# Patient Record
Sex: MALE | Race: BLACK OR AFRICAN AMERICAN | NOT HISPANIC OR LATINO | Employment: OTHER | URBAN - METROPOLITAN AREA
[De-identification: names, ages, dates, MRNs, and addresses within clinical notes are randomized per-mention and may not be internally consistent; named-entity substitution may affect disease eponyms.]

---

## 2023-03-14 DIAGNOSIS — K59.00 CONSTIPATION, UNSPECIFIED CONSTIPATION TYPE: ICD-10-CM

## 2023-03-14 DIAGNOSIS — K21.9 GASTROESOPHAGEAL REFLUX DISEASE WITHOUT ESOPHAGITIS: Primary | ICD-10-CM

## 2023-03-14 RX ORDER — POLYETHYLENE GLYCOL 3350 17 G/17G
17 POWDER, FOR SOLUTION ORAL ONCE
Qty: 17 G | Refills: 0 | Status: SHIPPED | OUTPATIENT
Start: 2023-03-14 | End: 2023-03-14

## 2023-03-14 RX ORDER — POLYETHYLENE GLYCOL 3350 17 G/17G
17 POWDER, FOR SOLUTION ORAL ONCE
COMMUNITY
End: 2023-03-14 | Stop reason: SDUPTHER

## 2023-03-14 RX ORDER — SIMETHICONE 125 MG
125 CAPSULE ORAL ONCE
Qty: 1 CAPSULE | Refills: 0 | Status: SHIPPED | OUTPATIENT
Start: 2023-03-14 | End: 2023-03-14

## 2023-03-14 RX ORDER — SIMETHICONE 125 MG
125 CAPSULE ORAL ONCE
COMMUNITY
End: 2023-03-14 | Stop reason: SDUPTHER

## 2023-03-27 ENCOUNTER — PATIENT OUTREACH (OUTPATIENT)
Dept: PRIMARY CARE | Facility: CLINIC | Age: 43
End: 2023-03-27
Payer: MEDICAID

## 2023-03-27 RX ORDER — POLYETHYLENE GLYCOL 3350 17 G/17G
17 POWDER, FOR SOLUTION ORAL DAILY
COMMUNITY
Start: 2017-05-11 | End: 2023-04-10 | Stop reason: SDUPTHER

## 2023-03-27 RX ORDER — DOCUSATE SODIUM 50 MG/5ML
10 LIQUID ORAL DAILY
COMMUNITY
End: 2023-05-10 | Stop reason: ALTCHOICE

## 2023-03-27 RX ORDER — NAPROXEN SODIUM 220 MG/1
81 TABLET, FILM COATED ORAL DAILY
COMMUNITY
Start: 2023-03-02 | End: 2023-06-21 | Stop reason: SDUPTHER

## 2023-03-27 RX ORDER — PHENYTOIN 125 MG/5ML
4 SUSPENSION ORAL 3 TIMES DAILY
COMMUNITY
Start: 2021-10-19 | End: 2023-09-15 | Stop reason: DRUGHIGH

## 2023-03-27 RX ORDER — HYDROCORTISONE 25 MG/G
OINTMENT TOPICAL 2 TIMES DAILY PRN
COMMUNITY
Start: 2016-01-13 | End: 2024-04-17 | Stop reason: SDUPTHER

## 2023-03-27 RX ORDER — ARTIFICIAL TEARS 1; 2; 3 MG/ML; MG/ML; MG/ML
1-2 SOLUTION/ DROPS OPHTHALMIC 2 TIMES DAILY
COMMUNITY

## 2023-03-27 RX ORDER — MEROPENEM 1 G/1
1 INJECTION, POWDER, FOR SOLUTION INTRAVENOUS EVERY 8 HOURS
Qty: 15 G | Refills: 0 | COMMUNITY
Start: 2023-03-23 | End: 2023-03-28

## 2023-03-27 RX ORDER — BACLOFEN 10 MG/1
1 TABLET ORAL NIGHTLY
COMMUNITY
Start: 2017-02-17 | End: 2024-04-08 | Stop reason: ALTCHOICE

## 2023-03-27 RX ORDER — ALPRAZOLAM 0.5 MG/1
0.5 TABLET ORAL AS NEEDED
COMMUNITY

## 2023-03-27 RX ORDER — SELENIUM SULFIDE 2.5 MG/100ML
1 LOTION TOPICAL
COMMUNITY

## 2023-03-27 RX ORDER — VANCOMYCIN HYDROCHLORIDE 1.25 G/25ML
1.25 INJECTION, POWDER, LYOPHILIZED, FOR SOLUTION INTRAVENOUS EVERY 8 HOURS
COMMUNITY
Start: 2023-03-23 | End: 2023-03-28

## 2023-03-27 RX ORDER — DEXTROMETHORPHAN/PSEUDOEPHED 2.5-7.5/.8
2 DROPS ORAL
COMMUNITY

## 2023-03-27 RX ORDER — CARBAMAZEPINE 200 MG/10ML
300 SUSPENSION ORAL 2 TIMES DAILY
COMMUNITY
Start: 2020-12-07 | End: 2024-02-02 | Stop reason: SDUPTHER

## 2023-03-27 RX ORDER — LEVETIRACETAM 100 MG/ML
20 SOLUTION ORAL 2 TIMES DAILY
COMMUNITY
Start: 2020-12-07 | End: 2024-01-30 | Stop reason: SDUPTHER

## 2023-03-27 RX ORDER — CARBAMAZEPINE 200 MG/10ML
150 SUSPENSION ORAL DAILY
COMMUNITY
End: 2024-02-02 | Stop reason: SDUPTHER

## 2023-03-27 RX ORDER — ACETAMINOPHEN 160 MG/5ML
20 LIQUID ORAL EVERY 6 HOURS
COMMUNITY

## 2023-03-27 RX ORDER — BACLOFEN 20 MG/1
20 TABLET ORAL 3 TIMES DAILY
COMMUNITY
End: 2024-01-16

## 2023-03-27 RX ORDER — DIAZEPAM 10 MG/2ML
5 INJECTION INTRAMUSCULAR AS NEEDED
COMMUNITY
Start: 2018-02-27 | End: 2024-02-02 | Stop reason: WASHOUT

## 2023-03-27 NOTE — PROGRESS NOTES
Discharge Facility: [facility]  Discharge Diagnosis: Pneumonia, Multiple sclerosis, seizure   Admission Date: 3/10/2023  Discharge Date: 3/24/2023    PCP appt:: Caregiver to schedule. Transportation must be coordinated.     Hospital Course:   Israel Edgar is a 42 year-old -American male with a history of end-stage SPMS (nonverbal, spastic quadriplegia, chronic little, PEG) c/b intractable bihemispheric epilepsy who is transferred from [facility] ED to [facility] for breakthrough vs provoked seizure. Patient also found COVID-positive s/p Remdesivir x5d (3/10-3/14), Dexamethasone x10d (3/10-3/19). Patient transferred to [facility] on 3/9 for TAMMIE monitoring and cvEEG.  Since transfer to [facility], EEG negative for seizures. Suspect breakthrough seizure i/s/o COVID infection. Patient developed new O2 requirement c/f PNA. Sputum Cx grew Pseudomonas and MRSA. ID consulted, started on Vanc/Jazzy. CTPE and DVT US negative. From epilepsy stand point, patient likely had breakthrough seizure i/s/o COVID infection but has been seizure-free since 3/10. Patient was transferred to the medicine team due to pulmonary findings.     Engagement  Call Start Time: 1230 (3/27/2023 12:41 PM)    Medications  Medications reviewed with patient/caregiver?: Yes (Patient's mother, primary caregiver, verbalized understanding of medication regime.) (3/27/2023 12:41 PM)  Is the patient having any side effects they believe may be caused by any medication additions or changes?: No (3/27/2023 12:41 PM)  Does the patient have all medications ordered at discharge?: No (3/27/2023 12:41 PM)  What is keeping the patient from filling the prescriptions?: Lost script/didn't receive (3/27/2023 12:41 PM)  Prescription Comments: Patient did not receive enough heparin flushes for administration of IV antibiotics. Only has one flush left. Patient's mother called [facility] and someone said they would work on it. (3/27/2023 12:41 PM)  Is the patient taking all medications as  directed (includes completed medication regime)?: Yes (3/27/2023 12:41 PM)  Care Management Interventions: Nurse notified pharmacy for assistance (3/27/2023 12:41 PM)  Medication Comments: Called [facility] pharmacy. Heparin flushes should be delivered by 3 pm today. (3/27/2023 12:41 PM)    Appointments  Does the patient have a primary care provider?: Yes (3/27/2023 12:41 PM)  Has the patient kept scheduled appointments due by today?: Not applicable (3/27/2023 12:41 PM)  Care Management Interventions: Advised patient to keep appointment (3/27/2023 12:41 PM)    Self Management  What is the home health agency?: [facility] (3/27/2023 12:41 PM)  Has home health visited the patient within 72 hours of discharge?: Yes (3/27/2023 12:41 PM)  What Durable Medical Equipment (DME) was ordered?: N/A- no needs at this time. (3/27/2023 12:41 PM)    Patient Teaching  Does the patient have access to their discharge instructions?: Yes (Mother, primary caregiver, verbalized understanding of instructions.) (3/27/2023 12:41 PM)  What is the patient's perception of their health status since discharge?: Improving (3/27/2023 12:41 PM)  Is the patient/caregiver able to teach back the hierarchy of who to call/visit for symptoms/problems? PCP, Specialist, Home Health nurse, Urgent Care, ED, 911: Yes (3/27/2023 12:41 PM)

## 2023-03-28 LAB
ALANINE AMINOTRANSFERASE (SGPT) (U/L) IN SER/PLAS: 18 U/L (ref 10–52)
ALBUMIN (G/DL) IN SER/PLAS: 3.3 G/DL (ref 3.4–5)
ALKALINE PHOSPHATASE (U/L) IN SER/PLAS: 71 U/L (ref 33–120)
ANION GAP IN SER/PLAS: 13 MMOL/L (ref 10–20)
ASPARTATE AMINOTRANSFERASE (SGOT) (U/L) IN SER/PLAS: 24 U/L (ref 9–39)
BILIRUBIN TOTAL (MG/DL) IN SER/PLAS: 0.4 MG/DL (ref 0–1.2)
CALCIUM (MG/DL) IN SER/PLAS: 7.9 MG/DL (ref 8.6–10.3)
CARBON DIOXIDE, TOTAL (MMOL/L) IN SER/PLAS: 24 MMOL/L (ref 21–32)
CHLORIDE (MMOL/L) IN SER/PLAS: 102 MMOL/L (ref 98–107)
CREATININE (MG/DL) IN SER/PLAS: 0.39 MG/DL (ref 0.5–1.3)
ERYTHROCYTE DISTRIBUTION WIDTH (RATIO) BY AUTOMATED COUNT: 12.6 % (ref 11.5–14.5)
ERYTHROCYTE MEAN CORPUSCULAR HEMOGLOBIN CONCENTRATION (G/DL) BY AUTOMATED: 32.4 G/DL (ref 32–36)
ERYTHROCYTE MEAN CORPUSCULAR VOLUME (FL) BY AUTOMATED COUNT: 97 FL (ref 80–100)
ERYTHROCYTES (10*6/UL) IN BLOOD BY AUTOMATED COUNT: 4.25 X10E12/L (ref 4.5–5.9)
GFR MALE: >90 ML/MIN/1.73M2
GLUCOSE (MG/DL) IN SER/PLAS: 69 MG/DL (ref 74–99)
HEMATOCRIT (%) IN BLOOD BY AUTOMATED COUNT: 41.3 % (ref 41–52)
HEMOGLOBIN (G/DL) IN BLOOD: 13.4 G/DL (ref 13.5–17.5)
LEUKOCYTES (10*3/UL) IN BLOOD BY AUTOMATED COUNT: 3.2 X10E9/L (ref 4.4–11.3)
PLATELETS (10*3/UL) IN BLOOD AUTOMATED COUNT: 226 X10E9/L (ref 150–450)
POTASSIUM (MMOL/L) IN SER/PLAS: 5 MMOL/L (ref 3.5–5.3)
PROTEIN TOTAL: 6.4 G/DL (ref 6.4–8.2)
SODIUM (MMOL/L) IN SER/PLAS: 134 MMOL/L (ref 136–145)
UREA NITROGEN (MG/DL) IN SER/PLAS: 8 MG/DL (ref 6–23)

## 2023-03-29 DIAGNOSIS — D72.819 LEUKOPENIA, UNSPECIFIED TYPE: Primary | ICD-10-CM

## 2023-03-30 NOTE — PROGRESS NOTES
Israel Edgar   1980   [id number]   [phone]   Suellen Talbert MD    Provider reach out to me for ACO assistance to place referral for  Home Care because pt is home bond and need lab draw. Thank you.

## 2023-04-13 ENCOUNTER — PATIENT OUTREACH (OUTPATIENT)
Dept: PRIMARY CARE | Facility: CLINIC | Age: 43
End: 2023-04-13
Payer: MEDICAID

## 2023-04-13 PROBLEM — G40.909 EPILEPSY (MULTI): Status: ACTIVE | Noted: 2023-04-13

## 2023-04-13 NOTE — PROGRESS NOTES
Call made to follow up after hospitalization for pneumonia, multiple sclerosis and seizure.       At time of outreach call the patient's mother, primary caregiver, feels as if the patient's condition has improved. His IV antibiotics are complete and [facility] has discharged. Last Friday, she took the patient to see the dentist to get fitted for a mouth guard. Due to his spasms, he was biting his lip and causing it to bleed. The patient has one open area on his buttocks that is being dressed. The caregiver states she is trying her best to keep the patient turned and off of the wound. The patient's peg site irritation has also improved. Though there was a scant amount of blood on the patient's dressing today.     The patient's mother was unable to schedule a hospital follow up. She is requesting assistance getting levetiracetam 100 mg/ml refilled. She states [place]. Message sent in [employer] to Deb Mora CNP, neurology, requesting a refill. Received a response from Deb stating the Rx was called in on 4/11/2023. Caregiver updated.    Offered CCM services to caregiver. Caregiver agreeable. Will continue to follow until a Care Manager is assigned to the case.

## 2023-05-03 ENCOUNTER — PATIENT OUTREACH (OUTPATIENT)
Dept: PRIMARY CARE | Facility: CLINIC | Age: 43
End: 2023-05-03
Payer: MEDICAID

## 2023-05-04 NOTE — PROGRESS NOTES
Call regarding patient's ER visits on 4/28/2023 and 5/1/2023. Spoke with mother, Vee, patient's primary caregiver. Vee states she initially took the patient to ER for concerns of a possible UTI. The patient has been sleeping more and she noted blood in his urine. The patient was negative for a UTI and released to home. Vee then received notification from the hospital the patient was Covid positive. Still concerned the patient was not acting like himself she returned to the ER with the patient. She was informed the patient was more than likely still testing positive from his Covid infection in March. She was also told the fatigue may be related to this as well. The patient released to home.     At this time, the patient's condition is about the same. He continues to sleep most of the time. No further blood noted in his urine. The wound on his sacral area has healed. The caregiver declines a PCP FU at this time. Advised to call with questions or concerns.

## 2023-05-10 ENCOUNTER — TELEMEDICINE (OUTPATIENT)
Dept: PRIMARY CARE | Facility: CLINIC | Age: 43
End: 2023-05-10
Payer: MEDICAID

## 2023-05-10 DIAGNOSIS — K59.00 CONSTIPATION, UNSPECIFIED CONSTIPATION TYPE: ICD-10-CM

## 2023-05-10 DIAGNOSIS — G40.919 INTRACTABLE EPILEPSY WITHOUT STATUS EPILEPTICUS, UNSPECIFIED EPILEPSY TYPE (MULTI): Primary | ICD-10-CM

## 2023-05-10 PROCEDURE — 99442 PR PHYS/QHP TELEPHONE EVALUATION 11-20 MIN: CPT | Performed by: STUDENT IN AN ORGANIZED HEALTH CARE EDUCATION/TRAINING PROGRAM

## 2023-05-10 RX ORDER — DOCUSATE SODIUM 50 MG/5ML
100 LIQUID ORAL DAILY
Qty: 100 ML | Refills: 5 | Status: SHIPPED | OUTPATIENT
Start: 2023-05-10 | End: 2023-06-21 | Stop reason: SDUPTHER

## 2023-05-10 RX ORDER — POLYETHYLENE GLYCOL 3350
17 POWDER (GRAM) MISCELLANEOUS DAILY
Qty: 500 G | Refills: 5 | Status: SHIPPED | OUTPATIENT
Start: 2023-05-10 | End: 2023-06-09

## 2023-05-10 NOTE — PROGRESS NOTES
This is a telephonic conversation between me and Jesuss mom Benji has.  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  Subjective   Patient ID: Israel Edgar is a 42 y.o. male who presents for follow up  Virtual or Telephone Consent    A telephone visit (audio only) between the patient (at the originating site) and the provider (at the distant site) was utilized to provide this telehealth service.   Verbal consent was requested and obtained from Mrs. Edgar ( Israel's mom)on this date, 05/10/23 for a telehealth visit.   HPI  Mom reports patient is off Vanco and meropenem.  Has been doing okay.  But does endorse disease constipation and requests refill of docusate and polyethylene glycol.  She also reports he has been having more frequent seizures and is under neurologist monitoring.  Requests refill for Versed nasal spray until she sees a neurologist or gets a hold of them.  Review of Systems   Constitutional:  Negative for activity change and fever.   HENT:  Negative for congestion.    Respiratory:  Negative for cough, shortness of breath and wheezing.    Cardiovascular:  Negative for chest pain and leg swelling.   Gastrointestinal:  Negative for abdominal pain, constipation, nausea and vomiting.   Endocrine: Negative for cold intolerance.   Genitourinary:  Negative for dysuria, hematuria and urgency.   Neurological:  Negative for dizziness, speech difficulty, weakness and numbness.   Psychiatric/Behavioral:  Negative for self-injury and suicidal ideas.          Physical Exam    Assessment/Plan          Problem List Items Addressed This Visit          Nervous    Epilepsy (CMS/HCC) - Primary    Relevant Medications    nasal spray midazolam (Versed) 5 mg/spray (0.1 mL) spray,non-aerosol     Other Visit Diagnoses       Constipation, unspecified constipation type        Relevant Medications    polyethylene glycol 3350,bulk, powder    docusate sodium (Colace) 50 mg/5 mL oral liquid

## 2023-05-24 ASSESSMENT — ENCOUNTER SYMPTOMS
CONSTIPATION: 0
ACTIVITY CHANGE: 0
VOMITING: 0
DIZZINESS: 0
SPEECH DIFFICULTY: 0
WEAKNESS: 0
COUGH: 0
FEVER: 0
DYSURIA: 0
SHORTNESS OF BREATH: 0
NUMBNESS: 0
ABDOMINAL PAIN: 0
WHEEZING: 0
NAUSEA: 0
HEMATURIA: 0

## 2023-05-31 ENCOUNTER — TELEPHONE (OUTPATIENT)
Dept: PRIMARY CARE | Facility: CLINIC | Age: 43
End: 2023-05-31
Payer: MEDICAID

## 2023-06-02 ENCOUNTER — PATIENT OUTREACH (OUTPATIENT)
Dept: PRIMARY CARE | Facility: CLINIC | Age: 43
End: 2023-06-02
Payer: MEDICAID

## 2023-06-02 NOTE — PROGRESS NOTES
Call placed regarding two month post discharge follow-up. Per patient's mother, the patient's condition is at baseline at this time. The patient has an appt for a Botox injection on 6/8/2023 to hopefully relieve some of the stiffness and spacticity in his left arm. The patient also has a podiatry appt on 6/15/2023 for an ingrown toenail. A new wheelchair is in process of being ordered to allow the patient to have time out of bed. Caregiver has TCM contact info for assistance as needed.

## 2023-06-09 ENCOUNTER — PATIENT OUTREACH (OUTPATIENT)
Dept: PRIMARY CARE | Facility: CLINIC | Age: 43
End: 2023-06-09
Payer: MEDICAID

## 2023-06-09 NOTE — PROGRESS NOTES
Received phone call from Vee requesting a home care referral for Israel. He needs PT/OT evaluations to assist in obtaining his custom wheelchair. Offered freedom of choice for home care. Vee stated [facility] as preferred agency. Request sent to Dr. Ken for home care referral.

## 2023-06-13 DIAGNOSIS — G40.909 NONINTRACTABLE EPILEPSY WITHOUT STATUS EPILEPTICUS, UNSPECIFIED EPILEPSY TYPE (MULTI): Primary | ICD-10-CM

## 2023-06-13 DIAGNOSIS — G93.1 ANOXIC BRAIN DAMAGE (MULTI): ICD-10-CM

## 2023-06-14 ENCOUNTER — TELEPHONE (OUTPATIENT)
Dept: PRIMARY CARE | Facility: CLINIC | Age: 43
End: 2023-06-14
Payer: MEDICAID

## 2023-06-18 DIAGNOSIS — G40.909 NONINTRACTABLE EPILEPSY WITHOUT STATUS EPILEPTICUS, UNSPECIFIED EPILEPSY TYPE (MULTI): Primary | ICD-10-CM

## 2023-06-21 ENCOUNTER — TELEMEDICINE (OUTPATIENT)
Dept: PRIMARY CARE | Facility: CLINIC | Age: 43
End: 2023-06-21
Payer: MEDICAID

## 2023-06-21 DIAGNOSIS — K59.00 CONSTIPATION, UNSPECIFIED CONSTIPATION TYPE: ICD-10-CM

## 2023-06-21 DIAGNOSIS — G40.901 NONINTRACTABLE EPILEPSY WITH STATUS EPILEPTICUS, UNSPECIFIED EPILEPSY TYPE (MULTI): Primary | ICD-10-CM

## 2023-06-21 DIAGNOSIS — G93.1 ANOXIC ENCEPHALOPATHY (MULTI): ICD-10-CM

## 2023-06-21 PROCEDURE — 99213 OFFICE O/P EST LOW 20 MIN: CPT | Performed by: STUDENT IN AN ORGANIZED HEALTH CARE EDUCATION/TRAINING PROGRAM

## 2023-06-21 RX ORDER — DOCUSATE SODIUM 50 MG/5ML
100 LIQUID ORAL DAILY
Qty: 100 ML | Refills: 11 | Status: SHIPPED | OUTPATIENT
Start: 2023-06-21 | End: 2023-07-25 | Stop reason: SDUPTHER

## 2023-06-21 RX ORDER — NAPROXEN SODIUM 220 MG/1
81 TABLET, FILM COATED ORAL DAILY
Qty: 30 TABLET | Refills: 11 | Status: SHIPPED | OUTPATIENT
Start: 2023-06-21 | End: 2023-08-24 | Stop reason: SDUPTHER

## 2023-06-21 NOTE — PROGRESS NOTES
Virtual or Telephone Consent    An interactive audio and video telecommunication system which permits real time communications between the patient (at the originating site) and provider (at the distant site) was utilized to provide this telehealth service.   Verbal consent was requested and obtained for Israel  from mother  on this date, 06/26/23, for a telehealth visit.       Subjective   Patient ID: Israel Edgar is a 42 y.o. male who presents for No chief complaint on file..  HPI  Israel is 42 years old with s/p anoxic encephalopathy, nonverbal, mostly alert but sometimes drowsy, is being seen via virtual visit along with his mother.  Benji is doing mostly okay, seeing his epilepsy specialist.  Mother reports he is doing fairly well, at baseline, requests refill of several medications along with medical supplies.  Also reports he has been seeing his neurologist who is providing Botox injections.  Past Medical History:   Diagnosis Date    Abnormal findings on diagnostic imaging of other specified body structures 10/11/2017    Nonspecific abnormal findings on radiological and examination of intrathoracic organs    Acute embolism and thrombosis of deep veins of unspecified upper extremity (CMS/HCC)     Acute deep vein thrombosis of arm    Acute upper respiratory infection, unspecified 12/05/2014    Acute upper respiratory infection    Impacted cerumen, bilateral 09/01/2017    Impacted cerumen of both ears    Nausea with vomiting, unspecified 09/09/2016    Nausea and vomiting in adult    Other conditions influencing health status     Acute Hypoxic Encephalopathy    Other conditions influencing health status     Schizophrenia    Other conditions influencing health status     Lumbar Puncture Traumatic Tap    Other muscle spasm     Muscle spasm    Paraplegia, unspecified (CMS/HCC)     Paraparesis of both lower limbs    Personal history of other diseases of urinary system 03/21/2017    History of bladder stone    Personal  history of other specified conditions 01/13/2017    History of urinary incontinence    Personal history of other specified conditions     History of headache    Personal history of urinary (tract) infections 01/31/2017    History of urinary tract infection    Personal history of urinary calculi 09/08/2015    History of renal calculi    Personal history of urinary calculi 03/07/2016    History of renal calculi    Unspecified visual loss     Vision problems      Past Surgical History:   Procedure Laterality Date    ILEOSTOMY  08/05/2015    Ileostomy    OTHER SURGICAL HISTORY  08/05/2015    Tracheostomy    OTHER SURGICAL HISTORY  09/16/2016    Feeding Tube      No family history on file.   Allergies   Allergen Reactions    Adhesive Tape-Silicones Other and Unknown     Adhesive Bandages MISC    Per NH record    Albuterol Unknown    Budesonide Unknown     pulmicort nebulizer    Famotidine Other    Lacosamide Unknown     mother states lethargy, will not allow this medication    Latex Other    Lorazepam Unknown     Ativan TABS    Other Other and Unknown    Pantoprazole Unknown     diarrhea, hypokalemia, hypomagnesium    Piperacillin-Tazobactam Unknown     erythema multiforme    Ranitidine Unknown    Sulfamethoxazole-Trimethoprim Unknown    Vancomycin Other     Per NH list    Ipratropium Bromide Rash    Nitrofurantoin Rash     facial rash and reddness              Review of Systems   Unable to perform ROS: Patient nonverbal   Patient's mother reports he has been breathing okay, no changes in his baseline mental status, no fever, no change in breathing pattern, having regular bowel movements, has a change Boss bag.  No signs of inflammation reported at PEG site.  No skin breaks or sacral ulcers reported      Physical Exam  Constitutional:       Comments: Physical examination virtual visit is limited.   HENT:      Head: Normocephalic.      Nose: Nose normal.   Pulmonary:      Effort: Pulmonary effort is normal.    Neurological:      Mental Status: He is alert.         Assessment/Plan     Problem List Items Addressed This Visit       Epilepsy (CMS/HCC) - Primary    Relevant Medications    aspirin 81 mg chewable tablet    Other Relevant Orders    Referral to Home Care     Other Visit Diagnoses       Anoxic encephalopathy (CMS/[facility])        Relevant Medications    aspirin 81 mg chewable tablet    Other Relevant Orders    Referral to Home Care    Constipation, unspecified constipation type        Relevant Medications    docusate sodium (Colace) 50 mg/5 mL oral liquid        Given the anoxic encephalopathy, history of epilepsy, homebound status, I believe Israel will benefit from home health care, PT/OT.  Patient to continue taking his medications.  To continue follow-up with neurology.

## 2023-06-26 ENCOUNTER — PATIENT OUTREACH (OUTPATIENT)
Dept: PRIMARY CARE | Facility: CLINIC | Age: 43
End: 2023-06-26
Payer: MEDICAID

## 2023-06-26 NOTE — PROGRESS NOTES
Call placed regarding for 90 day post discharge wrap up. At the time of call, the pt's mother and primary caregiver states she is taking the pt to the ER to be evaluated for a UTI. She said the patient is having watery diarrhea and suspects something is going on. Caregiver also upset because C referral was declined. Called [facility] and more details for referral needed. Sent a message to PCP and clinical pool requesting assistance. Will keep case open until issue resolved.

## 2023-06-27 ENCOUNTER — PATIENT OUTREACH (OUTPATIENT)
Dept: PRIMARY CARE | Facility: CLINIC | Age: 43
End: 2023-06-27
Payer: MEDICAID

## 2023-06-27 NOTE — PROGRESS NOTES
Israel Edgar   1980   [id number]   [phone]   Suellen Talbert MD    I called pt and left voice message in regards to  Homecare and have they notified pt in regards to assistance. I will follow up with  Homecare to check the status and follow up with pt with an update. Thank you.

## 2023-06-29 PROBLEM — Z93.1 GASTROSTOMY STATUS (MULTI): Status: ACTIVE | Noted: 2022-08-19

## 2023-06-29 PROBLEM — N31.9 NEUROGENIC BLADDER: Status: ACTIVE | Noted: 2023-06-29

## 2023-06-29 PROBLEM — G82.50 QUADRIPLEGIA, UNSPECIFIED (MULTI): Status: ACTIVE | Noted: 2022-08-19

## 2023-06-29 PROBLEM — L21.9 SEBORRHEIC DERMATITIS: Status: ACTIVE | Noted: 2023-06-29

## 2023-06-29 PROBLEM — G35 MULTIPLE SCLEROSIS (MULTI): Status: ACTIVE | Noted: 2022-08-19

## 2023-06-29 PROBLEM — R13.10 DYSPHAGIA: Status: ACTIVE | Noted: 2022-08-19

## 2023-06-29 PROBLEM — Z97.8 CHRONIC INDWELLING FOLEY CATHETER: Status: ACTIVE | Noted: 2023-06-29

## 2023-07-19 ENCOUNTER — PATIENT OUTREACH (OUTPATIENT)
Dept: PRIMARY CARE | Facility: CLINIC | Age: 43
End: 2023-07-19
Payer: MEDICAID

## 2023-07-19 DIAGNOSIS — K59.81 OGILVIE SYNDROME: ICD-10-CM

## 2023-07-19 RX ORDER — CHOLECALCIFEROL (VITAMIN D3) 10(400)/ML
10 DROPS ORAL DAILY
COMMUNITY
Start: 2023-03-07 | End: 2024-03-28 | Stop reason: SDUPTHER

## 2023-07-19 RX ORDER — POLYETHYLENE GLYCOL 3350 17 G/17G
17 POWDER, FOR SOLUTION ORAL
COMMUNITY
Start: 2017-05-11 | End: 2024-04-07 | Stop reason: ALTCHOICE

## 2023-07-19 RX ORDER — BACITRACIN ZINC AND POLYMYXIN B SULFATE 500; 10000 [USP'U]/G; [USP'U]/G
OINTMENT OPHTHALMIC
COMMUNITY
Start: 2020-08-27 | End: 2024-02-16 | Stop reason: ENTERED-IN-ERROR

## 2023-07-19 RX ORDER — ASPIRIN 81 MG
TABLET, DELAYED RELEASE (ENTERIC COATED) ORAL
COMMUNITY
Start: 2016-08-23 | End: 2024-02-16 | Stop reason: WASHOUT

## 2023-07-19 NOTE — PROGRESS NOTES
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 Brenna Scott  [facility] Encounter and Summary: Linked   See discharge assessment below for further details    Medications  Medications reviewed with patient/caregiver?: Yes (Reviewed with mother. No med changes made.) (7/19/2023  2:14 PM)  Is the patient having any side effects they believe may be caused by any medication additions or changes?: No (7/19/2023  2:14 PM)  Does the patient have all medications ordered at discharge?: Yes (7/19/2023  2:14 PM)  Care Management Interventions: No intervention needed (7/19/2023  2:14 PM)  Is the patient taking all medications as directed (includes completed medication regime)?: Yes (7/19/2023  2:14 PM)    Appointments  Does the patient have a primary care provider?: Yes (7/19/2023  2:14 PM)  Care Management Interventions: -- (Message sent to office to schedule a virtual hospital follow up.) (7/19/2023  2:14 PM)  Has the patient kept scheduled appointments due by today?: Not applicable (7/19/2023  2:14 PM)    Self Management  What is the home health agency?: [facility] (7/19/2023  2:14 PM)  Has home health visited the patient within 72 hours of discharge?: Yes (7/19/2023  2:14 PM)  What Durable Medical Equipment (DME) was ordered?: N/A (7/19/2023  2:14 PM)    Patient Teaching  Does the patient have access to their discharge instructions?: Yes (7/19/2023  2:14 PM)  What is the patient's perception of their health status since discharge?: Returned to baseline/stable (Per mother, pt is doing okay at this time. Peg tube is functioning well. Contiunues to monitor sacral area for breakdown. [facility] therapy has resumed.) (7/19/2023  2:14 PM)    Wrap Up  Wrap Up Additional  Comments: Presented to the ED complaining of constipation abdominal pain distention with nausea and vomiting. Found to have a   partial/high-grade obstruction at the proximal rectum was seen by surgery. Serial KUB showed improvement final diagnosis was a Gerardo obstruction. Had an existing feeding tube which needed to be replaced this was done on 6/17/2023. (7/19/2023  2:14 PM)

## 2023-07-20 DIAGNOSIS — K59.00 CONSTIPATION, UNSPECIFIED CONSTIPATION TYPE: Primary | ICD-10-CM

## 2023-07-25 ENCOUNTER — TELEMEDICINE (OUTPATIENT)
Dept: PRIMARY CARE | Facility: CLINIC | Age: 43
End: 2023-07-25
Payer: MEDICAID

## 2023-07-25 DIAGNOSIS — K59.00 CONSTIPATION, UNSPECIFIED CONSTIPATION TYPE: ICD-10-CM

## 2023-07-25 PROCEDURE — 99213 OFFICE O/P EST LOW 20 MIN: CPT | Performed by: STUDENT IN AN ORGANIZED HEALTH CARE EDUCATION/TRAINING PROGRAM

## 2023-07-25 RX ORDER — DOCUSATE SODIUM 50 MG/5ML
100 LIQUID ORAL DAILY
Qty: 100 ML | Refills: 11 | Status: SHIPPED | OUTPATIENT
Start: 2023-07-25 | End: 2023-08-02 | Stop reason: SDUPTHER

## 2023-07-25 NOTE — PROGRESS NOTES
Virtual or Telephone Consent    An interactive audio and video telecommunication system which permits real time communications between the patient (at the originating site) and provider (at the distant site) was utilized to provide this telehealth service.   Verbal consent was requested and obtained from Mrs Edgar(per mom ) on this date, 7/25/2023for a telehealth visit.    Virtual or Telephone Consent    An interactive audio and video telecommunication system which permits real time communications between the patient (at the originating site) and provider (at the distant site) was utilized to provide this telehealth service.   Verbal consent was requested and obtained for Israel  from mother  on this date, 06/26/23, for a telehealth visit.       Subjective   Patient ID: Israel Edgar is a 42 y.o. male who presents for No chief complaint on file..  HPI  Israel is 42 years old with s/p anoxic encephalopathy, nonverbal, mostly alert but sometimes drowsy, is being seen via virtual visit along with his mother.  Benji is doing mostly okay, seeing his epilepsy specialist.  Mother reports he is doing fairly well, at baseline, requests refill of several medications along with medical supplies.  Also reports he has been seeing his neurologist who is providing Botox injections.  Past Medical History:   Diagnosis Date    Abnormal findings on diagnostic imaging of other specified body structures 10/11/2017    Nonspecific abnormal findings on radiological and examination of intrathoracic organs    Acute embolism and thrombosis of deep veins of unspecified upper extremity (CMS/HCC)     Acute deep vein thrombosis of arm    Acute upper respiratory infection, unspecified 12/05/2014    Acute upper respiratory infection    Impacted cerumen, bilateral 09/01/2017    Impacted cerumen of both ears    Nausea with vomiting, unspecified 09/09/2016    Nausea and vomiting in adult    Other conditions influencing health status     Acute Hypoxic  Encephalopathy    Other conditions influencing health status     Schizophrenia    Other conditions influencing health status     Lumbar Puncture Traumatic Tap    Other muscle spasm     Muscle spasm    Paraplegia, unspecified (CMS/HCC)     Paraparesis of both lower limbs    Personal history of other diseases of urinary system 03/21/2017    History of bladder stone    Personal history of other specified conditions 01/13/2017    History of urinary incontinence    Personal history of other specified conditions     History of headache    Personal history of urinary (tract) infections 01/31/2017    History of urinary tract infection    Personal history of urinary calculi 09/08/2015    History of renal calculi    Personal history of urinary calculi 03/07/2016    History of renal calculi    Unspecified visual loss     Vision problems      Past Surgical History:   Procedure Laterality Date    ILEOSTOMY  08/05/2015    Ileostomy    OTHER SURGICAL HISTORY  08/05/2015    Tracheostomy    OTHER SURGICAL HISTORY  09/16/2016    Feeding Tube          Allergies   Allergen Reactions    Adhesive Tape-Silicones Other and Unknown     Adhesive Bandages MISC    Per NH record    Albuterol Unknown    Budesonide Unknown     pulmicort nebulizer    Famotidine Other    Lacosamide Unknown     mother states lethargy, will not allow this medication    Latex Other    Lorazepam Unknown     Ativan TABS    Other Other and Unknown    Pantoprazole Unknown     diarrhea, hypokalemia, hypomagnesium    Piperacillin-Tazobactam Unknown     erythema multiforme    Ranitidine Unknown    Sulfamethoxazole-Trimethoprim Unknown    Suppository Adult [Glycerin (Adult)] Other     Mom doesnot want; says previous GI said no suppository     Vancomycin Other     Per NH list    Ipratropium Bromide Rash    Nitrofurantoin Rash     facial rash and reddness         Review of Systems   Unable to perform ROS: Patient nonverbal   Patient's mother reports he has been breathing okay,  no changes in his baseline mental status, no fever, no change in breathing pattern, having regular bowel movements, has a change Boss bag.  No signs of inflammation reported at PEG site.  No skin breaks or sacral ulcers reported      Physical Exam  Constitutional:       Comments: Physical examination virtual visit is limited.   HENT:      Head: Normocephalic.      Nose: Nose normal.   Pulmonary:      Effort: Pulmonary effort is normal.   Neurological:      Mental Status: He is alert.       Problem List Items Addressed This Visit    None  Visit Diagnoses       Constipation, unspecified constipation type

## 2023-08-02 ENCOUNTER — PATIENT OUTREACH (OUTPATIENT)
Dept: PRIMARY CARE | Facility: CLINIC | Age: 43
End: 2023-08-02
Payer: MEDICAID

## 2023-08-02 ENCOUNTER — TELEPHONE (OUTPATIENT)
Dept: PRIMARY CARE | Facility: CLINIC | Age: 43
End: 2023-08-02
Payer: MEDICAID

## 2023-08-02 DIAGNOSIS — K59.00 CONSTIPATION, UNSPECIFIED CONSTIPATION TYPE: ICD-10-CM

## 2023-08-02 RX ORDER — DOCUSATE SODIUM 50 MG/5ML
100 LIQUID ORAL 3 TIMES DAILY
Qty: 473 ML | Refills: 11 | Status: SHIPPED | OUTPATIENT
Start: 2023-08-02 | End: 2023-10-31

## 2023-08-02 NOTE — PROGRESS NOTES
Unable to reach patient's mother, primary caregiver, for call back after patient's follow up appointment with Dr. Ken. [facility] with call back number for patient to call if needed.    13:09 Received call back from pt's mother. Pt's bowels are still not moving well and she has been giving the pt enemas as needed. Additionally, there was a problem with pt's colace Rx and the correct volume of medicine was not received. Verified in pt's chart colace ordered in correct dosage and amount on 7/25/2023. Caregiver to check with [facility]'s pharmacy for prescription. Caregiver continues to work with neurology to get the pt a wheelchair and is very frustrated with the lack of progress being made. Offered to make a request to Dr. Ken for assistance, but caregiver declined.

## 2023-08-16 LAB
ANION GAP IN SER/PLAS: 14 MMOL/L (ref 10–20)
CALCIUM (MG/DL) IN SER/PLAS: 9.1 MG/DL (ref 8.6–10.6)
CARBAMAZEPINE (UG/ML) IN SER/PLAS: 6.5 UG/ML (ref 4–12)
CARBON DIOXIDE, TOTAL (MMOL/L) IN SER/PLAS: 28 MMOL/L (ref 21–32)
CHLORIDE (MMOL/L) IN SER/PLAS: 99 MMOL/L (ref 98–107)
CREATININE (MG/DL) IN SER/PLAS: 0.42 MG/DL (ref 0.5–1.3)
GFR MALE: >90 ML/MIN/1.73M2
GLUCOSE (MG/DL) IN SER/PLAS: 104 MG/DL (ref 74–99)
PHENYTOIN (UG/ML) IN SER/PLAS: 18.5 UG/ML (ref 10–20)
POTASSIUM (MMOL/L) IN SER/PLAS: 4 MMOL/L (ref 3.5–5.3)
SODIUM (MMOL/L) IN SER/PLAS: 137 MMOL/L (ref 136–145)
UREA NITROGEN (MG/DL) IN SER/PLAS: 9 MG/DL (ref 6–23)

## 2023-08-17 LAB — KEPPRA: 44 UG/ML (ref 10–40)

## 2023-08-23 ENCOUNTER — PATIENT OUTREACH (OUTPATIENT)
Dept: PRIMARY CARE | Facility: CLINIC | Age: 43
End: 2023-08-23
Payer: MEDICAID

## 2023-08-23 RX ORDER — LACTULOSE 10 G/15ML
10 SOLUTION ORAL; RECTAL 2 TIMES DAILY
COMMUNITY
Start: 2023-08-22 | End: 2024-02-02
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
## 2023-08-24 ENCOUNTER — TELEPHONE (OUTPATIENT)
Dept: PRIMARY CARE | Facility: CLINIC | Age: 43
End: 2023-08-24
Payer: MEDICAID

## 2023-08-24 DIAGNOSIS — G93.1 ANOXIC ENCEPHALOPATHY (MULTI): ICD-10-CM

## 2023-08-24 DIAGNOSIS — G40.901 NONINTRACTABLE EPILEPSY WITH STATUS EPILEPTICUS, UNSPECIFIED EPILEPSY TYPE (MULTI): ICD-10-CM

## 2023-08-25 RX ORDER — NAPROXEN SODIUM 220 MG/1
81 TABLET, FILM COATED ORAL DAILY
Qty: 30 TABLET | Refills: 11 | Status: SHIPPED | OUTPATIENT
Start: 2023-08-25 | End: 2024-08-19

## 2023-08-29 ENCOUNTER — DOCUMENTATION (OUTPATIENT)
Dept: PRIMARY CARE | Facility: CLINIC | Age: 43
End: 2023-08-29
Payer: MEDICAID

## 2023-08-29 DIAGNOSIS — G40.909 NONINTRACTABLE EPILEPSY WITHOUT STATUS EPILEPTICUS, UNSPECIFIED EPILEPSY TYPE (MULTI): ICD-10-CM

## 2023-08-29 DIAGNOSIS — G35 MULTIPLE SCLEROSIS (MULTI): Primary | ICD-10-CM

## 2023-08-29 DIAGNOSIS — G93.1 ANOXIC ENCEPHALOPATHY (MULTI): ICD-10-CM

## 2023-08-29 DIAGNOSIS — Z97.8 CHRONIC INDWELLING FOLEY CATHETER: ICD-10-CM
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
## 2023-09-12 ENCOUNTER — PATIENT OUTREACH (OUTPATIENT)
Dept: PRIMARY CARE | Facility: CLINIC | Age: 43
End: 2023-09-12
Payer: MEDICAID

## 2023-09-12 DIAGNOSIS — G40.919 BREAKTHROUGH SEIZURE (MULTI): ICD-10-CM

## 2023-09-12 DIAGNOSIS — R31.9 URINARY TRACT INFECTION WITH HEMATURIA, SITE UNSPECIFIED: ICD-10-CM

## 2023-09-12 DIAGNOSIS — N39.0 URINARY TRACT INFECTION WITH HEMATURIA, SITE UNSPECIFIED: ICD-10-CM

## 2023-09-12 RX ORDER — ADHESIVE BANDAGE
10 BANDAGE TOPICAL 2 TIMES DAILY
COMMUNITY
End: 2024-02-02

## 2023-09-12 RX ORDER — SENNOSIDES 8.8 MG/5ML
5 LIQUID ORAL 2 TIMES DAILY
COMMUNITY
End: 2024-04-07 | Stop reason: ALTCHOICE

## 2023-09-12 RX ORDER — CEFDINIR 300 MG/1
300 CAPSULE ORAL EVERY 12 HOURS
COMMUNITY
Start: 2023-09-08 | End: 2023-09-17

## 2023-09-12 NOTE — PROGRESS NOTES
Discharge Facility: [facility]  Discharge Diagnosis: Breakthrough seizure, UTI, Chronic little  Admission Date: 9/6/2023  Discharge Date: 9/8/2023    PCP Appointment Date: Needs scheduled  Specialist Appointment Date: 9/14/2023 11:30 Dr. Feliberto Mendosa, Neurology  Hospital Encounter and Summary: Linked   See discharge assessment below for further details    Engagement  Call Start Time: 1532 (9/12/2023  3:34 PM)    Medications  Medications reviewed with patient/caregiver?: Yes (9/12/2023  3:34 PM)  Is the patient having any side effects they believe may be caused by any medication additions or changes?: No (9/12/2023  3:34 PM)  Does the patient have all medications ordered at discharge?: Yes (9/12/2023  3:34 PM)  Is the patient taking all medications as directed (includes completed medication regime)?: Yes (9/12/2023  3:34 PM)    Appointments  Does the patient have a primary care provider?: Yes (9/12/2023  3:34 PM)  Care Management Interventions: -- (Message sent to office to schedule a virtual visit.) (9/12/2023  3:34 PM)  Has the patient kept scheduled appointments due by today?: Not applicable (9/12/2023  3:34 PM)    Self Management  What is the home health agency?: [facility] (9/12/2023  3:34 PM)  Has home health visited the patient within 72 hours of discharge?: Call prior to 72 hours (9/12/2023  3:34 PM)  What Durable Medical Equipment (DME) was ordered?: [employer] (9/12/2023  3:34 PM)    Patient Teaching  Does the patient have access to their discharge instructions?: Yes (9/12/2023  3:34 PM)  Care Management Interventions: Reviewed instructions with patient (9/12/2023  3:34 PM)  What is the patient's perception of their health status since discharge?: Returned to baseline/stable (9/12/2023  3:34 PM)  Is the patient/caregiver able to teach back the hierarchy of who to call/visit for symptoms/problems? PCP, Specialist, Home Health nurse, Urgent Care, ED, 911: Yes (9/12/2023  3:34 PM)    Wrap Up  Wrap Up Additional Comments:  Pt with h/o MS and a seizure disorder transported to ED after having a breakthrough seizure. Neurology consulted and hospital meds adjusted. Pt has a chronic little and also diagnosed with a UTI. ID consulted and IV meropenem given. Pt is total care and discharged to home with support of family and HHC. Rx for cedinir 300 mg every 12 hours given. (9/12/2023  3:34 PM)

## 2023-09-13 DIAGNOSIS — N39.0 URINARY TRACT INFECTION WITHOUT HEMATURIA, SITE UNSPECIFIED: Primary | ICD-10-CM

## 2023-09-15 ENCOUNTER — PATIENT OUTREACH (OUTPATIENT)
Dept: PRIMARY CARE | Facility: CLINIC | Age: 43
End: 2023-09-15
Payer: MEDICAID

## 2023-09-15 ENCOUNTER — DOCUMENTATION (OUTPATIENT)
Dept: PRIMARY CARE | Facility: CLINIC | Age: 43
End: 2023-09-15
Payer: MEDICAID

## 2023-09-15 DIAGNOSIS — G35 MULTIPLE SCLEROSIS (MULTI): ICD-10-CM

## 2023-09-15 DIAGNOSIS — G40.909 NONINTRACTABLE EPILEPSY WITHOUT STATUS EPILEPTICUS, UNSPECIFIED EPILEPSY TYPE (MULTI): Primary | ICD-10-CM

## 2023-09-15 DIAGNOSIS — G93.1 ANOXIC ENCEPHALOPATHY (MULTI): ICD-10-CM

## 2023-09-15 RX ORDER — PHENYTOIN 125 MG/5ML
SUSPENSION ORAL
Qty: 360 ML | Refills: 11 | Status: SHIPPED | OUTPATIENT
Start: 2023-09-15 | End: 2024-02-02 | Stop reason: SDUPTHER

## 2023-09-15 NOTE — PROGRESS NOTES
Spoke to Ms Edgar   She reports  patient is having breakthrough seizures  and reports that patient had similar reactions to keflex. She is able to give him Nazylam for seizure control and has Versed if needed.  We discussed on proceeding to the emergency room for further evaluation and management but Ms. Edgar would like to take him if he has a tonic-clonic seizure.  We discussed on stopping Omnicef [patient has had at least 10 days in total of antibiotics including hospitalization].  Agreeable to repeat urine culture/analysis.  Home health referral placed with occupational therapy per patient request.  Phenytoin medication sent per request.  Ms. Edgar promises to take Benji to emergency room or call 911 with persisting seizures.

## 2023-09-15 NOTE — PROGRESS NOTES
Received phone call from pt's mother Vee. Israel is continuing to have breakthrough seizures. Pt had 3 seizures yesterday and a seizure this morning. Vee is using nasal spray midazolam with relief. Advised mother to call the pt's neurologist, but she states they have not returned her previous calls. Vee called Dr. Ken and is waiting for a return call. Message sent to Dr. Ken updating on seizure this morning. Vee is concerned omnicef that pt is taking for his UTI is contributing to his breakthrough seizures. Notified physician of caregiver concerns.

## 2023-09-18 ENCOUNTER — LAB (OUTPATIENT)
Dept: LAB | Facility: LAB | Age: 43
End: 2023-09-18
Payer: MEDICAID

## 2023-09-18 DIAGNOSIS — N39.0 URINARY TRACT INFECTION WITHOUT HEMATURIA, SITE UNSPECIFIED: ICD-10-CM

## 2023-09-18 LAB
APPEARANCE, URINE: CLEAR
BILIRUBIN, URINE: NEGATIVE
BLOOD, URINE: NEGATIVE
COLOR, URINE: YELLOW
GLUCOSE, URINE: NEGATIVE MG/DL
KETONES, URINE: NEGATIVE MG/DL
LEUKOCYTE ESTERASE, URINE: ABNORMAL
MUCUS, URINE: NORMAL /LPF
NITRITE, URINE: NEGATIVE
PH, URINE: 7 (ref 5–8)
PROTEIN, URINE: NEGATIVE MG/DL
RBC, URINE: <1 /HPF (ref 0–5)
SPECIFIC GRAVITY, URINE: 1.01 (ref 1–1.03)
SQUAMOUS EPITHELIAL CELLS, URINE: <1 /HPF
UROBILINOGEN, URINE: <2 MG/DL (ref 0–1.9)
WBC, URINE: 4 /HPF (ref 0–5)

## 2023-09-18 PROCEDURE — 87086 URINE CULTURE/COLONY COUNT: CPT

## 2023-09-18 PROCEDURE — 81001 URINALYSIS AUTO W/SCOPE: CPT

## 2023-09-18 PROCEDURE — 87186 SC STD MICRODIL/AGAR DIL: CPT

## 2023-09-18 PROCEDURE — 87077 CULTURE AEROBIC IDENTIFY: CPT

## 2023-09-20 ENCOUNTER — DOCUMENTATION (OUTPATIENT)
Dept: PRIMARY CARE | Facility: CLINIC | Age: 43
End: 2023-09-20
Payer: MEDICAID

## 2023-09-20 ENCOUNTER — TELEPHONE (OUTPATIENT)
Dept: PRIMARY CARE | Facility: CLINIC | Age: 43
End: 2023-09-20
Payer: MEDICAID

## 2023-09-20 LAB — URINE CULTURE: ABNORMAL

## 2023-09-20 RX ORDER — CIPROFLOXACIN 500 MG/1
500 TABLET ORAL 2 TIMES DAILY
Qty: 10 TABLET | Refills: 0 | Status: SHIPPED | OUTPATIENT
Start: 2023-09-20 | End: 2023-09-25

## 2023-09-20 NOTE — PROGRESS NOTES
"Spoke to Ms. Edgar.  At 11:37 AM today . we spoke about the urine culture still showing up gram-negative bacilli-sensitivity r yet to be reported.  She reports Benji's Boss catheter is blocked and is leaking.  She changed it but it is still leaking.  Advised her to proceed to the emergency room immediately for further evaluation and management.  She does report-she would like to go Emergency room at the main [place].  But reports she has no choice when the EMS comes in.    I did speak to our patient coordinator, who in turn called Vee Edgar for concerns and advised again to take Benji to the emergency room and that we will arrange transportation/EMS to take him to the main [place].  However per patient coordinator, Ms. Edgar did not want to take him to the emergency room.    I spoke to Ms. Edgar again at 2:30 PM today, discussed culture sensitivity report.  I advised again to take him to the emergency room because his catheter is leaking.  She reports \"I know Benji better\" and that \"you do not know anything and sits on a hard chair day and night and you do not understand\".  she does not want him taken to the emergency room.    She is agreeable to give him ciprofloxacin for the UTI [per sensitivity report], we discussed benefits and risks.  I do understand the complicated history of Benji Edgar with multiple sclerosis, nonverbal state and multiple hospital admissions,etc. But given given the nature of today's conversation with Ms. Vee Edgar along with her reluctance to take him to the emergency room, I believe this will not set a good foundation for the forthcoming patient-doctor Relationship.  I did explain to Ms. Vee Edgar that I will be discharging Benji from my practice.  We will be filling his current medications for 30 days within which she will have to find another primary care physician.  She is agreeable to this.  "

## 2023-09-21 ENCOUNTER — PATIENT OUTREACH (OUTPATIENT)
Dept: CARE COORDINATION | Facility: CLINIC | Age: 43
End: 2023-09-21
Payer: MEDICAID

## 2023-09-21 ENCOUNTER — TELEPHONE (OUTPATIENT)
Dept: PRIMARY CARE | Facility: CLINIC | Age: 43
End: 2023-09-21
Payer: MEDICAID

## 2023-09-21 ENCOUNTER — DOCUMENTATION (OUTPATIENT)
Dept: PRIMARY CARE | Facility: CLINIC | Age: 43
End: 2023-09-21
Payer: MEDICAID

## 2023-09-21 SDOH — ECONOMIC STABILITY: GENERAL: WOULD YOU LIKE HELP WITH ANY OF THE FOLLOWING NEEDS?: I DONT NEED HELP WITH ANY OF THESE

## 2023-09-21 NOTE — PROGRESS NOTES
Unsuccessful outreach call to raisa's primary caregiver to offer care managemtns services.  No answer; message left requesting a return call.    Germaine LARSONN RN CCM

## 2023-09-21 NOTE — PROGRESS NOTES
RN CM spoke with patient's primary caregiver/mother Vee Cruz RN to assess resources.  CM provided information on  House calls program and the services they provide and Mrs. Cruz is ok with referral being sent. Per mom, patient is total care related to MS.  Mom administers feeding, catheter care, ADL's and IADL's.  CM provided verbal information on [place] Waiver services and will send out written information.  CM will follow up with primary caregiver on routine basis.        Germaine BLAKE RN [facility]  [phone]

## 2023-09-21 NOTE — PROGRESS NOTES
"Documenting this because my last note is not visible on epic.  My conversation with Ms. Vee Edgar yesterday in the morning was about patient's urinary tract infection.  Given that despite antibiotics, Benji's urine is still showing positive for infection with culture and urinalysis along with his mom reporting that his catheter was leaking.  She reports she did change his Boss catheter.  I advised Ms. Edgar to proceed to the emergency room and call 911 for Benji for further evaluation and management for UTI and Boss's.  She reported she did not want to take him to [facility] due to her previous experiences and would like to go to [facility].  Spoke to patient coordinator about this.  Per my conversation with patient coordinator our patient coordinator, Ms. Edgar did not want to take him to [facility] either but wanted antibiotics at home.  I called him later yesterday afternoon for the second time.  However during our conversation Ms. Edgar started yelling at me saying \"you sit on a hard chair day in and day out in your office and do not know what is happening here\".  \"I know Benji's body\".  Despite multiple attempts to calm her down and convince her to take her son to the emergency room, Ms. Edgar would not agree.  Finally, I had sent in an antibiotic keeping in view the risks of continuing care at home without antibiotics including but not limited to sepsis etc.  I did discuss the benefits and risks of the antibiotic with Ms. Edgar.  She agreed.  However, given today's conversation with Ms. Edgar and her yelling, I think going forward this would not set a good precedence to our doctor-patient relationship.  I did discuss with her that I will be discharging Benji of my practice and that for the next 30 days we will be filling all the medications Benji requires.  Within this time she should find another primary care physician.  She agrees to this.      "

## 2023-09-21 NOTE — TELEPHONE ENCOUNTER
Result Communication    Resulted Orders   Urinalysis with Reflex Microscopic   Result Value Ref Range    Color, Urine YELLOW STRAW,YELLOW    Appearance, Urine CLEAR CLEAR    Specific Gravity, Urine 1.012 1.005 - 1.035    pH, Urine 7.0 5.0 - 8.0    Protein, Urine NEGATIVE NEGATIVE mg/dL    Glucose, Urine NEGATIVE NEGATIVE mg/dL    Blood, Urine NEGATIVE NEGATIVE    Ketones, Urine NEGATIVE NEGATIVE mg/dL    Bilirubin, Urine NEGATIVE NEGATIVE    Urobilinogen, Urine <2.0 0.0 - 1.9 mg/dL    Nitrite, Urine NEGATIVE NEGATIVE    Leukocyte Esterase, Urine MODERATE(2+) (A) NEGATIVE   Urine Culture   Result Value Ref Range    Urine Culture Pseudomonas aeruginosa (A)       Comment:      >100,000 CFU/ML   Urinalysis Microscopic Only   Result Value Ref Range    WBC, Urine 4 0 - 5 /HPF    RBC, Urine <1 0 - 5 /HPF    Squamous Epithelial Cells, Urine <1 /HPF    Mucus, Urine 1+ /LPF       3:32 PM    Patient was sent yesterday patient's mom's request.  We discussed on presenting to the emergency room because she reports catheter is clogged.  We discussed about benefits risks of ciprofloxacin.  Verbalizes understanding and is okay to proceed.  Medication sent

## 2023-10-06 ENCOUNTER — PATIENT OUTREACH (OUTPATIENT)
Dept: PRIMARY CARE | Facility: CLINIC | Age: 43
End: 2023-10-06
Payer: MEDICAID

## 2023-10-06 NOTE — PROGRESS NOTES
Call placed regarding monthly post discharge follow up. Spoke with pt's mother Vee. Pt was referred to the  House Calls Program, but mother does not think this program would be beneficial at this time. She would prefer to stay with a primary care provider. Will explore options for pt.

## 2023-10-17 PROBLEM — L89.102: Status: ACTIVE | Noted: 2023-10-17

## 2023-10-20 ENCOUNTER — PATIENT OUTREACH (OUTPATIENT)
Dept: PRIMARY CARE | Facility: CLINIC | Age: 43
End: 2023-10-20
Payer: MEDICAID

## 2023-10-20 NOTE — PROGRESS NOTES
Call placed regarding monthly discharge follow up. Spoke with the patient's mother and primary care giver Vee. Confirmed from chart the patient's wound care supplies are in the process of being ordered.     The patient is continuing with with home care OT, but they will be wrapping up soon. Home care PT already discharged. Vee states the exercises were too much for Israel and his legs became still and it was difficult to dress him.     Vee is concerned the patient's bowels are not emptying completely, but she will follow up with GI for this. The patient has a colonoscopy scheduled for 12/1/2023.     Advised caregiver to call with questions or concerns as needed.

## 2023-10-27 ENCOUNTER — TELEPHONE (OUTPATIENT)
Dept: GASTROENTEROLOGY | Facility: HOSPITAL | Age: 43
End: 2023-10-27
Payer: MEDICAID

## 2023-10-27 DIAGNOSIS — K59.09 CHRONIC CONSTIPATION: Primary | ICD-10-CM

## 2023-10-27 RX ORDER — SYRING-NEEDL,DISP,INSUL,0.3 ML 29 G X1/2"
148 SYRINGE, EMPTY DISPOSABLE MISCELLANEOUS EVERY OTHER DAY
Qty: 2220 ML | Refills: 3 | Status: SHIPPED | OUTPATIENT
Start: 2023-10-27 | End: 2024-02-24

## 2023-10-30 DIAGNOSIS — K59.01 SLOW TRANSIT CONSTIPATION: Primary | ICD-10-CM

## 2023-10-30 RX ORDER — SORBITOL SOLUTION 70 %
30 SOLUTION, ORAL MISCELLANEOUS DAILY PRN
Qty: 473 ML | Refills: 3 | Status: SHIPPED | OUTPATIENT
Start: 2023-10-30 | End: 2024-01-01

## 2023-11-06 ENCOUNTER — TELEPHONE (OUTPATIENT)
Dept: GASTROENTEROLOGY | Facility: HOSPITAL | Age: 43
End: 2023-11-06
Payer: MEDICAID

## 2023-11-15 ENCOUNTER — PATIENT OUTREACH (OUTPATIENT)
Dept: PRIMARY CARE | Facility: CLINIC | Age: 43
End: 2023-11-15
Payer: MEDICAID

## 2023-11-16 ENCOUNTER — TELEPHONE (OUTPATIENT)
Dept: PRIMARY CARE | Facility: CLINIC | Age: 43
End: 2023-11-16
Payer: MEDICAID

## 2023-11-16 DIAGNOSIS — L89.102 PRESSURE INJURY OF BACK, STAGE 2 (MULTI): Primary | ICD-10-CM

## 2023-11-16 DIAGNOSIS — Z93.1 GASTROSTOMY STATUS (MULTI): ICD-10-CM

## 2023-11-16 DIAGNOSIS — G35 MULTIPLE SCLEROSIS (MULTI): ICD-10-CM

## 2023-11-17 NOTE — PROGRESS NOTES
Call placed regarding monthly post discharge follow up. Spoke with patient's mother/primary caregiver Vee. Per Vee, the patient is still struggling with constipation. She did give him magnesium citrate recently with relief. The patient has an upcoming follow up with GI and tentatively a colonoscopy scheduled. There are concerns about the patient's anti-seizure medication as it is supposed to be taken with food and the patient will be on clear liquids for his colonoscopy prep. Vee will work with neurology to resolve this. No questions or concerns for Dr. Ken at this time.

## 2023-11-28 ENCOUNTER — PATIENT OUTREACH (OUTPATIENT)
Dept: PRIMARY CARE | Facility: CLINIC | Age: 43
End: 2023-11-28
Payer: MEDICAID

## 2023-11-29 NOTE — PROGRESS NOTES
Received phone call from patient's mother, Vee, updating on patient status. Israel is still having issues with constipation and she is using one full bottle of mag citrate every other day with results. After consulting neurology, Israel's upcoming colonoscopy has been canceled due to the increased risk of seizures with holding his medications. Additionally, she was notified by [facility] the tube feeding formula Israel uses is being discontinued. After talking with the dietician, a new formula was chosen. Authorization for the new formula to be sent to Dr. Ken's office. Message sent to office notifying of change and letting them know to watch for authorization paperwork.

## 2023-11-30 ENCOUNTER — PATIENT OUTREACH (OUTPATIENT)
Dept: PRIMARY CARE | Facility: CLINIC | Age: 43
End: 2023-11-30
Payer: MEDICAID

## 2023-11-30 NOTE — PROGRESS NOTES
Outreach made to notify mother Dr. Ken's office sent the authorization for Israel's new tube feedings. [facility] states [facility] already made a delivery today. Further states, the new formula is very comparable to the old.

## 2023-12-01 ENCOUNTER — APPOINTMENT (OUTPATIENT)
Dept: GASTROENTEROLOGY | Facility: HOSPITAL | Age: 43
End: 2023-12-01
Payer: MEDICAID

## 2023-12-08 ENCOUNTER — PATIENT OUTREACH (OUTPATIENT)
Dept: PRIMARY CARE | Facility: CLINIC | Age: 43
End: 2023-12-08
Payer: MEDICAID

## 2023-12-08 NOTE — PROGRESS NOTES
Unable to reach patient/mother, Vee, for outreach call to wrap up TCM services. LVM with caregiver to return call for questions or concerns. The patient has met the 30 day and 90 day rehospitalization goals and is discharged from TCM services.

## 2023-12-14 ENCOUNTER — PROCEDURE VISIT (OUTPATIENT)
Dept: NEUROLOGY | Facility: CLINIC | Age: 43
End: 2023-12-14
Payer: MEDICAID

## 2023-12-14 DIAGNOSIS — G82.50 QUADRIPLEGIA, UNSPECIFIED (MULTI): ICD-10-CM

## 2023-12-14 DIAGNOSIS — G35 MULTIPLE SCLEROSIS (MULTI): Primary | ICD-10-CM

## 2023-12-14 NOTE — PROGRESS NOTES
Provider Impressions     Neuro - Immunology Assessment: This is a 43 year old AA with PMH of: end-stage MS (EDSS 9.5), secondary seizures/status, and anoxic brain injury in 2013 who presents: for spasticity management of the bilateral fingers and left arm.   The Neurological Exam showed: moderate spasticity of the bilateral fingers and left elbow with dystonic flexion. Otherwise patient is quadriplegic and non-verbal with mixed hypertonia/hypotonia and PEG tube.   MRI Showed: confluent periventricular, subcortical, and infratentorial white matter lesion with severe global brain atrophy on the study from 2021. MRI from 2012 shows large fluffy lesions with average brain volume. I also personally reviewed his spine MRI from 2012, which showed multiple short segment cord lesions.   CSF (Cerebrospinal Fluid): done at the time of diagnosis and was reportedly consistent with MS.   OCT/VEP: not done.   MS Mimics: had negative serum AQP4 antibody by PARVIZ in 2012.   The Overall Picture is Suggestive of: end stage SPMS (initial MRI has some MOGAD-like features but overall progression and disease severity more consistent with MS). No value in testing MOG at this point.   DMT (Disease Modifying Therapies): he has accumulated maximum neurological disability and the benefits of DMT will not outweigh the risks of infection in his case. I would not recommend DMT for him at this point. I would not recommend ITB either given high potential for infections and missed refills. We can give him a trial of botox to improve finger and elbow posture per mother's wishes (25 u right FDS, 25 U left FDS, and 50 u left biceps). will avoid MRI monitoring as well since immunomodulation is no longer indicated in case of ongoing disease activity.         Interval Assessment:   06/08/2023: first botox session. Inject LUE 25 FDS, 25 FDP, 50 biceps. will add RUE next time. tolerated well     09/14/2023: botox helped improve ROM especially around the  elbow. no side effects. I added the right arm and will plan a higher dose next time.         Plan: end stage MS was discussed with the patient (and family if present) in detail including pathogenesis, clinical picture, complications, course, prognosis, monitoring strategy, and management options. All questions answered.   Acute Relapse Management: not indicated.   DMT (Disease Modifying Therapies): not indicated.   Symptomatic Management: continue botox.   Vitamin D: continue 5000 units liquid vitamin D daily.   Smoking: quit.   PT/OT: continue.   Follow-Up: after three months for botox.      Patient Discussion/Summary  - You were injected with a total of 200 units of botox. You tolerated the procedure well. The effect of botox usually kicks in after 5 to 7 days and lasts for about 3 months. Please call office after 7 to 10 days to report how you are doing. Follow up in three months for repeat injections. next time we are planning to increase to 300 units.     - Please continue same dose of oral baclofen.      - Please continue physical therapy and daily stretching.      Thank you very much for coming to see me today.     Feliberto Mendosa MD, PhD   of Neurology  [facility] [employer]  Director of Neuroimmunology and staff neurologist at the [facility] Center  [facility]  09/14/2023   12:00PM        Attending Note                I saw and evaluated the patient. I personally obtained the key and critical portions of the history and physical exam or was physically present for key and critical portions performed by the trainee. I reviewed the trainee's documentation and discussed the patient with the trainee. I agree with the trainee's medical decision making, as documented on the trainee's note, with the exception/addition of the following:   Procedure Note Attestation   Procedure: botox.    Procedure performed by: me and the  fellow.    I supervised the entire procedure .   Comments/Additional Findings:   I edited the note and co-wrote the assessment and plan.     medical decision making was moderate complexity.     Feliberto Mendosa MD, PhD   of Neurology  [facility] [employer]  Director of Neuroimmunology and staff neurologist at the [facility] Disorder Center  [facility]  09/14/2023   8:09 PM  .      Orders  Multiple sclerosis    · Renew: Vitamin D3 10 MCG/ML Oral Liquid; Administer 10mL (4000 units) via G tube  once daily     Chief Complaint  MS.      History of Present Illness     Neuro - Immunology   Date of onset: 2007   Date of diagnosis: 12/2007   Last MRI brain: 2021   Last MRI cervical: 2017   Last MRI thoracic: 2017    SPMS.      Disease Course at Onset: RR.   Disease Course Now: SPMS.   Current DMT (Disease Modifying Therapies): none.   Previous DMT (Disease Modifying Therapies): rebif from 2008 to 2012 then stopped after having a spinal attack then he switched to avonex.   CSF (Cerebrospinal Fluid): done at the time of diagnosis and was reportedly consistent with MS.   JCV (Schuyler Cunningham Virus): unknown.   VZV (Varicella Zoster Virus): negative in 2012.   Hep Panel: unknown.   NMO (Neuromyelitis Optica): negative by serum PARVIZ in 2012.   MOG (Myelin Oligodendrocyte Glycoprotein): not done.   Narrative HPI:   This is a 43 yo male patient with hx of end-stage MS (EDSS 9.5), secondary seizures, and anoxic brain injury in 2013 who is here to establish care for MS and spasticity accompanied by his mother. He is a former patient of Dr. Min.      Interval hx:  botox helped improve ROM especially around the elbow. no side effects. I added the right arm and will plan a higher dose next time.   MS Symptom Review:   Weakness:   Sensory changes:   Gait Change:   Bladder/bowel dysfunction:   Spasticity:   Dystonia:   Cognitive changes:   No DMT.    Vitamin D: not taking.   Symptomatic: baclofen 30, 20, 20. allergic to tizanidine.      Review of Systems  All systems reviewed and are negative except as mentioned in the HPI.        Active Problems  Problems    · Atelectasis (518.0) (J98.11)   · Bilateral dry eyes (375.15) (H04.123)   · Chronic indwelling Boss catheter (V45.89) (Z97.8)   · Complex partial seizures evolving to generalized tonic-clonic seizures (345.40)  (G40.209)   · Constipation (564.00) (K59.00)   · Constipation, chronic (564.00) (K59.09)   · Discharge planning issues (V68.9) (Z02.9)   · Drooling (527.7) (K11.7)   · Dysphagia (787.20) (R13.10)   · Encephalopathy (348.30) (G93.40)   · Epilepsy (345.90) (G40.909)   · Fecal incontinence (787.60) (R15.9)   · Flatulence (787.3) (R14.3)   · Gastrostomy tube in place (V44.1) (Z93.1)   · GERD without esophagitis (530.81) (K21.9)   · Ileus (560.1) (K56.7)   · Immobility (799.89) (Z74.09)   · Incontinence of urine (788.30) (R32)   · Insomnia (780.52) (G47.00)   · Major depressive disorder (296.20) (F32.9)   · Multiple sclerosis (340) (G35)   · Muscle spasm (728.85) (M62.838)   · Muscle weakness (generalized) (728.87) (M62.81)   · Neurogenic bladder (596.54) (N31.9)   · Rash, skin (782.1) (R21)   · Seborrheic dermatitis (690.10) (L21.9)   · Seizure, convulsion (780.39) (R56.9)   · Seizures (780.39) (R56.9)   · 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)   · Spasticity (781.0) (R25.2)   · Ulcerative blepharitis left lower eyelid (373.01) (H01.015)   · Ulcerative blepharitis left upper eyelid (373.01) (H01.014)   · Ulcerative blepharitis of right lower eyelid (373.01) (H01.012)   · Ulcerative blepharitis of right upper eyelid (373.01) (H01.011)   · UTI (urinary tract infection) (599.0) (N39.0)     Past Medical History  Problems    · History of Acute deep vein thrombosis of arm (453.82) (I82.629)   · History of Acute Hypoxic Encephalopathy (348.1)   · History of Acute upper respiratory infection (465.9) (J06.9)    · Resolved Date: 24 Jan 2017   · History of bladder stone (V13.09) (Z87.448)   · Resolved Date: 03 Oct 2017   · History of headache (V13.89) (Z87.898)   · History of renal calculi (V13.01) (Z87.442)   · Resolved Date: 03 Oct 2017   · History of renal calculi (V13.01) (Z87.442)   · Resolved Date: 03 Oct 2017   · History of urinary incontinence (V13.09) (Z87.898)   · Resolved Date: 08 May 2018   · History of urinary tract infection (V13.02) (Z87.440)   · Resolved Date: 03 Oct 2017   · Added by Problem List Migration; 2013-7-20; Moved to Suppressed Nov 24 2013 4:45PM   · History of Impacted cerumen of both ears (380.4) (H61.23)   · Resolved Date: 08 May 2018   · History of Lumbar Puncture Traumatic Tap (349.0)   · History of Nausea and vomiting in adult (787.01) (R11.2)   · Resolved Date: 08 May 2018   · History of Nonspecific abnormal findings on radiological and examination of intrathoracic  organs (793.2) (R93.89)   · Resolved Date: 08 May 2018   · Added by Problem List Migration; 2013-7-20; Moved to Suppressed Nov 24 2013 4:45PM   · History of Paraparesis of both lower limbs (344.1) (G82.20)   · History of Schizophrenia (V11.0)   · History of Vision problems (V41.0) (H54.7)     Surgical History  Problems    · History of Feeding Tube   · History of Ileostomy   · History of Tracheostomy     Family History  Mother    · Family history of Breast Cancer (V16.3)  Father    · Family history of Diabetes Mellitus (V18.0)  Maternal Grandmother    · Family history of congestive heart failure (V17.49) (Z82.49)   · Family history of hypertension (V17.49) (Z82.49)     Social History  Problems    · Denied: History of Alcohol   · Does not use illicit drugs (V49.89) (Z78.9)   · Former smoker (V15.82) (Z87.891)   · No caffeine use     Allergies  Medication    · Adhesive Bandages MISC   Recorded By: Catalina Nesbitt; 5/6/2014 2:52:49 PM   · Ativan TABS   Adverse Reaction; Updated By: Deidre Taylor; 2/13/2019 4:50:56 PM   ·  Atrovent   Recorded By: Catalina Nesbitt; 5/6/2014 2:52:49 PM   · Bactrim TABS   Recorded By: Catalina Nesbitt; 5/6/2014 2:52:49 PM   · Pepcid   Recorded By: Chana Becker; 10/3/2017 8:55:09 AM   · Pulmicort Flexhaler INHA   Recorded By: Rubina Marlow; 8/5/2015 11:31:47 AM   · Zosyn   Recorded By: Chana Becker; 10/3/2017 8:54:29 AM  NonMedication    · Latex   Recorded By: Rubina Marlow; 8/5/2015 11:31:47 AM  Denied    · vancomycin   Updated By: Deidre Taylor; 2/13/2019 4:50:56 PM   · Vancomycin HCl CAPS   Updated By: Deidre Taylor; 2/13/2019 4:50:56 PM   · Zanaflex   Updated By: Rubina Marlow; 8/5/2015 11:31:47 AM  Unsure if true allergy. May be other factors involved.     Current Meds     Medication Name Instruction   Acetaminophen 160 MG/5ML Oral Solution SWALLOW 20 ML Every 6 hours   Alcohol Pads 70 % Pad clean skin before injection   ALPRAZolam 1 MG Oral Tablet One half to one tablet po three times daily   Aquaphor External Ointment     Artificial Tears 0.1-0.3 % Ophthalmic Solution APPLY DROP Every twelve hours   Aspirin 81 MG Oral Tablet Chewable PER PEG TUBE DAILY   Bacitracin-Polymyxin B 500-43666 UNIT/GM Ophthalmic Ointment APPLY A SMALL AMOUNT OF OINTMENT TWICE DAILY TO AFFECTED EYE(S).   Baclofen 10 MG Oral Tablet TAKE 3 TABLET Daily In The Morning in morning via peg tube   Baclofen 20 MG Oral Tablet TAKE 1 TABLET tid   Boost Plus Oral Liquid 2 cartons by peg every morning 2 in the afternoon and 1 at [facility]   carBAMazepine 100 MG/5ML Oral Suspension Take 15ML in the AM, 7.5ML in the afternoon and 15ML in the evening.   Debrox 6.5 % Otic Solution USE AS DIRECTED.   diazePAM 5 MG/ML Injection Solution 10ml multi-dose vial. Inject1-3ML(5-15MG) in the muscle as needed for seizures greated than 3 minutes or cluster of 2 to 3 seizures [id number]   Docusate Sodium 100 MG/10ML Oral Liquid TAKE 10 ML Daily   Fleet Enema ENEM     Gas-X 125 MG CAPS     Hydrocortisone 2.5 % External Ointment APPLY AS DIRECTED.  "As needed. Scalp and face   Lactulose 10 GM/15ML Oral Solution TAKE 15 ML Twice daily   levETIRAcetam 100 MG/ML Oral Solution TAKE 20 ML Twice daily   Milk of Magnesia Concentrate 2400 MG/10ML Oral Suspension TAKE 10 ML TWICE DAILY   N95 Face Mask use every week   Nayzilam 5 MG/0.1ML Nasal Solution Max 2 doses /single episode: One spray ( 5 mg) x 1 for one nostril for generelized tonic clonic seziure lasting for more than 5 minutes or prolonged seizure as discussed in clinic visit. If no resolution in seizure then repeat one spray of 5mg in other nostril after 5 min.      Canbe used 1 episode every 3 days upto 5 episodes per month. Please contact physician in case of question.   PEG 3350-KCl-Na Bicarb-NaCl 420 GM Oral Solution Reconstituted TAKE AS DIRECTED.   Phenytoin 125 MG/5ML Oral Suspension TAKE 4 ML 3 times daily   Selenium Sulfide 2.5 % External Lotion USE AS DIRECTED ON PACKAGE   Selenium Sulf-Pyrithione-Urea 2.25 % SHAM SHAMPOO HAIR/SCALP AS DIRECTED   Senna 8.8 MG/5ML Oral Liquid TAKE 5 ML TWICE DAILY   Silace LIQD TAKE 10 ML Daily Via PEG tube   Syringe 22G X 1-1/2\" 3 ML MISC To be used with diazepam 5mg/mL   traZODone HCl - 100 MG Oral Tablet TAKE 1 TABLET BY MOUTH EVERY NIGHT AT BEDTIME   Vitamin D3 10 MCG/ML Oral Liquid Administer 10mL (4000 units) via G tube once daily.   Zinc Oxide 20 % External Ointment to buttocks twice a day      Physical Exam  non-verbal, sleepy but able to open eyes. not tracking   Motor: notable spasticity mainly in the fingers bilaterally and the left elbow. Otherwise has mixed hypertonia, hypotonia, and contractures. 0/5 all limbs. none.   Deep Tendon Reflexes: left biceps 0 , right biceps 0, left triceps 0, right triceps 0, left brachioradialis 0, right brachioradialis 0, left patella 0, right patella 0, left ankle jerk 0, right ankle jerk 0   Gait: Gait was untestable. Secondary to the patient's weakness and imbalance, gait testing was not possible and was medically " contraindicated.      Procedure     Procedure: Botox injection.   Indication: muscle spasm.   Risk, benefits, alternatives, risk of worsening pain and risk of focal weakness were discussed with the parent. Verbal consent was obtained prior to the procedure. Alcohol was used to prep the area. Anesthesia: no anesthesia was needed.   Procedure Note:   The patient was placed in the upright position. 200 ml Botox-A was injected into the biceps 50 u, FDS 25 u, and FDP 25 u each. zero units discarded, bilaterally using a transcartilaginous technique using EMG guidance to identify the injection site. Lot number: [id number], [id number]. Expiration date: 2/2026, 10/2025.   Post-Procedure:   the patient tolerated the procedure well. Complications: None.   Follow-up in the office in 12 weeks for repeat injection(s).

## 2023-12-14 NOTE — PATIENT INSTRUCTIONS
- You were injected with a total of 300 units of botox. You tolerated the procedure well. The effect of botox usually kicks in after 5 to 7 days and lasts for about 3 months. Please call office after 7 to 10 days to report how you are doing. Follow up in three months for repeat injections. next time we are planning to increase to 300 units.     - Please continue same dose of oral baclofen.     - I ordered home OT, aid, and hospital bed for you     - Please continue daily stretching.      Thank you very much for coming to see me today.     Feliberto Mendosa MD, PhD   of Neurology  [facility] [employer]  Director of Neuroimmunology and staff neurologist at the [facility] Center  [facility]

## 2023-12-15 ENCOUNTER — HOME HEALTH ADMISSION (OUTPATIENT)
Dept: HOME HEALTH SERVICES | Facility: HOME HEALTH | Age: 43
End: 2023-12-15
Payer: MEDICAID

## 2023-12-15 ENCOUNTER — TELEPHONE (OUTPATIENT)
Dept: HOME HEALTH SERVICES | Facility: HOME HEALTH | Age: 43
End: 2023-12-15

## 2023-12-15 NOTE — TELEPHONE ENCOUNTER
Hello,     Thank you for the Home Health Referral     Unfortunately, [facility] does not provide non-skilled services. Due to lack of skilled needs, we have made the referral for Non-Admit. If the patient requires skilled services (SN, PT, ST), please resubmit the referral indicating the patient's needs. Other services (OT, HHA, MSW, RD) can be added if one of these qualifying services are ordered. Thank you

## 2023-12-18 ENCOUNTER — DOCUMENTATION (OUTPATIENT)
Dept: HOME HEALTH SERVICES | Facility: HOME HEALTH | Age: 43
End: 2023-12-18
Payer: MEDICAID

## 2023-12-18 ENCOUNTER — HOME HEALTH ADMISSION (OUTPATIENT)
Dept: HOME HEALTH SERVICES | Facility: HOME HEALTH | Age: 43
End: 2023-12-18
Payer: MEDICAID

## 2023-12-18 DIAGNOSIS — G35 MULTIPLE SCLEROSIS (MULTI): Primary | ICD-10-CM

## 2023-12-18 NOTE — HH CARE COORDINATION
Home Care received a Referral for Physical Therapy and Home Health Aide. We have processed the referral for a Start of Care for 24 -48 hours.     If you have any questions or concerns, please feel free to contact us at [phone]. Follow the prompts, enter your five digit zip code, and you will be directed to your care team on [facility].

## 2023-12-19 NOTE — TELEPHONE ENCOUNTER
Vee, mother called. Mom would like you to order Speech Therapy ( he is non-verbal) & OT for [place]'s home health care order. She stated that PT is not helpful as it causes more spasticity in his legs when they stretch/ROM his legs.  She declined nursing care, home health aide, & PT, as she states she takes care of all his needs. [facility] can't open a case for OT only it needs to be combined with PT, HHA, nursing, or SLP.

## 2023-12-22 PROBLEM — R15.9 FECAL INCONTINENCE: Status: ACTIVE | Noted: 2023-12-22

## 2023-12-27 NOTE — TELEPHONE ENCOUNTER
Ms Herve called today. She wanted to report that the Botox injections don't seem to be helping his hands. She stated there is no change. She would like to speak directly to you regarding ordering Speech Therapy and Occupational Therapy for home care.

## 2024-01-13 DIAGNOSIS — G35 MULTIPLE SCLEROSIS (MULTI): Primary | ICD-10-CM

## 2024-01-15 ENCOUNTER — TELEPHONE (OUTPATIENT)
Dept: NEUROLOGY | Facility: HOSPITAL | Age: 44
End: 2024-01-15
Payer: MEDICAID

## 2024-01-15 NOTE — TELEPHONE ENCOUNTER
Ms. Edgar called today. She would like to speak to you directly  about ordering SLP with OT for Home care for [place]. She stated she requested a script for another hospital bed at his last appointment. He currently has one issued by Medicaid but she wants a new improved bed. She wants it to go to [facility] but doesn't have a specific model or type.

## 2024-01-16 ENCOUNTER — APPOINTMENT (OUTPATIENT)
Dept: NEUROLOGY | Facility: CLINIC | Age: 44
End: 2024-01-16
Payer: MEDICAID

## 2024-01-16 RX ORDER — BACLOFEN 20 MG/1
20 TABLET ORAL 3 TIMES DAILY
Qty: 270 TABLET | Refills: 1 | Status: SHIPPED | OUTPATIENT
Start: 2024-01-16 | End: 2024-01-23 | Stop reason: SDUPTHER

## 2024-01-19 DIAGNOSIS — G35 MULTIPLE SCLEROSIS (MULTI): Primary | ICD-10-CM

## 2024-01-23 DIAGNOSIS — N39.0 URINARY TRACT INFECTION ASSOCIATED WITH INDWELLING URETHRAL CATHETER, SEQUELA: Primary | ICD-10-CM

## 2024-01-23 DIAGNOSIS — G35 MULTIPLE SCLEROSIS (MULTI): ICD-10-CM

## 2024-01-23 DIAGNOSIS — T83.511S URINARY TRACT INFECTION ASSOCIATED WITH INDWELLING URETHRAL CATHETER, SEQUELA: Primary | ICD-10-CM

## 2024-01-23 RX ORDER — BACLOFEN 20 MG/1
20 TABLET ORAL 3 TIMES DAILY
Qty: 270 TABLET | Refills: 1 | Status: SHIPPED | OUTPATIENT
Start: 2024-01-23 | End: 2024-04-12 | Stop reason: HOSPADM

## 2024-01-25 ENCOUNTER — LAB (OUTPATIENT)
Dept: LAB | Facility: LAB | Age: 44
End: 2024-01-25
Payer: MEDICAID

## 2024-01-25 DIAGNOSIS — N39.0 URINARY TRACT INFECTION ASSOCIATED WITH INDWELLING URETHRAL CATHETER, SEQUELA: ICD-10-CM

## 2024-01-25 DIAGNOSIS — T83.511S URINARY TRACT INFECTION ASSOCIATED WITH INDWELLING URETHRAL CATHETER, SEQUELA: ICD-10-CM

## 2024-01-25 LAB
AMORPH CRY #/AREA UR COMP ASSIST: ABNORMAL /HPF
APPEARANCE UR: ABNORMAL
BACTERIA #/AREA URNS AUTO: ABNORMAL /HPF
BILIRUB UR STRIP.AUTO-MCNC: NEGATIVE MG/DL
COLOR UR: ABNORMAL
GLUCOSE UR STRIP.AUTO-MCNC: NEGATIVE MG/DL
KETONES UR STRIP.AUTO-MCNC: NEGATIVE MG/DL
LEUKOCYTE ESTERASE UR QL STRIP.AUTO: ABNORMAL
MUCOUS THREADS #/AREA URNS AUTO: ABNORMAL /LPF
NITRITE UR QL STRIP.AUTO: NEGATIVE
PH UR STRIP.AUTO: 8 [PH]
PROT UR STRIP.AUTO-MCNC: ABNORMAL MG/DL
RBC # UR STRIP.AUTO: ABNORMAL /UL
RBC #/AREA URNS AUTO: >20 /HPF
SP GR UR STRIP.AUTO: 1.02
SQUAMOUS #/AREA URNS AUTO: ABNORMAL /HPF
UROBILINOGEN UR STRIP.AUTO-MCNC: <2 MG/DL
WBC #/AREA URNS AUTO: ABNORMAL /HPF
WBC CLUMPS #/AREA URNS AUTO: ABNORMAL /HPF

## 2024-01-25 PROCEDURE — 81001 URINALYSIS AUTO W/SCOPE: CPT

## 2024-01-25 PROCEDURE — 87086 URINE CULTURE/COLONY COUNT: CPT

## 2024-01-27 LAB — BACTERIA UR CULT: ABNORMAL

## 2024-01-30 ENCOUNTER — TELEMEDICINE (OUTPATIENT)
Dept: NEUROLOGY | Facility: CLINIC | Age: 44
End: 2024-01-30
Payer: MEDICAID

## 2024-01-30 ENCOUNTER — TELEPHONE (OUTPATIENT)
Dept: PRIMARY CARE | Facility: CLINIC | Age: 44
End: 2024-01-30
Payer: MEDICAID

## 2024-01-30 DIAGNOSIS — R31.9 URINARY TRACT INFECTION WITH HEMATURIA, SITE UNSPECIFIED: Primary | ICD-10-CM

## 2024-01-30 DIAGNOSIS — G40.909 NONINTRACTABLE EPILEPSY WITHOUT STATUS EPILEPTICUS, UNSPECIFIED EPILEPSY TYPE (MULTI): ICD-10-CM

## 2024-01-30 DIAGNOSIS — G40.919 REFRACTORY EPILEPSY (MULTI): Primary | ICD-10-CM

## 2024-01-30 DIAGNOSIS — N39.0 URINARY TRACT INFECTION WITH HEMATURIA, SITE UNSPECIFIED: Primary | ICD-10-CM

## 2024-01-30 DIAGNOSIS — G40.919 INTRACTABLE EPILEPSY WITHOUT STATUS EPILEPTICUS, UNSPECIFIED EPILEPSY TYPE (MULTI): ICD-10-CM

## 2024-01-30 PROCEDURE — 99214 OFFICE O/P EST MOD 30 MIN: CPT | Performed by: PSYCHIATRY & NEUROLOGY

## 2024-01-30 PROCEDURE — 99214 OFFICE O/P EST MOD 30 MIN: CPT | Mod: GT | Performed by: PSYCHIATRY & NEUROLOGY

## 2024-01-30 RX ORDER — CIPROFLOXACIN 500 MG/5ML
500 KIT ORAL 2 TIMES DAILY
Qty: 50 ML | Refills: 0 | Status: SHIPPED | OUTPATIENT
Start: 2024-01-30 | End: 2024-02-04

## 2024-01-30 RX ORDER — CIPROFLOXACIN 500 MG/1
500 TABLET ORAL 2 TIMES DAILY
Qty: 10 TABLET | Refills: 0 | Status: SHIPPED | OUTPATIENT
Start: 2024-01-30 | End: 2024-02-04

## 2024-01-30 NOTE — TELEPHONE ENCOUNTER
Result Communication    Resulted Orders   Urine Culture   Result Value Ref Range    Urine Culture (A)      Multiple organisms present, probable contamination. Repeat culture if clinically indicated.   Urinalysis with Reflex Microscopic   Result Value Ref Range    Color, Urine Vangie (N) Straw, Yellow    Appearance, Urine Turbid (N) Clear    Specific Gravity, Urine 1.018 1.005 - 1.035    pH, Urine 8.0 5.0, 5.5, 6.0, 6.5, 7.0, 7.5, 8.0    Protein, Urine 100 (2+) (N) NEGATIVE mg/dL    Glucose, Urine NEGATIVE NEGATIVE mg/dL    Blood, Urine SMALL (1+) (A) NEGATIVE    Ketones, Urine NEGATIVE NEGATIVE mg/dL    Bilirubin, Urine NEGATIVE NEGATIVE    Urobilinogen, Urine <2.0 <2.0 mg/dL    Nitrite, Urine NEGATIVE NEGATIVE    Leukocyte Esterase, Urine LARGE (3+) (A) NEGATIVE   Microscopic Only, Urine   Result Value Ref Range    WBC, Urine 21-50 (A) 1-5, NONE /HPF    WBC Clumps, Urine MODERATE Reference range not established. /HPF    RBC, Urine >20 (A) NONE, 1-2, 3-5 /HPF    Squamous Epithelial Cells, Urine 1-9 (SPARSE) Reference range not established. /HPF    Bacteria, Urine 1+ (A) NONE SEEN /HPF    Mucus, Urine 2+ Reference range not established. /LPF    Amorphous Crystals, Urine 2+ NONE, 1+, 2+ /HPF       4:55 PM      Cath changed on th day of collected urine sample  Adv mother  to let gi know ( has scheduled colonoscopy soon )  Uti -cath asso    Mom agreeable to ciprofloxacin   Discussed Aortitis / c diff/

## 2024-02-02 RX ORDER — PHENYTOIN 125 MG/5ML
SUSPENSION ORAL
Qty: 360 ML | Refills: 11 | Status: SHIPPED | OUTPATIENT
Start: 2024-02-02

## 2024-02-02 RX ORDER — CARBAMAZEPINE 200 MG/10ML
300 SUSPENSION ORAL 2 TIMES DAILY
Qty: 900 ML | Refills: 11 | Status: SHIPPED | OUTPATIENT
Start: 2024-02-02 | End: 2024-02-05 | Stop reason: SDUPTHER

## 2024-02-02 RX ORDER — LEVETIRACETAM 100 MG/ML
2000 SOLUTION ORAL 2 TIMES DAILY
Qty: 1200 ML | Refills: 11 | Status: SHIPPED | OUTPATIENT
Start: 2024-02-02

## 2024-02-02 RX ORDER — CARBAMAZEPINE 200 MG/10ML
150 SUSPENSION ORAL DAILY
Qty: 225 ML | Refills: 11 | Status: SHIPPED | OUTPATIENT
Start: 2024-02-02 | End: 2024-02-05 | Stop reason: ENTERED-IN-ERROR

## 2024-02-02 NOTE — PROGRESS NOTES
Interim history:  Telephone encounter with mom for follow up. Secondary progressive MS, intractable epilepsy, neurogenic bladder c/w recurrent infections and neurogenic bowel complicated with Ileus.   Midazolam Intranasal has been helping to stop seizures.   These continue to occur 1-2 every ~1  month.   On LEV 2gr bid, phenytoin 4ml tid and carbamazepine 100mg/10ML (15-7.5-15).   Ileus continues to be a significant problem. Getting scheduled colonoscopy this week, will require inpatient admission.       Plan:  Continue LEV, phenytoin carbamazepine.   Levels today   Follow up in 4-6 months.      Epilepsy Classification:  Epileptic Paroxysmal Episodes  Epileptogenic Zone: bihemispheric    Epileptic seizure semiology:   1. Type 1: Left versive seizureà left clonic   2. Type 2: right arm tonic à right arm clonic   Etiology: multiple sclerosis     Significant Comorbidities: multiple sclerosis  Onset date: 2013  Diagnosis date: 2013  Previous disease therapies: LEV, PHE, CBZ

## 2024-02-05 DIAGNOSIS — G40.919 REFRACTORY EPILEPSY (MULTI): ICD-10-CM

## 2024-02-05 RX ORDER — CARBAMAZEPINE 200 MG/10ML
SUSPENSION ORAL
Qty: 1125 ML | Refills: 11 | Status: SHIPPED | OUTPATIENT
Start: 2024-02-05 | End: 2024-03-19

## 2024-02-13 ENCOUNTER — TELEPHONE (OUTPATIENT)
Dept: PRIMARY CARE | Facility: CLINIC | Age: 44
End: 2024-02-13
Payer: MEDICAID

## 2024-02-13 ENCOUNTER — LAB (OUTPATIENT)
Dept: LAB | Facility: LAB | Age: 44
End: 2024-02-13
Payer: MEDICAID

## 2024-02-13 DIAGNOSIS — R31.9 URINARY TRACT INFECTION WITH HEMATURIA, SITE UNSPECIFIED: ICD-10-CM

## 2024-02-13 DIAGNOSIS — N39.0 URINARY TRACT INFECTION WITH HEMATURIA, SITE UNSPECIFIED: Primary | ICD-10-CM

## 2024-02-13 DIAGNOSIS — N39.0 URINARY TRACT INFECTION WITH HEMATURIA, SITE UNSPECIFIED: ICD-10-CM

## 2024-02-13 DIAGNOSIS — R31.9 URINARY TRACT INFECTION WITH HEMATURIA, SITE UNSPECIFIED: Primary | ICD-10-CM

## 2024-02-13 LAB
APPEARANCE UR: ABNORMAL
BILIRUB UR STRIP.AUTO-MCNC: NEGATIVE MG/DL
COLOR UR: YELLOW
GLUCOSE UR STRIP.AUTO-MCNC: NEGATIVE MG/DL
KETONES UR STRIP.AUTO-MCNC: ABNORMAL MG/DL
LEUKOCYTE ESTERASE UR QL STRIP.AUTO: ABNORMAL
MUCOUS THREADS #/AREA URNS AUTO: ABNORMAL /LPF
NITRITE UR QL STRIP.AUTO: NEGATIVE
PH UR STRIP.AUTO: 6 [PH]
PROT UR STRIP.AUTO-MCNC: NEGATIVE MG/DL
RBC # UR STRIP.AUTO: NEGATIVE /UL
RBC #/AREA URNS AUTO: ABNORMAL /HPF
SP GR UR STRIP.AUTO: 1.03
TRI-PHOS CRY #/AREA UR COMP ASSIST: ABNORMAL /HPF
UROBILINOGEN UR STRIP.AUTO-MCNC: <2 MG/DL
WBC #/AREA URNS AUTO: >50 /HPF

## 2024-02-13 PROCEDURE — 87086 URINE CULTURE/COLONY COUNT: CPT

## 2024-02-13 PROCEDURE — 81001 URINALYSIS AUTO W/SCOPE: CPT

## 2024-02-13 PROCEDURE — 87186 SC STD MICRODIL/AGAR DIL: CPT

## 2024-02-16 ENCOUNTER — APPOINTMENT (OUTPATIENT)
Dept: RADIOLOGY | Facility: HOSPITAL | Age: 44
End: 2024-02-16
Payer: MEDICAID

## 2024-02-16 ENCOUNTER — HOSPITAL ENCOUNTER (EMERGENCY)
Facility: HOSPITAL | Age: 44
Discharge: HOME | End: 2024-02-16
Attending: GENERAL PRACTICE
Payer: MEDICAID

## 2024-02-16 VITALS
TEMPERATURE: 97 F | RESPIRATION RATE: 20 BRPM | BODY MASS INDEX: 24.26 KG/M2 | HEIGHT: 71 IN | DIASTOLIC BLOOD PRESSURE: 95 MMHG | HEART RATE: 73 BPM | OXYGEN SATURATION: 99 % | WEIGHT: 173.28 LBS | SYSTOLIC BLOOD PRESSURE: 150 MMHG

## 2024-02-16 DIAGNOSIS — A49.9 UTI (URINARY TRACT INFECTION), BACTERIAL: Primary | ICD-10-CM

## 2024-02-16 DIAGNOSIS — N39.0 UTI (URINARY TRACT INFECTION), BACTERIAL: Primary | ICD-10-CM

## 2024-02-16 LAB
ALBUMIN SERPL BCP-MCNC: 4.3 G/DL (ref 3.4–5)
ALP SERPL-CCNC: 66 U/L (ref 33–120)
ALT SERPL W P-5'-P-CCNC: 22 U/L (ref 10–52)
ANION GAP SERPL CALC-SCNC: 12 MMOL/L (ref 10–20)
APPEARANCE UR: ABNORMAL
AST SERPL W P-5'-P-CCNC: 35 U/L (ref 9–39)
BACTERIA #/AREA URNS AUTO: ABNORMAL /HPF
BACTERIA UR CULT: ABNORMAL
BACTERIA UR CULT: ABNORMAL
BILIRUB SERPL-MCNC: 0.2 MG/DL (ref 0–1.2)
BILIRUB UR STRIP.AUTO-MCNC: NEGATIVE MG/DL
BUN SERPL-MCNC: 8 MG/DL (ref 6–23)
CALCIUM SERPL-MCNC: 9 MG/DL (ref 8.6–10.3)
CHLORIDE SERPL-SCNC: 97 MMOL/L (ref 98–107)
CO2 SERPL-SCNC: 27 MMOL/L (ref 21–32)
COLOR UR: YELLOW
CREAT SERPL-MCNC: 0.43 MG/DL (ref 0.5–1.3)
EGFRCR SERPLBLD CKD-EPI 2021: >90 ML/MIN/1.73M*2
ERYTHROCYTE [DISTWIDTH] IN BLOOD BY AUTOMATED COUNT: 12.1 % (ref 11.5–14.5)
FLUAV RNA RESP QL NAA+PROBE: NOT DETECTED
FLUBV RNA RESP QL NAA+PROBE: NOT DETECTED
GLUCOSE SERPL-MCNC: 154 MG/DL (ref 74–99)
GLUCOSE UR STRIP.AUTO-MCNC: NEGATIVE MG/DL
HCT VFR BLD AUTO: 49.9 % (ref 41–52)
HGB BLD-MCNC: 17.6 G/DL (ref 13.5–17.5)
KETONES UR STRIP.AUTO-MCNC: NEGATIVE MG/DL
LEUKOCYTE ESTERASE UR QL STRIP.AUTO: ABNORMAL
MCH RBC QN AUTO: 33.2 PG (ref 26–34)
MCHC RBC AUTO-ENTMCNC: 35.3 G/DL (ref 32–36)
MCV RBC AUTO: 94 FL (ref 80–100)
NITRITE UR QL STRIP.AUTO: NEGATIVE
NRBC BLD-RTO: 0 /100 WBCS (ref 0–0)
PH UR STRIP.AUTO: 7 [PH]
PLATELET # BLD AUTO: 221 X10*3/UL (ref 150–450)
POTASSIUM SERPL-SCNC: 4.1 MMOL/L (ref 3.5–5.3)
PROT SERPL-MCNC: 8.6 G/DL (ref 6.4–8.2)
PROT UR STRIP.AUTO-MCNC: NEGATIVE MG/DL
RBC # BLD AUTO: 5.3 X10*6/UL (ref 4.5–5.9)
RBC # UR STRIP.AUTO: ABNORMAL /UL
RBC #/AREA URNS AUTO: ABNORMAL /HPF
SARS-COV-2 RNA RESP QL NAA+PROBE: NOT DETECTED
SODIUM SERPL-SCNC: 132 MMOL/L (ref 136–145)
SP GR UR STRIP.AUTO: 1.01
UROBILINOGEN UR STRIP.AUTO-MCNC: <2 MG/DL
WBC # BLD AUTO: 6.1 X10*3/UL (ref 4.4–11.3)
WBC #/AREA URNS AUTO: >50 /HPF
WBC CLUMPS #/AREA URNS AUTO: ABNORMAL /HPF

## 2024-02-16 PROCEDURE — 74176 CT ABD & PELVIS W/O CONTRAST: CPT

## 2024-02-16 PROCEDURE — 36415 COLL VENOUS BLD VENIPUNCTURE: CPT | Performed by: GENERAL PRACTICE

## 2024-02-16 PROCEDURE — 74176 CT ABD & PELVIS W/O CONTRAST: CPT | Performed by: STUDENT IN AN ORGANIZED HEALTH CARE EDUCATION/TRAINING PROGRAM

## 2024-02-16 PROCEDURE — 81003 URINALYSIS AUTO W/O SCOPE: CPT | Performed by: GENERAL PRACTICE

## 2024-02-16 PROCEDURE — 51702 INSERT TEMP BLADDER CATH: CPT

## 2024-02-16 PROCEDURE — 2500000001 HC RX 250 WO HCPCS SELF ADMINISTERED DRUGS (ALT 637 FOR MEDICARE OP): Performed by: EMERGENCY MEDICINE

## 2024-02-16 PROCEDURE — 87086 URINE CULTURE/COLONY COUNT: CPT | Mod: AHULAB | Performed by: GENERAL PRACTICE

## 2024-02-16 PROCEDURE — 96365 THER/PROPH/DIAG IV INF INIT: CPT

## 2024-02-16 PROCEDURE — 80053 COMPREHEN METABOLIC PANEL: CPT | Performed by: GENERAL PRACTICE

## 2024-02-16 PROCEDURE — 85027 COMPLETE CBC AUTOMATED: CPT | Performed by: GENERAL PRACTICE

## 2024-02-16 PROCEDURE — 2500000004 HC RX 250 GENERAL PHARMACY W/ HCPCS (ALT 636 FOR OP/ED): Performed by: GENERAL PRACTICE

## 2024-02-16 PROCEDURE — 87636 SARSCOV2 & INF A&B AMP PRB: CPT | Performed by: GENERAL PRACTICE

## 2024-02-16 PROCEDURE — 99284 EMERGENCY DEPT VISIT MOD MDM: CPT | Mod: 25 | Performed by: GENERAL PRACTICE

## 2024-02-16 RX ORDER — LEVETIRACETAM 100 MG/ML
2000 SOLUTION ORAL ONCE
Status: COMPLETED | OUTPATIENT
Start: 2024-02-16 | End: 2024-02-16

## 2024-02-16 RX ORDER — DIAZEPAM ORAL SOLUTION (CONCENTRATE) 5 MG/ML
15 SOLUTION ORAL AS NEEDED
COMMUNITY
End: 2024-04-08 | Stop reason: ENTERED-IN-ERROR

## 2024-02-16 RX ORDER — ARTIFICIAL TEARS 1; 2; 3 MG/ML; MG/ML; MG/ML
1-2 SOLUTION/ DROPS OPHTHALMIC 2 TIMES DAILY
COMMUNITY
End: 2024-02-16 | Stop reason: ENTERED-IN-ERROR

## 2024-02-16 RX ORDER — CARBAMAZEPINE 100 MG/1
300 TABLET, CHEWABLE ORAL ONCE
Status: DISCONTINUED | OUTPATIENT
Start: 2024-02-16 | End: 2024-02-16

## 2024-02-16 RX ORDER — CIPROFLOXACIN 500 MG/1
500 TABLET ORAL 2 TIMES DAILY
Qty: 14 TABLET | Refills: 0 | Status: SHIPPED | OUTPATIENT
Start: 2024-02-16 | End: 2024-02-23

## 2024-02-16 RX ORDER — CIPROFLOXACIN 2 MG/ML
400 INJECTION, SOLUTION INTRAVENOUS ONCE
Status: COMPLETED | OUTPATIENT
Start: 2024-02-16 | End: 2024-02-16

## 2024-02-16 RX ORDER — BACLOFEN 10 MG/1
30 TABLET ORAL ONCE
Status: DISCONTINUED | OUTPATIENT
Start: 2024-02-16 | End: 2024-02-16

## 2024-02-16 RX ORDER — DOCUSATE SODIUM 50 MG/5ML
100 LIQUID ORAL DAILY
COMMUNITY

## 2024-02-16 RX ORDER — TRAZODONE HYDROCHLORIDE 100 MG/1
100 TABLET ORAL NIGHTLY PRN
COMMUNITY
End: 2024-04-07 | Stop reason: ALTCHOICE

## 2024-02-16 RX ORDER — ACETAMINOPHEN 160 MG/5ML
640 SUSPENSION ORAL ONCE
Status: COMPLETED | OUTPATIENT
Start: 2024-02-16 | End: 2024-02-16

## 2024-02-16 RX ORDER — CARBAMAZEPINE 200 MG/10ML
300 SUSPENSION ORAL
COMMUNITY
Start: 2020-12-07 | End: 2024-02-16 | Stop reason: ENTERED-IN-ERROR

## 2024-02-16 RX ORDER — PHENYTOIN 125 MG/5ML
100 SUSPENSION ORAL ONCE
Status: COMPLETED | OUTPATIENT
Start: 2024-02-16 | End: 2024-02-16

## 2024-02-16 RX ADMIN — PHENYTOIN 100 MG: 125 SUSPENSION ORAL at 12:35

## 2024-02-16 RX ADMIN — SODIUM CHLORIDE, POTASSIUM CHLORIDE, SODIUM LACTATE AND CALCIUM CHLORIDE 1000 ML: 600; 310; 30; 20 INJECTION, SOLUTION INTRAVENOUS at 04:55

## 2024-02-16 RX ADMIN — ACETAMINOPHEN 650 MG: 160 SUSPENSION ORAL at 12:33

## 2024-02-16 RX ADMIN — CIPROFLOXACIN 400 MG: 400 INJECTION, SOLUTION INTRAVENOUS at 04:57

## 2024-02-16 RX ADMIN — LEVETIRACETAM 2000 MG: 100 SOLUTION ORAL at 12:39

## 2024-02-16 NOTE — ED NOTES
Pt arrives with little catheter in place, was discontinues with intact balloon and was replaced as charted. Pt tolerated procedure well.      [place] RN  02/16/24 [id number]

## 2024-02-16 NOTE — PROGRESS NOTES
Pharmacy Medication History Review    Israel Edgar is a 43 y.o. male admitted for No Principal Problem: There is no principal problem currently on the Problem List. Please update the Problem List and refresh.. Pharmacy reviewed the patient's [id number] medications and allergies for accuracy.    The list below reflectives the updated PTA list. Please review each medication in order reconciliation for additional clarification and justification.  Prior to Admission Medications   Prescriptions Last Dose Informant     ALPRAZolam (Xanax) 0.5 mg tablet       Sig: Take 1 tablet (0.5 mg) by mouth if needed.   acetaminophen (Tylenol) 160 mg/5 mL (5 mL) solution       Sig: Take 20 mL by mouth 3 times a day. AT 9AM, 4PM AND 3AM   artificial tears, dextran-hypomel-glycerin, 0.1-0.3-0.2 % ophthalmic solution       Sig: Administer 1-2 drops into affected eye(s) twice a day. at 9 am and 3 pm while awake   aspirin 81 mg chewable tablet       Si tablet (81 mg) by g-tube route once daily. at 4 pm   baclofen (Lioresal) 10 mg tablet   Yes No   Sig: Take 1 tablet (10 mg) by mouth once daily at bedtime. Administer with tube feeds AT 10PM WITH BACLOFEN 20MG TABLET TO TOTAL 30MG   baclofen (Lioresal) 20 mg tablet       Sig: Take 1 tablet (20 mg) by mouth 3 times a day.   Patient taking differently: Take 0.5 tablets (10 mg) by mouth once daily at bedtime. Take with 20mg for a total of 30mg.   carBAMazepine 200 mg/10 mL suspension       Sig: Take 15ml in AM 7.5mL at noon and 15ml in PM   Patient taking differently: Take 15ml in AM 7.5mL at 4PM and 15ml in PM   cholecalciferol (Vitamin D-3) 10 mcg/mL (400 unit/mL) drops       Sig: 10 mL (4,000 Units) by g-tube route once daily.   diazePAM (Valium) 5 mg/mL solution       Sig: Take 3 mL (15 mg) by mouth if needed for seizures.   docusate sodium (Colace) 50 mg/5 mL oral liquid       Sig: Take 10 mL (100 mg) by mouth once daily.   hydrocortisone 2.5 % ointment       Sig: Apply  topically 2 times a day as needed. Apply to scalp and / or face area for seborrheic dermatitis (scalp flaking and reddended facial areas) 2 times a day, As Needed   levETIRAcetam 100 mg/mL solution       Si mL (2,000 mg) by g-tube route 2 times a day.   magnesium citrate solution       Sig: Take 148 mL by mouth every other day.   nasal spray midazolam (Versed) 5 mg/spray (0.1 mL) spray,non-aerosol       Sig: Use one spray in one nostril for convulsive seizure lasting longer than 3 minutes. May repeat a in the other nostril after 10 minutes if convulsive seizures still ongoing.   Patient taking differently: Use one spray in one nostril for convulsive seizure lasting longer than 5 minutes. May repeat a in the other nostril after 5 minutes if convulsive seizures still ongoing CALL 911   onabotulinumtoxinA (Botox) 200 unit injection       Sig: Inject 150 Units into the muscle. IN EACH ARM EVERY 3 MONTHS, GIVEN BY NEUROLOGIST   petrolatum,white ([facility], TOP)       Sig: Apply 1 Application topically if needed (TO BUTTOCKS TWICE DAILY AND AFTER EACH BOWEL MOVEMENT).   phenytoin (Dilantin-125) 125 mg/5 mL suspension       Sig: Take 4ml by PEG tube 3 times a day   polyethylene glycol (Glycolax, Miralax) 17 gram/dose powder       Si g. by PEG tube once a day. If no bowel movement in 3 days, give up to three doses a day for three days or until bowel movement. for three day than fleet Enema if no bm   selenium sulfide (Selsun) 2.5 % shampoo       Sig: Apply 1 Application topically 1 (one) time per week. Use every other day during a  seborrheic dermatitis outbreak.   senna 8.8 mg/5 mL syrup       Sig: Take 5 mL by mouth 2 times a day.   simethicone (Mylicon) 40 mg/0.6 mL drops       Si.6 mL (40 mg) by g-tube route if needed.   sodium phosphates 19-7 gram/197 mL enema       Sig: Insert 1 enema into the rectum if needed (constipation). 1 times after polyethylene glycol 3 times a day, As Needed  for severe constipation after giving miralax   traZODone (Desyrel) 100 mg tablet       Sig: Take 1 tablet (100 mg) by mouth as needed at bedtime for sleep.   white petrolatum-mineral oiL 94-3 % ophthalmic ointment       Sig: Apply 1 Application to both eyes once daily at bedtime.      Facility-Administered Medications: None      Allergies  Reviewed by [place] RN on 2/16/2024        Severity Reactions Comments    Adhesive Tape-silicones Not Specified Other, Unknown Adhesive Bandages MISC Per [facility] record    Albuterol Not Specified Unknown     Budesonide Not Specified Unknown pulmicort nebulizer    Famotidine Not Specified Other     Keflex [cephalexin] Not Specified Diarrhea     Lacosamide Not Specified Unknown mother states lethargy, will not allow this medication    Latex Not Specified Other     Lorazepam Not Specified Unknown Ativan TABS    Other Not Specified Other, Unknown     Pantoprazole Not Specified Unknown diarrhea, hypokalemia, hypomagnesium    Piperacillin-tazobactam Not Specified Unknown erythema multiforme    Ranitidine Not Specified Unknown     Sulfamethoxazole-trimethoprim Not Specified Unknown     Suppository Adult [glycerin (adult)] Not Specified Other Mom doesnot want; says previous GI said no suppository     Vancomycin Not Specified Other Per NH list    Ipratropium Bromide Low Rash     Nitrofurantoin Low Rash facial rash and reddness            Below are additional concerns with the patient's PTA list.  Medication list sent with patient.    Kelli Lovell CPhT

## 2024-02-16 NOTE — ED PROVIDER NOTES
HPI   Chief Complaint   Patient presents with    Urinary Symptom     Pt arrives via E[facility] from home with mother for urine leaking from his catheter. Mother reports that pt had a recent UTI.       HPI: 43-year-old male with a history of advanced multiple sclerosis and chronic indwelling Boss catheter presents with leakage around his Boss catheter.  His mother is very on top of his care and changes his Boss catheter every 2 weeks.  She states that several weeks ago he was prescribed 5 days of Cipro for urinary tract infection but this course was interrupted by a brief hospital stay.  She is concerned that he may be experiencing either bladder stones or ureteral stones that are interfering with the Boss catheter.  She states that he is at his neurological baseline      Limitations to history: Nonverbal status  Independent Historians: Patient's mother  External Records Reviewed: [facility], outpatient notes, inpatient notes  ------------------------------------------------------------------------------------------------------------------------------------------  ROS: a ten point review of systems was performed and was negative except as per HPI.  ------------------------------------------------------------------------------------------------------------------------------------------  PMH / PSH: as per HPI, otherwise reviewed in EMR  MEDS: as per HPI, otherwise reviewed in EMR  ALLERGIES: as per HPI, otherwise reviewed in EMR  SocH:  as per HPI, otherwise reviewed in EMR  FH:  as per HPI, otherwise reviewed in EMR  ------------------------------------------------------------------------------------------------------------------------------------------  Physical Exam:  VS: As documented in the triage note and EMR flowsheet from this visit was reviewed  General: Deconditioned appearing. No acute distress.   Eyes:  Extraocular movements grossly intact. No scleral icterus. No discharge  HEENT:  Normocephalic.  Atraumatic  Neck:  Moves neck freely. No gross masses  CV: Regular rhythm. No murmurs, rubs or gallops   Resp: Clear to auscultation bilaterally. No respiratory distress.    GI: Soft, no masses, nontender. No rebound tenderness or guarding.  PEG tube in place  : Boss catheter in place with mild leakage around the urethral meatus  MSK: Symmetric muscle bulk. No deformities. No lower extremity edema.    Skin: Warm, dry, intact.   Neuro: Advanced multiple sclerosis.  Patient is at his neurological baseline.  Does not move his extremities  Psych: Nonverbal at baseline  ------------------------------------------------------------------------------------------------------------------------------------------  Hospital Course / Medical Decision Making:  Independent Interpretations: CT abdomen / pelvis  EKG as interpreted by me: NA    MDM: This is a 43-year-old male with a history of advanced multiple sclerosis and chronic indwelling Boss catheter presenting with leakage around his Boss.  His mother is providing history.  He is afebrile and in no distress in the ED.  No major abnormalities on CBC or CMP.  No leukocytosis.  His Boss catheter was replaced by nursing staff without difficulty.  Urinalysis is positive for UTI.  He did recently complete a course of Cipro but his mother states that this course was interrupted and does not feel that he was treated adequately.  I conducted shared decision making and we will trial another course of Cipro.  He was given a dose of IV Cipro in the ED.  No ureteral stone noted on CT.  His mother was provided with a copy of his CT scan report.  She feels safe with him going home.  She will follow-up with his primary care physician as soon as possible and will return the patient to the ED for any concerning symptoms.    Discussion of Management with Other Providers:   I discussed the patient/results with: Emergency medicine team    Final diagnosis and disposition as below.    Results for orders placed  or performed during the hospital encounter of 02/16/24  -CBC:        Result                      Value             Ref Range           WBC                         6.1               4.4 - 11.3 x*       nRBC                        0.0               0.0 - 0.0 /1*       RBC                         5.30              4.50 - 5.90 *       Hemoglobin                  17.6 (H)          13.5 - 17.5 *       Hematocrit                  49.9              41.0 - 52.0 %       MCV                         94                80 - 100 fL         MCH                         33.2              26.0 - 34.0 *       MCHC                        35.3              32.0 - 36.0 *       RDW                         12.1              11.5 - 14.5 %       Platelets                   221               150 - 450 x1*  -Comprehensive metabolic panel:        Result                      Value             Ref Range           Glucose                     154 (H)           74 - 99 mg/dL       Sodium                      132 (L)           136 - 145 mm*       Potassium                   4.1               3.5 - 5.3 mm*       Chloride                    97 (L)            98 - 107 mmo*       Bicarbonate                 27                21 - 32 mmol*       Anion Gap                   12                10 - 20 mmol*       Urea Nitrogen               8                 6 - 23 mg/dL        Creatinine                  0.43 (L)          0.50 - 1.30 *       eGFR                        >90               >60 mL/min/1*       Calcium                     9.0               8.6 - 10.3 m*       Albumin                     4.3               3.4 - 5.0 g/*       Alkaline Phosphatase        66                33 - 120 U/L        Total Protein               8.6 (H)           6.4 - 8.2 g/*       AST                         35                9 - 39 U/L          Bilirubin, Total            0.2               0.0 - 1.2 mg*       ALT                         22                10 - 52 U/L     -Sars-CoV-2 and Influenza A/B PCR:        Result                      Value             Ref Range           Flu A Result                Not Detected      Not Detected        Flu B Result                Not Detected      Not Detected        Coronavirus 2019, PCR       Not Detected      Not Detected   -Urinalysis with Reflex Culture and Microscopic:        Result                      Value             Ref Range           Color, Urine                Yellow            Straw, Yellow       Appearance, Urine           Hazy (N)          Clear               Specific Gravity, Urine     1.006             1.005 - 1.035       pH, Urine                   7.0               5.0, 5.5, 6.*       Protein, Urine              NEGATIVE          NEGATIVE mg/*       Glucose, Urine              NEGATIVE          NEGATIVE mg/*       Blood, Urine                                  NEGATIVE        LARGE (3+) (A)       Ketones, Urine              NEGATIVE          NEGATIVE mg/*       Bilirubin, Urine            NEGATIVE          NEGATIVE            Urobilinogen, Urine         <2.0              <2.0 mg/dL          Nitrite, Urine              NEGATIVE          NEGATIVE            Leukocyte Esterase, Ur*                       NEGATIVE        LARGE (3+) (A)  -Microscopic Only, Urine:        Result                      Value             Ref Range           WBC, Urine                  >50 (A)           1-5, NONE /H*       WBC Clumps, Urine           FEW               Reference ra*       RBC, Urine                  11-20 (A)         NONE, 1-2, 3*       Bacteria, Urine             1+ (A)            NONE SEEN /H*  CT abdomen pelvis wo IV contrast   Final Result    1.  The Boss catheter is in place and the urinary bladder is    decompressed and contains gas. Suggestion of stranding around the    urinary bladder consistent with the provided history of urinary tract    infection. No evidence of hydronephrosis or nephrolithiasis.    2. There is chronic  dilatation of the colon suggestive of chronic    ileus. There are air fluid levels throughout the colon and rectum    without wall thickening. Clinical correlation for diarrhea/colitis is    recommended.    3. Unchanged bibasilar bandlike atelectasis. There is however mucous    plugging within the right lower lobe bronchi and clinical correlation    for aspiration is recommended.                Signed by: Rm Brewer 2/16/2024 4:22 AM    Dictation workstation:   [id number]                               [place] Coma Scale Score: 10                     Patient History   Past Medical History:   Diagnosis Date    Abnormal findings on diagnostic imaging of other specified body structures 10/11/2017    Nonspecific abnormal findings on radiological and examination of intrathoracic organs    Acute embolism and thrombosis of deep veins of unspecified upper extremity (CMS/HCC)     Acute deep vein thrombosis of arm    Acute upper respiratory infection, unspecified 12/05/2014    Acute upper respiratory infection    Impacted cerumen, bilateral 09/01/2017    Impacted cerumen of both ears    Nausea with vomiting, unspecified 09/09/2016    Nausea and vomiting in adult    Other conditions influencing health status     Acute Hypoxic Encephalopathy    Other conditions influencing health status     Schizophrenia    Other conditions influencing health status     Lumbar Puncture Traumatic Tap    Other muscle spasm     Muscle spasm    Paraplegia, unspecified (CMS/HCC)     Paraparesis of both lower limbs    Personal history of other diseases of urinary system 03/21/2017    History of bladder stone    Personal history of other specified conditions 01/13/2017    History of urinary incontinence    Personal history of other specified conditions     History of headache    Personal history of urinary (tract) infections 01/31/2017    History of urinary tract infection    Personal history of urinary calculi 09/08/2015    History of renal  calculi    Personal history of urinary calculi 03/07/2016    History of renal calculi    Unspecified visual loss     Vision problems     Past Surgical History:   Procedure Laterality Date    ILEOSTOMY  08/05/2015    Ileostomy    OTHER SURGICAL HISTORY  08/05/2015    Tracheostomy    OTHER SURGICAL HISTORY  09/16/2016    Feeding Tube     No family history on file.  Social History     Tobacco Use    Smoking status: Not on file    Smokeless tobacco: Not on file   Substance Use Topics    Alcohol use: Not on file    Drug use: Not on file       Physical Exam   ED Triage Vitals   Temperature Heart Rate Respirations BP   02/16/24 0151 02/16/24 0151 02/16/24 0151 02/16/24 0151   36.1 °C (97 °F) 96 16 (!) 126/92      Pulse Ox Temp Source Heart Rate Source Patient Position   02/16/24 0151 02/16/24 0151 02/16/24 0151 02/16/24 0637   (!) 92 % Axillary Monitor Lying      BP Location FiO2 (%)     02/16/24 0637 --     Left arm        Physical Exam    ED Course & MDM   Diagnoses as of 02/16/24 0720   UTI (urinary tract infection), bacterial       Medical Decision Making      Procedure  Procedures     Pk Bryan,   02/21/24 5050

## 2024-02-18 LAB — BACTERIA UR CULT: ABNORMAL

## 2024-02-19 ENCOUNTER — TELEPHONE (OUTPATIENT)
Dept: PHARMACY | Facility: HOSPITAL | Age: 44
End: 2024-02-19
Payer: MEDICAID

## 2024-02-19 DIAGNOSIS — N39.0 COMPLICATED UTI (URINARY TRACT INFECTION): Primary | ICD-10-CM

## 2024-02-19 RX ORDER — CEFUROXIME AXETIL 250 MG/1
250 TABLET ORAL 2 TIMES DAILY
Qty: 20 TABLET | Refills: 0 | Status: SHIPPED | OUTPATIENT
Start: 2024-02-19 | End: 2024-02-29

## 2024-02-19 NOTE — PROGRESS NOTES
EDPD Note: Bug-Drug Mismatch    Contacted Mr./Mrs./Ms. Israel Edgar regarding a positive urine culture/result that was taken during their recent emergency room visit. I completed education with patient. The patient is not being treated appropriately with Ciprofloxacin 500mg. Spoke with mother regarding results. Patient is allergic to Zosyn and Bactrim. Patient does have a intolerance (diarrhea) to Keflex. Mother states she is comfortable trying another cephalosporin because she refuses to return to ER.  Macrobid does not cover typically cover most Proteus species, so Cefuroxime was our last outpatient option.     Will also route result to patient PCP for awareness.    The following prescription was sent to the patient's preferred pharmacy. No further follow up needed from EDPD Team.   Drug: Cefuroxime 250mg   Si tab BID x10  Days Supply: 10    Susceptibility data from last 90 days.  Collected Specimen Info Organism Ampicillin Cefazolin Cefazolin (uncomplicated UTIs only) Ciprofloxacin Gentamicin Nitrofurantoin Oxacillin Piperacillin/Tazobactam Tetracycline Trimethoprim/Sulfamethoxazole Vancomycin   24 Urine from Clean Catch/Voided Proteus mirabilis S S S R S   S R S    24 Urine from Clean Catch/Voided Methicillin Resistant Staphylococcus aureus (MRSA)      S R   S S     Proteus mirabilis S S S R S   S R S        Irish Bautista, PharmD

## 2024-02-26 ENCOUNTER — TELEMEDICINE (OUTPATIENT)
Dept: PRIMARY CARE | Facility: CLINIC | Age: 44
End: 2024-02-26
Payer: MEDICAID

## 2024-02-26 DIAGNOSIS — N39.0 CHRONIC UTI: ICD-10-CM

## 2024-02-26 DIAGNOSIS — Z97.8 CHRONIC INDWELLING FOLEY CATHETER: Primary | ICD-10-CM

## 2024-02-26 DIAGNOSIS — G82.50 QUADRIPLEGIA, UNSPECIFIED (MULTI): ICD-10-CM

## 2024-02-26 DIAGNOSIS — N31.9 NEUROGENIC BLADDER: ICD-10-CM

## 2024-02-26 DIAGNOSIS — G35 MULTIPLE SCLEROSIS (MULTI): ICD-10-CM

## 2024-02-26 DIAGNOSIS — G40.909 NONINTRACTABLE EPILEPSY WITHOUT STATUS EPILEPTICUS, UNSPECIFIED EPILEPSY TYPE (MULTI): ICD-10-CM

## 2024-02-26 PROCEDURE — 3008F BODY MASS INDEX DOCD: CPT | Performed by: STUDENT IN AN ORGANIZED HEALTH CARE EDUCATION/TRAINING PROGRAM

## 2024-02-26 PROCEDURE — 99213 OFFICE O/P EST LOW 20 MIN: CPT | Performed by: STUDENT IN AN ORGANIZED HEALTH CARE EDUCATION/TRAINING PROGRAM

## 2024-02-26 RX ORDER — METHENAMINE HIPPURATE 1000 MG/1
1 TABLET ORAL 2 TIMES DAILY
Qty: 180 TABLET | Refills: 0 | Status: SHIPPED | OUTPATIENT
Start: 2024-02-26 | End: 2024-05-26

## 2024-02-26 ASSESSMENT — ENCOUNTER SYMPTOMS
SPEECH DIFFICULTY: 0
HEMATURIA: 0
ACTIVITY CHANGE: 0
NUMBNESS: 0
NAUSEA: 0
WHEEZING: 0
WEAKNESS: 0
SHORTNESS OF BREATH: 0
CONSTIPATION: 0
DIZZINESS: 0
FEVER: 0
VOMITING: 0
DYSURIA: 0
COUGH: 0
ABDOMINAL PAIN: 0

## 2024-02-26 NOTE — PROGRESS NOTES
Virtual or Telephone Consent    An interactive audio and video telecommunication system which permits real time communications between the patient (at the originating site) and provider (at the distant site) was utilized to provide this telehealth service.   Verbal consent was requested and obtained from Ms Edgar  on this date, 02/26/24 for a telehealth visit.   Subjective   Patient ID: Israel Edgar is a 43 y.o. male who presents for follow-up from emergency room.  HPI  Israel is 43 years old with history of multiple sclerosis, spastic tetraplegia, bedbound and nonverbal.  I am speaking to Ms. Edgar, Benji's mom today.  We discussed on Benji having number of UTIs this year, being treated with antibiotics..  Last urine culture showed Proteus, with resistance to ciprofloxacin tetracycline.  He is currently on cefuroxime to 50 mg twice daily.  He will be completing his antibiotic course on Thursday per mom  Reports Israel is doing better after the antibiotic course, is more alert and urine is clear.  Past Medical History:   Diagnosis Date    Abnormal findings on diagnostic imaging of other specified body structures 10/11/2017    Nonspecific abnormal findings on radiological and examination of intrathoracic organs    Acute embolism and thrombosis of deep veins of unspecified upper extremity (CMS/HCC)     Acute deep vein thrombosis of arm    Acute upper respiratory infection, unspecified 12/05/2014    Acute upper respiratory infection    Impacted cerumen, bilateral 09/01/2017    Impacted cerumen of both ears    Nausea with vomiting, unspecified 09/09/2016    Nausea and vomiting in adult    Other conditions influencing health status     Acute Hypoxic Encephalopathy    Other conditions influencing health status     Schizophrenia    Other conditions influencing health status     Lumbar Puncture Traumatic Tap    Other muscle spasm     Muscle spasm    Paraplegia, unspecified (CMS/HCC)     Paraparesis of both lower limbs     Personal history of other diseases of urinary system 03/21/2017    History of bladder stone    Personal history of other specified conditions 01/13/2017    History of urinary incontinence    Personal history of other specified conditions     History of headache    Personal history of urinary (tract) infections 01/31/2017    History of urinary tract infection    Personal history of urinary calculi 09/08/2015    History of renal calculi    Personal history of urinary calculi 03/07/2016    History of renal calculi    Unspecified visual loss     Vision problems      Past Surgical History:   Procedure Laterality Date    ILEOSTOMY  08/05/2015    Ileostomy    OTHER SURGICAL HISTORY  08/05/2015    Tracheostomy    OTHER SURGICAL HISTORY  09/16/2016    Feeding Tube      No family history on file.   Allergies   Allergen Reactions    Adhesive Tape-Silicones Other and Unknown     Adhesive Bandages MISC    Per NH record    Albuterol Unknown    Budesonide Unknown     pulmicort nebulizer    Famotidine Other    Keflex [Cephalexin] Diarrhea    Lacosamide Unknown     mother states lethargy, will not allow this medication    Latex Other    Lorazepam Unknown     Ativan TABS    Other Other and Unknown    Pantoprazole Unknown     diarrhea, hypokalemia, hypomagnesium    Piperacillin-Tazobactam Unknown     erythema multiforme    Ranitidine Unknown    Sulfamethoxazole-Trimethoprim Unknown    Suppository Adult [Glycerin (Adult)] Other     Mom doesnot want; says previous GI said no suppository     Vancomycin Other     Per NH list    Ipratropium Bromide Rash    Nitrofurantoin Rash     facial rash and reddness          Occupation:     Review of Systems   Constitutional:  Negative for activity change and fever.   HENT:  Negative for congestion.    Respiratory:  Negative for cough, shortness of breath and wheezing.    Cardiovascular:  Negative for chest pain and leg swelling.   Gastrointestinal:  Negative for abdominal pain, constipation, nausea  and vomiting.   Endocrine: Negative for cold intolerance.   Genitourinary:  Negative for dysuria, hematuria and urgency.   Neurological:  Negative for dizziness, speech difficulty, weakness and numbness.   Psychiatric/Behavioral:  Negative for self-injury and suicidal ideas.        Objective   There were no vitals taken for this visit.   Physical Exam  Constitutional:       General: He is not in acute distress.     Comments: Physical examination virtual visit is limited.   HENT:      Head: Normocephalic.      Nose: Nose normal.   Pulmonary:      Effort: Pulmonary effort is normal.       Mom reports no skin tears in the back.  Assessment/Plan   Diagnoses and all orders for this visit:         Problem List Items Addressed This Visit       Epilepsy (CMS/[facility])    Relevant Orders    Referral to Home Care    Chronic indwelling Boss catheter - Primary    Relevant Medications    methenamine hippurate (Hiprex) 1 gram tablet    Other Relevant Orders    Urinalysis with Reflex Microscopic    Urine Culture    Referral to Home Care    Multiple sclerosis (CMS/[facility])    Relevant Orders    Referral to Home Care    Neurogenic bladder    Relevant Orders    Referral to Home Care    Quadriplegia, unspecified (CMS/[facility])    Relevant Orders    Referral to Home Care     Other Visit Diagnoses       Chronic UTI        Relevant Medications    methenamine hippurate (Hiprex) 1 gram tablet    Other Relevant Orders    Urinalysis with Reflex Microscopic    Urine Culture    Referral to Home Care

## 2024-02-27 ENCOUNTER — DOCUMENTATION (OUTPATIENT)
Dept: HOME HEALTH SERVICES | Facility: HOME HEALTH | Age: 44
End: 2024-02-27
Payer: MEDICAID

## 2024-02-27 ENCOUNTER — HOME HEALTH ADMISSION (OUTPATIENT)
Dept: HOME HEALTH SERVICES | Facility: HOME HEALTH | Age: 44
End: 2024-02-27
Payer: MEDICAID

## 2024-02-27 NOTE — HH CARE COORDINATION
Home Care received a Referral for Nursing and Occupational Therapy. We have processed the referral for a Start of Care on 2/28/24.     If you have any questions or concerns, please feel free to contact us at [phone]. Follow the prompts, enter your five digit zip code, and you will be directed to your care team on [facility] 1.

## 2024-02-28 ENCOUNTER — HOME CARE VISIT (OUTPATIENT)
Dept: HOME HEALTH SERVICES | Facility: HOME HEALTH | Age: 44
End: 2024-02-28
Payer: MEDICAID

## 2024-02-28 VITALS
SYSTOLIC BLOOD PRESSURE: 108 MMHG | WEIGHT: 173 LBS | HEART RATE: 67 BPM | DIASTOLIC BLOOD PRESSURE: 80 MMHG | TEMPERATURE: 98.3 F | BODY MASS INDEX: 24.22 KG/M2 | HEIGHT: 71 IN

## 2024-02-28 PROCEDURE — G0299 HHS/HOSPICE OF RN EA 15 MIN: HCPCS

## 2024-02-28 PROCEDURE — 0023 HH SOC

## 2024-02-28 ASSESSMENT — PAIN SCALES - PAIN ASSESSMENT IN ADVANCED DEMENTIA (PAINAD)
CONSOLABILITY: 0
CONSOLABILITY: 0 - NO NEED TO CONSOLE.

## 2024-02-28 ASSESSMENT — ENCOUNTER SYMPTOMS
PERSON REPORTING PAIN: DIRECT OBSERVATION
DENIES PAIN: 1

## 2024-03-01 ASSESSMENT — ACTIVITIES OF DAILY LIVING (ADL)
ENTERING_EXITING_HOME: TOTAL DEPENDENCE
OASIS_M1830: 06

## 2024-03-01 ASSESSMENT — ENCOUNTER SYMPTOMS: LAST BOWEL MOVEMENT: 66898

## 2024-03-03 ENCOUNTER — HOME CARE VISIT (OUTPATIENT)
Dept: HOME HEALTH SERVICES | Facility: HOME HEALTH | Age: 44
End: 2024-03-03
Payer: MEDICAID

## 2024-03-04 ENCOUNTER — APPOINTMENT (OUTPATIENT)
Dept: HOME HEALTH SERVICES | Facility: HOME HEALTH | Age: 44
End: 2024-03-04
Payer: MEDICAID

## 2024-03-04 ENCOUNTER — LAB (OUTPATIENT)
Dept: LAB | Facility: LAB | Age: 44
End: 2024-03-04
Payer: MEDICAID

## 2024-03-04 ENCOUNTER — HOME CARE VISIT (OUTPATIENT)
Dept: HOME HEALTH SERVICES | Facility: HOME HEALTH | Age: 44
End: 2024-03-04
Payer: MEDICAID

## 2024-03-04 DIAGNOSIS — Z97.8 CHRONIC INDWELLING FOLEY CATHETER: ICD-10-CM

## 2024-03-04 DIAGNOSIS — N39.0 CHRONIC UTI: ICD-10-CM

## 2024-03-04 LAB
APPEARANCE UR: CLEAR
BILIRUB UR STRIP.AUTO-MCNC: NEGATIVE MG/DL
COLOR UR: YELLOW
GLUCOSE UR STRIP.AUTO-MCNC: NEGATIVE MG/DL
KETONES UR STRIP.AUTO-MCNC: ABNORMAL MG/DL
LEUKOCYTE ESTERASE UR QL STRIP.AUTO: ABNORMAL
MUCOUS THREADS #/AREA URNS AUTO: ABNORMAL /LPF
NITRITE UR QL STRIP.AUTO: NEGATIVE
PH UR STRIP.AUTO: 6 [PH]
PROT UR STRIP.AUTO-MCNC: NEGATIVE MG/DL
RBC # UR STRIP.AUTO: ABNORMAL /UL
RBC #/AREA URNS AUTO: ABNORMAL /HPF
SP GR UR STRIP.AUTO: 1.01
UROBILINOGEN UR STRIP.AUTO-MCNC: <2 MG/DL
WBC #/AREA URNS AUTO: ABNORMAL /HPF

## 2024-03-04 PROCEDURE — G0152 HHCP-SERV OF OT,EA 15 MIN: HCPCS

## 2024-03-04 PROCEDURE — 81001 URINALYSIS AUTO W/SCOPE: CPT

## 2024-03-04 ASSESSMENT — ENCOUNTER SYMPTOMS
DENIES PAIN: 1
PERSON REPORTING PAIN: PATIENT

## 2024-03-05 ASSESSMENT — ACTIVITIES OF DAILY LIVING (ADL)
WASHING_UPB_CURRENT_FUNCTION: DEPENDENT
WASHING_LB_CURRENT_FUNCTION: DEPENDENT

## 2024-03-06 ENCOUNTER — HOME CARE VISIT (OUTPATIENT)
Dept: HOME HEALTH SERVICES | Facility: HOME HEALTH | Age: 44
End: 2024-03-06
Payer: MEDICAID

## 2024-03-06 PROCEDURE — G0156 HHCP-SVS OF AIDE,EA 15 MIN: HCPCS

## 2024-03-06 ASSESSMENT — ACTIVITIES OF DAILY LIVING (ADL)
BATHING_REQUIRES_ASSISTANCE: 1
AMBULATION_REQUIRES_ASSISTANCE: 1
DRESSING_REQUIRES_ASSISTANCE: 1
EATING_REQUIRES_ASSISTANCE: 1
LAUNDRY_REQUIRES_ASSISTANCE: 1
SHOPPING_REQUIRES_ASSISTANCE: 1
TOILETING_REQUIRES_ASSISTANCE: 1
GROOMING_REQUIRES_ASSISTANCE: 1
PHYSICAL_TRANSFER_REQUIRES_ASSISTANCE: 1

## 2024-03-07 ENCOUNTER — HOME CARE VISIT (OUTPATIENT)
Dept: HOME HEALTH SERVICES | Facility: HOME HEALTH | Age: 44
End: 2024-03-07
Payer: MEDICAID

## 2024-03-07 PROCEDURE — G0152 HHCP-SERV OF OT,EA 15 MIN: HCPCS

## 2024-03-07 ASSESSMENT — ENCOUNTER SYMPTOMS: DENIES PAIN: 1

## 2024-03-11 ENCOUNTER — HOME CARE VISIT (OUTPATIENT)
Dept: HOME HEALTH SERVICES | Facility: HOME HEALTH | Age: 44
End: 2024-03-11
Payer: MEDICAID

## 2024-03-11 PROCEDURE — G0152 HHCP-SERV OF OT,EA 15 MIN: HCPCS

## 2024-03-11 PROCEDURE — G0156 HHCP-SVS OF AIDE,EA 15 MIN: HCPCS

## 2024-03-12 ASSESSMENT — ENCOUNTER SYMPTOMS
DENIES PAIN: 1
PERSON REPORTING PAIN: CAREGIVER

## 2024-03-14 ENCOUNTER — HOME CARE VISIT (OUTPATIENT)
Dept: HOME HEALTH SERVICES | Facility: HOME HEALTH | Age: 44
End: 2024-03-14
Payer: MEDICAID

## 2024-03-14 PROCEDURE — G0152 HHCP-SERV OF OT,EA 15 MIN: HCPCS

## 2024-03-16 ENCOUNTER — HOSPITAL ENCOUNTER (EMERGENCY)
Facility: HOSPITAL | Age: 44
Discharge: HOME | End: 2024-03-17
Attending: STUDENT IN AN ORGANIZED HEALTH CARE EDUCATION/TRAINING PROGRAM
Payer: MEDICAID

## 2024-03-16 DIAGNOSIS — T83.511A URINARY TRACT INFECTION ASSOCIATED WITH CATHETERIZATION OF URINARY TRACT, UNSPECIFIED INDWELLING URINARY CATHETER TYPE, INITIAL ENCOUNTER (CMS-HCC): Primary | ICD-10-CM

## 2024-03-16 DIAGNOSIS — N39.0 URINARY TRACT INFECTION ASSOCIATED WITH CATHETERIZATION OF URINARY TRACT, UNSPECIFIED INDWELLING URINARY CATHETER TYPE, INITIAL ENCOUNTER (CMS-HCC): Primary | ICD-10-CM

## 2024-03-16 LAB
APPEARANCE UR: ABNORMAL
BACTERIA #/AREA URNS AUTO: ABNORMAL /HPF
BILIRUB UR STRIP.AUTO-MCNC: NEGATIVE MG/DL
COLOR UR: YELLOW
GLUCOSE UR STRIP.AUTO-MCNC: NEGATIVE MG/DL
KETONES UR STRIP.AUTO-MCNC: NEGATIVE MG/DL
LEUKOCYTE ESTERASE UR QL STRIP.AUTO: ABNORMAL
MUCOUS THREADS #/AREA URNS AUTO: ABNORMAL /LPF
NITRITE UR QL STRIP.AUTO: NEGATIVE
PH UR STRIP.AUTO: 8 [PH]
PROT UR STRIP.AUTO-MCNC: NEGATIVE MG/DL
RBC # UR STRIP.AUTO: NEGATIVE /UL
RBC #/AREA URNS AUTO: ABNORMAL /HPF
SP GR UR STRIP.AUTO: 1.01
UROBILINOGEN UR STRIP.AUTO-MCNC: <2 MG/DL
WBC #/AREA URNS AUTO: ABNORMAL /HPF

## 2024-03-16 PROCEDURE — 81001 URINALYSIS AUTO W/SCOPE: CPT | Performed by: STUDENT IN AN ORGANIZED HEALTH CARE EDUCATION/TRAINING PROGRAM

## 2024-03-16 PROCEDURE — 99283 EMERGENCY DEPT VISIT LOW MDM: CPT

## 2024-03-16 RX ORDER — CEFAZOLIN SODIUM 1 G/50ML
1 SOLUTION INTRAVENOUS ONCE
Status: DISCONTINUED | OUTPATIENT
Start: 2024-03-16 | End: 2024-03-16

## 2024-03-16 RX ORDER — CEPHALEXIN 500 MG/1
500 CAPSULE ORAL ONCE
Status: DISCONTINUED | OUTPATIENT
Start: 2024-03-17 | End: 2024-03-17

## 2024-03-16 RX ORDER — CEPHALEXIN 500 MG/1
500 CAPSULE ORAL 4 TIMES DAILY
Qty: 40 CAPSULE | Refills: 0 | Status: SHIPPED | OUTPATIENT
Start: 2024-03-16 | End: 2024-03-17 | Stop reason: WASHOUT

## 2024-03-17 VITALS
BODY MASS INDEX: 24.07 KG/M2 | SYSTOLIC BLOOD PRESSURE: 136 MMHG | TEMPERATURE: 98 F | WEIGHT: 171.96 LBS | OXYGEN SATURATION: 96 % | HEART RATE: 64 BPM | RESPIRATION RATE: 18 BRPM | HEIGHT: 71 IN | DIASTOLIC BLOOD PRESSURE: 73 MMHG

## 2024-03-17 PROCEDURE — 2500000001 HC RX 250 WO HCPCS SELF ADMINISTERED DRUGS (ALT 637 FOR MEDICARE OP)

## 2024-03-17 RX ORDER — PHENYTOIN 125 MG/5ML
100 SUSPENSION ORAL ONCE
Status: DISCONTINUED | OUTPATIENT
Start: 2024-03-17 | End: 2024-03-17 | Stop reason: HOSPADM

## 2024-03-17 RX ORDER — LEVETIRACETAM 100 MG/ML
2000 SOLUTION ORAL ONCE
Status: DISCONTINUED | OUTPATIENT
Start: 2024-03-17 | End: 2024-03-17 | Stop reason: HOSPADM

## 2024-03-17 RX ORDER — CARBAMAZEPINE 200 MG/10ML
300 SUSPENSION ORAL ONCE
Status: DISCONTINUED | OUTPATIENT
Start: 2024-03-17 | End: 2024-03-17 | Stop reason: HOSPADM

## 2024-03-17 RX ORDER — DOXYCYCLINE 100 MG/1
100 TABLET ORAL 2 TIMES DAILY
Qty: 20 TABLET | Refills: 0 | Status: SHIPPED | OUTPATIENT
Start: 2024-03-17 | End: 2024-03-27

## 2024-03-17 RX ORDER — ACETAMINOPHEN 160 MG/5ML
325 SUSPENSION ORAL ONCE
Status: DISCONTINUED | OUTPATIENT
Start: 2024-03-17 | End: 2024-03-17 | Stop reason: HOSPADM

## 2024-03-17 RX ORDER — BACLOFEN 10 MG/1
10 TABLET ORAL ONCE
Status: DISCONTINUED | OUTPATIENT
Start: 2024-03-17 | End: 2024-03-17 | Stop reason: HOSPADM

## 2024-03-17 RX ORDER — DOXYCYCLINE HYCLATE 100 MG
100 TABLET ORAL ONCE
Status: COMPLETED | OUTPATIENT
Start: 2024-03-17 | End: 2024-03-17

## 2024-03-17 RX ADMIN — DOXYCYCLINE HYCLATE 100 MG: 100 TABLET, COATED ORAL at 01:05

## 2024-03-17 NOTE — DISCHARGE INSTRUCTIONS
You have been evaluated in the Emergency Department today for your urinary symptoms. Your evaluation, including urinalysis, suggests that your symptoms are due to a urinary tract infection. Please take your prescribed antibiotics for the full course of the medication as directed.    Please follow up with your primary care physician within two days.    Return to the Emergency Department if you experience fevers 100.4° or greater, worsening or uncontrolled pain, vomiting, flank pain, or for any other concerning symptoms.    Thank you for choosing us for your care.

## 2024-03-17 NOTE — ED NOTES
Mom at bedside; already aggressive; told mom that she cannot interfere with care and that if she continues being aggressive toward staff she will be escorted out     Mami Chew RN  03/16/24 2894

## 2024-03-17 NOTE — ED PROVIDER NOTES
CC: No chief complaint on file.     HPI: Israel Edgar is an 43 y.o. male with history including DVT, diabetes, MS, seizures, neurogenic bladder, quadriplegia, GERD, hx of MRSA bacteremia, HTN presenting to the emergency department for leakage around the little catheter. History was obtained from patient's mom and caregiver. Reports that there was leakage around his chronic little that occurred yesterday. Denies purulent drainage, fevers, chills. He was at his baseline neurologic status. She changed the little prior to presentation.      Triage note was reviewed and  agree with it  Limitations to History:  none  Additional History Obtained from: dc summary from 2/5/2024    PMHx/PSHx:  Per HPI.   - has a past medical history of Abnormal findings on diagnostic imaging of other specified body structures (10/11/2017), Acute embolism and thrombosis of deep veins of unspecified upper extremity (CMS/[facility]), Acute upper respiratory infection, unspecified (12/05/2014), Impacted cerumen, bilateral (09/01/2017), Nausea with vomiting, unspecified (09/09/2016), Other conditions influencing health status, Other conditions influencing health status, Other conditions influencing health status, Other muscle spasm, Paraplegia, unspecified (CMS/[facility]), Personal history of other diseases of urinary system (03/21/2017), Personal history of other specified conditions (01/13/2017), Personal history of other specified conditions, Personal history of urinary (tract) infections (01/31/2017), Personal history of urinary calculi (09/08/2015), Personal history of urinary calculi (03/07/2016), and Unspecified visual loss.  - has a past surgical history that includes Other surgical history (08/05/2015); Ileostomy (08/05/2015); and Other surgical history (09/16/2016).    Social History:  - Tobacco:  has no history on file for tobacco use.   - Alcohol:  has no history on file for alcohol use.   - Drugs:  has no history on file for drug use.      Medications: Reviewed in EMR.     Allergies:  Adhesive tape-silicones, Albuterol, Budesonide, Famotidine, Keflex [cephalexin], Lacosamide, Latex, Lorazepam, Other, Pantoprazole, Piperacillin-tazobactam, Ranitidine, Sulfamethoxazole-trimethoprim, Suppository adult [glycerin (adult)], Vancomycin, Ipratropium bromide, and Nitrofurantoin    ???????????????????????????????????????????????????????????????  Triage Vitals:  T    HR    BP    RR    O2        Physical Exam  Vitals and nursing note reviewed. Exam conducted with a chaperone present.   Constitutional:       General: He is not in acute distress.  HENT:      Head: Normocephalic and atraumatic.   Eyes:      Conjunctiva/sclera: Conjunctivae normal.   Cardiovascular:      Rate and Rhythm: Normal rate and regular rhythm.   Pulmonary:      Effort: Pulmonary effort is normal. No respiratory distress.      Breath sounds: Normal breath sounds.   Abdominal:      General: There is distension.      Palpations: Abdomen is soft.      Tenderness: There is no abdominal tenderness.   Genitourinary:     Comments: Boss in place. Good ouput. No hematuria or purulence noted in catheter  Musculoskeletal:         General: No deformity.      Cervical back: Normal range of motion.   Skin:     General: Skin is warm and dry.      Comments: G tube intact. No erythema surrounding   Neurological:      Mental Status: He is alert and oriented to person, place, and time.   Psychiatric:         Mood and Affect: Mood normal.         Behavior: Behavior normal.       ???????????????????????????????????????????????????????????????      ED Course/Medical Decision Making:    Diagnoses as of 03/17/24 0015   Urinary tract infection associated with catheterization of urinary tract, unspecified indwelling urinary catheter type, initial encounter (CMS/[facility])        Patient is a 43-year-old male presenting with concern for leakage around the Boss catheter.  Patient was afebrile and vitals were within  normal limits.  Lab work was considered however we did not feel it was indicated at this time given no external signs of infection.  UA was sent after changing the Boss.  UA was concerning for UTI.  Patient was given doxycycline for antibiotic treatment and after careful review of the urine cultures and patient's extensive allergy choices.  Considered ampicillin or Keflex however patient's caretaker was reporting that he would not tolerate either of those and that they induce seizures.  Patient was given an initial dose of doxycycline.  Patient will be discharged home with prescription for doxycycline.  Patient's home medications were ordered while awaiting transportation. Patient was discharged home in stable condition. Patient was advised to return if symptoms worsen or don’t improve. Patient was advised to follow up with their PCP as needed.       External records reviewed: recent inpatient, clinic, and prior ED notes  Diagnostic imaging independently reviewed/interpreted by me (as reflected in MDM) includes: ua  Social Determinants Affecting Care: Transportation difficulties  Discussion of management with other providers: ED attending,    Prescription Drug Consideration: doxycycline. Careful consideration of antibiotics based on prior urine culture and review of allergies  Escalation of Care: considered labs    Pt was seen and discussed with ED attending     Impression:   uti    Disposition: discharge        Procedures ? [facility] last updated 3/16/2024 10:29 PM        Dulce Alba MD  Resident  03/17/24 0133

## 2024-03-18 ENCOUNTER — HOME CARE VISIT (OUTPATIENT)
Dept: HOME HEALTH SERVICES | Facility: HOME HEALTH | Age: 44
End: 2024-03-18
Payer: MEDICAID

## 2024-03-18 PROCEDURE — G0152 HHCP-SERV OF OT,EA 15 MIN: HCPCS

## 2024-03-21 ENCOUNTER — HOME CARE VISIT (OUTPATIENT)
Dept: HOME HEALTH SERVICES | Facility: HOME HEALTH | Age: 44
End: 2024-03-21
Payer: MEDICAID

## 2024-03-21 PROCEDURE — G0152 HHCP-SERV OF OT,EA 15 MIN: HCPCS

## 2024-03-22 PROCEDURE — G0180 MD CERTIFICATION HHA PATIENT: HCPCS | Performed by: STUDENT IN AN ORGANIZED HEALTH CARE EDUCATION/TRAINING PROGRAM

## 2024-03-22 ASSESSMENT — ENCOUNTER SYMPTOMS
PERSON REPORTING PAIN: CAREGIVER
DENIES PAIN: 1

## 2024-03-22 ASSESSMENT — ACTIVITIES OF DAILY LIVING (ADL)
HOME_HEALTH_OASIS: 01
WASHING_UPB_CURRENT_FUNCTION: DEPENDENT
WASHING_LB_CURRENT_FUNCTION: DEPENDENT
OASIS_M1830: 06

## 2024-03-25 ENCOUNTER — LAB (OUTPATIENT)
Dept: LAB | Facility: LAB | Age: 44
End: 2024-03-25
Payer: MEDICAID

## 2024-03-25 DIAGNOSIS — D72.819 LEUKOPENIA, UNSPECIFIED TYPE: ICD-10-CM

## 2024-03-25 LAB
ALBUMIN SERPL BCP-MCNC: 4.2 G/DL (ref 3.4–5)
ALP SERPL-CCNC: 66 U/L (ref 33–120)
ALT SERPL W P-5'-P-CCNC: 25 U/L (ref 10–52)
ANION GAP SERPL CALC-SCNC: 14 MMOL/L (ref 10–20)
AST SERPL W P-5'-P-CCNC: 26 U/L (ref 9–39)
BASOPHILS # BLD AUTO: 0.05 X10*3/UL (ref 0–0.1)
BASOPHILS NFR BLD AUTO: 1.4 %
BILIRUB SERPL-MCNC: 0.4 MG/DL (ref 0–1.2)
BUN SERPL-MCNC: 8 MG/DL (ref 6–23)
CALCIUM SERPL-MCNC: 9.1 MG/DL (ref 8.6–10.3)
CHLORIDE SERPL-SCNC: 99 MMOL/L (ref 98–107)
CO2 SERPL-SCNC: 24 MMOL/L (ref 21–32)
CREAT SERPL-MCNC: 0.47 MG/DL (ref 0.5–1.3)
EGFRCR SERPLBLD CKD-EPI 2021: >90 ML/MIN/1.73M*2
EOSINOPHIL # BLD AUTO: 0.08 X10*3/UL (ref 0–0.7)
EOSINOPHIL NFR BLD AUTO: 2.2 %
ERYTHROCYTE [DISTWIDTH] IN BLOOD BY AUTOMATED COUNT: 11.9 % (ref 11.5–14.5)
GLUCOSE SERPL-MCNC: 86 MG/DL (ref 74–99)
HCT VFR BLD AUTO: 50.6 % (ref 41–52)
HGB BLD-MCNC: 16.4 G/DL (ref 13.5–17.5)
IMM GRANULOCYTES # BLD AUTO: 0.01 X10*3/UL (ref 0–0.7)
IMM GRANULOCYTES NFR BLD AUTO: 0.3 % (ref 0–0.9)
LYMPHOCYTES # BLD AUTO: 1.18 X10*3/UL (ref 1.2–4.8)
LYMPHOCYTES NFR BLD AUTO: 32.8 %
MCH RBC QN AUTO: 32 PG (ref 26–34)
MCHC RBC AUTO-ENTMCNC: 32.4 G/DL (ref 32–36)
MCV RBC AUTO: 99 FL (ref 80–100)
MONOCYTES # BLD AUTO: 0.64 X10*3/UL (ref 0.1–1)
MONOCYTES NFR BLD AUTO: 17.8 %
NEUTROPHILS # BLD AUTO: 1.64 X10*3/UL (ref 1.2–7.7)
NEUTROPHILS NFR BLD AUTO: 45.5 %
NRBC BLD-RTO: 0 /100 WBCS (ref 0–0)
PLATELET # BLD AUTO: 238 X10*3/UL (ref 150–450)
POTASSIUM SERPL-SCNC: 4.1 MMOL/L (ref 3.5–5.3)
PROT SERPL-MCNC: 7.8 G/DL (ref 6.4–8.2)
RBC # BLD AUTO: 5.12 X10*6/UL (ref 4.5–5.9)
SODIUM SERPL-SCNC: 133 MMOL/L (ref 136–145)
WBC # BLD AUTO: 3.6 X10*3/UL (ref 4.4–11.3)

## 2024-03-25 PROCEDURE — 36415 COLL VENOUS BLD VENIPUNCTURE: CPT

## 2024-03-25 PROCEDURE — 80053 COMPREHEN METABOLIC PANEL: CPT

## 2024-03-25 PROCEDURE — 85025 COMPLETE CBC W/AUTO DIFF WBC: CPT

## 2024-03-27 ENCOUNTER — TELEPHONE (OUTPATIENT)
Dept: NEUROLOGY | Facility: HOSPITAL | Age: 44
End: 2024-03-27
Payer: MEDICAID

## 2024-03-27 NOTE — TELEPHONE ENCOUNTER
Israel Edgar 's mother called she would like Israel 's Vitamin D refilled. Please send to [facility] on file.  I will also fax the to [facility] [phone] the hospital bed prescription at Ms. Edgar request.

## 2024-03-28 DIAGNOSIS — G35 MULTIPLE SCLEROSIS (MULTI): Primary | ICD-10-CM

## 2024-03-28 RX ORDER — CHOLECALCIFEROL (VITAMIN D3) 10(400)/ML
4000 DROPS ORAL DAILY
Qty: 300 ML | Refills: 3 | Status: SHIPPED | OUTPATIENT
Start: 2024-03-28

## 2024-03-29 ENCOUNTER — TELEPHONE (OUTPATIENT)
Dept: PRIMARY CARE | Facility: CLINIC | Age: 44
End: 2024-03-29
Payer: MEDICAID

## 2024-04-03 ENCOUNTER — HOSPITAL ENCOUNTER (EMERGENCY)
Facility: HOSPITAL | Age: 44
Discharge: HOME | End: 2024-04-04
Attending: EMERGENCY MEDICINE
Payer: MEDICAID

## 2024-04-03 ENCOUNTER — APPOINTMENT (OUTPATIENT)
Dept: RADIOLOGY | Facility: HOSPITAL | Age: 44
End: 2024-04-03
Payer: MEDICAID

## 2024-04-03 ENCOUNTER — TELEPHONE (OUTPATIENT)
Dept: PRIMARY CARE | Facility: CLINIC | Age: 44
End: 2024-04-03
Payer: MEDICAID

## 2024-04-03 DIAGNOSIS — K56.7 ILEUS (MULTI): ICD-10-CM

## 2024-04-03 DIAGNOSIS — R05.3 CHRONIC COUGH: ICD-10-CM

## 2024-04-03 DIAGNOSIS — T83.9XXD COMPLICATION OF FOLEY CATHETER, SUBSEQUENT ENCOUNTER: Primary | ICD-10-CM

## 2024-04-03 LAB
FLUAV RNA RESP QL NAA+PROBE: NOT DETECTED
FLUBV RNA RESP QL NAA+PROBE: NOT DETECTED
SARS-COV-2 RNA RESP QL NAA+PROBE: NOT DETECTED

## 2024-04-03 PROCEDURE — 87636 SARSCOV2 & INF A&B AMP PRB: CPT | Performed by: EMERGENCY MEDICINE

## 2024-04-03 PROCEDURE — 87186 SC STD MICRODIL/AGAR DIL: CPT | Mod: AHULAB | Performed by: EMERGENCY MEDICINE

## 2024-04-03 PROCEDURE — 71045 X-RAY EXAM CHEST 1 VIEW: CPT | Performed by: STUDENT IN AN ORGANIZED HEALTH CARE EDUCATION/TRAINING PROGRAM

## 2024-04-03 PROCEDURE — 74176 CT ABD & PELVIS W/O CONTRAST: CPT | Performed by: RADIOLOGY

## 2024-04-03 PROCEDURE — 99284 EMERGENCY DEPT VISIT MOD MDM: CPT | Mod: 25

## 2024-04-03 PROCEDURE — 51702 INSERT TEMP BLADDER CATH: CPT

## 2024-04-03 PROCEDURE — 74176 CT ABD & PELVIS W/O CONTRAST: CPT

## 2024-04-03 PROCEDURE — 71045 X-RAY EXAM CHEST 1 VIEW: CPT

## 2024-04-03 ASSESSMENT — PAIN SCALES - GENERAL: PAINLEVEL_OUTOF10: 0 - NO PAIN

## 2024-04-03 ASSESSMENT — COLUMBIA-SUICIDE SEVERITY RATING SCALE - C-SSRS
6. HAVE YOU EVER DONE ANYTHING, STARTED TO DO ANYTHING, OR PREPARED TO DO ANYTHING TO END YOUR LIFE?: NO
1. IN THE PAST MONTH, HAVE YOU WISHED YOU WERE DEAD OR WISHED YOU COULD GO TO SLEEP AND NOT WAKE UP?: NO
2. HAVE YOU ACTUALLY HAD ANY THOUGHTS OF KILLING YOURSELF?: NO

## 2024-04-03 ASSESSMENT — PAIN - FUNCTIONAL ASSESSMENT: PAIN_FUNCTIONAL_ASSESSMENT: 0-10

## 2024-04-04 VITALS
SYSTOLIC BLOOD PRESSURE: 104 MMHG | RESPIRATION RATE: 16 BRPM | DIASTOLIC BLOOD PRESSURE: 73 MMHG | BODY MASS INDEX: 24.22 KG/M2 | TEMPERATURE: 97.2 F | OXYGEN SATURATION: 95 % | HEART RATE: 75 BPM | HEIGHT: 71 IN | WEIGHT: 173 LBS

## 2024-04-04 NOTE — ED PROVIDER NOTES
HPI   No chief complaint on file.      HPI  Israel Edgar is an 43 y.o. male with history including DVT, diabetes, MS, seizures, neurogenic bladder, quadriplegia, GERD, hx of MRSA bacteremia, HTN presenting to the emergency department for leakage around the little catheter.     There has been no fever or vomiting today.  Seizures are under control.  I am concerned that the leaking could mean a urinary tract infection.  I did explain that the patient likely has a chronic infection                  No data recorded                     Patient History   Past Medical History:   Diagnosis Date    Abnormal findings on diagnostic imaging of other specified body structures 10/11/2017    Nonspecific abnormal findings on radiological and examination of intrathoracic organs    Acute embolism and thrombosis of deep veins of unspecified upper extremity (CMS/HCC)     Acute deep vein thrombosis of arm    Acute upper respiratory infection, unspecified 12/05/2014    Acute upper respiratory infection    Impacted cerumen, bilateral 09/01/2017    Impacted cerumen of both ears    Nausea with vomiting, unspecified 09/09/2016    Nausea and vomiting in adult    Other conditions influencing health status     Acute Hypoxic Encephalopathy    Other conditions influencing health status     Schizophrenia    Other conditions influencing health status     Lumbar Puncture Traumatic Tap    Other muscle spasm     Muscle spasm    Paraplegia, unspecified (CMS/HCC)     Paraparesis of both lower limbs    Personal history of other diseases of urinary system 03/21/2017    History of bladder stone    Personal history of other specified conditions 01/13/2017    History of urinary incontinence    Personal history of other specified conditions     History of headache    Personal history of urinary (tract) infections 01/31/2017    History of urinary tract infection    Personal history of urinary calculi 09/08/2015    History of renal calculi    Personal history  of urinary calculi 03/07/2016    History of renal calculi    Unspecified visual loss     Vision problems     Past Surgical History:   Procedure Laterality Date    ILEOSTOMY  08/05/2015    Ileostomy    OTHER SURGICAL HISTORY  08/05/2015    Tracheostomy    OTHER SURGICAL HISTORY  09/16/2016    Feeding Tube     No family history on file.  Social History     Tobacco Use    Smoking status: Not on file    Smokeless tobacco: Not on file   Substance Use Topics    Alcohol use: Not on file    Drug use: Not on file       Physical Exam   ED Triage Vitals [04/03/24 2101]   Temperature Heart Rate Respirations BP   36.2 °C (97.2 °F) 86 18 121/87      Pulse Ox Temp Source Heart Rate Source Patient Position   95 % Oral Monitor Lying      BP Location FiO2 (%)     Right arm 21 %       Physical Exam  Vitals and nursing note reviewed.   Constitutional:       General: He is not in acute distress.     Appearance: He is well-developed.   HENT:      Head: Normocephalic and atraumatic.   Eyes:      Comments: None conjugate gaze   Cardiovascular:      Rate and Rhythm: Normal rate and regular rhythm.      Heart sounds: No murmur heard.  Pulmonary:      Effort: Pulmonary effort is normal. No respiratory distress.      Breath sounds: Normal breath sounds.   Abdominal:      Palpations: Abdomen is soft.      Tenderness: There is no abdominal tenderness.      Comments: G tube site clean dry intact   Musculoskeletal:         General: No swelling.      Cervical back: Neck supple.   Skin:     General: Skin is warm and dry.      Capillary Refill: Capillary refill takes less than 2 seconds.      Comments: Thickened dry skin   Neurological:      Mental Status: He is alert.   Psychiatric:         Mood and Affect: Mood normal.         ED Course & MDM   Diagnoses as of 04/11/24 0720   Complication of Boss catheter, subsequent encounter   Chronic cough   Ileus (CMS/[facility])       Medical Decision Making  Will replace the patient's 16 [place] catheter with a  larger 18 [place] catheter.  Ultimately this patient will need to follow-up with urology.  Family requested x-ray and COVID swab for chronic a chronic cough.  Patient to follow-up as an outpatient.  Patient always has a very large gastric bubble/inflammation of his gut.  Given he is nonverbal I did obtain a CT scan today which shows ileus but no other new symptoms.  The patient's caregiver is extremely opposed to other medications refuses MiraLAX and does not want enema at this time.  Discharged home.    Procedure  Procedures     Rickey Scott MD  04/03/24 4308       Rickey Scott MD  04/11/24 [id number]

## 2024-04-06 LAB
BACTERIA UR CULT: ABNORMAL
BACTERIA UR CULT: ABNORMAL

## 2024-04-07 ENCOUNTER — APPOINTMENT (OUTPATIENT)
Dept: RADIOLOGY | Facility: HOSPITAL | Age: 44
End: 2024-04-07
Payer: MEDICAID

## 2024-04-07 ENCOUNTER — TELEPHONE (OUTPATIENT)
Dept: PHARMACY | Facility: HOSPITAL | Age: 44
End: 2024-04-07
Payer: MEDICAID

## 2024-04-07 ENCOUNTER — HOSPITAL ENCOUNTER (INPATIENT)
Facility: HOSPITAL | Age: 44
LOS: 5 days | Discharge: HOME | End: 2024-04-12
Attending: EMERGENCY MEDICINE | Admitting: NURSE PRACTITIONER
Payer: MEDICAID

## 2024-04-07 DIAGNOSIS — N39.0 URINARY TRACT INFECTION ASSOCIATED WITH INDWELLING URETHRAL CATHETER, INITIAL ENCOUNTER (CMS-HCC): Primary | ICD-10-CM

## 2024-04-07 DIAGNOSIS — T83.511A URINARY TRACT INFECTION ASSOCIATED WITH INDWELLING URETHRAL CATHETER, INITIAL ENCOUNTER (CMS-HCC): Primary | ICD-10-CM

## 2024-04-07 DIAGNOSIS — G40.909 NONINTRACTABLE EPILEPSY WITHOUT STATUS EPILEPTICUS, UNSPECIFIED EPILEPSY TYPE (MULTI): ICD-10-CM

## 2024-04-07 DIAGNOSIS — Z22.322 MRSA (METHICILLIN RESISTANT STAPH AUREUS) CULTURE POSITIVE: ICD-10-CM

## 2024-04-07 LAB
ALBUMIN SERPL BCP-MCNC: 3.7 G/DL (ref 3.4–5)
ALP SERPL-CCNC: 60 U/L (ref 33–120)
ALT SERPL W P-5'-P-CCNC: 17 U/L (ref 10–52)
ANION GAP BLDV CALCULATED.4IONS-SCNC: 9 MMOL/L (ref 10–25)
ANION GAP SERPL CALC-SCNC: 14 MMOL/L (ref 10–20)
APPEARANCE UR: ABNORMAL
APTT PPP: 21 SECONDS (ref 27–38)
AST SERPL W P-5'-P-CCNC: 19 U/L (ref 9–39)
BASE EXCESS BLDV CALC-SCNC: 3.8 MMOL/L (ref -2–3)
BASOPHILS # BLD AUTO: 0.04 X10*3/UL (ref 0–0.1)
BASOPHILS NFR BLD AUTO: 1.2 %
BILIRUB SERPL-MCNC: 0.3 MG/DL (ref 0–1.2)
BILIRUB UR STRIP.AUTO-MCNC: NEGATIVE MG/DL
BODY TEMPERATURE: 37 DEGREES CELSIUS
BUN SERPL-MCNC: 8 MG/DL (ref 6–23)
CA-I BLDV-SCNC: 1.16 MMOL/L (ref 1.1–1.33)
CALCIUM SERPL-MCNC: 9.1 MG/DL (ref 8.6–10.6)
CHLORIDE BLDV-SCNC: 99 MMOL/L (ref 98–107)
CHLORIDE SERPL-SCNC: 101 MMOL/L (ref 98–107)
CO2 SERPL-SCNC: 23 MMOL/L (ref 21–32)
COLOR UR: ABNORMAL
CREAT SERPL-MCNC: 0.39 MG/DL (ref 0.5–1.3)
CRP SERPL-MCNC: 2.81 MG/DL
EGFRCR SERPLBLD CKD-EPI 2021: >90 ML/MIN/1.73M*2
EOSINOPHIL # BLD AUTO: 0.09 X10*3/UL (ref 0–0.7)
EOSINOPHIL NFR BLD AUTO: 2.6 %
ERYTHROCYTE [DISTWIDTH] IN BLOOD BY AUTOMATED COUNT: 11.7 % (ref 11.5–14.5)
EST. AVERAGE GLUCOSE BLD GHB EST-MCNC: 108 MG/DL
FLUAV RNA RESP QL NAA+PROBE: NOT DETECTED
FLUBV RNA RESP QL NAA+PROBE: NOT DETECTED
GLUCOSE BLDV-MCNC: 119 MG/DL (ref 74–99)
GLUCOSE SERPL-MCNC: 110 MG/DL (ref 74–99)
GLUCOSE UR STRIP.AUTO-MCNC: NORMAL MG/DL
HBA1C MFR BLD: 5.4 %
HCO3 BLDV-SCNC: 29.7 MMOL/L (ref 22–26)
HCT VFR BLD AUTO: 43.1 % (ref 41–52)
HCT VFR BLD EST: 48 % (ref 41–52)
HGB BLD-MCNC: 15.8 G/DL (ref 13.5–17.5)
HGB BLDV-MCNC: 15.9 G/DL (ref 13.5–17.5)
IMM GRANULOCYTES # BLD AUTO: 0 X10*3/UL (ref 0–0.7)
IMM GRANULOCYTES NFR BLD AUTO: 0 % (ref 0–0.9)
INR PPP: 1.1 (ref 0.9–1.1)
KETONES UR STRIP.AUTO-MCNC: NEGATIVE MG/DL
LACTATE BLDV-SCNC: 1.3 MMOL/L (ref 0.4–2)
LEUKOCYTE ESTERASE UR QL STRIP.AUTO: ABNORMAL
LYMPHOCYTES # BLD AUTO: 0.83 X10*3/UL (ref 1.2–4.8)
LYMPHOCYTES NFR BLD AUTO: 23.9 %
MAGNESIUM SERPL-MCNC: 2.04 MG/DL (ref 1.6–2.4)
MCH RBC QN AUTO: 32.8 PG (ref 26–34)
MCHC RBC AUTO-ENTMCNC: 36.7 G/DL (ref 32–36)
MCV RBC AUTO: 89 FL (ref 80–100)
MONOCYTES # BLD AUTO: 0.6 X10*3/UL (ref 0.1–1)
MONOCYTES NFR BLD AUTO: 17.3 %
MUCOUS THREADS #/AREA URNS AUTO: ABNORMAL /LPF
NEUTROPHILS # BLD AUTO: 1.91 X10*3/UL (ref 1.2–7.7)
NEUTROPHILS NFR BLD AUTO: 55 %
NITRITE UR QL STRIP.AUTO: ABNORMAL
NRBC BLD-RTO: 0 /100 WBCS (ref 0–0)
OXYHGB MFR BLDV: 88.5 % (ref 45–75)
PCO2 BLDV: 48 MM HG (ref 41–51)
PH BLDV: 7.4 PH (ref 7.33–7.43)
PH UR STRIP.AUTO: 8 [PH]
PHOSPHATE SERPL-MCNC: 3.1 MG/DL (ref 2.5–4.9)
PLATELET # BLD AUTO: 167 X10*3/UL (ref 150–450)
PO2 BLDV: 62 MM HG (ref 35–45)
POTASSIUM BLDV-SCNC: 4.3 MMOL/L (ref 3.5–5.3)
POTASSIUM SERPL-SCNC: 4.2 MMOL/L (ref 3.5–5.3)
PROT SERPL-MCNC: 7.4 G/DL (ref 6.4–8.2)
PROT UR STRIP.AUTO-MCNC: ABNORMAL MG/DL
PROTHROMBIN TIME: 12.7 SECONDS (ref 9.8–12.8)
RBC # BLD AUTO: 4.82 X10*6/UL (ref 4.5–5.9)
RBC # UR STRIP.AUTO: ABNORMAL /UL
RBC #/AREA URNS AUTO: >20 /HPF
SAO2 % BLDV: 91 % (ref 45–75)
SARS-COV-2 RNA RESP QL NAA+PROBE: NOT DETECTED
SODIUM BLDV-SCNC: 133 MMOL/L (ref 136–145)
SODIUM SERPL-SCNC: 134 MMOL/L (ref 136–145)
SP GR UR STRIP.AUTO: 1.02
TRI-PHOS CRY #/AREA UR COMP ASSIST: ABNORMAL /HPF
UROBILINOGEN UR STRIP.AUTO-MCNC: NORMAL MG/DL
WBC # BLD AUTO: 3.5 X10*3/UL (ref 4.4–11.3)
WBC #/AREA URNS AUTO: ABNORMAL /HPF

## 2024-04-07 PROCEDURE — 87636 SARSCOV2 & INF A&B AMP PRB: CPT

## 2024-04-07 PROCEDURE — 2500000005 HC RX 250 GENERAL PHARMACY W/O HCPCS: Performed by: NURSE PRACTITIONER

## 2024-04-07 PROCEDURE — 84132 ASSAY OF SERUM POTASSIUM: CPT

## 2024-04-07 PROCEDURE — 99223 1ST HOSP IP/OBS HIGH 75: CPT | Performed by: NURSE PRACTITIONER

## 2024-04-07 PROCEDURE — 1210000001 HC SEMI-PRIVATE ROOM DAILY

## 2024-04-07 PROCEDURE — 2500000001 HC RX 250 WO HCPCS SELF ADMINISTERED DRUGS (ALT 637 FOR MEDICARE OP): Performed by: NURSE PRACTITIONER

## 2024-04-07 PROCEDURE — 83036 HEMOGLOBIN GLYCOSYLATED A1C: CPT | Performed by: NURSE PRACTITIONER

## 2024-04-07 PROCEDURE — 3E0G76Z INTRODUCTION OF NUTRITIONAL SUBSTANCE INTO UPPER GI, VIA NATURAL OR ARTIFICIAL OPENING: ICD-10-PCS | Performed by: NURSE PRACTITIONER

## 2024-04-07 PROCEDURE — 85730 THROMBOPLASTIN TIME PARTIAL: CPT

## 2024-04-07 PROCEDURE — 85025 COMPLETE CBC W/AUTO DIFF WBC: CPT

## 2024-04-07 PROCEDURE — 83735 ASSAY OF MAGNESIUM: CPT

## 2024-04-07 PROCEDURE — 99285 EMERGENCY DEPT VISIT HI MDM: CPT | Performed by: EMERGENCY MEDICINE

## 2024-04-07 PROCEDURE — 36415 COLL VENOUS BLD VENIPUNCTURE: CPT

## 2024-04-07 PROCEDURE — 81001 URINALYSIS AUTO W/SCOPE: CPT

## 2024-04-07 PROCEDURE — 87186 SC STD MICRODIL/AGAR DIL: CPT

## 2024-04-07 PROCEDURE — 2500000004 HC RX 250 GENERAL PHARMACY W/ HCPCS (ALT 636 FOR OP/ED): Mod: SE

## 2024-04-07 PROCEDURE — 2500000004 HC RX 250 GENERAL PHARMACY W/ HCPCS (ALT 636 FOR OP/ED): Performed by: NURSE PRACTITIONER

## 2024-04-07 PROCEDURE — 96374 THER/PROPH/DIAG INJ IV PUSH: CPT

## 2024-04-07 PROCEDURE — 71045 X-RAY EXAM CHEST 1 VIEW: CPT

## 2024-04-07 PROCEDURE — 96375 TX/PRO/DX INJ NEW DRUG ADDON: CPT

## 2024-04-07 PROCEDURE — 84100 ASSAY OF PHOSPHORUS: CPT

## 2024-04-07 PROCEDURE — 99285 EMERGENCY DEPT VISIT HI MDM: CPT

## 2024-04-07 PROCEDURE — 2500000004 HC RX 250 GENERAL PHARMACY W/ HCPCS (ALT 636 FOR OP/ED): Mod: SE | Performed by: PHARMACIST

## 2024-04-07 PROCEDURE — 74018 RADEX ABDOMEN 1 VIEW: CPT | Mod: FOREIGN READ | Performed by: RADIOLOGY

## 2024-04-07 PROCEDURE — 71045 X-RAY EXAM CHEST 1 VIEW: CPT | Mod: FOREIGN READ | Performed by: RADIOLOGY

## 2024-04-07 PROCEDURE — 86140 C-REACTIVE PROTEIN: CPT | Performed by: NURSE PRACTITIONER

## 2024-04-07 PROCEDURE — 74018 RADEX ABDOMEN 1 VIEW: CPT

## 2024-04-07 RX ORDER — CARBAMAZEPINE 200 MG/10ML
150 SUSPENSION ORAL DAILY
Status: DISCONTINUED | OUTPATIENT
Start: 2024-04-08 | End: 2024-04-12 | Stop reason: HOSPADM

## 2024-04-07 RX ORDER — POLYETHYLENE GLYCOL 3350 17 G/17G
17 POWDER, FOR SOLUTION ORAL 3 TIMES DAILY PRN
Status: DISCONTINUED | OUTPATIENT
Start: 2024-04-07 | End: 2024-04-07

## 2024-04-07 RX ORDER — PHENYTOIN 125 MG/5ML
360 SUSPENSION ORAL ONCE
Status: DISCONTINUED | OUTPATIENT
Start: 2024-04-07 | End: 2024-04-07

## 2024-04-07 RX ORDER — ENOXAPARIN SODIUM 100 MG/ML
40 INJECTION SUBCUTANEOUS EVERY 24 HOURS
Status: DISCONTINUED | OUTPATIENT
Start: 2024-04-07 | End: 2024-04-09

## 2024-04-07 RX ORDER — BACLOFEN 10 MG/1
30 TABLET ORAL DAILY
Status: DISCONTINUED | OUTPATIENT
Start: 2024-04-08 | End: 2024-04-12 | Stop reason: HOSPADM

## 2024-04-07 RX ORDER — ACETAMINOPHEN 160 MG/5ML
650 SOLUTION ORAL EVERY 6 HOURS
Status: DISCONTINUED | OUTPATIENT
Start: 2024-04-08 | End: 2024-04-12 | Stop reason: HOSPADM

## 2024-04-07 RX ORDER — DIAZEPAM 5 MG/ML
5 INJECTION, SOLUTION INTRAMUSCULAR; INTRAVENOUS DAILY PRN
Status: DISCONTINUED | OUTPATIENT
Start: 2024-04-07 | End: 2024-04-07

## 2024-04-07 RX ORDER — CARBAMAZEPINE 200 MG/10ML
300 SUSPENSION ORAL 2 TIMES DAILY
Status: DISCONTINUED | OUTPATIENT
Start: 2024-04-07 | End: 2024-04-12 | Stop reason: HOSPADM

## 2024-04-07 RX ORDER — DEXTROMETHORPHAN/PSEUDOEPHED 2.5-7.5/.8
40 DROPS ORAL AS NEEDED
Status: DISCONTINUED | OUTPATIENT
Start: 2024-04-07 | End: 2024-04-12 | Stop reason: HOSPADM

## 2024-04-07 RX ORDER — PHENYTOIN 125 MG/5ML
100 SUSPENSION ORAL 3 TIMES DAILY
Status: DISCONTINUED | OUTPATIENT
Start: 2024-04-07 | End: 2024-04-12 | Stop reason: HOSPADM

## 2024-04-07 RX ORDER — LEVETIRACETAM 100 MG/ML
2000 SOLUTION ORAL 2 TIMES DAILY
Status: DISCONTINUED | OUTPATIENT
Start: 2024-04-07 | End: 2024-04-12 | Stop reason: HOSPADM

## 2024-04-07 RX ORDER — DOCUSATE SODIUM 50 MG/5ML
100 LIQUID ORAL DAILY
Status: DISCONTINUED | OUTPATIENT
Start: 2024-04-08 | End: 2024-04-12 | Stop reason: HOSPADM

## 2024-04-07 RX ORDER — NAPROXEN SODIUM 220 MG/1
81 TABLET, FILM COATED ORAL DAILY
Status: DISCONTINUED | OUTPATIENT
Start: 2024-04-08 | End: 2024-04-12 | Stop reason: HOSPADM

## 2024-04-07 RX ORDER — VANCOMYCIN HYDROCHLORIDE 1 G/20ML
INJECTION, POWDER, LYOPHILIZED, FOR SOLUTION INTRAVENOUS DAILY PRN
Status: DISCONTINUED | OUTPATIENT
Start: 2024-04-07 | End: 2024-04-12 | Stop reason: HOSPADM

## 2024-04-07 RX ORDER — BACLOFEN 10 MG/1
20 TABLET ORAL 2 TIMES DAILY
Status: DISCONTINUED | OUTPATIENT
Start: 2024-04-07 | End: 2024-04-12 | Stop reason: HOSPADM

## 2024-04-07 RX ORDER — ACETAMINOPHEN 500 MG
5 TABLET ORAL NIGHTLY PRN
Status: DISCONTINUED | OUTPATIENT
Start: 2024-04-07 | End: 2024-04-07

## 2024-04-07 RX ORDER — ACETAMINOPHEN 160 MG/5ML
650 SOLUTION ORAL EVERY 6 HOURS PRN
Status: DISCONTINUED | OUTPATIENT
Start: 2024-04-07 | End: 2024-04-07

## 2024-04-07 RX ORDER — BACLOFEN 10 MG/1
30 TABLET ORAL NIGHTLY
Status: DISCONTINUED | OUTPATIENT
Start: 2024-04-07 | End: 2024-04-07

## 2024-04-07 RX ORDER — DIAZEPAM 5 MG/ML
5 INJECTION, SOLUTION INTRAMUSCULAR; INTRAVENOUS EVERY 5 MIN PRN
Status: DISCONTINUED | OUTPATIENT
Start: 2024-04-07 | End: 2024-04-12 | Stop reason: HOSPADM

## 2024-04-07 RX ORDER — PETROLATUM 420 MG/G
OINTMENT TOPICAL AS NEEDED
Status: DISCONTINUED | OUTPATIENT
Start: 2024-04-07 | End: 2024-04-12 | Stop reason: HOSPADM

## 2024-04-07 RX ADMIN — ENOXAPARIN SODIUM 40 MG: 100 INJECTION SUBCUTANEOUS at 23:55

## 2024-04-07 RX ADMIN — Medication 1250 MG: at 19:09

## 2024-04-07 RX ADMIN — ACETAMINOPHEN 650 MG: 650 SOLUTION ORAL at 21:34

## 2024-04-07 RX ADMIN — MEROPENEM 1 G: 1 INJECTION, POWDER, FOR SOLUTION INTRAVENOUS at 18:43

## 2024-04-07 RX ADMIN — PHENYTOIN 100 MG: 125 SUSPENSION ORAL at 21:35

## 2024-04-07 RX ADMIN — Medication 1 APPLICATION: at 21:35

## 2024-04-07 ASSESSMENT — COLUMBIA-SUICIDE SEVERITY RATING SCALE - C-SSRS
1. IN THE PAST MONTH, HAVE YOU WISHED YOU WERE DEAD OR WISHED YOU COULD GO TO SLEEP AND NOT WAKE UP?: NO
6. HAVE YOU EVER DONE ANYTHING, STARTED TO DO ANYTHING, OR PREPARED TO DO ANYTHING TO END YOUR LIFE?: NO
2. HAVE YOU ACTUALLY HAD ANY THOUGHTS OF KILLING YOURSELF?: NO

## 2024-04-07 NOTE — ED TRIAGE NOTES
"Pt presents to the ED via EMS for possible infection. Per EMS, pt being treated for staph resistant MRSA as well as a 2nd infection unknown to EMS. Pt nonverbal at baseline. Pt has hx of DM, DVT, HTN, seizures, and quadriplegia. Per EMS, pt \"not receiving proper care\" per mom.   "

## 2024-04-07 NOTE — PROGRESS NOTES
EDPD Note: Lab/Chart Reviewed    Reviewed Mr./Mrs./Ms. Israel Edgar 's chart regarding a positive urine culture/result that was taken during their recent emergency room visit. Patient's mother Vee returned back our voicemail that was left for the first outreach attempt.     Patient's urine culture was positive as listed below. Patient is allergic to Bactrim and has seizures with cephalexin per mother Vee and is unable to take either of those oral therapies. Patient's mother believes patient needs further treatment to treat his infection and she was informed that at this time her only option for treatment is to return back to the emergency department so patient can receive IV therapy to adequately treat both MRSA and Proteus mirabilus. She agreed and felt that he needs a course of IV antibiotics.     Susceptibility data from last 90 days.  Collected Specimen Info Organism Ampicillin Cefazolin Cefazolin (uncomplicated UTIs only) Ciprofloxacin Gentamicin Nitrofurantoin Oxacillin Piperacillin/Tazobactam Tetracycline Trimethoprim/Sulfamethoxazole Vancomycin   04/03/24 Urine from Indwelling (Boss) Catheter Methicillin Resistant Staphylococcus aureus (MRSA)      S R   S S     Proteus mirabilis S S S R S R  S R S    02/16/24 Urine from Clean Catch/Voided Proteus mirabilis S S S R S   S R S    02/13/24 Urine from Clean Catch/Voided Methicillin Resistant Staphylococcus aureus (MRSA)      S R   S S     Proteus mirabilis S S S R S   S R S      No further follow up needed from EDPD Team.     Raf Jimenez, PharmD

## 2024-04-07 NOTE — ED PROVIDER NOTES
HPI:      Limitations to History: Nonverbal status  Additional History Obtained from: Mother, EMS  External records reviewed: Prior EMR note    Chief Complaint   Patient presents with    Infection          Israel Edgar is a 43 y.o. male with past medical history of quadriplegia, nonverbal status, and MRDO urinary tract infections complicated by multiple antibiotic adverse reactions/allergies presenting to the ED with positive urine culture from [facility].  Patient was seen there few days ago, and was sent home pending urine culture.  At that time he did not have a fever, and remained without a fever at home.  However when mother was called and notified of his positive urine cultures today, she requested that he be brought to [facility] instead of [facility] ED.  On arrival, patient is afebrile, overall well-appearing.  Most recent indwelling Boss catheter changed on 4/3/2024 prior to urine cultures.    Past Medical History:   Diagnosis Date    Abnormal findings on diagnostic imaging of other specified body structures 10/11/2017    Nonspecific abnormal findings on radiological and examination of intrathoracic organs    Acute embolism and thrombosis of deep veins of unspecified upper extremity (CMS/HCC)     Acute deep vein thrombosis of arm    Acute upper respiratory infection, unspecified 12/05/2014    Acute upper respiratory infection    Impacted cerumen, bilateral 09/01/2017    Impacted cerumen of both ears    Nausea with vomiting, unspecified 09/09/2016    Nausea and vomiting in adult    Other conditions influencing health status     Acute Hypoxic Encephalopathy    Other conditions influencing health status     Schizophrenia    Other conditions influencing health status     Lumbar Puncture Traumatic Tap    Other muscle spasm     Muscle spasm    Paraplegia, unspecified (CMS/HCC)     Paraparesis of both lower limbs    Personal history of other diseases of urinary system 03/21/2017    History of  bladder stone    Personal history of other specified conditions 01/13/2017    History of urinary incontinence    Personal history of other specified conditions     History of headache    Personal history of urinary (tract) infections 01/31/2017    History of urinary tract infection    Personal history of urinary calculi 09/08/2015    History of renal calculi    Personal history of urinary calculi 03/07/2016    History of renal calculi    Unspecified visual loss     Vision problems     Past Surgical History:   Procedure Laterality Date    ILEOSTOMY  08/05/2015    Ileostomy    OTHER SURGICAL HISTORY  08/05/2015    Tracheostomy    OTHER SURGICAL HISTORY  09/16/2016    Feeding Tube        Allergies   Allergen Reactions    Adhesive Tape-Silicones Other and Unknown     Adhesive Bandages MISC    Per [facility] record    Albuterol Unknown    Budesonide Unknown     pulmicort nebulizer    Famotidine Other    Keflex [Cephalexin] Diarrhea    Lacosamide Unknown     mother states lethargy, will not allow this medication    Latex Other    Lorazepam Unknown     Ativan TABS    Other Other and Unknown    Pantoprazole Unknown     diarrhea, hypokalemia, hypomagnesium    Piperacillin-Tazobactam Unknown     erythema multiforme    Ranitidine Unknown    Sulfamethoxazole-Trimethoprim Unknown    Suppository Adult [Glycerin (Adult)] Other     Mom doesnot want; says previous GI said no suppository     Vancomycin Other     Administer slowly but able to tolerate     Ipratropium Bromide Rash    Nitrofurantoin Rash     facial rash and reddness       --------------------------------------------------------------------------------------------------------------------------------------    VS: As documented in the triage note and EMR flowsheet from this visit were reviewed.  Temp 36.6 °C (97.9 °F) HR 85 /87 RR 18 Sat 97 % on      Physical Exam:  GEN: Afebrile, no acute distress, appears comfortable. Conversational and appropriate.    HEENT:  Normocephalic, atraumatic. Conjunctiva pink with no redness or exudates. Hearing grossly intact. Moist mucous membranes.  CARDIO: Normal rate and regular rhythm. Normal S1, S2  without murmurs, rubs, or gallops.   PULM: Clear to auscultation bilaterally. No rales, rhonchi, or wheezes. No accessory muscle use or stridor.   GI: Soft, non-tender, distended abdomen, improved from baseline per mom. No rebound tenderness or guarding.   : Boss cath present, with cloudy yellow urine, no obvious hematuria.  SKIN: Warm and dry, no rashes, lesions, petechiae, or purpura.  MSK: No obvious deformities.  2+ pulses in the bilateral upper and lower extremities.  No peripheral edema, contusions, or wounds.    NEURO: A&Ox3, No focal findings identified. No confusion or gross mental status changes.  PSYCH: Appropriate mood and behavior, converses and responds appropriately during exam.   ---------------------------------------------------------------------------------------------------------------------------------------  Given in the ED:   Medications   vancomycin (Vancocin) pharmacy to dose - pharmacy monitoring (has no administration in time range)   vancomycin (Vancocin) in dextrose 5% 250 mL IV 1,250 mg (1,250 mg intravenous Given 4/7/24 1909)   meropenem (Merrem) 1 g in sodium chloride 0.9% 100 mL IV (0 g intravenous Stopped 4/7/24 1913)        Work up: All labs and imaging were independently reviewed by me.    Labs Reviewed   CBC WITH AUTO DIFFERENTIAL - Abnormal       Result Value    WBC 3.5 (*)     nRBC 0.0      RBC 4.82      Hemoglobin 15.8      Hematocrit 43.1      MCV 89      MCH 32.8      MCHC 36.7 (*)     RDW 11.7      Platelets 167      Neutrophils % 55.0      Immature Granulocytes %, Automated 0.0      Lymphocytes % 23.9      Monocytes % 17.3      Eosinophils % 2.6      Basophils % 1.2      Neutrophils Absolute 1.91      Immature Granulocytes Absolute, Automated 0.00      Lymphocytes Absolute 0.83 (*)     Monocytes  Absolute 0.60      Eosinophils Absolute 0.09      Basophils Absolute 0.04     COMPREHENSIVE METABOLIC PANEL - Abnormal    Glucose 110 (*)     Sodium 134 (*)     Potassium 4.2      Chloride 101      Bicarbonate 23      Anion Gap 14      Urea Nitrogen 8      Creatinine 0.39 (*)     eGFR >90      Calcium 9.1      Albumin 3.7      Alkaline Phosphatase 60      Total Protein 7.4      AST 19      Bilirubin, Total 0.3      ALT 17     COAGULATION SCREEN - Abnormal    Protime 12.7      INR 1.1      aPTT 21 (*)     Narrative:     The APTT is no longer used for monitoring Unfractionated Heparin Therapy. For monitoring Heparin Therapy, use the Heparin Assay.   BLOOD GAS VENOUS FULL PANEL UNSOLICITED - Abnormal    POCT pH, Venous 7.40      POCT pCO2, Venous 48      POCT pO2, Venous 62 (*)     POCT SO2, Venous 91 (*)     POCT Oxy Hemoglobin, Venous 88.5 (*)     POCT Hematocrit Calculated, Venous 48.0      POCT Sodium, Venous 133 (*)     POCT Potassium, Venous 4.3      POCT Chloride, Venous 99      POCT Ionized Calicum, Venous 1.16      POCT Glucose, Venous 119 (*)     POCT Lactate, Venous 1.3      POCT Base Excess, Venous 3.8 (*)     POCT HCO3 Calculated, Venous 29.7 (*)     POCT Hemoglobin, Venous 15.9      POCT Anion Gap, Venous 9.0 (*)     Patient Temperature 37.0     MAGNESIUM - Normal    Magnesium 2.04     PHOSPHORUS - Normal    Phosphorus 3.1     INFLUENZA A AND B PCR - Normal    Flu A Result Not Detected      Flu B Result Not Detected      Narrative:     This assay is an in vitro diagnostic multiplex nucleic acid amplification test for the detection and discrimination of Influenza A & B from nasopharyngeal specimens, and has been validated for use at [facility]. Negative results do not preclude Influenza A/B infections, and should not be used as the sole basis for diagnosis, treatment, or other management decisions. If Influenza A/B and RSV PCR results are negative, testing for Parainfluenza virus,  Adenovirus and Metapneumovirus is routinely performed for [facility] pediatric oncology and intensive care inpatients, and is available on other patients by placing an add-on request.   SARS-COV-2 PCR - Normal    Coronavirus 2019, PCR Not Detected      Narrative:     This assay has received FDA Emergency Use Authorization (EUA) and is only authorized for the duration of time that circumstances exist to justify the authorization of the emergency use of in vitro diagnostic tests for the detection of SARS-CoV-2 virus and/or diagnosis of COVID-19 infection under section 564(b)(1) of the Act, 21 U.S.C. 360bbb-3(b)(1). This assay is an in vitro diagnostic nucleic acid amplification test for the qualitative detection of SARS-CoV-2 from nasopharyngeal specimens and has been validated for use at [facility]. Negative results do not preclude COVID-19 infections and should not be used as the sole basis for diagnosis, treatment, or other management decisions.     LACTATE   URINALYSIS WITH REFLEX CULTURE AND MICROSCOPIC    Narrative:     The following orders were created for panel order Urinalysis with Reflex Culture and Microscopic.  Procedure                               Abnormality         Status                     ---------                               -----------         ------                     Urinalysis with Reflex C...[[id number]]                                                 Extra Urine Gray Tube[[id number]]                                                         Please view results for these tests on the individual orders.   URINALYSIS WITH REFLEX CULTURE AND MICROSCOPIC   EXTRA URINE GRAY TUBE   HEMOGLOBIN A1C   LACTATE   C-REACTIVE PROTEIN   CBC WITH AUTO DIFFERENTIAL   RENAL FUNCTION PANEL   MAGNESIUM       XR abdomen 1 view   Final Result   Gas-filled distended large and small bowel compatible with ileus.   Signed by Julio Curran MD      XR chest 1 view   Final Result   No acute process.    Gas-filled distended loops of large and small bowel compatible with   ileus.   Signed by Julio Curran MD            MDM:  Briefly, this is a 43 y.o. male who was seen here for positive urine cultures for MRSA and Proteus, complicated by bacterial resistance and patient allergies.  Patient arrived afebrile, not tachycardic, overall nonseptic appearing.  Was started on vancomycin and meropenem, in addition to basic blood work, and repeat urinalysis.  Abdominal x-ray was ordered with concern for obstruction, may perform CT abdomen/pelvis if abnormal x-ray.  Chest x-ray was also ordered to rule out further infection.  Discussed with medicine team, and was accepted for further management.  Remained hemodynamically stable in the ED.    Diagnoses as of 04/07/24 1914   Urinary tract infection associated with indwelling urethral catheter, initial encounter (CMS/[facility])   MRSA (methicillin resistant staph aureus) culture positive     Social Determinants of Health: Currently lives at facility, however mother is very involved in care.    Plan and Disposition: Admission to medicine for IV antibiotics.        Oneal Stafford DO  Emergency Medicine, PGY-2    Patient was seen and evaluated by the attending physician. The attending ED physician agrees with the plan. Patient and/or patient´s representative was counseled regarding labs, imaging, likely diagnosis, and plan. All questions were answered.  Disclaimer: This note was dictated by speech recognition.  Attempt at proofreading was made to minimize errors.  Errors in transcription may be present.  Please call if questions.     Oneal Stafford DO  Resident  04/07/24 1945

## 2024-04-07 NOTE — CONSULTS
"Vancomycin Dosing by Pharmacy- Initial    Israel Edgar is a 43 y.o. year old male who Pharmacy has been consulted for vancomycin dosing for urinary tract infection, positive MRSA urine culture (catheter associated). Based on the patient's indication and renal status this patient is being dosed based on a goal AUC of 400-600.     Renal function can be described as Normal Renal Function    Renal function is at patient's baseline      Visit Vitals  /87   Pulse 85   Temp 36.6 °C (97.9 °F) (Oral)   Resp 18        Lab Results   Component Value Date    CREATININE 0.47 (L) 03/25/2024    CREATININE 0.43 (L) 02/16/2024    CREATININE 0.41 (L) 09/08/2023    CREATININE 0.47 (L) 09/07/2023    CREATININE 0.34 (L) 09/06/2023    CREATININE 0.42 (L) 08/16/2023        Patient weight is No results found for: \"PTWEIGHT\"    Lab Results   Component Value Date    VANCORANDOM 15.9 03/22/2023    VANCORANDOM 22.3 03/22/2023    VANCORANDOM CANCELED 03/22/2023        No intake/output data recorded.    Lab Results   Component Value Date    PATIENTTEMP 37.0 04/07/2024    PATIENTTEMP 37.0 09/06/2023    PATIENTTEMP 37.0 03/15/2023    PATIENTTEMP 37.0 03/09/2023          Assessment/Plan    Patient will not be given a loading dose.  Will initiate vancomycin maintenance,  1250 mg every 12 hours. Patient has a vancomycin allergy listed with details as \"On NH (Nursing Home) List\". Patient received vancomycin in March 2023 without incident, but regardless I will decrease the infusion rate to 2 hours in order to mitigate any infusion related reaction.    This dosing regimen is predicted by [employer] to result in the following pharmacokinetic parameters:    Loading dose: N/A  Regimen: 1250 mg IV every 12 hours.  Start time: 17:56 on 04/07/2024  Exposure target: AUC24 (range)400-600 mg/L.hr   AUC24,ss: 460 mg/L.hr  Probability of AUC24 > 400: 65 %  Ctrough,ss: 13 mg/L  Probability of Ctrough,ss > 20: 20 %  Probability of nephrotoxicity (Lodise ELEAZAR " 2009): 8 %      Follow-up level will be ordered on 4/8 at AM labs unless clinically indicated sooner.  Will continue to monitor renal function daily while on vancomycin and order serum creatinine at least every 48 hours if not already ordered.  Follow for continued vancomycin needs, clinical response, and signs/symptoms of toxicity.     Thank you for allowing me to participate in the care of this patient.     Cheyenne Romano, PharmD

## 2024-04-07 NOTE — H&P
History Of Present Illness  Israel Edgar is a 43 y.o. male with a PMH of DVT, diabetes, multiple sclerosis, seizure disorder, neurogenic bladder with chronic little cath, paraplegia, neurogenic bowel c/b chronic ileus (2011), chronic constipation, GERD, hx of MRSA bacteremia, recurrent UTIs, quadriplegia, Gerardo's syndrome, dysphagia and HTN. Pt presented to ED from home with his mother this afternoon after she was notified that urine cultures obtained on 4/3/24 were positive for P.mirabilis and MRSA. Pt originally presented to [facility] on 4/3 due to concern for UTI. Pt's mom states that his urine had a foul fishy ammonia type smell and urine was leaking around his little cath. His little was changed to a larger size during this visit to 18 Fr little cath instead of 16 Fr. Urine sample was sent when little was changed in the ED. Antibiotics were not prescribed during this visit due to concern for chronic colonization. Pt is nonverbal at baseline. Labs obtained on admit notable for leukopenia, elevated CRP, and mild hyponatremia. KUB showed gas-filled distended large and small bowel compatible with ileus. Upon chart review it appears that pt has had ileus since 2011. Abdomen is distended on exam but soft. No visible signs of discomfort on palpation. Pt did not appear to be experiencing any distress when seen by writer. IV antibiotics based on sensitivities started while in the ED. Pt has multiple allergies to antibiotics upon chart review. Pt is NPO and receives bolus TF (Peptamen 1.5) TID at home. Pt's mom (Vee) is his POA and caregiver. Last hospitalization was at [facility] 2/1-2/5 for colonoscopy pt unable to have procedure done outpatient because his Tegretrol and Baclofen must be taken with food and their was increased risk for aspiration. Pt admitted to medicine service for further treatment and management of catheter associated UTI.      Past Medical History  Past Medical History:   Diagnosis Date     Abnormal findings on diagnostic imaging of other specified body structures 10/11/2017    Nonspecific abnormal findings on radiological and examination of intrathoracic organs    Acute embolism and thrombosis of deep veins of unspecified upper extremity (CMS/HCC)     Acute deep vein thrombosis of arm    Acute upper respiratory infection, unspecified 12/05/2014    Acute upper respiratory infection    Impacted cerumen, bilateral 09/01/2017    Impacted cerumen of both ears    Nausea with vomiting, unspecified 09/09/2016    Nausea and vomiting in adult    Other conditions influencing health status     Acute Hypoxic Encephalopathy    Other conditions influencing health status     Schizophrenia    Other conditions influencing health status     Lumbar Puncture Traumatic Tap    Other muscle spasm     Muscle spasm    Paraplegia, unspecified (CMS/HCC)     Paraparesis of both lower limbs    Personal history of other diseases of urinary system 03/21/2017    History of bladder stone    Personal history of other specified conditions 01/13/2017    History of urinary incontinence    Personal history of other specified conditions     History of headache    Personal history of urinary (tract) infections 01/31/2017    History of urinary tract infection    Personal history of urinary calculi 09/08/2015    History of renal calculi    Personal history of urinary calculi 03/07/2016    History of renal calculi    Unspecified visual loss     Vision problems       Surgical History  Past Surgical History:   Procedure Laterality Date    ILEOSTOMY  08/05/2015    Ileostomy    OTHER SURGICAL HISTORY  08/05/2015    Tracheostomy    OTHER SURGICAL HISTORY  09/16/2016    Feeding Tube        Social History  He has no history on file for tobacco use, alcohol use, and drug use.    Family History  No family history on file.     Allergies  Keflex [cephalexin], Levaquin [levofloxacin], Lorazepam, Piperacillin-tazobactam, Adhesive tape-silicones, Albuterol,  "Lacosamide, Latex, Pantoprazole, Sulfa (sulfonamide antibiotics), Sulfamethoxazole-trimethoprim, Suppository adult [glycerin (adult)], Budesonide, Famotidine, Ipratropium bromide, Nitrofurantoin, and Ranitidine    Review of Systems   Reason unable to perform ROS: pt nonverbal.        Physical Exam  Vitals reviewed.   Constitutional:       General: He is not in acute distress.     Appearance: He is not ill-appearing.   HENT:      Head: Normocephalic and atraumatic.      Nose: Nose normal.      Mouth/Throat:      Mouth: Mucous membranes are dry.   Eyes:      Conjunctiva/sclera: Conjunctivae normal.   Cardiovascular:      Rate and Rhythm: Normal rate.      Pulses: Normal pulses.      Heart sounds: Normal heart sounds.   Pulmonary:      Effort: Pulmonary effort is normal.      Breath sounds: Normal breath sounds.   Abdominal:      General: There is distension.      Palpations: Abdomen is soft.   Skin:     Comments: Healing stage pressure ulcer on sacrum, midline/LUQ PEG tube    Neurological:      Mental Status: He is alert.      Comments: GAYE; pt nonverbal          Last Recorded Vitals  Blood pressure 131/81, pulse 75, temperature 36.6 °C (97.9 °F), temperature source Oral, resp. rate 14, height 1.803 m (5' 11\"), weight 77.1 kg (170 lb), SpO2 97 %.    Relevant Results  XR chest 1 view    Result Date: 4/7/2024  STUDY: Chest Radiograph;  04/07/2024 at 5:50 PM INDICATION: Rule out pneumonia. COMPARISON: XR chest 04/03/24, 09/06/23, 07/15/23. ACCESSION NUMBER(S): [id number] ORDERING CLINICIAN: NOEMI BRAUN TECHNIQUE:  Frontal chest was obtained at 1750 hours. FINDINGS: CARDIOMEDIASTINAL SILHOUETTE: Cardiomediastinal silhouette is normal in size and configuration.  LUNGS: Lungs are clear.  ABDOMEN: No remarkable upper abdominal findings.  BONES: No acute osseous changes.    No acute process. Gas-filled distended loops of large and small bowel compatible with ileus. Signed by Julio Curran MD    XR abdomen 1 " view    Result Date: 4/7/2024  STUDY: Abdomen Radiographs;  04/07/2024 at 5:50 PM INDICATION: Abdominal distention. COMPARISON: XR chest imaging of same date, 04/07/24. XR KUB 07/13/23, 07/12/23. ACCESSION NUMBER(S): [id number] ORDERING CLINICIAN: TECHNIQUE:  One view(s) of the abdomen (two images). FINDINGS:  There are gas-filled distended large and small bowel loops compatible with ileus. There are no convincing calculi or abnormal calcifications.  No focal osseous abnormalities.      Gas-filled distended large and small bowel compatible with ileus. Signed by Julio Curran MD      Results for orders placed or performed during the hospital encounter of 04/07/24 (from the past 24 hour(s))   Blood Gas Venous Full Panel Unsolicited   Result Value Ref Range    POCT pH, Venous 7.40 7.33 - 7.43 pH    POCT pCO2, Venous 48 41 - 51 mm Hg    POCT pO2, Venous 62 (H) 35 - 45 mm Hg    POCT SO2, Venous 91 (H) 45 - 75 %    POCT Oxy Hemoglobin, Venous 88.5 (H) 45.0 - 75.0 %    POCT Hematocrit Calculated, Venous 48.0 41.0 - 52.0 %    POCT Sodium, Venous 133 (L) 136 - 145 mmol/L    POCT Potassium, Venous 4.3 3.5 - 5.3 mmol/L    POCT Chloride, Venous 99 98 - 107 mmol/L    POCT Ionized Calicum, Venous 1.16 1.10 - 1.33 mmol/L    POCT Glucose, Venous 119 (H) 74 - 99 mg/dL    POCT Lactate, Venous 1.3 0.4 - 2.0 mmol/L    POCT Base Excess, Venous 3.8 (H) -2.0 - 3.0 mmol/L    POCT HCO3 Calculated, Venous 29.7 (H) 22.0 - 26.0 mmol/L    POCT Hemoglobin, Venous 15.9 13.5 - 17.5 g/dL    POCT Anion Gap, Venous 9.0 (L) 10.0 - 25.0 mmol/L    Patient Temperature 37.0 degrees Celsius   CBC and Auto Differential   Result Value Ref Range    WBC 3.5 (L) 4.4 - 11.3 x10*3/uL    nRBC 0.0 0.0 - 0.0 /100 WBCs    RBC 4.82 4.50 - 5.90 x10*6/uL    Hemoglobin 15.8 13.5 - 17.5 g/dL    Hematocrit 43.1 41.0 - 52.0 %    MCV 89 80 - 100 fL    MCH 32.8 26.0 - 34.0 pg    MCHC 36.7 (H) 32.0 - 36.0 g/dL    RDW 11.7 11.5 - 14.5 %    Platelets 167 150 - 450 x10*3/uL     Neutrophils % 55.0 40.0 - 80.0 %    Immature Granulocytes %, Automated 0.0 0.0 - 0.9 %    Lymphocytes % 23.9 13.0 - 44.0 %    Monocytes % 17.3 2.0 - 10.0 %    Eosinophils % 2.6 0.0 - 6.0 %    Basophils % 1.2 0.0 - 2.0 %    Neutrophils Absolute 1.91 1.20 - 7.70 x10*3/uL    Immature Granulocytes Absolute, Automated 0.00 0.00 - 0.70 x10*3/uL    Lymphocytes Absolute 0.83 (L) 1.20 - 4.80 x10*3/uL    Monocytes Absolute 0.60 0.10 - 1.00 x10*3/uL    Eosinophils Absolute 0.09 0.00 - 0.70 x10*3/uL    Basophils Absolute 0.04 0.00 - 0.10 x10*3/uL   Comprehensive Metabolic Panel   Result Value Ref Range    Glucose 110 (H) 74 - 99 mg/dL    Sodium 134 (L) 136 - 145 mmol/L    Potassium 4.2 3.5 - 5.3 mmol/L    Chloride 101 98 - 107 mmol/L    Bicarbonate 23 21 - 32 mmol/L    Anion Gap 14 10 - 20 mmol/L    Urea Nitrogen 8 6 - 23 mg/dL    Creatinine 0.39 (L) 0.50 - 1.30 mg/dL    eGFR >90 >60 mL/min/1.73m*2    Calcium 9.1 8.6 - 10.6 mg/dL    Albumin 3.7 3.4 - 5.0 g/dL    Alkaline Phosphatase 60 33 - 120 U/L    Total Protein 7.4 6.4 - 8.2 g/dL    AST 19 9 - 39 U/L    Bilirubin, Total 0.3 0.0 - 1.2 mg/dL    ALT 17 10 - 52 U/L   Magnesium   Result Value Ref Range    Magnesium 2.04 1.60 - 2.40 mg/dL   Phosphorus   Result Value Ref Range    Phosphorus 3.1 2.5 - 4.9 mg/dL   Influenza A, and B PCR   Result Value Ref Range    Flu A Result Not Detected Not Detected    Flu B Result Not Detected Not Detected   Sars-CoV-2 PCR   Result Value Ref Range    Coronavirus 2019, PCR Not Detected Not Detected   Coagulation Screen   Result Value Ref Range    Protime 12.7 9.8 - 12.8 seconds    INR 1.1 0.9 - 1.1    aPTT 21 (L) 27 - 38 seconds   Hemoglobin A1c   Result Value Ref Range    Hemoglobin A1C 5.4 see below %    Estimated Average Glucose 108 Not Established mg/dL   C-reactive protein   Result Value Ref Range    C-Reactive Protein 2.81 (H) <1.00 mg/dL   Urinalysis with Reflex Culture and Microscopic   Result Value Ref Range    Color, Urine Light-Yellow  Light-Yellow, Yellow, Dark-Yellow    Appearance, Urine Turbid (N) Clear    Specific Gravity, Urine 1.016 1.005 - 1.035    pH, Urine 8.0 5.0, 5.5, 6.0, 6.5, 7.0, 7.5, 8.0    Protein, Urine 20 (TRACE) NEGATIVE, 10 (TRACE), 20 (TRACE) mg/dL    Glucose, Urine Normal Normal mg/dL    Blood, Urine 0.2 (2+) (A) NEGATIVE    Ketones, Urine NEGATIVE NEGATIVE mg/dL    Bilirubin, Urine NEGATIVE NEGATIVE    Urobilinogen, Urine Normal Normal mg/dL    Nitrite, Urine 1+ (A) NEGATIVE    Leukocyte Esterase, Urine 250 Mckay/µL (A) NEGATIVE   Microscopic Only, Urine   Result Value Ref Range    WBC, Urine 11-20 (A) 1-5, NONE /HPF    RBC, Urine >20 (A) NONE, 1-2, 3-5 /HPF    Mucus, Urine FEW Reference range not established. /LPF    Triple Phosphate Crystals, Urine 1+ NONE, 1+ /HPF      Susceptibility data from last 90 days.  Collected Specimen Info Organism Ampicillin Cefazolin Cefazolin (uncomplicated UTIs only) Ciprofloxacin Gentamicin Nitrofurantoin Oxacillin Piperacillin/Tazobactam Tetracycline Trimethoprim/Sulfamethoxazole Vancomycin   04/03/24 Urine from Indwelling (Boss) Catheter Methicillin Resistant Staphylococcus aureus (MRSA)      S R   S S     Proteus mirabilis S S S R S R  S R S    02/16/24 Urine from Clean Catch/Voided Proteus mirabilis S S S R S   S R S    02/13/24 Urine from Clean Catch/Voided Methicillin Resistant Staphylococcus aureus (MRSA)      S R   S S     Proteus mirabilis S S S R S   S R S       ED Medication Administration from 04/07/2024 1640 to 04/08/2024 0011         Date/Time Order Dose Route Action Action by     04/07/2024 1843 EDT meropenem (Merrem) 1 g in sodium chloride 0.9% 100 mL IV 1 g intravenous New Bag YAHIR Patricio     04/07/2024 1855 EDT phenytoin (Dilantin) suspension 360 mg -- oral Canceled Entry YAHIR Patricio     04/07/2024 1909 EDT vancomycin (Vancocin) in dextrose 5% 250 mL IV 1,250 mg 1,250 mg intravenous Given YAHIR Patricio     04/07/2024 1913 EDT meropenem (Merrem) 1 g in sodium chloride 0.9% 100 mL IV 0 g  intravenous Stopped YAHIR Patricio     04/07/2024 2134 EDT acetaminophen (Tylenol) oral liquid 650 mg 650 mg g-tube Given Patricio S     04/07/2024 2135 EDT phenytoin (Dilantin) suspension 100 mg 100 mg g-tube Given Patricio S     04/07/2024 2135 EDT white petrolatum-mineral oiL (Tears Naturale PM) ophthalmic ointment 1 Application 1 Application Both Eyes Given YAHIR Patricio     04/07/2024 2355 EDT enoxaparin (Lovenox) syringe 40 mg 40 mg subcutaneous Given Patricio S           Lab Results   Component Value Date    HGBA1C 5.4 04/07/2024      Scheduled medications  acetaminophen, 650 mg, g-tube, q6h  aspirin, 81 mg, g-tube, Daily  baclofen, 20 mg, g-tube, BID  baclofen, 30 mg, oral, Daily  carBAMazepine, 150 mg, g-tube, Daily  carBAMazepine, 300 mg, g-tube, BID  docusate sodium, 100 mg, g-tube, Daily  enoxaparin, 40 mg, subcutaneous, q24h  levETIRAcetam, 2,000 mg, g-tube, BID  lubricating eye drops, 2 drop, Both Eyes, BID  [START ON 4/9/2024] magnesium citrate, 148 mL, g-tube, Every other day  meropenem, 1 g, intravenous, q8h  phenytoin, 100 mg, g-tube, TID  vancomycin, 1,250 mg, intravenous, q12h  white petrolatum-mineral oiL, 1 Application, Both Eyes, Nightly      Continuous medications     PRN medications  PRN medications: diazePAM, simethicone, vancomycin, white petrolatum    Mr. Edgar is a 43 yr old male with a PMH of DVT, diabetes, multiple sclerosis, seizure disorder, neurogenic bladder with chronic little cath, paraplegia, neurogenic bowel c/b ileus (2011), chronic constipation, GERD, hx of MRSA bacteremia, recurrent UTIs, quadriplegia, Eolia's syndrome, dysphagia and HTN. Pt presented to ED from home with his mother this afternoon after she was notified that urine cultures obtained on 4/3/24 were positive for P.mirabilis and MRSA. Pt originally presented to [facility] on 4/3 due to concern for UTI. Labs obtained on admit notable for leukopenia, elevated CRP, and mild hyponatremia. KUB showed gas-filled distended large and  small bowel compatible with ileus. Upon chart review it appears that pt has had ileus since 2011. Abdomen is distended on exam but soft. Pt admitted to medicine service for further treatment and management of catheter associated UTI.     Assessment/Plan   Principal Problem:    Urinary tract infection associated with indwelling urethral catheter, initial encounter (CMS/[facility])    #catheter associated UTI  #hx of recurrent UTIs  - Urine culture: Pending results with sensitivities   - Prior Urine culture result (4/3/24):20,000-80,000 CFU/mL P.mirabilis sensitive to Ampicillin, Ancef, Gentamicin, Zosyn, and Bactrim and >100,000 MRSA sensitive to Macrobid, Vancomycin, and Bactrim  - Antibiotic Regimen: Vancomycin RPH to dose (4/7-) and Merrem 1g q8h (4/7-). Transition to PO antibiotic at discharge   - Monitor kidney function; Provide maintenance fluids if needed; Encourage oral hydration   - consult ID in AM for antimicrobial recs (pt has allergies to Macrobid, Bactrim, Keflex)  - pt takes Methenamine Hippurate 1g BID via PEG tube outpatient (med held on admit)    #hyponatremia (mild)  - Na 134 on admit  - continue to monitor RFP ordered for AM    #neurogenic bowel c/b chronic ileus (2011)  #chronic constipation  - per chart review ileus has been present since 2011  - pt was last seen by GI outpatient on 2/23/24  - abdomen soft on exam with no visible signs of discomfort; per mom abdominal distension is how it has always looked in the past  - continue home bowel regimen: Colace 10 ml daily via PEG tube and Magnesium citrate 148 ml every other day via PEG tube    #seizure disorder  - pt follows Dr. Brenna Ash- [facility], last visit via telemedicine 1/30/24  - seizure and aspiration precautions  - continue home dose of Keppra 2000 mg (20 mL) BID at 0900/2100, Dilantin suspension 100 mg TID at 0900/1500/2100, and Tegretol 300 mg at 10a/10p and 150 mg at 1600 via PEG tube  - pt takes Nayzilam 5 mg nasal spray PRN  seizures (last filled via [facility] 2/5/24); med is non-formulary, mom states that pt normally has PRN Valium ordered for seizures when hospitalized; pt has allergy to Ativan    #dysphagia  - home regimen: Peptamen 1.5 1 carton TID (10a/4p/10p) with 150 ml water flushes before and after boluses  - Vital 1.5 (therapeutic interchange) 1 carton (237 ml) TID with 150 ml water flushes before and after boluses    #multiple sclerosis  - continue home dose of Baclofen 30 mg via PEG tube at 10a and 20 mg via PEG at 1600 and 2200    Dispo: admit to RNF. Plan per above.    I spent 75 minutes in the professional and overall care of this patient.    Emergency contact: Vee Edgar (mom/POA): [phone] (please call her cell if you round on pt and she is not present in room. She has appt with pt's wheelchair company tomorrow 4/8 at 12:30 that can't be missed. Pt got new wheelchair that needs adjustments.      Huong Reid, APRN-CNP

## 2024-04-08 ENCOUNTER — APPOINTMENT (OUTPATIENT)
Dept: RADIOLOGY | Facility: HOSPITAL | Age: 44
End: 2024-04-08
Payer: MEDICAID

## 2024-04-08 LAB
ALBUMIN SERPL BCP-MCNC: 3.8 G/DL (ref 3.4–5)
ANION GAP SERPL CALC-SCNC: 13 MMOL/L (ref 10–20)
BASOPHILS # BLD AUTO: 0.05 X10*3/UL (ref 0–0.1)
BASOPHILS NFR BLD AUTO: 1.5 %
BUN SERPL-MCNC: 7 MG/DL (ref 6–23)
CALCIUM SERPL-MCNC: 9 MG/DL (ref 8.6–10.6)
CHLORIDE SERPL-SCNC: 101 MMOL/L (ref 98–107)
CO2 SERPL-SCNC: 27 MMOL/L (ref 21–32)
CREAT SERPL-MCNC: 0.51 MG/DL (ref 0.5–1.3)
EGFRCR SERPLBLD CKD-EPI 2021: >90 ML/MIN/1.73M*2
EOSINOPHIL # BLD AUTO: 0.09 X10*3/UL (ref 0–0.7)
EOSINOPHIL NFR BLD AUTO: 2.8 %
ERYTHROCYTE [DISTWIDTH] IN BLOOD BY AUTOMATED COUNT: 11.9 % (ref 11.5–14.5)
GLUCOSE SERPL-MCNC: 175 MG/DL (ref 74–99)
HCT VFR BLD AUTO: 45.1 % (ref 41–52)
HGB BLD-MCNC: 15.6 G/DL (ref 13.5–17.5)
HOLD SPECIMEN: NORMAL
IMM GRANULOCYTES # BLD AUTO: 0.01 X10*3/UL (ref 0–0.7)
IMM GRANULOCYTES NFR BLD AUTO: 0.3 % (ref 0–0.9)
LACTATE SERPL-SCNC: 1.5 MMOL/L (ref 0.4–2)
LYMPHOCYTES # BLD AUTO: 0.93 X10*3/UL (ref 1.2–4.8)
LYMPHOCYTES NFR BLD AUTO: 28.5 %
MAGNESIUM SERPL-MCNC: 1.79 MG/DL (ref 1.6–2.4)
MCH RBC QN AUTO: 31.7 PG (ref 26–34)
MCHC RBC AUTO-ENTMCNC: 34.6 G/DL (ref 32–36)
MCV RBC AUTO: 92 FL (ref 80–100)
MONOCYTES # BLD AUTO: 0.48 X10*3/UL (ref 0.1–1)
MONOCYTES NFR BLD AUTO: 14.7 %
NEUTROPHILS # BLD AUTO: 1.7 X10*3/UL (ref 1.2–7.7)
NEUTROPHILS NFR BLD AUTO: 52.2 %
NRBC BLD-RTO: 0 /100 WBCS (ref 0–0)
PHOSPHATE SERPL-MCNC: 3.8 MG/DL (ref 2.5–4.9)
PLATELET # BLD AUTO: 248 X10*3/UL (ref 150–450)
POTASSIUM SERPL-SCNC: 3.8 MMOL/L (ref 3.5–5.3)
RBC # BLD AUTO: 4.92 X10*6/UL (ref 4.5–5.9)
SODIUM SERPL-SCNC: 137 MMOL/L (ref 136–145)
WBC # BLD AUTO: 3.3 X10*3/UL (ref 4.4–11.3)

## 2024-04-08 PROCEDURE — 99232 SBSQ HOSP IP/OBS MODERATE 35: CPT | Performed by: INTERNAL MEDICINE

## 2024-04-08 PROCEDURE — 1210000001 HC SEMI-PRIVATE ROOM DAILY

## 2024-04-08 PROCEDURE — 85025 COMPLETE CBC W/AUTO DIFF WBC: CPT | Performed by: NURSE PRACTITIONER

## 2024-04-08 PROCEDURE — 36410 VNPNXR 3YR/> PHY/QHP DX/THER: CPT

## 2024-04-08 PROCEDURE — 83605 ASSAY OF LACTIC ACID: CPT | Performed by: NURSE PRACTITIONER

## 2024-04-08 PROCEDURE — 2500000005 HC RX 250 GENERAL PHARMACY W/O HCPCS: Performed by: NURSE PRACTITIONER

## 2024-04-08 PROCEDURE — 80069 RENAL FUNCTION PANEL: CPT | Performed by: NURSE PRACTITIONER

## 2024-04-08 PROCEDURE — 2500000001 HC RX 250 WO HCPCS SELF ADMINISTERED DRUGS (ALT 637 FOR MEDICARE OP): Performed by: NURSE PRACTITIONER

## 2024-04-08 PROCEDURE — 83735 ASSAY OF MAGNESIUM: CPT | Performed by: NURSE PRACTITIONER

## 2024-04-08 PROCEDURE — C1751 CATH, INF, PER/CENT/MIDLINE: HCPCS

## 2024-04-08 PROCEDURE — A4217 STERILE WATER/SALINE, 500 ML: HCPCS | Performed by: NURSE PRACTITIONER

## 2024-04-08 PROCEDURE — 36415 COLL VENOUS BLD VENIPUNCTURE: CPT | Performed by: NURSE PRACTITIONER

## 2024-04-08 PROCEDURE — 2500000004 HC RX 250 GENERAL PHARMACY W/ HCPCS (ALT 636 FOR OP/ED): Performed by: NURSE PRACTITIONER

## 2024-04-08 PROCEDURE — 99222 1ST HOSP IP/OBS MODERATE 55: CPT | Performed by: INTERNAL MEDICINE

## 2024-04-08 PROCEDURE — 2780000003 HC OR 278 NO HCPCS

## 2024-04-08 RX ORDER — VANCOMYCIN HYDROCHLORIDE 500 MG/100ML
INJECTION, SOLUTION INTRAVENOUS
Status: DISCONTINUED
Start: 2024-04-08 | End: 2024-04-08 | Stop reason: WASHOUT

## 2024-04-08 RX ORDER — SYRING-NEEDL,DISP,INSUL,0.3 ML 29 G X1/2"
148 SYRINGE, EMPTY DISPOSABLE MISCELLANEOUS EVERY OTHER DAY
Status: DISCONTINUED | OUTPATIENT
Start: 2024-04-08 | End: 2024-04-12 | Stop reason: HOSPADM

## 2024-04-08 RX ORDER — LIDOCAINE HYDROCHLORIDE 10 MG/ML
5 INJECTION INFILTRATION; PERINEURAL ONCE
Status: DISCONTINUED | OUTPATIENT
Start: 2024-04-08 | End: 2024-04-12 | Stop reason: HOSPADM

## 2024-04-08 RX ORDER — DIAZEPAM 5 MG/ML
15 INJECTION, SOLUTION INTRAMUSCULAR; INTRAVENOUS
COMMUNITY

## 2024-04-08 RX ORDER — TRAZODONE HYDROCHLORIDE 100 MG/1
100 TABLET ORAL NIGHTLY PRN
COMMUNITY
End: 2024-04-08 | Stop reason: ENTERED-IN-ERROR

## 2024-04-08 RX ORDER — SYRING-NEEDL,DISP,INSUL,0.3 ML 29 G X1/2"
148 SYRINGE, EMPTY DISPOSABLE MISCELLANEOUS EVERY OTHER DAY
Status: DISCONTINUED | OUTPATIENT
Start: 2024-04-08 | End: 2024-04-08

## 2024-04-08 RX ORDER — SYRING-NEEDL,DISP,INSUL,0.3 ML 29 G X1/2"
SYRINGE, EMPTY DISPOSABLE MISCELLANEOUS
COMMUNITY

## 2024-04-08 RX ADMIN — CARBOXYMETHYLCELLULOSE SODIUM 2 DROP: 5 SOLUTION/ DROPS OPHTHALMIC at 08:50

## 2024-04-08 RX ADMIN — CARBOXYMETHYLCELLULOSE SODIUM 2 DROP: 5 SOLUTION/ DROPS OPHTHALMIC at 15:43

## 2024-04-08 RX ADMIN — LEVETIRACETAM 2000 MG: 500 SOLUTION ORAL at 20:59

## 2024-04-08 RX ADMIN — BACLOFEN 30 MG: 10 TABLET ORAL at 11:24

## 2024-04-08 RX ADMIN — ACETAMINOPHEN 650 MG: 650 SOLUTION ORAL at 09:11

## 2024-04-08 RX ADMIN — Medication 1250 MG: at 18:14

## 2024-04-08 RX ADMIN — LEVETIRACETAM 2000 MG: 500 SOLUTION ORAL at 01:18

## 2024-04-08 RX ADMIN — PHENYTOIN 100 MG: 125 SUSPENSION ORAL at 15:38

## 2024-04-08 RX ADMIN — PHENYTOIN 100 MG: 125 SUSPENSION ORAL at 08:20

## 2024-04-08 RX ADMIN — DOCUSATE SODIUM 100 MG: 50 LIQUID ORAL at 10:16

## 2024-04-08 RX ADMIN — Medication 1 APPLICATION: at 21:00

## 2024-04-08 RX ADMIN — SIMETHICONE 40 MG: 20 SUSPENSION/ DROPS ORAL at 21:14

## 2024-04-08 RX ADMIN — CARBAMAZEPINE 300 MG: 100 SUSPENSION ORAL at 02:35

## 2024-04-08 RX ADMIN — ENOXAPARIN SODIUM 40 MG: 100 INJECTION SUBCUTANEOUS at 20:58

## 2024-04-08 RX ADMIN — ACETAMINOPHEN 650 MG: 650 SOLUTION ORAL at 20:59

## 2024-04-08 RX ADMIN — BACLOFEN 20 MG: 10 TABLET ORAL at 21:15

## 2024-04-08 RX ADMIN — MEROPENEM 1 G: 1 INJECTION, POWDER, FOR SOLUTION INTRAVENOUS at 20:58

## 2024-04-08 RX ADMIN — CARBAMAZEPINE 150 MG: 100 SUSPENSION ORAL at 16:44

## 2024-04-08 RX ADMIN — CARBAMAZEPINE 300 MG: 100 SUSPENSION ORAL at 11:23

## 2024-04-08 RX ADMIN — ACETAMINOPHEN 650 MG: 650 SOLUTION ORAL at 15:43

## 2024-04-08 RX ADMIN — MEROPENEM 1 G: 1 INJECTION, POWDER, FOR SOLUTION INTRAVENOUS at 11:10

## 2024-04-08 RX ADMIN — BACLOFEN 20 MG: 10 TABLET ORAL at 02:37

## 2024-04-08 RX ADMIN — PHENYTOIN 100 MG: 125 SUSPENSION ORAL at 21:14

## 2024-04-08 RX ADMIN — BACLOFEN 20 MG: 10 TABLET ORAL at 16:44

## 2024-04-08 RX ADMIN — CARBAMAZEPINE 300 MG: 100 SUSPENSION ORAL at 21:15

## 2024-04-08 RX ADMIN — Medication 1250 MG: at 08:20

## 2024-04-08 RX ADMIN — LEVETIRACETAM 2000 MG: 500 SOLUTION ORAL at 10:18

## 2024-04-08 RX ADMIN — MEROPENEM 1 G: 1 INJECTION, POWDER, FOR SOLUTION INTRAVENOUS at 02:38

## 2024-04-08 ASSESSMENT — PAIN SCALES - GENERAL: PAINLEVEL_OUTOF10: 0 - NO PAIN

## 2024-04-08 ASSESSMENT — PAIN - FUNCTIONAL ASSESSMENT: PAIN_FUNCTIONAL_ASSESSMENT: CPOT (CRITICAL CARE PAIN OBSERVATION TOOL)

## 2024-04-08 NOTE — PROGRESS NOTES
"Israel Edgar is a 43 y.o. male on day 1 of admission presenting with Urinary tract infection associated with indwelling urethral catheter, initial encounter (CMS/[facility]).    Subjective   No events over night,     Patient is non verbal at base line Patients mother at bed side,   H/o Leak around the little catheter,        Objective     Physical Exam  Constitutional:       Comments: Looks comfortable at rest  Nonverbal due to underlying MS,    HENT:      Head: Normocephalic.      Nose: Nose normal.   Eyes:      Extraocular Movements: Extraocular movements intact.      Pupils: Pupils are equal, round, and reactive to light.   Cardiovascular:      Rate and Rhythm: Normal rate and regular rhythm.      Heart sounds: Normal heart sounds. No murmur heard.  Pulmonary:      Effort: No respiratory distress.      Breath sounds: Normal breath sounds. No wheezing.   Abdominal:      General: Bowel sounds are normal.      Palpations: Abdomen is soft.      Comments: PEG tube is in place,    Musculoskeletal:         General: Normal range of motion.      Cervical back: Normal range of motion.   Skin:     General: Skin is warm.   Neurological:      Mental Status: Mental status is at baseline.      Comments: Non verbal, at base line          Last Recorded Vitals  Blood pressure 119/87, pulse 79, temperature 36.6 °C (97.9 °F), temperature source Oral, resp. rate 13, height 1.803 m (5' 11\"), weight 77.1 kg (170 lb), SpO2 94 %.  Intake/Output last 3 Shifts:  No intake/output data recorded.    Relevant Results  Scheduled medications  acetaminophen, 650 mg, g-tube, q6h  aspirin, 81 mg, g-tube, Daily  baclofen, 20 mg, g-tube, BID  baclofen, 30 mg, oral, Daily  carBAMazepine, 150 mg, g-tube, Daily  carBAMazepine, 300 mg, g-tube, BID  docusate sodium, 100 mg, g-tube, Daily  enoxaparin, 40 mg, subcutaneous, q24h  levETIRAcetam, 2,000 mg, g-tube, BID  lubricating eye drops, 2 drop, Both Eyes, BID  magnesium citrate, 148 mL, oral, Every other " day  meropenem, 1 g, intravenous, q8h  phenytoin, 100 mg, g-tube, TID  vancomycin, 1,250 mg, intravenous, q12h  white petrolatum-mineral oiL, 1 Application, Both Eyes, Nightly      Continuous medications     PRN medications  PRN medications: diazePAM, simethicone, vancomycin, white petrolatum    Results for orders placed or performed during the hospital encounter of 04/07/24 (from the past 24 hour(s))   Blood Gas Venous Full Panel Unsolicited   Result Value Ref Range    POCT pH, Venous 7.40 7.33 - 7.43 pH    POCT pCO2, Venous 48 41 - 51 mm Hg    POCT pO2, Venous 62 (H) 35 - 45 mm Hg    POCT SO2, Venous 91 (H) 45 - 75 %    POCT Oxy Hemoglobin, Venous 88.5 (H) 45.0 - 75.0 %    POCT Hematocrit Calculated, Venous 48.0 41.0 - 52.0 %    POCT Sodium, Venous 133 (L) 136 - 145 mmol/L    POCT Potassium, Venous 4.3 3.5 - 5.3 mmol/L    POCT Chloride, Venous 99 98 - 107 mmol/L    POCT Ionized Calicum, Venous 1.16 1.10 - 1.33 mmol/L    POCT Glucose, Venous 119 (H) 74 - 99 mg/dL    POCT Lactate, Venous 1.3 0.4 - 2.0 mmol/L    POCT Base Excess, Venous 3.8 (H) -2.0 - 3.0 mmol/L    POCT HCO3 Calculated, Venous 29.7 (H) 22.0 - 26.0 mmol/L    POCT Hemoglobin, Venous 15.9 13.5 - 17.5 g/dL    POCT Anion Gap, Venous 9.0 (L) 10.0 - 25.0 mmol/L    Patient Temperature 37.0 degrees Celsius   CBC and Auto Differential   Result Value Ref Range    WBC 3.5 (L) 4.4 - 11.3 x10*3/uL    nRBC 0.0 0.0 - 0.0 /100 WBCs    RBC 4.82 4.50 - 5.90 x10*6/uL    Hemoglobin 15.8 13.5 - 17.5 g/dL    Hematocrit 43.1 41.0 - 52.0 %    MCV 89 80 - 100 fL    MCH 32.8 26.0 - 34.0 pg    MCHC 36.7 (H) 32.0 - 36.0 g/dL    RDW 11.7 11.5 - 14.5 %    Platelets 167 150 - 450 x10*3/uL    Neutrophils % 55.0 40.0 - 80.0 %    Immature Granulocytes %, Automated 0.0 0.0 - 0.9 %    Lymphocytes % 23.9 13.0 - 44.0 %    Monocytes % 17.3 2.0 - 10.0 %    Eosinophils % 2.6 0.0 - 6.0 %    Basophils % 1.2 0.0 - 2.0 %    Neutrophils Absolute 1.91 1.20 - 7.70 x10*3/uL    Immature Granulocytes  Absolute, Automated 0.00 0.00 - 0.70 x10*3/uL    Lymphocytes Absolute 0.83 (L) 1.20 - 4.80 x10*3/uL    Monocytes Absolute 0.60 0.10 - 1.00 x10*3/uL    Eosinophils Absolute 0.09 0.00 - 0.70 x10*3/uL    Basophils Absolute 0.04 0.00 - 0.10 x10*3/uL   Comprehensive Metabolic Panel   Result Value Ref Range    Glucose 110 (H) 74 - 99 mg/dL    Sodium 134 (L) 136 - 145 mmol/L    Potassium 4.2 3.5 - 5.3 mmol/L    Chloride 101 98 - 107 mmol/L    Bicarbonate 23 21 - 32 mmol/L    Anion Gap 14 10 - 20 mmol/L    Urea Nitrogen 8 6 - 23 mg/dL    Creatinine 0.39 (L) 0.50 - 1.30 mg/dL    eGFR >90 >60 mL/min/1.73m*2    Calcium 9.1 8.6 - 10.6 mg/dL    Albumin 3.7 3.4 - 5.0 g/dL    Alkaline Phosphatase 60 33 - 120 U/L    Total Protein 7.4 6.4 - 8.2 g/dL    AST 19 9 - 39 U/L    Bilirubin, Total 0.3 0.0 - 1.2 mg/dL    ALT 17 10 - 52 U/L   Magnesium   Result Value Ref Range    Magnesium 2.04 1.60 - 2.40 mg/dL   Phosphorus   Result Value Ref Range    Phosphorus 3.1 2.5 - 4.9 mg/dL   Influenza A, and B PCR   Result Value Ref Range    Flu A Result Not Detected Not Detected    Flu B Result Not Detected Not Detected   Sars-CoV-2 PCR   Result Value Ref Range    Coronavirus 2019, PCR Not Detected Not Detected   Coagulation Screen   Result Value Ref Range    Protime 12.7 9.8 - 12.8 seconds    INR 1.1 0.9 - 1.1    aPTT 21 (L) 27 - 38 seconds   Hemoglobin A1c   Result Value Ref Range    Hemoglobin A1C 5.4 see below %    Estimated Average Glucose 108 Not Established mg/dL   C-reactive protein   Result Value Ref Range    C-Reactive Protein 2.81 (H) <1.00 mg/dL   Urinalysis with Reflex Culture and Microscopic   Result Value Ref Range    Color, Urine Light-Yellow Light-Yellow, Yellow, Dark-Yellow    Appearance, Urine Turbid (N) Clear    Specific Gravity, Urine 1.016 1.005 - 1.035    pH, Urine 8.0 5.0, 5.5, 6.0, 6.5, 7.0, 7.5, 8.0    Protein, Urine 20 (TRACE) NEGATIVE, 10 (TRACE), 20 (TRACE) mg/dL    Glucose, Urine Normal Normal mg/dL    Blood, Urine 0.2  (2+) (A) NEGATIVE    Ketones, Urine NEGATIVE NEGATIVE mg/dL    Bilirubin, Urine NEGATIVE NEGATIVE    Urobilinogen, Urine Normal Normal mg/dL    Nitrite, Urine 1+ (A) NEGATIVE    Leukocyte Esterase, Urine 250 Mckay/µL (A) NEGATIVE   Extra Urine Gray Tube   Result Value Ref Range    Extra Tube Hold for add-ons.    Microscopic Only, Urine   Result Value Ref Range    WBC, Urine 11-20 (A) 1-5, NONE /HPF    RBC, Urine >20 (A) NONE, 1-2, 3-5 /HPF    Mucus, Urine FEW Reference range not established. /LPF    Triple Phosphate Crystals, Urine 1+ NONE, 1+ /HPF       This patient has a urinary catheter   Reason for the urinary catheter remaining today? neurogenic bladder               Assessment/Plan   Principal Problem:    Urinary tract infection associated with indwelling urethral catheter, initial encounter (CMS/[facility])    Mr. Edgar is a 43 year old  old male with a PMH of DVT, DM, multiple sclerosis, seizure disorder, neurogenic bladder with chronic little catheter, MS, neurogenic bowel c/b ileus (2011), chronic constipation, GERD, hx of MRSA bacteremia, recurrent UTIs,  Gerardo's syndrome, dysphagia and HTN who is presented to ED from home with his mother  after she was notified that urine cultures obtained on 4/3/24 were positive for P.mirabilis and MRSA. Pt originally presented to [facility] on 4/3 due to concern for UTI. Labs obtained on admit notable for leukopenia, elevated CRP, and mild hyponatremia. KUB showed gas-filled distended large and small bowel compatible with ileus. Upon chart review it appears that pt has had ileus since 2011. Abdomen is distended on exam , however is  soft. Pt is admitted to medicine service for further treatment and management of catheter associated UTI.      Assessment/Plan   Principal Problem:    Urinary tract infection associated with indwelling urethral catheter, initial encounter (CMS/[facility])     #Catheter associated UTI  #hx of recurrent UTIs  - Urine culture: Pending results with  sensitivities   - Prior Urine culture result (4/3/24):20,000-80,000 CFU/mL P.mirabilis sensitive to Ampicillin, Ancef, Gentamicin, Zosyn, and Bactrim and >100,000 MRSA sensitive to Macrobid, Vancomycin, and Bactrim  - Antibiotic Regimen: Vancomycin RPH to dose (4/7-) and Merrem 1g q8h (4/7-). Transition to PO antibiotic at discharge   - pt takes Methenamine Hippurate 1g BID via PEG tube outpatient (med held on admit)  ID consulted, follow up recs,      #hyponatremia (mild)  - Na 134 on admit  - continue to monitor RFP,      #neurogenic bowel c/b chronic ileus (2011)  #chronic constipation  - per chart review ileus has been present since 2011  - pt was last seen by GI outpatient on 2/23/24  - abdomen soft on exam with no visible signs of discomfort; per mom abdominal distension is how it has always looked in the past  - continue home bowel regimen: Colace 10 ml daily via PEG tube and Magnesium citrate 148 ml every other day via PEG tube     #H/o seizure disorder  - pt follows Dr. Brenna Scott, last visit via telemedicine 1/30/24  - seizure and aspiration precautions  - continue home dose of Keppra 2000 mg (20 mL) BID at 0900/2100, Dilantin suspension 100 mg TID at 0900/1500/2100, and Tegretol 300 mg at 10a/10p and 150 mg at 1600 via PEG tube  - pt takes Nayzilam 5 mg nasal spray PRN seizures (last filled via [facility] 2/5/24); med is non-formulary, mom states that pt normally has PRN Valium ordered for seizures when hospitalized; pt has allergy to Ativan     #dysphagia s/p PEG:  - home regimen: Peptamen 1.5 1 carton TID (10a/4p/10p) with 150 ml water flushes before and after boluses  - Vital 1.5 (therapeutic interchange) 1 carton (237 ml) TID with 150 ml water flushes before and after boluses     #H/o multiple sclerosis  - continue home dose of Baclofen 30 mg via PEG tube at 10a and 20 mg via PEG at 1600 and 2200     Dispo: Pending cultures from 4/7,       Emergency contact: Vee Edgar  (mom/POA): [phone] (please call her cell if you round on pt and she is not present in room. She has appt with pt's wheelchair company tomorrow 4/8 at 12:30 that can't be missed. Pt got new wheelchair that needs adjustments.       Julien Thomas MD

## 2024-04-08 NOTE — PROGRESS NOTES
Pharmacy Medication History Review    Israel Edgar is a 43 y.o. male admitted for Urinary tract infection associated with indwelling urethral catheter, initial encounter (CMS/[facility]). Pharmacy reviewed the patient's [id number] medications and allergies for accuracy.    The list below reflects the updated PTA list. Comments regarding how patient may be taking medications differently can be found in the Admit Orders Activity  Prior to Admission Medications   Prescriptions Informant Patient Mother Reported? Taking?   ALPRAZolam (Xanax) 0.5 mg tablet Mother Yes PRN   Si tablet (0.5 mg) by g-tube route if needed.   TABLET SHOULD BE CRUSHED AND GIVEN VIA G-TUBE   TegretoL 100 mg/5 mL suspension Mother Yes Yes   Sig: Take 15ml in AM 7.5mL at 4PM and 15ml in PM   acetaminophen (Tylenol) 160 mg/5 mL (5 mL) solution Mother Yes Yes   Sig: Take 20 mL by mouth every 6 hours. Pt takes med at   9a/4p/10p/3a   artificial tears, dextran-hypomel-glycerin, 0.1-0.3-0.2 % ophthalmic solution Mother Yes Yes   Sig: Administer 1-2 drops into affected eye(s) twice a day.   at 9 am and 3 pm while awake   aspirin 81 mg chewable tablet Mother Yes Yes   Si tablet (81 mg) by g-tube route once daily. at 4 pm   baclofen (Lioresal) 20 mg tablet Mother Yes Yes   Sig: Take 0.5 tablet (10 mg) by mouth 3 times a day.   Pt takes 30 mg of Baclofen at 10am and 20 mg of Baclofen   at 1600 and 2200. Must be crushed and given with tube feed.   cholecalciferol (Vitamin D-3) 10 mcg/mL (400 unit/mL) drops Mother Yes Yes   Sig: 10 mL (4,000 Units) by g-tube route once daily.   diazePAM (Valium) 5 mg/mL Injection Mother Yes PRN   Sig: Take 3 mL (15 mg) into the muscle if needed for seizures.   DO NOT MIX WITH NAYZILAM  **Patient Mom States Rarely Used**   docusate sodium (Colace) 50 mg/5 mL oral liquid Mother Yes Yes   Sig: 10 mL (100 mg) by g-tube route once daily.   hydrocortisone 2.5 % ointment Mother Yes PRN   Sig: Apply topically 2 times a day  as needed. Apply to scalp   and / or face area for seborrheic dermatitis (scalp flaking and   reddened facial areas) 2 times a day, As Needed   levETIRAcetam 100 mg/mL solution Mother Yes Yes   Si mL (2,000 mg) by g-tube route 2 times a day.   Pt takes this medication at 0900 and 2100.   magnesium citrate solution Mother Yes Yes   Sig: Give 148 ml(s) by G-TUBE every other day   methenamine hippurate (Hiprex) 1 gram tablet Mother Yes Yes   Sig: Take 1 tablet (1 g) by mouth 2 times a day.   TABLET SHOULD BE CRUSHED AND GIVEN VIA G-TUBE   nasal spray midazolam (Versed) 5 mg/spray (0.1 mL) spray,non-aerosol Mother Yes PRN   Sig: Use one spray in one nostril for convulsive seizure   lasting longer than 5 minutes. May repeat again in the other nostril   after 5 minutes if convulsive seizures still ongoing. CALL 911   **Last Filled 24 for a 30 Day Supply**   onabotulinumtoxinA (Botox) 200 unit injection Mother Yes Yes   Sig: Inject 150 Units into the muscle.   IN EACH ARM EVERY 3 MONTHS, GIVEN BY NEUROLOGIST   petrolatum,white (ADVANCED HEALING, PETROLATUM, TOP) Mother Yes PRN   Sig: Apply 1 Application topically if needed   (TO BUTTOCKS TWICE DAILY AND AFTER EACH BOWEL MOVEMENT).   phenytoin (Dilantin-125) 125 mg/5 mL suspension Mother Yes Yes   Sig: Take 4ml by PEG tube 3 times a day   selenium sulfide (Selsun) 2.5 % shampoo Mother Yes PRN   Sig: Apply 1 Application topically 1 (one) time per week.   Use every other day during a seborrheic dermatitis outbreak   or as needed   simethicone (Mylicon) 40 mg/0.6 mL drops Mother Yes PRN   Si mL (133.3333 mg) by g-tube route every other day   if needed for flatulence.   white petrolatum-mineral oiL 94-3 % ophthalmic ointment Mother Yes Yes   Sig: Apply 1 Application to both eyes once daily at bedtime.  Apply small amount (1/4 inch) to inside of lower eyelid at bedtime daily      Facility-Administered Medications: None        The list below reflects the updated  allergy list. Please review each documented allergy for additional clarification and justification.  Allergies  Reviewed by Aidan Contreras on 4/8/2024        Severity Reactions Comments    Keflex [cephalexin] High Seizure Diarrhea; seizure activity, rash, and redness    Levaquin [levofloxacin] High Seizure     Lorazepam High Seizure Increased agitation and complex seizures (tonic clonic)    Piperacillin-tazobactam High Other erythema multiforme    Adhesive Tape-silicones Not Specified Other, Unknown Adhesive Bandages MISC Per NH record    Albuterol Not Specified Unknown     Lacosamide Not Specified Other Mother states lethargy, and non responsiveness to stimuli; will not allow this medication    Latex Not Specified Other Increased urinary infections    Pantoprazole Not Specified Diarrhea Diarrhea, hypokalemia, hypomagnesium    Suppository Adult [glycerin (adult)] Not Specified Other Mom doesnot want; says previous GI said no suppository     Budesonide Low Rash pulmicort nebulizer; Mother states facial rash and tachycardia    Famotidine Low Diarrhea Diarrhea    Ipratropium Bromide Low Rash Mother states facial rash and tachycardia    Nitrofurantoin Low Rash Facial rash and reddness    Ranitidine Low Rash Facial rash and redness    Sulfa (sulfonamide Antibiotics) Low Hives, Rash     Sulfamethoxazole-trimethoprim Low Hives, Rash             Patient declines M2B at discharge. Pharmacy has been updated to [facility] [phone].    Sources used to complete the med history include:  Patient is non-verbal and mother/caregiver is at bedside and was interviewed about medications given to patient. Patient mom was pleasant, very conversive and knowledgeable about patient medication regimen with home typed medication list with her and she administers the medications as well via peg tube  [facility] fill history reviewed  Care Everywhere, [facility] Clinical Summary  reviewed  [facility] reviewed    Below are additional concerns with the patient's PTA list.  ALL REQUEST PER MOM:  **DO NOT ADD OR CHANGE PATIENT MEDICATIONS WITHOUT TALKING TO MOM EARL VEENA [phone]**  **PATIENT IS NPO SINCE 4/29/2018**  **DO NOT GIVE POLYETHYLENE GLYCOL OR SENNA**  **NO RECTAL SUPPOSITORIES ARE TO BE GIVEN TO PATIENT**      ---------------------------------  Aidan Contreras CPhT  Transitions of Care Technician  Medication reconciliation complete  Please reach out via [employer] Secure Chat for questions,   or if no response call [employer] or [email or web address].  [facility]

## 2024-04-08 NOTE — CONSULTS
Inpatient consult to Infectious Diseases  Consult performed by: Gurinder Gandhi MD  Consult ordered by: Julien Thomas MD        Primary MD: Suellen Talbert MD  Reason For Consult Co-management for chronic little catheter UTI    History Of Present Illness  Israel Edgar is a 43 y.o. male with PMH of diabetes, multiple sclerosis, seizure disorder, neurogenic bladder with chronic little cath since 2013, paraplegia, neurogenic bowel c/b chronic ileus (2011), Gerardo's syndrome chronic constipation, hx of MRSA bacteremia and recurrent UTIs who presented to ED from home with his mother this afternoon after she was notified that urine cultures obtained on 4/3/24 were positive for P.mirabilis and MRSA. ID was consulted on 4/8.     Patient is nonverbal at baseline. Patient originally presented to [facility] on 4/3 with his mother report that his urine had a foul fishy ammonia type smell with leakage around his little cath. Antibiotics were not prescribed during this visit due to concern for chronic colonization. Pt has multiple allergies to antibiotics upon chart review. Pt admitted to medicine service for further treatment and management of catheter associated UTI.    Patient presented to the ED with leakage around his Little catheter with his mother. Afebrile without leukocytosis, and finally was treated for CAUTI with Cefuroxime 250mg BID for 10 days (2/19-2/28). Patient was doing better, which he was more alert and his urine was clear per PCP note on 2/26.     His mother denied he had fever but still having acetaminophen. Recent pH of urine was 8.0, with baseline above 6.0.     Past Medical History  He has a past medical history of Abnormal findings on diagnostic imaging of other specified body structures (10/11/2017), Acute embolism and thrombosis of deep veins of unspecified upper extremity (CMS/HCC), Acute upper respiratory infection, unspecified (12/05/2014), Impacted cerumen, bilateral (09/01/2017), Nausea with  "vomiting, unspecified (09/09/2016), Other conditions influencing health status, Other conditions influencing health status, Other conditions influencing health status, Other muscle spasm, Paraplegia, unspecified (CMS/HCC), Personal history of other diseases of urinary system (03/21/2017), Personal history of other specified conditions (01/13/2017), Personal history of other specified conditions, Personal history of urinary (tract) infections (01/31/2017), Personal history of urinary calculi (09/08/2015), Personal history of urinary calculi (03/07/2016), and Unspecified visual loss.    Surgical History  He has a past surgical history that includes Other surgical history (08/05/2015); Ileostomy (08/05/2015); and Other surgical history (09/16/2016).  Family History  No family history on file.  Allergies  Keflex [cephalexin], Levaquin [levofloxacin], Lorazepam, Piperacillin-tazobactam, Adhesive tape-silicones, Albuterol, Lacosamide, Latex, Pantoprazole, Suppository adult [glycerin (adult)], Budesonide, Famotidine, Ipratropium bromide, Nitrofurantoin, Ranitidine, Sulfa (sulfonamide antibiotics), and Sulfamethoxazole-trimethoprim      Objective  Range of Vitals (last 24 hours)  Heart Rate:  [66-85]   Temperature:  [36.6 °C (97.9 °F)]   Respirations:  [10-18]   BP: (105-131)/(80-94)   Height:  [180.3 cm (5' 11\")]   Weight:  [77.1 kg (170 lb)]   Pulse Ox:  [93 %-98 %]   Daily Weight  04/07/24 : 77.1 kg (170 lb)    Body mass index is 23.71 kg/m².     Physical Exam  General: NAD  CVS: RRR. Normal S1 and S2. No m/r/g.   RESP: ctab no w/r/r, no increased wob  Abd: Soft and distended. No sign suggestive of tenderness to palpation   Ext: No swelling of the LE b/l.   Boss catheter placed   Relevant Results    Labs  Results from last 72 hours   Lab Units 04/07/24  1708   WBC AUTO x10*3/uL 3.5*   HEMOGLOBIN g/dL 15.8   HEMATOCRIT % 43.1   PLATELETS AUTO x10*3/uL 167   NEUTROS PCT AUTO % 55.0   LYMPHS PCT AUTO % 23.9   MONOS PCT " AUTO % 17.3   EOS PCT AUTO % 2.6   Microbiology  Susceptibility data from last 90 days.  Collected Specimen Info Organism Ampicillin Cefazolin Cefazolin (uncomplicated UTIs only) Ciprofloxacin Gentamicin Nitrofurantoin Oxacillin Piperacillin/Tazobactam Tetracycline Trimethoprim/Sulfamethoxazole Vancomycin   04/03/24 Urine from Indwelling (Little) Catheter Methicillin Resistant Staphylococcus aureus (MRSA)      S R   S S     Proteus mirabilis S S S R S R  S R S    02/16/24 Urine from Clean Catch/Voided Proteus mirabilis S S S R S   S R S    02/13/24 Urine from Clean Catch/Voided Methicillin Resistant Staphylococcus aureus (MRSA)      S R   S S     Proteus mirabilis S S S R S   S R S      Microbiology  4/7 Urine     Antimicrobial agents  4/6- Meropenem  4/6- Vancomycin    Assessment/Plan  # Recent urine culture growing with Proteus mirabilis and MRSA with urine leakage and foul smell in setting of chronic setting  # Severe allergy of levofloxacin, zosyn, keflex, and bactrim. Cefuroxime tolerable.     A 42 yo male with advanced multiple sclerosis, seizure disorder, neurogenic bladder with chronic little cath since 2013 and hx of MRSA bacteremia and recurrent UTIs was found to have foul smell and urine leakage. No fever with oral acetaminophen. UA showed pyuria and recent urine culture growing with Proteus mirabilis and MRSA. Has been on methenamine. Would be considered for suprapubic catheter to reduce burden of his UTI.     There is likely a colonization given his current clinical condition but it would be an option to treat possible CAUTI through discussion with his mother.     Allergy hxs of antibiotics, cefuroxime should be tolerable from recent treatment.  Cefazolin or Ceftriaxone would be tolerable to use given both having side chains that are different from keflex, which would likely have not cross reaction. Also, it would less likely to have allergy for all cephalosporins. However, there would be reasonable to  chose different type of antibiotics to treat for possible infection.      Would be agreeable to continue IV meropenem and vancomycin for a while. Will follow urine culture.     Recommendations  -Please continue IV meropenem 1g  q8h and IV vancomycin per pharmacy protocol   -Will follow UA and culture 4/7    Discussed with Dr. Andrade. Please text me via Epic chat if you have any questions or concerns regarding this patient. ID will continue to follow up this patient.    Gurinder Gandhi MD  ID fellow [id number]  Team B Pager [phone]    We spent some time talking to his mother, who clearly does a fantastic job in managing him, about the challenge of a chronic Boss catheter recurrent bacterial colonization and recurrent UTIs.  We emphasized the need to only treat symptomatic UTIs but this is challenging in this case.  We did mention that a suprapubic catheter may have less of a risk than a Boss catheter and that she should follow-up with urology.  We will continue to follow-up with next couple days

## 2024-04-08 NOTE — PROGRESS NOTES
Pharmacy Admission Order Reconciliation Review    Israel Edgar is a 43 y.o. male admitted for Urinary tract infection associated with indwelling urethral catheter, initial encounter (CMS/[facility]). Pharmacy reviewed the patient's unreconciled admission medications.    Prior to admission medications that were reviewed and acted on by the pharmacist include:  Diazepam    These medications have been reconciled.     Any other unreconcilied medications have been addressed and will be ordered or held by the patient's medical team. Medications addressed by the pharmacist may be added or changed by the patient's medical team at any time.    Gabriela Herman, PharmD  Transitions of Care Pharmacist  [facility]  Please reach out via Secure Chat for questions, or if no response call [phone]

## 2024-04-09 LAB
ALBUMIN SERPL BCP-MCNC: 3.7 G/DL (ref 3.4–5)
ANION GAP SERPL CALC-SCNC: 12 MMOL/L (ref 10–20)
BASOPHILS # BLD AUTO: 0.04 X10*3/UL (ref 0–0.1)
BASOPHILS NFR BLD AUTO: 1.4 %
BUN SERPL-MCNC: 7 MG/DL (ref 6–23)
CALCIUM SERPL-MCNC: 9.1 MG/DL (ref 8.6–10.6)
CHLORIDE SERPL-SCNC: 101 MMOL/L (ref 98–107)
CO2 SERPL-SCNC: 29 MMOL/L (ref 21–32)
CREAT SERPL-MCNC: 0.45 MG/DL (ref 0.5–1.3)
EGFRCR SERPLBLD CKD-EPI 2021: >90 ML/MIN/1.73M*2
EOSINOPHIL # BLD AUTO: 0.08 X10*3/UL (ref 0–0.7)
EOSINOPHIL NFR BLD AUTO: 2.8 %
ERYTHROCYTE [DISTWIDTH] IN BLOOD BY AUTOMATED COUNT: 11.7 % (ref 11.5–14.5)
GLUCOSE SERPL-MCNC: 110 MG/DL (ref 74–99)
HCT VFR BLD AUTO: 43.1 % (ref 41–52)
HGB BLD-MCNC: 15.5 G/DL (ref 13.5–17.5)
IMM GRANULOCYTES # BLD AUTO: 0 X10*3/UL (ref 0–0.7)
IMM GRANULOCYTES NFR BLD AUTO: 0 % (ref 0–0.9)
LYMPHOCYTES # BLD AUTO: 0.92 X10*3/UL (ref 1.2–4.8)
LYMPHOCYTES NFR BLD AUTO: 32.1 %
MAGNESIUM SERPL-MCNC: 1.94 MG/DL (ref 1.6–2.4)
MCH RBC QN AUTO: 32.8 PG (ref 26–34)
MCHC RBC AUTO-ENTMCNC: 36 G/DL (ref 32–36)
MCV RBC AUTO: 91 FL (ref 80–100)
MONOCYTES # BLD AUTO: 0.49 X10*3/UL (ref 0.1–1)
MONOCYTES NFR BLD AUTO: 17.1 %
NEUTROPHILS # BLD AUTO: 1.34 X10*3/UL (ref 1.2–7.7)
NEUTROPHILS NFR BLD AUTO: 46.6 %
NRBC BLD-RTO: 0 /100 WBCS (ref 0–0)
PHOSPHATE SERPL-MCNC: 3.5 MG/DL (ref 2.5–4.9)
PLATELET # BLD AUTO: 252 X10*3/UL (ref 150–450)
POTASSIUM SERPL-SCNC: 4 MMOL/L (ref 3.5–5.3)
RBC # BLD AUTO: 4.73 X10*6/UL (ref 4.5–5.9)
SODIUM SERPL-SCNC: 138 MMOL/L (ref 136–145)
VANCOMYCIN SERPL-MCNC: 10.1 UG/ML (ref 5–20)
WBC # BLD AUTO: 2.9 X10*3/UL (ref 4.4–11.3)

## 2024-04-09 PROCEDURE — A4217 STERILE WATER/SALINE, 500 ML: HCPCS | Performed by: NURSE PRACTITIONER

## 2024-04-09 PROCEDURE — 83735 ASSAY OF MAGNESIUM: CPT | Performed by: INTERNAL MEDICINE

## 2024-04-09 PROCEDURE — 2500000004 HC RX 250 GENERAL PHARMACY W/ HCPCS (ALT 636 FOR OP/ED): Performed by: NURSE PRACTITIONER

## 2024-04-09 PROCEDURE — 2500000004 HC RX 250 GENERAL PHARMACY W/ HCPCS (ALT 636 FOR OP/ED)

## 2024-04-09 PROCEDURE — 2500000005 HC RX 250 GENERAL PHARMACY W/O HCPCS: Performed by: NURSE PRACTITIONER

## 2024-04-09 PROCEDURE — 80069 RENAL FUNCTION PANEL: CPT | Performed by: INTERNAL MEDICINE

## 2024-04-09 PROCEDURE — 99232 SBSQ HOSP IP/OBS MODERATE 35: CPT | Performed by: INTERNAL MEDICINE

## 2024-04-09 PROCEDURE — A4217 STERILE WATER/SALINE, 500 ML: HCPCS

## 2024-04-09 PROCEDURE — 85025 COMPLETE CBC W/AUTO DIFF WBC: CPT | Performed by: INTERNAL MEDICINE

## 2024-04-09 PROCEDURE — 99233 SBSQ HOSP IP/OBS HIGH 50: CPT | Performed by: INTERNAL MEDICINE

## 2024-04-09 PROCEDURE — 1100000001 HC PRIVATE ROOM DAILY

## 2024-04-09 PROCEDURE — 80202 ASSAY OF VANCOMYCIN: CPT | Performed by: STUDENT IN AN ORGANIZED HEALTH CARE EDUCATION/TRAINING PROGRAM

## 2024-04-09 PROCEDURE — 2500000001 HC RX 250 WO HCPCS SELF ADMINISTERED DRUGS (ALT 637 FOR MEDICARE OP): Performed by: NURSE PRACTITIONER

## 2024-04-09 RX ADMIN — ACETAMINOPHEN 650 MG: 650 SOLUTION ORAL at 09:07

## 2024-04-09 RX ADMIN — Medication 1250 MG: at 06:10

## 2024-04-09 RX ADMIN — ASPIRIN 81 MG 81 MG: 81 TABLET ORAL at 22:10

## 2024-04-09 RX ADMIN — MEROPENEM 1 G: 1 INJECTION, POWDER, FOR SOLUTION INTRAVENOUS at 22:11

## 2024-04-09 RX ADMIN — LEVETIRACETAM 2000 MG: 500 SOLUTION ORAL at 09:04

## 2024-04-09 RX ADMIN — MEROPENEM 1 G: 1 INJECTION, POWDER, FOR SOLUTION INTRAVENOUS at 05:24

## 2024-04-09 RX ADMIN — ACETAMINOPHEN 650 MG: 650 SOLUTION ORAL at 22:21

## 2024-04-09 RX ADMIN — VANCOMYCIN HYDROCHLORIDE 1500 MG: 5 INJECTION, POWDER, LYOPHILIZED, FOR SOLUTION INTRAVENOUS at 22:11

## 2024-04-09 RX ADMIN — CARBAMAZEPINE 150 MG: 100 SUSPENSION ORAL at 18:13

## 2024-04-09 RX ADMIN — BACLOFEN 30 MG: 10 TABLET ORAL at 09:04

## 2024-04-09 RX ADMIN — ACETAMINOPHEN 650 MG: 650 SOLUTION ORAL at 02:41

## 2024-04-09 RX ADMIN — DOCUSATE SODIUM 100 MG: 50 LIQUID ORAL at 09:04

## 2024-04-09 RX ADMIN — CARBAMAZEPINE 300 MG: 100 SUSPENSION ORAL at 22:10

## 2024-04-09 RX ADMIN — PHENYTOIN 100 MG: 125 SUSPENSION ORAL at 14:11

## 2024-04-09 RX ADMIN — BACLOFEN 20 MG: 10 TABLET ORAL at 22:10

## 2024-04-09 RX ADMIN — ACETAMINOPHEN 650 MG: 650 SOLUTION ORAL at 14:11

## 2024-04-09 RX ADMIN — PHENYTOIN 100 MG: 125 SUSPENSION ORAL at 22:18

## 2024-04-09 RX ADMIN — MEROPENEM 1 G: 1 INJECTION, POWDER, FOR SOLUTION INTRAVENOUS at 13:53

## 2024-04-09 RX ADMIN — Medication 1 APPLICATION: at 22:10

## 2024-04-09 RX ADMIN — BACLOFEN 20 MG: 10 TABLET ORAL at 18:10

## 2024-04-09 ASSESSMENT — COGNITIVE AND FUNCTIONAL STATUS - GENERAL
TURNING FROM BACK TO SIDE WHILE IN FLAT BAD: TOTAL
TOILETING: TOTAL
PERSONAL GROOMING: TOTAL
WALKING IN HOSPITAL ROOM: TOTAL
DRESSING REGULAR LOWER BODY CLOTHING: TOTAL
MOBILITY SCORE: 6
EATING MEALS: TOTAL
DAILY ACTIVITIY SCORE: 6
DRESSING REGULAR UPPER BODY CLOTHING: TOTAL
HELP NEEDED FOR BATHING: TOTAL
STANDING UP FROM CHAIR USING ARMS: TOTAL
CLIMB 3 TO 5 STEPS WITH RAILING: TOTAL
MOVING TO AND FROM BED TO CHAIR: TOTAL
MOVING FROM LYING ON BACK TO SITTING ON SIDE OF FLAT BED WITH BEDRAILS: TOTAL

## 2024-04-09 ASSESSMENT — ACTIVITIES OF DAILY LIVING (ADL)
HEARING - RIGHT EAR: UNABLE TO ASSESS
FEEDING YOURSELF: DEPENDENT
BATHING: DEPENDENT
PATIENT'S MEMORY ADEQUATE TO SAFELY COMPLETE DAILY ACTIVITIES?: UNABLE TO ASSESS
TOILETING: DEPENDENT
ADEQUATE_TO_COMPLETE_ADL: UNABLE TO ASSESS
LACK_OF_TRANSPORTATION: PATIENT UNABLE TO ANSWER
GROOMING: DEPENDENT
HEARING - LEFT EAR: UNABLE TO ASSESS
DRESSING YOURSELF: DEPENDENT
WALKS IN HOME: UNABLE TO ASSESS
JUDGMENT_ADEQUATE_SAFELY_COMPLETE_DAILY_ACTIVITIES: NO

## 2024-04-09 NOTE — PROGRESS NOTES
"Vancomycin Dosing by Pharmacy- FOLLOW UP    Israel Edgar is a 43 y.o. year old male who Pharmacy has been consulted for vancomycin dosing for other UTI . Based on the patient's indication and renal status this patient is being dosed based on a goal AUC of 400-600.     Renal function is currently stable.    Current vancomycin dose: 1250 mg given every 12 hours    Estimated vancomycin AUC on current dose: <400 mg/L.hr     Visit Vitals  BP (!) 118/93   Pulse 70   Temp 36.6 °C (97.9 °F) (Oral)   Resp 16        Lab Results   Component Value Date    CREATININE 0.45 (L) 04/09/2024    CREATININE 0.51 04/08/2024    CREATININE 0.39 (L) 04/07/2024    CREATININE 0.47 (L) 03/25/2024        Patient weight is No results found for: \"PTWEIGHT\"    No results found for: \"CULTURE\"     I/O last 3 completed shifts:  In: 910 (11.8 mL/kg) [P.O.:670; I.V.:240 (3.1 mL/kg)]  Out: 2100 (27.2 mL/kg) [Urine:2100 (0.8 mL/kg/hr)]  Weight: 77.1 kg   [unfilled]    Lab Results   Component Value Date    PATIENTTEMP 37.0 04/07/2024    PATIENTTEMP 37.0 09/06/2023    PATIENTTEMP 37.0 03/15/2023    PATIENTTEMP 37.0 03/09/2023        Assessment/Plan    Below goal AUC. Orders placed for new vancomcyin regimen of 1500 every 12 hours to begin at 2100.   Longer infusion of doses of 120 minutes     This dosing regimen is predicted by [employer] to result in the following pharmacokinetic parameters:    AUC24,ss: 475 mg/L.hr  Probability of AUC24 > 400: 86 %  Ctrough,ss: 12.1 mg/L  Probability of Ctrough,ss > 20: 1 %  Probability of nephrotoxicity (Lodise ELEAZAR 2009): 7 %    The next level will be obtained on 4/11 at AM labs. May be obtained sooner if clinically indicated.   Will continue to monitor renal function daily while on vancomycin and order serum creatinine at least every 48 hours if not already ordered.  Follow for continued vancomycin needs, clinical response, and signs/symptoms of toxicity.       Jay Jay Redding, PharmD           "

## 2024-04-09 NOTE — PROGRESS NOTES
"Israel Edgar is a 43 y.o. male on day 2 of admission presenting with Urinary tract infection associated with indwelling urethral catheter, initial encounter (CMS/[facility]).    Subjective   No events over night,     Patient is non verbal at base line ( Patients mother at bed side),   H/o Leak around the little catheter,   No c/o fever or chills as per his mother       Objective     Physical Exam  Constitutional:       Comments: Looks comfortable at rest  Nonverbal due to underlying MS,    HENT:      Head: Normocephalic.      Nose: Nose normal.   Eyes:      Extraocular Movements: Extraocular movements intact.      Pupils: Pupils are equal, round, and reactive to light.   Cardiovascular:      Rate and Rhythm: Normal rate and regular rhythm.      Heart sounds: Normal heart sounds. No murmur heard.  Pulmonary:      Effort: No respiratory distress.      Breath sounds: Normal breath sounds. No wheezing.   Abdominal:      General: Bowel sounds are normal.      Palpations: Abdomen is soft.      Comments: PEG tube is in place,    Musculoskeletal:         General: Normal range of motion.      Cervical back: Normal range of motion.   Skin:     General: Skin is warm.   Neurological:      Mental Status: Mental status is at baseline.      Comments: Non verbal, at base line          Last Recorded Vitals  Blood pressure 123/78, pulse 82, temperature 36.9 °C (98.4 °F), resp. rate 16, height 1.803 m (5' 11\"), weight 77.1 kg (170 lb), SpO2 94 %.  Intake/Output last 3 Shifts:  I/O last 3 completed shifts:  In: 910 (11.8 mL/kg) [P.O.:670; I.V.:240 (3.1 mL/kg)]  Out: 2100 (27.2 mL/kg) [Urine:2100 (0.8 mL/kg/hr)]  Weight: 77.1 kg     Relevant Results  Scheduled medications  acetaminophen, 650 mg, g-tube, q6h  aspirin, 81 mg, g-tube, Daily  baclofen, 20 mg, g-tube, BID  baclofen, 30 mg, oral, Daily  carBAMazepine, 150 mg, g-tube, Daily  carBAMazepine, 300 mg, g-tube, BID  docusate sodium, 100 mg, g-tube, Daily  levETIRAcetam, 2,000 mg, g-tube, " BID  lidocaine, 5 mL, infiltration, Once  lubricating eye drops, 2 drop, Both Eyes, BID  magnesium citrate, 148 mL, oral, Every other day  meropenem, 1 g, intravenous, q8h  phenytoin, 100 mg, g-tube, TID  vancomycin, 1,500 mg, intravenous, q12h  white petrolatum-mineral oiL, 1 Application, Both Eyes, Nightly      Continuous medications     PRN medications  PRN medications: diazePAM, simethicone, vancomycin, white petrolatum    Results for orders placed or performed during the hospital encounter of 04/07/24 (from the past 24 hour(s))   Lactate   Result Value Ref Range    Lactate 1.5 0.4 - 2.0 mmol/L   CBC and Auto Differential   Result Value Ref Range    WBC 3.3 (L) 4.4 - 11.3 x10*3/uL    nRBC 0.0 0.0 - 0.0 /100 WBCs    RBC 4.92 4.50 - 5.90 x10*6/uL    Hemoglobin 15.6 13.5 - 17.5 g/dL    Hematocrit 45.1 41.0 - 52.0 %    MCV 92 80 - 100 fL    MCH 31.7 26.0 - 34.0 pg    MCHC 34.6 32.0 - 36.0 g/dL    RDW 11.9 11.5 - 14.5 %    Platelets 248 150 - 450 x10*3/uL    Neutrophils % 52.2 40.0 - 80.0 %    Immature Granulocytes %, Automated 0.3 0.0 - 0.9 %    Lymphocytes % 28.5 13.0 - 44.0 %    Monocytes % 14.7 2.0 - 10.0 %    Eosinophils % 2.8 0.0 - 6.0 %    Basophils % 1.5 0.0 - 2.0 %    Neutrophils Absolute 1.70 1.20 - 7.70 x10*3/uL    Immature Granulocytes Absolute, Automated 0.01 0.00 - 0.70 x10*3/uL    Lymphocytes Absolute 0.93 (L) 1.20 - 4.80 x10*3/uL    Monocytes Absolute 0.48 0.10 - 1.00 x10*3/uL    Eosinophils Absolute 0.09 0.00 - 0.70 x10*3/uL    Basophils Absolute 0.05 0.00 - 0.10 x10*3/uL   Renal Function Panel   Result Value Ref Range    Glucose 175 (H) 74 - 99 mg/dL    Sodium 137 136 - 145 mmol/L    Potassium 3.8 3.5 - 5.3 mmol/L    Chloride 101 98 - 107 mmol/L    Bicarbonate 27 21 - 32 mmol/L    Anion Gap 13 10 - 20 mmol/L    Urea Nitrogen 7 6 - 23 mg/dL    Creatinine 0.51 0.50 - 1.30 mg/dL    eGFR >90 >60 mL/min/1.73m*2    Calcium 9.0 8.6 - 10.6 mg/dL    Phosphorus 3.8 2.5 - 4.9 mg/dL    Albumin 3.8 3.4 - 5.0  g/dL   Magnesium   Result Value Ref Range    Magnesium 1.79 1.60 - 2.40 mg/dL   Light Blue Top   Result Value Ref Range    Extra Tube Hold for add-ons.    Red Top   Result Value Ref Range    Extra Tube Hold for add-ons.    PST Top   Result Value Ref Range    Extra Tube Hold for add-ons.    Vancomycin   Result Value Ref Range    Vancomycin 10.1 5.0 - 20.0 ug/mL   CBC and Auto Differential   Result Value Ref Range    WBC 2.9 (L) 4.4 - 11.3 x10*3/uL    nRBC 0.0 0.0 - 0.0 /100 WBCs    RBC 4.73 4.50 - 5.90 x10*6/uL    Hemoglobin 15.5 13.5 - 17.5 g/dL    Hematocrit 43.1 41.0 - 52.0 %    MCV 91 80 - 100 fL    MCH 32.8 26.0 - 34.0 pg    MCHC 36.0 32.0 - 36.0 g/dL    RDW 11.7 11.5 - 14.5 %    Platelets 252 150 - 450 x10*3/uL    Neutrophils % 46.6 40.0 - 80.0 %    Immature Granulocytes %, Automated 0.0 0.0 - 0.9 %    Lymphocytes % 32.1 13.0 - 44.0 %    Monocytes % 17.1 2.0 - 10.0 %    Eosinophils % 2.8 0.0 - 6.0 %    Basophils % 1.4 0.0 - 2.0 %    Neutrophils Absolute 1.34 1.20 - 7.70 x10*3/uL    Immature Granulocytes Absolute, Automated 0.00 0.00 - 0.70 x10*3/uL    Lymphocytes Absolute 0.92 (L) 1.20 - 4.80 x10*3/uL    Monocytes Absolute 0.49 0.10 - 1.00 x10*3/uL    Eosinophils Absolute 0.08 0.00 - 0.70 x10*3/uL    Basophils Absolute 0.04 0.00 - 0.10 x10*3/uL   Magnesium   Result Value Ref Range    Magnesium 1.94 1.60 - 2.40 mg/dL   Renal Function Panel   Result Value Ref Range    Glucose 110 (H) 74 - 99 mg/dL    Sodium 138 136 - 145 mmol/L    Potassium 4.0 3.5 - 5.3 mmol/L    Chloride 101 98 - 107 mmol/L    Bicarbonate 29 21 - 32 mmol/L    Anion Gap 12 10 - 20 mmol/L    Urea Nitrogen 7 6 - 23 mg/dL    Creatinine 0.45 (L) 0.50 - 1.30 mg/dL    eGFR >90 >60 mL/min/1.73m*2    Calcium 9.1 8.6 - 10.6 mg/dL    Phosphorus 3.5 2.5 - 4.9 mg/dL    Albumin 3.7 3.4 - 5.0 g/dL       This patient has a urinary catheter   Reason for the urinary catheter remaining today? neurogenic bladder               Assessment/Plan   Principal Problem:     Urinary tract infection associated with indwelling urethral catheter, initial encounter (CMS/[facility])    Mr. Edgar is a 43 year old  old male with a PMH of DVT, DM, multiple sclerosis, seizure disorder, neurogenic bladder with chronic little catheter, MS, neurogenic bowel c/b ileus (2011), chronic constipation, GERD, hx of MRSA bacteremia, recurrent UTIs,  Hoolehua's syndrome, dysphagia and HTN who is presented to ED from home with his mother  after she was notified that urine cultures obtained on 4/3/24 were positive for P.mirabilis and MRSA. Pt [facility] due to concern for UTI. Labs obtained on admit notable for leukopenia, elevated CRP, and mild hyponatremia. KUB showed gas-filled distended large and small bowel compatible with ileus. Upon chart review it appears that pt has had ileus since 2011. Abdomen is distended on exam , however is  soft. Pt is admitted to medicine service for further treatment and management of catheter associated UTI.      Assessment/Plan   Principal Problem:    Urinary tract infection associated with indwelling urethral catheter, initial encounter (CMS/[facility])     #Catheter associated UTI  #hx of recurrent UTIs  - Urine culture: Pending results with sensitivities   - Prior Urine culture result (4/3/24):20,000-80,000 CFU/mL P.mirabilis sensitive to Ampicillin, Ancef, Gentamicin, Zosyn, and Bactrim and >100,000 MRSA sensitive to Macrobid, Vancomycin, and Bactrim  - Antibiotic Regimen: Vancomycin RPH to dose (4/7-) and Merrem 1g q8h (4/7-). Transition to PO antibiotic at discharge   - pt takes Methenamine Hippurate 1g BID via PEG tube outpatient (med held on admit)  ID consulted, recs appreciated,   Continue vanc, meropenum,      #hyponatremia (mild)  - Na 134 on admit  - continue to monitor RFP,      #neurogenic bowel c/b chronic ileus (2011)  #chronic constipation  - per chart review ileus has been present since 2011  - pt was last seen by GI outpatient on 2/23/24  -  abdomen soft on exam with no visible signs of discomfort; per mom abdominal distension is how it has always looked in the past  - continue home bowel regimen: Colace 10 ml daily via PEG tube and Magnesium citrate 148 ml every other day via PEG tube     #H/o seizure disorder  - pt follows Dr. Brenna Scott, last visit via telemedicine 1/30/24  - seizure and aspiration precautions  - continue home dose of Keppra 2000 mg (20 mL) BID at 0900/2100, Dilantin suspension 100 mg TID at 0900/1500/2100, and Tegretol 300 mg at 10a/10p and 150 mg at 1600 via PEG tube  - pt takes Nayzilam 5 mg nasal spray PRN seizures (last filled via [facility] 2/5/24); med is non-formulary, mom states that pt normally has PRN Valium ordered for seizures when hospitalized; pt has allergy to Ativan     #dysphagia s/p PEG:  - home regimen: Peptamen 1.5 1 carton TID (10a/4p/10p) with 150 ml water flushes before and after boluses  - Vital 1.5 (therapeutic interchange) 1 carton (237 ml) TID with 150 ml water flushes before and after boluses     #H/o multiple sclerosis  - continue home dose of Baclofen 30 mg via PEG tube at 10a and 20 mg via PEG at 1600 and 2200     Dispo: Pending cultures from 4/7,       Emergency contact: Vee Edgar (mom/POA): [phone] (please call her cell if you round on pt and she is not present in room. She has appt with pt's wheelchair nprogress tomorrow 4/8 at 12:30 that can't be missed. Pt got new wheelchair that needs adjustments.       Julien Thomas MD

## 2024-04-09 NOTE — PROGRESS NOTES
"Israel Edgar is a 43 y.o. male on day 2 of admission presenting with Urinary tract infection associated with indwelling urethral catheter, initial encounter (CMS/[facility]).    Subjective      Patient seen in his room when he had a house 3.  Discussed with his mother who thinks he is at his baseline    Objective     Physical Exam    Physical Exam  General: NAD  CVS: RRR. Normal S1 and S2. No m/r/g.   RESP: ctab no w/r/r, no increased wob  Abd: Soft and distended. No sign suggestive of tenderness to palpation   Ext: No swelling of the LE b/l.   Boss catheter in place    Last Recorded Vitals  Blood pressure 123/78, pulse 82, temperature 36.9 °C (98.4 °F), resp. rate 16, height 1.803 m (5' 11\"), weight 77.1 kg (170 lb), SpO2 94 %.  Intake/Output last 3 Shifts:  I/O last 3 completed shifts:  In: 910 (11.8 mL/kg) [P.O.:670; I.V.:240 (3.1 mL/kg)]  Out: 2100 (27.2 mL/kg) [Urine:2100 (0.8 mL/kg/hr)]  Weight: 77.1 kg     Relevant Results  Scheduled medications  acetaminophen, 650 mg, g-tube, q6h  aspirin, 81 mg, g-tube, Daily  baclofen, 20 mg, g-tube, BID  baclofen, 30 mg, oral, Daily  carBAMazepine, 150 mg, g-tube, Daily  carBAMazepine, 300 mg, g-tube, BID  docusate sodium, 100 mg, g-tube, Daily  enoxaparin, 40 mg, subcutaneous, q24h  levETIRAcetam, 2,000 mg, g-tube, BID  lidocaine, 5 mL, infiltration, Once  lubricating eye drops, 2 drop, Both Eyes, BID  magnesium citrate, 148 mL, oral, Every other day  meropenem, 1 g, intravenous, q8h  phenytoin, 100 mg, g-tube, TID  vancomycin, 1,500 mg, intravenous, q12h  white petrolatum-mineral oiL, 1 Application, Both Eyes, Nightly      Continuous medications          Results for orders placed or performed during the hospital encounter of 04/07/24 (from the past 24 hour(s))   Lactate   Result Value Ref Range    Lactate 1.5 0.4 - 2.0 mmol/L   CBC and Auto Differential   Result Value Ref Range    WBC 3.3 (L) 4.4 - 11.3 x10*3/uL    nRBC 0.0 0.0 - 0.0 /100 WBCs    RBC 4.92 4.50 - 5.90 " x10*6/uL    Hemoglobin 15.6 13.5 - 17.5 g/dL    Hematocrit 45.1 41.0 - 52.0 %    MCV 92 80 - 100 fL    MCH 31.7 26.0 - 34.0 pg    MCHC 34.6 32.0 - 36.0 g/dL    RDW 11.9 11.5 - 14.5 %    Platelets 248 150 - 450 x10*3/uL    Neutrophils % 52.2 40.0 - 80.0 %    Immature Granulocytes %, Automated 0.3 0.0 - 0.9 %    Lymphocytes % 28.5 13.0 - 44.0 %    Monocytes % 14.7 2.0 - 10.0 %    Eosinophils % 2.8 0.0 - 6.0 %    Basophils % 1.5 0.0 - 2.0 %    Neutrophils Absolute 1.70 1.20 - 7.70 x10*3/uL    Immature Granulocytes Absolute, Automated 0.01 0.00 - 0.70 x10*3/uL    Lymphocytes Absolute 0.93 (L) 1.20 - 4.80 x10*3/uL    Monocytes Absolute 0.48 0.10 - 1.00 x10*3/uL    Eosinophils Absolute 0.09 0.00 - 0.70 x10*3/uL    Basophils Absolute 0.05 0.00 - 0.10 x10*3/uL   Renal Function Panel   Result Value Ref Range    Glucose 175 (H) 74 - 99 mg/dL    Sodium 137 136 - 145 mmol/L    Potassium 3.8 3.5 - 5.3 mmol/L    Chloride 101 98 - 107 mmol/L    Bicarbonate 27 21 - 32 mmol/L    Anion Gap 13 10 - 20 mmol/L    Urea Nitrogen 7 6 - 23 mg/dL    Creatinine 0.51 0.50 - 1.30 mg/dL    eGFR >90 >60 mL/min/1.73m*2    Calcium 9.0 8.6 - 10.6 mg/dL    Phosphorus 3.8 2.5 - 4.9 mg/dL    Albumin 3.8 3.4 - 5.0 g/dL   Magnesium   Result Value Ref Range    Magnesium 1.79 1.60 - 2.40 mg/dL   Light Blue Top   Result Value Ref Range    Extra Tube Hold for add-ons.    Red Top   Result Value Ref Range    Extra Tube Hold for add-ons.    PST Top   Result Value Ref Range    Extra Tube Hold for add-ons.    Vancomycin   Result Value Ref Range    Vancomycin 10.1 5.0 - 20.0 ug/mL   CBC and Auto Differential   Result Value Ref Range    WBC 2.9 (L) 4.4 - 11.3 x10*3/uL    nRBC 0.0 0.0 - 0.0 /100 WBCs    RBC 4.73 4.50 - 5.90 x10*6/uL    Hemoglobin 15.5 13.5 - 17.5 g/dL    Hematocrit 43.1 41.0 - 52.0 %    MCV 91 80 - 100 fL    MCH 32.8 26.0 - 34.0 pg    MCHC 36.0 32.0 - 36.0 g/dL    RDW 11.7 11.5 - 14.5 %    Platelets 252 150 - 450 x10*3/uL    Neutrophils % 46.6 40.0  - 80.0 %    Immature Granulocytes %, Automated 0.0 0.0 - 0.9 %    Lymphocytes % 32.1 13.0 - 44.0 %    Monocytes % 17.1 2.0 - 10.0 %    Eosinophils % 2.8 0.0 - 6.0 %    Basophils % 1.4 0.0 - 2.0 %    Neutrophils Absolute 1.34 1.20 - 7.70 x10*3/uL    Immature Granulocytes Absolute, Automated 0.00 0.00 - 0.70 x10*3/uL    Lymphocytes Absolute 0.92 (L) 1.20 - 4.80 x10*3/uL    Monocytes Absolute 0.49 0.10 - 1.00 x10*3/uL    Eosinophils Absolute 0.08 0.00 - 0.70 x10*3/uL    Basophils Absolute 0.04 0.00 - 0.10 x10*3/uL   Magnesium   Result Value Ref Range    Magnesium 1.94 1.60 - 2.40 mg/dL   Renal Function Panel   Result Value Ref Range    Glucose 110 (H) 74 - 99 mg/dL    Sodium 138 136 - 145 mmol/L    Potassium 4.0 3.5 - 5.3 mmol/L    Chloride 101 98 - 107 mmol/L    Bicarbonate 29 21 - 32 mmol/L    Anion Gap 12 10 - 20 mmol/L    Urea Nitrogen 7 6 - 23 mg/dL    Creatinine 0.45 (L) 0.50 - 1.30 mg/dL    eGFR >90 >60 mL/min/1.73m*2    Calcium 9.1 8.6 - 10.6 mg/dL    Phosphorus 3.5 2.5 - 4.9 mg/dL    Albumin 3.7 3.4 - 5.0 g/dL                     Assessment/Plan   Principal Problem:    Urinary tract infection associated with indwelling urethral catheter, initial encounter (CMS/[facility])      Antimicrobial agents  4/6- Meropenem  4/6- Vancomycin     Assessment/Plan  # Recent urine culture growing with Proteus mirabilis and MRSA with urine leakage and foul smell in setting of chronic setting  # Severe allergy of levofloxacin, zosyn, keflex, and bactrim. Cefuroxime tolerable.      A 42 yo male with advanced multiple sclerosis, seizure disorder, neurogenic bladder with chronic little cath since 2013 and hx of MRSA bacteremia and recurrent UTIs was found to have foul smell and urine leakage. No fever with oral acetaminophen. UA showed pyuria and recent urine culture growing with Proteus mirabilis and MRSA. Has been on methenamine. Would be considered for suprapubic catheter to reduce burden of his UTI.      There is likely a colonization  given his current clinical condition but it would be an option to treat possible CAUTI through discussion with his mother.      Allergy hxs of antibiotics, cefuroxime should be tolerable from recent treatment.  Cefazolin or Ceftriaxone would be tolerable to use given both having side chains that are different from keflex, which would likely have not cross reaction. Also, it would less likely to have allergy for all cephalosporins. However, there would be reasonable to chose different type of antibiotics to treat for possible infection.       Would be agreeable to continue IV meropenem and vancomycin for a while. Will follow urine culture.      Recommendations  -Please continue IV meropenem 1g  q8h and IV vancomycin per pharmacy protocol   -Will follow UA and culture 4/7     Discussed with Dr. Andrade. Please text me via Epic chat if you have any questions or concerns regarding this patient. ID will continue to follow up this patient.     Gurinder Gandhi MD  ID fellow [id number]  Team B Pager [phone]     We spent some time talking to his mother, who clearly does a fantastic job in managing him, about the challenge of a chronic Boss catheter recurrent bacterial colonization and recurrent UTIs.  We emphasized the need to only treat symptomatic UTIs but this is challenging in this case.  We did mention that a suprapubic catheter may have less of a risk than a Boss catheter and that she should follow-up with urology.  We will continue to follow-up with next couple days    Update April 9  Per his mother he is at his baseline.  Urine culture from 4 7 growing gram-negative rods.  Urine culture from 4 3 with MRSA and Proteus.    Will continue vancomycin and meropenem for now.  He should not need a long course.  Really a few days to clear this out and hopefully put him into remission for a while in terms of UTIs.  I discussed with his mother the issue of getting a suprapubic catheter instead of a Boss to cut down the frequency of UTIs.   I told her methenamine really does not work without dwell time and does not work when there is continuous drainage.  There is really no good solution to preventing UTIs in the situation.  Suprapubic catheter may have less of a risk    Marcell Andrade MD

## 2024-04-09 NOTE — CONSULTS
"Nutrition Assessment      Reason for Assessment: Dietitian discretion (Patient is on home TF)    Israel Edgar is a 43 y.o. male on day 2 of admission presenting with Urinary tract infection associated with indwelling urethral catheter, initial encounter (CMS/[facility]).     Patient with dysphagia and PEG tube feedings at baseline.     ID following    PMH of DVT, DM, multiple sclerosis, seizure disorder, neurogenic bladder with chronic little catheter, MS, neurogenic bowel c/b ileus (2011), chronic constipation, GERD, hx of MRSA bacteremia, recurrent UTIs,  [place]'s syndrome, dysphagia and HTN     Nutrition History:  Food and Nutrient History: Spoke with patient's mom who was bathing patient. States patient receives 3 cartons of Peptamen 1.5 a day along with 150ml of water before and after each bolus. Reports that patient is followed by Dietitian working for an outside agency. \"He gets it by gravity.\" TF provides 49g protein and 1080 kcal a day. Patient's mother adamant that patient receive TF as ordered and is aware that Vital 1.5 is appropriate substitute for Peptamen 1.5.  \"He is a quadraplegic and I don't want him gaining weight because I have to lift him.\"   Patient has \"chronic constipation\" per patient's mom.  Vitamin/Herbal Supplement Use: Vitamin D3      Anthropometrics:  Height: 180.3 cm (5' 10.98\")   Weight: 77.1 kg (170 lb)   BMI (Calculated): 23.72  IBW/kg (Dietitian Calculated): 78.2 kg  Percent of IBW: 98.6 %  Adjusted Body Weight (kg): 68.5 kg (subtracted 10-15% from IBW for quadraplegia.)    Weight History:   Wt Readings from Last 15 Encounters:   04/09/24 77.1 kg (170 lb)   04/03/24 78.5 kg (173 lb)   03/16/24 78 kg (171 lb 15.3 oz)   02/28/24 78.5 kg (173 lb)   02/16/24 78.6 kg (173 lb 4.5 oz)   12/05/22 78.7 kg (173 lb 8 oz)   09/12/22 78.7 kg (173 lb 8 oz)   08/29/22 78.7 kg (173 lb 8 oz)   08/22/22 78.7 kg (173 lb 8 oz)       Weight Change %:  Significant Weight Loss:  (Admission weight is recorded " "as an 'estimated wt'. Therefore, unsure of wt change.)    Nutrition Focused Physical Exam Findings:  Deferred due to receiving bed bath.  Per visual exam, patient appears very well nourished.  Edema:  Edema: none  Physical Findings:  Skin: Negative    Nutrition Significant Labs:  CBC Trend:   Results from last 7 days   Lab Units 04/09/24 0435 04/08/24 1814 04/07/24  1708   WBC AUTO x10*3/uL 2.9* 3.3* 3.5*   RBC AUTO x10*6/uL 4.73 4.92 4.82   HEMOGLOBIN g/dL 15.5 15.6 15.8   HEMATOCRIT % 43.1 45.1 43.1   MCV fL 91 92 89   PLATELETS AUTO x10*3/uL 252 248 167    , BMP Trend:   Results from last 7 days   Lab Units 04/09/24 0435 04/08/24 1814 04/07/24  1708   GLUCOSE mg/dL 110* 175* 110*   CALCIUM mg/dL 9.1 9.0 9.1   SODIUM mmol/L 138 137 134*   POTASSIUM mmol/L 4.0 3.8 4.2   CO2 mmol/L 29 27 23   CHLORIDE mmol/L 101 101 101   BUN mg/dL 7 7 8   CREATININE mg/dL 0.45* 0.51 0.39*    , A1C:  Lab Results   Component Value Date    HGBA1C 5.4 04/07/2024   , BG POCT trend:    , Renal Lab Trend:   Results from last 7 days   Lab Units 04/09/24 0435 04/08/24 1814 04/07/24  1708   POTASSIUM mmol/L 4.0 3.8 4.2   PHOSPHORUS mg/dL 3.5 3.8 3.1   SODIUM mmol/L 138 137 134*   MAGNESIUM mg/dL 1.94 1.79 2.04   EGFR mL/min/1.73m*2 >90 >90 >90   BUN mg/dL 7 7 8   CREATININE mg/dL 0.45* 0.51 0.39*    , Vit D: No results found for: \"VITD25\"        Nutrition Specific Medications:  docusate sodium, 100 mg, g-tube, Daily  magnesium citrate, 148 mL, oral, Every other day  meropenem, 1 g, intravenous, q8h  phenytoin, 100 mg, g-tube, TID  vancomycin, 1,500 mg, intravenous, q12h    Continuous medications     PRN medications  PRN medications: diazePAM, simethicone, vancomycin, white petrolatum    I/O:    ; Stool Appearance: Loose (04/09/24 0524)    Dietary Orders (From admission, onward)       Start     Ordered    04/07/24 2154  Enteral feeding with NPO Vital 1.5; PEG (percutaneous endoscopic gastric); 237; three times a day; 150; Water; Tap " water; Other (specify); 150 cc water flush before and after tube feed bolus  Diet effective now        Question Answer Comment   Tube feeding formula: Vital 1.5    Feeding route: PEG (percutaneous endoscopic gastric)    Tube feeding bolus (mL): 237    Tube feeding bolus frequency: three times a day    Tube feeding flush (mL): 150    Flush type: Water    Water type: Tap water    Flush frequency: Other (specify)    Additional Details 150 cc water flush before and after tube feed bolus        04/07/24 1512                     Estimated Needs:   Total Energy Estimated Needs (kCal): 1370 kCal  Method for Estimating Needs: 68.5kg / 20 kcal  Total Protein Estimated Needs (g): 70 g  Method for Estimating Needs: 68.5 kg / 1.0              Nutrition Diagnosis   Malnutrition Diagnosis  Patient has Malnutrition Diagnosis: No    Nutrition Diagnosis  Patient has Nutrition Diagnosis: Yes  Nutrition Diagnosis 1: Swallowing difficulity  Related to (1): chronic illness  As Evidenced by (1): chronic Peg tube for nutrition support.       Nutrition Interventions/Recommendations         Nutrition Prescription:  Individualized Nutrition Prescription Provided for : Continue Vital 1.5 240ml bolus per day. Water flushes 150ml before and after each bolus.         Nutrition Education:          Nutrition Monitoring and Evaluation   Food/Nutrient Related History Monitoring  Enteral and Parenteral Nutrition Intake: Enteral nutrition intake  Criteria: TF well tolerated.    Body Composition/Growth/Weight History  Monitoring and Evaluation Plan: Weight  Criteria: Stable weight    Biochemical Data, Medical Tests and Procedures  Monitoring and Evaluation Plan: Electrolyte/renal panel, Glucose/endocrine profile  Criteria: Labs wnl          Time Spent/Follow-up Reminder:   Time Spent (min): 45 minutes  Last Date of Nutrition Visit: 04/09/24  Nutrition Follow-Up Needed?: Dietitian to reassess per policy

## 2024-04-09 NOTE — NURSING NOTE
"1600: This nurse attempted to go in patients room to administer medications and give TF. Mother became angry stating \"I need to wash my son and to come back in 1 hour. Nurse asked mother if she needed any help, mother stated \"no\".     1700: Dr. Moore called to the room due to patients mother yelling at staff regarding medications that werent given in the ER. Nurse assured pt's mother that he would receive his medications once allowed to do so by her. Mother then stated \"if she didn't clean him, no one would have\", this nurse reminded pt's mother that I attempted to help and that offer was declined. As I attempted to assess and put a mepalex on patient sacrum, mother became verbally aggressive and was asked to step aside so I could perform patient care, mother stated she would not leave her son, and I needed to leave and that she would put cameras up while she wasn't here as she \"doesn't trust us\". Nursing supervisor called to the room.     1800: This nurse went in again with a witness to give pt his medications and tube feed. Mother stated \"None of you know what your doing, you need to go back to school\". This nurse was simply attempting to check for bowel sounds. When asked if I could assess and give meds, mother became verbally aggressive and stated \"You must be an Reg\". Nurse asked her what she meant, Patients mother stated, \"It must be that time of the month.\" Patients mother was mocking witness RN in room. Nurse was able to give all medications and tube feed successfully as witnessed by other nurse.   "

## 2024-04-10 LAB — BACTERIA UR CULT: ABNORMAL

## 2024-04-10 PROCEDURE — 2500000001 HC RX 250 WO HCPCS SELF ADMINISTERED DRUGS (ALT 637 FOR MEDICARE OP): Performed by: NURSE PRACTITIONER

## 2024-04-10 PROCEDURE — 2500000004 HC RX 250 GENERAL PHARMACY W/ HCPCS (ALT 636 FOR OP/ED): Performed by: NURSE PRACTITIONER

## 2024-04-10 PROCEDURE — 2500000005 HC RX 250 GENERAL PHARMACY W/O HCPCS: Performed by: NURSE PRACTITIONER

## 2024-04-10 PROCEDURE — 1100000001 HC PRIVATE ROOM DAILY

## 2024-04-10 PROCEDURE — 99232 SBSQ HOSP IP/OBS MODERATE 35: CPT | Performed by: INTERNAL MEDICINE

## 2024-04-10 PROCEDURE — 94760 N-INVAS EAR/PLS OXIMETRY 1: CPT

## 2024-04-10 PROCEDURE — A4217 STERILE WATER/SALINE, 500 ML: HCPCS

## 2024-04-10 PROCEDURE — 2500000004 HC RX 250 GENERAL PHARMACY W/ HCPCS (ALT 636 FOR OP/ED)

## 2024-04-10 PROCEDURE — 99233 SBSQ HOSP IP/OBS HIGH 50: CPT | Performed by: INTERNAL MEDICINE

## 2024-04-10 RX ADMIN — ACETAMINOPHEN 650 MG: 650 SOLUTION ORAL at 04:32

## 2024-04-10 RX ADMIN — ACETAMINOPHEN 650 MG: 650 SOLUTION ORAL at 21:26

## 2024-04-10 RX ADMIN — ACETAMINOPHEN 650 MG: 650 SOLUTION ORAL at 09:00

## 2024-04-10 RX ADMIN — ASPIRIN 81 MG 81 MG: 81 TABLET ORAL at 15:23

## 2024-04-10 RX ADMIN — MEROPENEM 1 G: 1 INJECTION, POWDER, FOR SOLUTION INTRAVENOUS at 21:26

## 2024-04-10 RX ADMIN — Medication 1 APPLICATION: at 21:30

## 2024-04-10 RX ADMIN — LEVETIRACETAM 2000 MG: 500 SOLUTION ORAL at 21:26

## 2024-04-10 RX ADMIN — PHENYTOIN 100 MG: 125 SUSPENSION ORAL at 15:23

## 2024-04-10 RX ADMIN — MEROPENEM 1 G: 1 INJECTION, POWDER, FOR SOLUTION INTRAVENOUS at 04:32

## 2024-04-10 RX ADMIN — VANCOMYCIN HYDROCHLORIDE 1500 MG: 5 INJECTION, POWDER, LYOPHILIZED, FOR SOLUTION INTRAVENOUS at 22:37

## 2024-04-10 RX ADMIN — DIAZEPAM 5 MG: 10 INJECTION, SOLUTION INTRAMUSCULAR; INTRAVENOUS at 23:54

## 2024-04-10 RX ADMIN — CARBAMAZEPINE 300 MG: 100 SUSPENSION ORAL at 10:00

## 2024-04-10 RX ADMIN — CARBOXYMETHYLCELLULOSE SODIUM 2 DROP: 5 SOLUTION/ DROPS OPHTHALMIC at 08:31

## 2024-04-10 RX ADMIN — LEVETIRACETAM 2000 MG: 500 SOLUTION ORAL at 00:27

## 2024-04-10 RX ADMIN — PHENYTOIN 100 MG: 125 SUSPENSION ORAL at 22:20

## 2024-04-10 RX ADMIN — BACLOFEN 30 MG: 10 TABLET ORAL at 10:00

## 2024-04-10 RX ADMIN — LEVETIRACETAM 2000 MG: 500 SOLUTION ORAL at 08:31

## 2024-04-10 RX ADMIN — ACETAMINOPHEN 650 MG: 650 SOLUTION ORAL at 15:22

## 2024-04-10 RX ADMIN — VANCOMYCIN HYDROCHLORIDE 1500 MG: 5 INJECTION, POWDER, LYOPHILIZED, FOR SOLUTION INTRAVENOUS at 10:56

## 2024-04-10 RX ADMIN — PHENYTOIN 100 MG: 125 SUSPENSION ORAL at 09:00

## 2024-04-10 RX ADMIN — MEROPENEM 1 G: 1 INJECTION, POWDER, FOR SOLUTION INTRAVENOUS at 15:23

## 2024-04-10 RX ADMIN — BACLOFEN 20 MG: 10 TABLET ORAL at 16:12

## 2024-04-10 RX ADMIN — CARBAMAZEPINE 150 MG: 100 SUSPENSION ORAL at 16:00

## 2024-04-10 RX ADMIN — DOCUSATE SODIUM 100 MG: 50 LIQUID ORAL at 08:32

## 2024-04-10 ASSESSMENT — COGNITIVE AND FUNCTIONAL STATUS - GENERAL
MOVING FROM LYING ON BACK TO SITTING ON SIDE OF FLAT BED WITH BEDRAILS: TOTAL
STANDING UP FROM CHAIR USING ARMS: TOTAL
TURNING FROM BACK TO SIDE WHILE IN FLAT BAD: TOTAL
DRESSING REGULAR LOWER BODY CLOTHING: TOTAL
EATING MEALS: TOTAL
CLIMB 3 TO 5 STEPS WITH RAILING: TOTAL
MOVING TO AND FROM BED TO CHAIR: TOTAL
DAILY ACTIVITIY SCORE: 6
HELP NEEDED FOR BATHING: TOTAL
TOILETING: TOTAL
MOBILITY SCORE: 6
WALKING IN HOSPITAL ROOM: TOTAL
DRESSING REGULAR UPPER BODY CLOTHING: TOTAL
PERSONAL GROOMING: TOTAL

## 2024-04-10 ASSESSMENT — ACTIVITIES OF DAILY LIVING (ADL): LACK_OF_TRANSPORTATION: NO

## 2024-04-10 ASSESSMENT — PAIN - FUNCTIONAL ASSESSMENT: PAIN_FUNCTIONAL_ASSESSMENT: WONG-BAKER FACES

## 2024-04-10 NOTE — PROGRESS NOTES
"Israel Edgar is a 43 y.o. male on day 3 of admission presenting with Urinary tract infection associated with indwelling urethral catheter, initial encounter (CMS/[facility]).    Subjective       Discussed with his mother who thinks he is at his baseline    Objective     Physical Exam    Physical Exam  General: NAD  CVS: RRR. Normal S1 and S2. No m/r/g.   RESP: ctab no w/r/r, no increased wob  Abd: Soft and distended. No sign suggestive of tenderness to palpation   Ext: No swelling of the LE b/l.   Boss catheter in place    Last Recorded Vitals  Blood pressure (!) 122/96, pulse 86, temperature 36.5 °C (97.7 °F), resp. rate 15, height 1.803 m (5' 10.98\"), weight 77.1 kg (170 lb), SpO2 94%.  Intake/Output last 3 Shifts:  I/O last 3 completed shifts:  In: 910 (11.8 mL/kg) [P.O.:670; I.V.:240 (3.1 mL/kg)]  Out: 2100 (27.2 mL/kg) [Urine:2100 (0.8 mL/kg/hr)]  Weight: 77.1 kg     Relevant Results  Scheduled medications  acetaminophen, 650 mg, g-tube, q6h  aspirin, 81 mg, g-tube, Daily  baclofen, 20 mg, g-tube, BID  baclofen, 30 mg, oral, Daily  carBAMazepine, 150 mg, g-tube, Daily  carBAMazepine, 300 mg, g-tube, BID  docusate sodium, 100 mg, g-tube, Daily  levETIRAcetam, 2,000 mg, g-tube, BID  lidocaine, 5 mL, infiltration, Once  lubricating eye drops, 2 drop, Both Eyes, BID  magnesium citrate, 148 mL, oral, Every other day  meropenem, 1 g, intravenous, q8h  phenytoin, 100 mg, g-tube, TID  vancomycin, 1,500 mg, intravenous, q12h  white petrolatum-mineral oiL, 1 Application, Both Eyes, Nightly      Continuous medications          No results found for this or any previous visit (from the past 24 hour(s)).                    Assessment/Plan   Principal Problem:    Urinary tract infection associated with indwelling urethral catheter, initial encounter (CMS/[facility])      Antimicrobial agents  4/6- Meropenem  4/6- Vancomycin     Assessment/Plan  # Recent urine culture growing with Proteus mirabilis and MRSA with urine leakage and foul " smell in setting of chronic setting  # Severe allergy of levofloxacin, zosyn, keflex, and bactrim. Cefuroxime tolerable.      A 44 yo male with advanced multiple sclerosis, seizure disorder, neurogenic bladder with chronic little cath since 2013 and hx of MRSA bacteremia and recurrent UTIs was found to have foul smell and urine leakage. No fever with oral acetaminophen. UA showed pyuria and recent urine culture growing with Proteus mirabilis and MRSA. Has been on methenamine. Would be considered for suprapubic catheter to reduce burden of his UTI.      There is likely a colonization given his current clinical condition but it would be an option to treat possible CAUTI through discussion with his mother.      Allergy hxs of antibiotics, cefuroxime should be tolerable from recent treatment.  Cefazolin or Ceftriaxone would be tolerable to use given both having side chains that are different from keflex, which would likely have not cross reaction. Also, it would less likely to have allergy for all cephalosporins. However, there would be reasonable to chose different type of antibiotics to treat for possible infection.       Would be agreeable to continue IV meropenem and vancomycin for a while. Will follow urine culture.      Recommendations  -Please continue IV meropenem 1g  q8h and IV vancomycin per pharmacy protocol   -Will follow UA and culture 4/7     Discussed with Dr. Andrade. Please text me via Epic chat if you have any questions or concerns regarding this patient. ID will continue to follow up this patient.     Gurinder Gandhi MD  ID fellow PGY4  Team B Pager [phone]     We spent some time talking to his mother, who clearly does a fantastic job in managing him, about the challenge of a chronic Little catheter recurrent bacterial colonization and recurrent UTIs.  We emphasized the need to only treat symptomatic UTIs but this is challenging in this case.  We did mention that a suprapubic catheter may have less of a risk  than a Little catheter and that she should follow-up with urology.  We will continue to follow-up with next couple days    Update April 9  Per his mother he is at his baseline.  Urine culture from 4 7 growing gram-negative rods.  Urine culture from 4 3 with MRSA and Proteus.    Will continue vancomycin and meropenem for now.  He should not need a long course.  Really a few days to clear this out and hopefully put him into remission for a while in terms of UTIs.  I discussed with his mother the issue of getting a suprapubic catheter instead of a Little to cut down the frequency of UTIs.  I told her methenamine really does not work without dwell time and does not work when there is continuous drainage.  There is really no good solution to preventing UTIs in the situation.  Suprapubic catheter may have less of a risk    April 10  At baseline  Would give 5 total days of antibiotics for an uncomplicated cystitis-   Would change his little during this hospital stay- related to biofilm colonization with MRSA on current little  Outpatient  follow up for consideration of suprapubic  will discuss this with his mother    Marcell Andrade MD

## 2024-04-10 NOTE — PROGRESS NOTES
04/10/24 1425   Discharge Planning   Living Arrangements Parent   Support Systems Parent   Assistance Needed Total care for all adls   Type of Residence Private residence   Home or Post Acute Services None   Patient expects to be discharged to: Home with mother   Does the patient need discharge transport arranged? Yes   RoundTrip coordination needed? Yes   Has discharge transport been arranged? No   Financial Resource Strain   How hard is it for you to pay for the very basics like food, housing, medical care, and heating? Not hard   Housing Stability   In the last 12 months, was there a time when you were not able to pay the mortgage or rent on time? N   In the last 12 months, was there a time when you did not have a steady place to sleep or slept in a shelter (including now)? N   Transportation Needs   In the past 12 months, has lack of transportation kept you from medical appointments or from getting medications? no   In the past 12 months, has lack of transportation kept you from meetings, work, or from getting things needed for daily living? No     Transitional Care Coordination Progress Note:  Patient discussed during interdisciplinary rounds.   Team members present: MD, TCC  Plan per Medical/Surgical team: pending final ID recommendations  Payor: Medicaid  Discharge disposition: Home  Potential Barriers: none  ADOD: 1-2 days    Previous Home Care: NA   DME: Hospital bed, petr lift, air compressor, suction machine, wheelchair, tube feed pump/pole and supplies  Pharmacy: [facility]  Falls: Denies  PCP:  Dr Suellen Talbert; last seen 2-3 months ago  Spoke with mother Vee over the phone to complete admission assessment. Patient lives at home with mother who provides all care. Mother states patient was active with [facility] until March of this year. Patient uses [facility]ransit to get to appointments. Mother states no concerns obtaining/affording medications; states no social/financial concerns.  Mother states tube feed and supplies are supplied by [facility]. Mother asking if this TCC was aware of donations for [facility] beds. Patient has standard hospital bed at home, however she feels he would do better with a [facility] bed. CHW updated and will reach out to mother and check with [facility]. Patient will need transport home arranged at discharge.     Susan BLAKE, RN  Transitional Care Coordinator (TCC)  [phone]

## 2024-04-10 NOTE — PROGRESS NOTES
"Israel Edgar is a 43 y.o. male on day 3 of admission presenting with Urinary tract infection associated with indwelling urethral catheter, initial encounter (CMS/[facility]).    Subjective   No events over night,     Patient is non verbal at base line   H/o Leak around the little catheter,   No c/o fever or chills as per his mother    RN reported no new events,        Objective     Physical Exam  Constitutional:       Comments: Looks comfortable at rest  Nonverbal due to underlying MS,    HENT:      Head: Normocephalic.      Nose: Nose normal.   Eyes:      Extraocular Movements: Extraocular movements intact.      Pupils: Pupils are equal, round, and reactive to light.   Cardiovascular:      Rate and Rhythm: Normal rate and regular rhythm.      Heart sounds: Normal heart sounds. No murmur heard.  Pulmonary:      Effort: No respiratory distress.      Breath sounds: Normal breath sounds. No wheezing.   Abdominal:      General: Bowel sounds are normal.      Palpations: Abdomen is soft.      Comments: PEG tube is in place,    Musculoskeletal:         General: Normal range of motion.      Cervical back: Normal range of motion.   Skin:     General: Skin is warm.   Neurological:      Mental Status: Mental status is at baseline.      Comments: Non verbal, at base line          Last Recorded Vitals  Blood pressure (!) 133/91, pulse 79, temperature 36.6 °C (97.9 °F), resp. rate 16, height 1.803 m (5' 10.98\"), weight 77.1 kg (170 lb), SpO2 95%.  Intake/Output last 3 Shifts:  I/O last 3 completed shifts:  In: 1447 (18.8 mL/kg) [P.O.:670; I.V.:240 (3.1 mL/kg); NG/GT:537]  Out: 2100 (27.2 mL/kg) [Urine:2100 (0.8 mL/kg/hr)]  Weight: 77.1 kg     Relevant Results  Scheduled medications  acetaminophen, 650 mg, g-tube, q6h  aspirin, 81 mg, g-tube, Daily  baclofen, 20 mg, g-tube, BID  baclofen, 30 mg, oral, Daily  carBAMazepine, 150 mg, g-tube, Daily  carBAMazepine, 300 mg, g-tube, BID  docusate sodium, 100 mg, g-tube, Daily  levETIRAcetam, " 2,000 mg, g-tube, BID  lidocaine, 5 mL, infiltration, Once  lubricating eye drops, 2 drop, Both Eyes, BID  magnesium citrate, 148 mL, oral, Every other day  meropenem, 1 g, intravenous, q8h  phenytoin, 100 mg, g-tube, TID  vancomycin, 1,500 mg, intravenous, q12h  white petrolatum-mineral oiL, 1 Application, Both Eyes, Nightly      Continuous medications     PRN medications  PRN medications: diazePAM, simethicone, vancomycin, white petrolatum    No results found for this or any previous visit (from the past 24 hour(s)).      This patient has a urinary catheter   Reason for the urinary catheter remaining today? neurogenic bladder               Assessment/Plan   Principal Problem:    Urinary tract infection associated with indwelling urethral catheter, initial encounter (CMS/[facility])    Mr. Edgar is a 43 year old  old male with a PMH of DVT, DM, multiple sclerosis, seizure disorder, neurogenic bladder with chronic little catheter, MS, neurogenic bowel c/b ileus (2011), chronic constipation, GERD, hx of MRSA bacteremia, recurrent UTIs,  Gerardo's syndrome, dysphagia and HTN who is presented to ED from home with his mother  after she was notified that urine cultures obtained on 4/3/24 were positive for P.mirabilis and MRSA. Pt originally presented to [facility] on 4/3 due to concern for UTI. Labs obtained on admit notable for leukopenia, elevated CRP, and mild hyponatremia. KUB showed gas-filled distended large and small bowel compatible with ileus. Upon chart review it appears that pt has had ileus since 2011. Abdomen is distended on exam , however is  soft. Pt is admitted to medicine service for further treatment and management of catheter associated UTI.         #Catheter associated UTI  #hx of recurrent UTIs  - Urine culture: Pending results with sensitivities   - Prior Urine culture result (4/3/24):20,000-80,000 CFU/mL P.mirabilis sensitive to Ampicillin, Ancef, Gentamicin, Zosyn, and Bactrim and >100,000 MRSA sensitive to  Macrobid, Vancomycin, and Bactrim  - Antibiotic Regimen: Vancomycin RPH to dose (4/7-) and Merrem 1g q8h (4/7-). Transition to PO antibiotic at discharge   - pt takes Methenamine Hippurate 1g BID via PEG tube outpatient (med held on admit)  ID consulted, recs appreciated,   Continue vanc, meropenum x total 5 days,   Follow up with urology for considering Supra pubic catheter,      #hyponatremia (mild)  - Na 134 on admit  Improved to 138,       #neurogenic bowel c/b chronic ileus (2011)  #chronic constipation  - per chart review ileus has been present since 2011  - pt was last seen by GI outpatient on 2/23/24  - abdomen soft on exam with no visible signs of discomfort; per mom abdominal distension is how it has always looked in the past  - continue home bowel regimen: Colace 10 ml daily via PEG tube and Magnesium citrate 148 ml every other day via PEG tube     #H/o seizure disorder  - pt follows Dr. Brenna Scott, last visit via telemedicine 1/30/24  - seizure and aspiration precautions  - continue home dose of Keppra 2000 mg (20 mL) BID at 0900/2100, Dilantin suspension 100 mg TID at 0900/1500/2100, and Tegretol 300 mg at 10a/10p and 150 mg at 1600 via PEG tube  - pt takes Nayzilam 5 mg nasal spray PRN seizures (last filled via [facility] 2/5/24); med is non-formulary, mom states that pt normally has PRN Valium ordered for seizures when hospitalized; pt has allergy to Ativan     #Dysphagia s/p PEG:  - home regimen: Peptamen 1.5 1 carton TID (10a/4p/10p) with 150 ml water flushes before and after boluses  - Vital 1.5 (therapeutic interchange) 1 carton (237 ml) TID with 150 ml water flushes before and after boluses     #H/o multiple sclerosis  - continue home dose of Baclofen 30 mg via PEG tube at 10a and 20 mg via PEG at 1600 and 2200     Dispo: Complete total 5 days of antibiotics and aim for discharge on 4/12 evening,       Emergency contact: Vee Edgar (mom/POA): [phone] (please call her  cell if you round on pt and she is not present in room. She has appt with pt's wheelchair company tomorrow 4/8 at 12:30 that can't be missed. Pt got new wheelchair that needs adjustments.       Julien Thomas MD

## 2024-04-10 NOTE — CARE PLAN
The patient's goals for the shift include comfort    The clinical goals for the shift include  antibiotic therapy    Problem: Safety  Goal: Patient will be injury free during hospitalization  Outcome: Progressing     Problem: Safety  Goal: I will remain free of falls  Outcome: Progressing     Problem: Daily Care  Goal: Daily care needs are met  Outcome: Progressing

## 2024-04-11 PROBLEM — G40.919 REFRACTORY EPILEPSY (MULTI): Status: ACTIVE | Noted: 2024-04-11

## 2024-04-11 LAB
ALBUMIN SERPL BCP-MCNC: 3.6 G/DL (ref 3.4–5)
ALP SERPL-CCNC: 60 U/L (ref 33–120)
ALT SERPL W P-5'-P-CCNC: 29 U/L (ref 10–52)
ANION GAP SERPL CALC-SCNC: 13 MMOL/L (ref 10–20)
AST SERPL W P-5'-P-CCNC: 25 U/L (ref 9–39)
BILIRUB SERPL-MCNC: 0.4 MG/DL (ref 0–1.2)
BUN SERPL-MCNC: 8 MG/DL (ref 6–23)
CALCIUM SERPL-MCNC: 8.9 MG/DL (ref 8.6–10.6)
CARBAMAZEPINE SERPL-MCNC: 3.7 UG/ML (ref 4–12)
CHLORIDE SERPL-SCNC: 103 MMOL/L (ref 98–107)
CO2 SERPL-SCNC: 29 MMOL/L (ref 21–32)
CREAT SERPL-MCNC: 0.44 MG/DL (ref 0.5–1.3)
EGFRCR SERPLBLD CKD-EPI 2021: >90 ML/MIN/1.73M*2
ERYTHROCYTE [DISTWIDTH] IN BLOOD BY AUTOMATED COUNT: 11.9 % (ref 11.5–14.5)
GLUCOSE BLD MANUAL STRIP-MCNC: 171 MG/DL (ref 74–99)
GLUCOSE SERPL-MCNC: 77 MG/DL (ref 74–99)
HCT VFR BLD AUTO: 40.9 % (ref 41–52)
HGB BLD-MCNC: 14.3 G/DL (ref 13.5–17.5)
LEVETIRACETAM SERPL-MCNC: 26 UG/ML (ref 10–40)
MCH RBC QN AUTO: 32.6 PG (ref 26–34)
MCHC RBC AUTO-ENTMCNC: 35 G/DL (ref 32–36)
MCV RBC AUTO: 93 FL (ref 80–100)
NRBC BLD-RTO: 0 /100 WBCS (ref 0–0)
PHENOBARB SERPL-MCNC: <5 UG/ML (ref 10–40)
PHENYTOIN SERPL-MCNC: 29.9 UG/ML (ref 10–20)
PLATELET # BLD AUTO: 230 X10*3/UL (ref 150–450)
POTASSIUM SERPL-SCNC: 4.2 MMOL/L (ref 3.5–5.3)
PROT SERPL-MCNC: 7 G/DL (ref 6.4–8.2)
RBC # BLD AUTO: 4.38 X10*6/UL (ref 4.5–5.9)
SODIUM SERPL-SCNC: 141 MMOL/L (ref 136–145)
VANCOMYCIN SERPL-MCNC: 16.9 UG/ML (ref 5–20)
WBC # BLD AUTO: 3 X10*3/UL (ref 4.4–11.3)

## 2024-04-11 PROCEDURE — 2500000005 HC RX 250 GENERAL PHARMACY W/O HCPCS: Performed by: NURSE PRACTITIONER

## 2024-04-11 PROCEDURE — A4217 STERILE WATER/SALINE, 500 ML: HCPCS

## 2024-04-11 PROCEDURE — 80184 ASSAY OF PHENOBARBITAL: CPT | Performed by: STUDENT IN AN ORGANIZED HEALTH CARE EDUCATION/TRAINING PROGRAM

## 2024-04-11 PROCEDURE — 99232 SBSQ HOSP IP/OBS MODERATE 35: CPT | Performed by: INTERNAL MEDICINE

## 2024-04-11 PROCEDURE — 2500000004 HC RX 250 GENERAL PHARMACY W/ HCPCS (ALT 636 FOR OP/ED)

## 2024-04-11 PROCEDURE — 80185 ASSAY OF PHENYTOIN TOTAL: CPT | Performed by: INTERNAL MEDICINE

## 2024-04-11 PROCEDURE — 85027 COMPLETE CBC AUTOMATED: CPT | Performed by: INTERNAL MEDICINE

## 2024-04-11 PROCEDURE — 80202 ASSAY OF VANCOMYCIN: CPT

## 2024-04-11 PROCEDURE — 80156 ASSAY CARBAMAZEPINE TOTAL: CPT | Performed by: STUDENT IN AN ORGANIZED HEALTH CARE EDUCATION/TRAINING PROGRAM

## 2024-04-11 PROCEDURE — 80053 COMPREHEN METABOLIC PANEL: CPT | Performed by: INTERNAL MEDICINE

## 2024-04-11 PROCEDURE — 99233 SBSQ HOSP IP/OBS HIGH 50: CPT | Performed by: INTERNAL MEDICINE

## 2024-04-11 PROCEDURE — 2500000001 HC RX 250 WO HCPCS SELF ADMINISTERED DRUGS (ALT 637 FOR MEDICARE OP): Performed by: NURSE PRACTITIONER

## 2024-04-11 PROCEDURE — 80177 DRUG SCRN QUAN LEVETIRACETAM: CPT | Performed by: STUDENT IN AN ORGANIZED HEALTH CARE EDUCATION/TRAINING PROGRAM

## 2024-04-11 PROCEDURE — 99223 1ST HOSP IP/OBS HIGH 75: CPT | Performed by: PSYCHIATRY & NEUROLOGY

## 2024-04-11 PROCEDURE — 1100000001 HC PRIVATE ROOM DAILY

## 2024-04-11 PROCEDURE — 82947 ASSAY GLUCOSE BLOOD QUANT: CPT

## 2024-04-11 PROCEDURE — 2500000004 HC RX 250 GENERAL PHARMACY W/ HCPCS (ALT 636 FOR OP/ED): Performed by: NURSE PRACTITIONER

## 2024-04-11 RX ADMIN — CARBOXYMETHYLCELLULOSE SODIUM 2 DROP: 5 SOLUTION/ DROPS OPHTHALMIC at 09:02

## 2024-04-11 RX ADMIN — MEROPENEM 1 G: 1 INJECTION, POWDER, FOR SOLUTION INTRAVENOUS at 14:46

## 2024-04-11 RX ADMIN — Medication 1 APPLICATION: at 21:00

## 2024-04-11 RX ADMIN — MEROPENEM 1 G: 1 INJECTION, POWDER, FOR SOLUTION INTRAVENOUS at 21:05

## 2024-04-11 RX ADMIN — CARBAMAZEPINE 300 MG: 100 SUSPENSION ORAL at 22:00

## 2024-04-11 RX ADMIN — VANCOMYCIN HYDROCHLORIDE 1500 MG: 5 INJECTION, POWDER, LYOPHILIZED, FOR SOLUTION INTRAVENOUS at 09:28

## 2024-04-11 RX ADMIN — CARBOXYMETHYLCELLULOSE SODIUM 2 DROP: 5 SOLUTION/ DROPS OPHTHALMIC at 15:11

## 2024-04-11 RX ADMIN — LEVETIRACETAM 2000 MG: 500 SOLUTION ORAL at 21:04

## 2024-04-11 RX ADMIN — CARBAMAZEPINE 300 MG: 100 SUSPENSION ORAL at 10:49

## 2024-04-11 RX ADMIN — MEROPENEM 1 G: 1 INJECTION, POWDER, FOR SOLUTION INTRAVENOUS at 05:18

## 2024-04-11 RX ADMIN — BACLOFEN 30 MG: 10 TABLET ORAL at 10:48

## 2024-04-11 RX ADMIN — CARBAMAZEPINE 150 MG: 100 SUSPENSION ORAL at 16:42

## 2024-04-11 RX ADMIN — ACETAMINOPHEN 650 MG: 650 SOLUTION ORAL at 08:57

## 2024-04-11 RX ADMIN — SIMETHICONE 40 MG: 20 SUSPENSION/ DROPS ORAL at 09:03

## 2024-04-11 RX ADMIN — ACETAMINOPHEN 650 MG: 650 SOLUTION ORAL at 15:13

## 2024-04-11 RX ADMIN — ASPIRIN 81 MG 81 MG: 81 TABLET ORAL at 16:42

## 2024-04-11 RX ADMIN — PHENYTOIN 100 MG: 125 SUSPENSION ORAL at 15:00

## 2024-04-11 RX ADMIN — LEVETIRACETAM 2000 MG: 500 SOLUTION ORAL at 08:56

## 2024-04-11 RX ADMIN — ACETAMINOPHEN 650 MG: 650 SOLUTION ORAL at 21:04

## 2024-04-11 RX ADMIN — DOCUSATE SODIUM 100 MG: 50 LIQUID ORAL at 08:56

## 2024-04-11 RX ADMIN — PHENYTOIN 100 MG: 125 SUSPENSION ORAL at 21:00

## 2024-04-11 RX ADMIN — VANCOMYCIN HYDROCHLORIDE 1500 MG: 5 INJECTION, POWDER, LYOPHILIZED, FOR SOLUTION INTRAVENOUS at 22:11

## 2024-04-11 RX ADMIN — ACETAMINOPHEN 650 MG: 650 SOLUTION ORAL at 03:35

## 2024-04-11 RX ADMIN — CARBAMAZEPINE 300 MG: 100 SUSPENSION ORAL at 00:23

## 2024-04-11 RX ADMIN — BACLOFEN 20 MG: 10 TABLET ORAL at 22:11

## 2024-04-11 RX ADMIN — BACLOFEN 20 MG: 10 TABLET ORAL at 16:42

## 2024-04-11 RX ADMIN — BACLOFEN 20 MG: 10 TABLET ORAL at 00:23

## 2024-04-11 RX ADMIN — PHENYTOIN 100 MG: 125 SUSPENSION ORAL at 09:00

## 2024-04-11 ASSESSMENT — COGNITIVE AND FUNCTIONAL STATUS - GENERAL
TURNING FROM BACK TO SIDE WHILE IN FLAT BAD: TOTAL
DRESSING REGULAR LOWER BODY CLOTHING: TOTAL
MOVING FROM LYING ON BACK TO SITTING ON SIDE OF FLAT BED WITH BEDRAILS: TOTAL
EATING MEALS: TOTAL
CLIMB 3 TO 5 STEPS WITH RAILING: TOTAL
STANDING UP FROM CHAIR USING ARMS: TOTAL
DRESSING REGULAR LOWER BODY CLOTHING: TOTAL
PERSONAL GROOMING: TOTAL
DAILY ACTIVITIY SCORE: 6
TOILETING: TOTAL
DRESSING REGULAR UPPER BODY CLOTHING: TOTAL
MOVING FROM LYING ON BACK TO SITTING ON SIDE OF FLAT BED WITH BEDRAILS: TOTAL
PERSONAL GROOMING: TOTAL
MOVING TO AND FROM BED TO CHAIR: TOTAL
MOBILITY SCORE: 6
DRESSING REGULAR UPPER BODY CLOTHING: TOTAL
CLIMB 3 TO 5 STEPS WITH RAILING: TOTAL
EATING MEALS: TOTAL
DAILY ACTIVITIY SCORE: 6
TURNING FROM BACK TO SIDE WHILE IN FLAT BAD: TOTAL
MOVING TO AND FROM BED TO CHAIR: TOTAL
HELP NEEDED FOR BATHING: TOTAL
TOILETING: TOTAL
WALKING IN HOSPITAL ROOM: TOTAL
STANDING UP FROM CHAIR USING ARMS: TOTAL
WALKING IN HOSPITAL ROOM: TOTAL
HELP NEEDED FOR BATHING: TOTAL
MOBILITY SCORE: 6

## 2024-04-11 ASSESSMENT — PAIN - FUNCTIONAL ASSESSMENT
PAIN_FUNCTIONAL_ASSESSMENT: FLACC (FACE, LEGS, ACTIVITY, CRY, CONSOLABILITY)
PAIN_FUNCTIONAL_ASSESSMENT: 0-10

## 2024-04-11 ASSESSMENT — PAIN SCALES - GENERAL
PAINLEVEL_OUTOF10: 0 - NO PAIN
PAINLEVEL_OUTOF10: 0 - NO PAIN

## 2024-04-11 NOTE — PROGRESS NOTES
"Israel Edgar is a 43 y.o. male on day 4 of admission presenting with Urinary tract infection associated with indwelling urethral catheter, initial encounter (CMS/[facility]).    Subjective   Over night, he had break through seizure,     Patient is non verbal at base line ( mother is at bed side)  H/o Leak around the little catheter,   No c/o fever or chills as per his mother    RN reported no new events,        Objective     Physical Exam  Constitutional:       Comments: Looks comfortable at rest  Nonverbal due to underlying MS,    HENT:      Head: Normocephalic.      Nose: Nose normal.   Eyes:      Extraocular Movements: Extraocular movements intact.      Pupils: Pupils are equal, round, and reactive to light.   Cardiovascular:      Rate and Rhythm: Normal rate and regular rhythm.      Heart sounds: Normal heart sounds. No murmur heard.  Pulmonary:      Effort: No respiratory distress.      Breath sounds: Normal breath sounds. No wheezing.   Abdominal:      General: Bowel sounds are normal.      Palpations: Abdomen is soft.      Comments: PEG tube is in place,    Musculoskeletal:         General: Normal range of motion.      Cervical back: Normal range of motion.   Skin:     General: Skin is warm.   Neurological:      Mental Status: Mental status is at baseline.      Comments: Non verbal, at base line          Last Recorded Vitals  Blood pressure 113/78, pulse 84, temperature 36.8 °C (98.2 °F), resp. rate 16, height 1.803 m (5' 10.98\"), weight 77.1 kg (170 lb), SpO2 94%.  Intake/Output last 3 Shifts:  I/O last 3 completed shifts:  In: 537 (7 mL/kg) [NG/GT:537]  Out: 2100 (27.2 mL/kg) [Urine:2100 (0.8 mL/kg/hr)]  Weight: 77.1 kg     Relevant Results  Scheduled medications  acetaminophen, 650 mg, g-tube, q6h  aspirin, 81 mg, g-tube, Daily  baclofen, 20 mg, g-tube, BID  baclofen, 30 mg, oral, Daily  carBAMazepine, 150 mg, g-tube, Daily  carBAMazepine, 300 mg, g-tube, BID  docusate sodium, 100 mg, g-tube, " Daily  levETIRAcetam, 2,000 mg, g-tube, BID  lidocaine, 5 mL, infiltration, Once  lubricating eye drops, 2 drop, Both Eyes, BID  magnesium citrate, 148 mL, oral, Every other day  meropenem, 1 g, intravenous, q8h  phenytoin, 100 mg, g-tube, TID  vancomycin, 1,500 mg, intravenous, q12h  white petrolatum-mineral oiL, 1 Application, Both Eyes, Nightly      Continuous medications     PRN medications  PRN medications: diazePAM, simethicone, vancomycin, white petrolatum    Results for orders placed or performed during the hospital encounter of 04/07/24 (from the past 24 hour(s))   POCT GLUCOSE   Result Value Ref Range    POCT Glucose 171 (H) 74 - 99 mg/dL         This patient has a urinary catheter   Reason for the urinary catheter remaining today? neurogenic bladder               Assessment/Plan   Principal Problem:    Urinary tract infection associated with indwelling urethral catheter, initial encounter (CMS/[facility])    Mr. Edgar is a 43 year old  old male with a PMH of DVT, DM, multiple sclerosis, seizure disorder, neurogenic bladder with chronic little catheter, MS, neurogenic bowel c/b ileus (2011), chronic constipation, GERD, hx of MRSA bacteremia, recurrent UTIs,  Gerardo's syndrome, dysphagia and HTN who is presented to ED from home with his mother  after she was notified that urine cultures obtained on 4/3/24 were positive for P.mirabilis and MRSA. Pt originally presented [facility] on 4/3 due to concern for UTI. Labs obtained on admit notable for leukopenia, elevated CRP, and mild hyponatremia. KUB showed gas-filled distended large and small bowel compatible with ileus. Upon chart review it appears that pt has had ileus since 2011. Abdomen is distended on exam , however is  soft. Pt is admitted to medicine service for further treatment and management of catheter associated UTI.         #Catheter associated UTI  #hx of recurrent UTIs  - Urine culture: Pending results with sensitivities   - Prior Urine culture result  (4/3/24):20,000-80,000 CFU/mL P.mirabilis sensitive to Ampicillin, Ancef, Gentamicin, Zosyn, and Bactrim and >100,000 MRSA sensitive to Macrobid, Vancomycin, and Bactrim  - Antibiotic Regimen: Vancomycin RPH to dose (4/7-) and Merrem 1g q8h (4/7-). Transition to PO antibiotic at discharge   - pt takes Methenamine Hippurate 1g BID via PEG tube outpatient (med held on admit)  ID consulted, recs appreciated,   Continue vanc, meropenum x total 5 days,   Follow up with urology for considering Supra pubic catheter,      #hyponatremia (mild)  - Na 134 on admit  Improved to 138,       #neurogenic bowel c/b chronic ileus (2011)  #chronic constipation  - per chart review ileus has been present since 2011  - pt was last seen by GI outpatient on 2/23/24  - abdomen soft on exam with no visible signs of discomfort; per mom abdominal distension is how it has always looked in the past  - continue home bowel regimen: Colace 10 ml daily via PEG tube and Magnesium citrate 148 ml every other day via PEG tube     #H/o seizure disorder  - pt follows Dr. Brenna Scott, last visit via telemedicine 1/30/24  - seizure and aspiration precautions  - continue home dose of Keppra 2000 mg (20 mL) BID at 0900/2100, Dilantin suspension 100 mg TID at 0900/1500/2100, and Tegretol 300 mg at 10a/10p and 150 mg at 1600 via PEG tube  - pt takes Nayzilam 5 mg nasal spray PRN seizures ([facility]); med is non-formulary, mom states that pt normally has PRN Valium ordered for seizures when hospitalized; pt has allergy to Ativan  He had break through seizure on 4/10 night,   Neurology recs appreciated,  Monitor drug level,      #Dysphagia s/p PEG:  - home regimen: Peptamen 1.5 1 carton TID (10a/4p/10p) with 150 ml water flushes before and after boluses  - Vital 1.5 (therapeutic interchange) 1 carton (237 ml) TID with 150 ml water flushes before and after boluses     #H/o multiple sclerosis  - continue home dose of Baclofen  30 mg via PEG tube at 10a and 20 mg via PEG at 1600 and 2200     Dispo: Complete total 5 days of antibiotics and aim for discharge on 4/12 evening,       Emergency contact: Vee Edgar (mom/POA): [phone] (please call her cell if you round on pt and she is not present in room. She has appt with pt's wheelchair company tomorrow 4/8 at 12:30 that can't be missed. Pt got new wheelchair that needs adjustments.       Julien Thomas MD

## 2024-04-11 NOTE — PROGRESS NOTES
"Israel Edgar is a 43 y.o. male on day 3 of admission presenting with Urinary tract infection associated with indwelling urethral catheter, initial encounter (CMS/[facility]).    Subjective       Discussed with his mother who thinks he is at his baseline    Objective     Physical Exam    Physical Exam  General: NAD  CVS: RRR. Normal S1 and S2. No m/r/g.   RESP: ctab no w/r/r, no increased wob  Abd: Soft and distended. No sign suggestive of tenderness to palpation   Ext: No swelling of the LE b/l.   Boss catheter in place    Last Recorded Vitals  Blood pressure (!) 122/96, pulse 86, temperature 36.5 °C (97.7 °F), resp. rate 15, height 1.803 m (5' 10.98\"), weight 77.1 kg (170 lb), SpO2 94%.  Intake/Output last 3 Shifts:  I/O last 3 completed shifts:  In: 910 (11.8 mL/kg) [P.O.:670; I.V.:240 (3.1 mL/kg)]  Out: 2100 (27.2 mL/kg) [Urine:2100 (0.8 mL/kg/hr)]  Weight: 77.1 kg     Relevant Results  Scheduled medications  acetaminophen, 650 mg, g-tube, q6h  aspirin, 81 mg, g-tube, Daily  baclofen, 20 mg, g-tube, BID  baclofen, 30 mg, oral, Daily  carBAMazepine, 150 mg, g-tube, Daily  carBAMazepine, 300 mg, g-tube, BID  docusate sodium, 100 mg, g-tube, Daily  levETIRAcetam, 2,000 mg, g-tube, BID  lidocaine, 5 mL, infiltration, Once  lubricating eye drops, 2 drop, Both Eyes, BID  magnesium citrate, 148 mL, oral, Every other day  meropenem, 1 g, intravenous, q8h  phenytoin, 100 mg, g-tube, TID  vancomycin, 1,500 mg, intravenous, q12h  white petrolatum-mineral oiL, 1 Application, Both Eyes, Nightly      Continuous medications          No results found for this or any previous visit (from the past 24 hour(s)).                    Assessment/Plan   Principal Problem:    Urinary tract infection associated with indwelling urethral catheter, initial encounter (CMS/[facility])      Antimicrobial agents  4/6- Meropenem  4/6- Vancomycin     Assessment/Plan  # Recent urine culture growing with Proteus mirabilis and MRSA with urine leakage and foul " smell in setting of chronic setting  # Severe allergy of levofloxacin, zosyn, keflex, and bactrim. Cefuroxime tolerable.      A 42 yo male with advanced multiple sclerosis, seizure disorder, neurogenic bladder with chronic little cath since 2013 and hx of MRSA bacteremia and recurrent UTIs was found to have foul smell and urine leakage. No fever with oral acetaminophen. UA showed pyuria and recent urine culture growing with Proteus mirabilis and MRSA. Has been on methenamine. Would be considered for suprapubic catheter to reduce burden of his UTI.      There is likely a colonization given his current clinical condition but it would be an option to treat possible CAUTI through discussion with his mother.      Allergy hxs of antibiotics, cefuroxime should be tolerable from recent treatment.  Cefazolin or Ceftriaxone would be tolerable to use given both having side chains that are different from keflex, which would likely have not cross reaction. Also, it would less likely to have allergy for all cephalosporins. However, there would be reasonable to chose different type of antibiotics to treat for possible infection.       Would be agreeable to continue IV meropenem and vancomycin for a while. Will follow urine culture.      Recommendations  -Please continue IV meropenem 1g  q8h and IV vancomycin per pharmacy protocol   -Will follow UA and culture 4/7     Discussed with Dr. Andrade. Please text me via Epic chat if you have any questions or concerns regarding this patient. ID will continue to follow up this patient.     Gurinder Gandhi MD  ID fellow PGY4  Team B Pager [phone]     We spent some time talking to his mother, who clearly does a fantastic job in managing him, about the challenge of a chronic Little catheter recurrent bacterial colonization and recurrent UTIs.  We emphasized the need to only treat symptomatic UTIs but this is challenging in this case.  We did mention that a suprapubic catheter may have less of a risk  than a Little catheter and that she should follow-up with urology.  We will continue to follow-up with next couple days    Update April 9  Per his mother he is at his baseline.  Urine culture from 4 7 growing gram-negative rods.  Urine culture from 4 3 with MRSA and Proteus.    Will continue vancomycin and meropenem for now.  He should not need a long course.  Really a few days to clear this out and hopefully put him into remission for a while in terms of UTIs.  I discussed with his mother the issue of getting a suprapubic catheter instead of a Little to cut down the frequency of UTIs.  I told her methenamine really does not work without dwell time and does not work when there is continuous drainage.  There is really no good solution to preventing UTIs in the situation.  Suprapubic catheter may have less of a risk    April 10  At baseline  Would give 5 total days of antibiotics for an uncomplicated cystitis-   Would change his little during this hospital stay- related to biofilm colonization with MRSA on current little  Outpatient  follow up for consideration of suprapubic  will discuss this with his mother    Update 4/11/24  At his baseline  No new recommendations-  Complete a short course of antibiotics for UTI  Will sign off- but text me with questions        Marcell Andrade MD

## 2024-04-11 NOTE — NURSING NOTE
At approximately 2345 this nurse was in patient's room, patient began to have a what appears to be a seizure. This nurse administered dose of Diazepam, and rapid response was called. Seizure lasted about 2-3 minutes without any injury. MD was notified of event and new lab orders were placed. Patient now resting comfortably mother is at bedside will continue with plan of care.

## 2024-04-11 NOTE — SIGNIFICANT EVENT
Rapid response paged for witnessed seizure activity by caregiver/family member. Primary RN notified that seizure activity is in setting of delay in medication availability. When pt seizure activity did not break 5mg IV ativan administered. After which pt seizure activity stopped. On arrival to bedside pt responding at baseline per family member. NACR at bedside performing focused neuro exam. Unable to assess motor function based on contracted extremities and pt baseline mobility limitations. NACR reached out to primary team to update on pt condition and also reached out to neurology to assess pt for any need of medication adjustment or movement to EMU.     Vitals:    04/10/24 2359   BP: 113/78   Pulse: 84   Resp:    Temp: 36.8 °C (98.2 °F)   SpO2: 94%     Pt vitals stable post seizure activity. Plan conveyed to primary RN and family member updated on current plan of care. Primary RN encouraged to reach out for any recurrent seizure activity, change in pt condition or if any new concerns arise.

## 2024-04-11 NOTE — PROGRESS NOTES
"Vancomycin Dosing by Pharmacy- FOLLOW UP    Israel Edgar is a 43 y.o. year old male who Pharmacy has been consulted for vancomycin dosing for other uti . Based on the patient's indication and renal status this patient is being dosed based on a goal AUC of 400-600.     Renal function is currently stable.    Current vancomycin dose: 1500 mg given every 12 hours    Estimated vancomycin AUC on current dose: 513 mg/L.hr     Visit Vitals  BP 97/75   Pulse 87   Temp 37 °C (98.6 °F)   Resp 16        Lab Results   Component Value Date    CREATININE 0.44 (L) 04/11/2024    CREATININE 0.45 (L) 04/09/2024    CREATININE 0.51 04/08/2024    CREATININE 0.39 (L) 04/07/2024        Patient weight is No results found for: \"PTWEIGHT\"    No results found for: \"CULTURE\"     I/O last 3 completed shifts:  In: 537 (7 mL/kg) [NG/GT:537]  Out: 2100 (27.2 mL/kg) [Urine:2100 (0.8 mL/kg/hr)]  Weight: 77.1 kg   [unfilled]    Lab Results   Component Value Date    PATIENTTEMP 37.0 04/07/2024    PATIENTTEMP 37.0 09/06/2023    PATIENTTEMP 37.0 03/15/2023    PATIENTTEMP 37.0 03/09/2023        Assessment/Plan    Within goal AUC range. Continue current vancomycin regimen.    This dosing regimen is predicted by [employer] to result in the following pharmacokinetic parameters:  Loading dose: N/A  Regimen: 1500 mg IV every 12 hours.  Start time: 10:37 on 04/11/2024  Exposure target: AUC24 (range)400-600 mg/L.hr   AUC24,ss: 513 mg/L.hr  Probability of AUC24 > 400: 97 %  Ctrough,ss: 14.2 mg/L  Probability of Ctrough,ss > 20: 2 %  Probability of nephrotoxicity (Lodise ELEAZAR 2009): 9 %    The next level will be obtained no more levels planned, per ID note plan of 5d abx, today is day 5.May be obtained sooner if clinically indicated.   Will continue to monitor renal function daily while on vancomycin and order serum creatinine at least every 48 hours if not already ordered.  Follow for continued vancomycin needs, clinical response, and signs/symptoms of " toxicity.       Cheyenne Torre, PharmD

## 2024-04-11 NOTE — NURSING NOTE
0806 received report. Patient awake, alert x to self. Mother is with patient and takes care of him at home. Bed locked in low position. Call bell within reach. Safety maintained.    [id number] Dr. Thomas speaking with Mom in room.

## 2024-04-11 NOTE — CONSULTS
Consults  Consult Questions:  Breakthrough seizure    History Of Present Illness  Israel Edgar is a 43 y.o. male history of primary chronic progressive multiple sclerosis requiring total care at baseline s/p PEG, paraplegia, neurogenic bladder, seizure disorder, seborrheic dermatitis, chronic little, mrsa colonization admitted with a CAUTI. Neurology consulted for breakthrough seizure.   Per primary team, patient has been admitted to [facility] 4 days ago for treatment of a  CAUTI (MRSA and Proteus).  Today patient had bili and administration of Dilantin and carbamazepine for more than 2 hours had an episode of nystagmus, left gaze deviation and lipsmacking that lasted for less than 2 minutes.   Patient received 5 mg of Valium.   On arrival of neurology team, mother was at bedside and said patient has been back to normal and has had not had any repeat seizures.  Per mother last seizure was in February in the setting of another urine infection.   Last seen by neurology Dr. Ash in January -and at that time neurogenic bladder with recurrent infections and bowel ileus has been an ongoing concern leading to 1-2 seizures every 1 month.   Most updated regimen: LEV 2gr bid, phenytoin 4ml tid and carbamazepine 100mg/5ML (15-7.5-15)       Epilepsy Classification:  Epileptic Paroxysmal Episodes  Epileptogenic Zone: bihemispheric              Epileptic seizure semiology:   1. Type 1: Left versive seizure? left clonic   2. Type 2: right arm tonic ? right arm clonic                Etiology: multiple sclerosis                                           Significant Comorbidities: multiple sclerosis  Onset date: 2013  Diagnosis date: 2013  Previous disease therapies: LEV, LAC     History summary:   patient had his first seizure in 2013. He has presented multiple times in the ED with status epilepticus both: provoked by infection and unprovoked.  Mom described his seizures as eyes roled up which may evoled into right or left  head deviation and bilateral asymmetric tonic posturing with LOC. Patient also has multiple nonepileptic paroxysmal episodes with nystagmus, arms twitching and eye deviations.     Prior EEGs:  2016: VEEG abnormal III: right arm tonic, left temporal seizure.  2017: VEEG abnormal III: left versive seizure, right hemispheric seizure.     Patient was diagnosed with MS in 2007 while he was in [facility]. He presented initially with cerebellar symptoms followed by spinal cord symptoms. He was on Rebif and Avonex but not fully compliant. Currently with secondary progressive MS, wheelchair bound, totally dependent on his mother who cares for him at home (she is a nurse).      MRI of the spine C and T shows extensive patchy abnormal increased intramedullary signal noted within the spinal.     MRI brain: Extensive patchy and confluent nonspecific white matter changes are again noted within the cerebral hemispheres bilaterally, brain stem and cerebellar hemispheres.     Past Medical History  Past Medical History:   Diagnosis Date    Abnormal findings on diagnostic imaging of other specified body structures 10/11/2017    Nonspecific abnormal findings on radiological and examination of intrathoracic organs    Acute embolism and thrombosis of deep veins of unspecified upper extremity (CMS/HCC)     Acute deep vein thrombosis of arm    Acute upper respiratory infection, unspecified 12/05/2014    Acute upper respiratory infection    Impacted cerumen, bilateral 09/01/2017    Impacted cerumen of both ears    Nausea with vomiting, unspecified 09/09/2016    Nausea and vomiting in adult    Other conditions influencing health status     Acute Hypoxic Encephalopathy    Other conditions influencing health status     Schizophrenia    Other conditions influencing health status     Lumbar Puncture Traumatic Tap    Other muscle spasm     Muscle spasm    Paraplegia, unspecified (CMS/HCC)     Paraparesis of both lower limbs    Personal history of  other diseases of urinary system 03/21/2017    History of bladder stone    Personal history of other specified conditions 01/13/2017    History of urinary incontinence    Personal history of other specified conditions     History of headache    Personal history of urinary (tract) infections 01/31/2017    History of urinary tract infection    Personal history of urinary calculi 09/08/2015    History of renal calculi    Personal history of urinary calculi 03/07/2016    History of renal calculi    Unspecified visual loss     Vision problems     Surgical History  Past Surgical History:   Procedure Laterality Date    ILEOSTOMY  08/05/2015    Ileostomy    OTHER SURGICAL HISTORY  08/05/2015    Tracheostomy    OTHER SURGICAL HISTORY  09/16/2016    Feeding Tube     Social History     Allergies  Keflex [cephalexin], Levaquin [levofloxacin], Lorazepam, Piperacillin-tazobactam, Adhesive tape-silicones, Albuterol, Lacosamide, Latex, Pantoprazole, Suppository adult [glycerin (adult)], Budesonide, Famotidine, Ipratropium bromide, Nitrofurantoin, Ranitidine, Sulfa (sulfonamide antibiotics), and Sulfamethoxazole-trimethoprim  Medications Prior to Admission   Medication Sig Dispense Refill Last Dose    acetaminophen (Tylenol) 160 mg/5 mL (5 mL) solution Take 20 mL by mouth every 6 hours. Pt takes med at : 9a/4p/10p/3a       ALPRAZolam (Xanax) 0.5 mg tablet 1 tablet (0.5 mg) by g-tube route if needed. TABLET SHOULD BE CRUSHED AND GIVEN VIA G-TUBE       artificial tears, dextran-hypomel-glycerin, 0.1-0.3-0.2 % ophthalmic solution Administer 1-2 drops into affected eye(s) twice a day. at 9 am and 3 pm while awake       aspirin 81 mg chewable tablet 1 tablet (81 mg) by g-tube route once daily. at 4 pm 30 tablet 11     baclofen (Lioresal) 20 mg tablet Take 1 tablet (20 mg) by mouth 3 times a day. (Patient taking differently: Take 0.5 tablets (10 mg) by mouth 3 times a day. Pt takes 30 mg of Baclofen at 10am and 20 mg of Baclofen at 1600  and 2200. Must be crushed and given with tube feed.) 270 tablet 1     cholecalciferol (Vitamin D-3) 10 mcg/mL (400 unit/mL) drops 10 mL (4,000 Units) by g-tube route once daily. 300 mL 3     diazePAM (Valium) 5 mg/mL injection Inject 3 mL (15 mg) into the muscle. As needed for seizure activity lasting for more than 3 minutes, and if seizures persist may repeat 3 times, then call 911. Do not Mix with Nayzilam.       docusate sodium (Colace) 50 mg/5 mL oral liquid 10 mL (100 mg) by g-tube route once daily.       hydrocortisone 2.5 % ointment Apply topically 2 times a day as needed. Apply to scalp and / or face area for seborrheic dermatitis (scalp flaking and reddended facial areas) 2 times a day, As Needed       levETIRAcetam 100 mg/mL solution 20 mL (2,000 mg) by g-tube route 2 times a day. (Patient taking differently: 20 mL (2,000 mg) by g-tube route 2 times a day. Pt takes this medication at 0900 and 2100.) 1200 mL 11     magnesium citrate solution Give 148 ml(s) by G-TUBE every other day       methenamine hippurate (Hiprex) 1 gram tablet Take 1 tablet (1 g) by mouth 2 times a day. (Patient taking differently: 1 tablet (1 g) by g-tube route 2 times a day. TABLET SHOULD BE CRUSHED AND GIVEN VIA G-TUBE) 180 tablet 0     nasal spray midazolam (Versed) 5 mg/spray (0.1 mL) spray,non-aerosol Use one spray in one nostril for convulsive seizure lasting longer than 3 minutes. May repeat a in the other nostril after 10 minutes if convulsive seizures still ongoing. (Patient taking differently: Use one spray in one nostril for convulsive seizure lasting longer than 5 minutes. May repeat a in the other nostril after 5 minutes if convulsive seizures still ongoing CALL 911) 3 each 2     onabotulinumtoxinA (Botox) 200 unit injection Inject 150 Units into the muscle. IN EACH ARM EVERY 3 MONTHS, GIVEN BY NEUROLOGIST       petrolatum,white (ADVANCED HEALING, PETROLATUM, TOP) Apply 1 Application topically if needed (TO BUTTOCKS TWICE  "DAILY AND AFTER EACH BOWEL MOVEMENT).       phenytoin (Dilantin-125) 125 mg/5 mL suspension Take 4ml by PEG tube 3 times a day 360 mL 11     selenium sulfide (Selsun) 2.5 % shampoo Apply 1 Application topically 1 (one) time per week. Use every other day during a seborrheic dermatitis outbreak or as needed       simethicone (Mylicon) 40 mg/0.6 mL drops 2 mL (133.3333 mg) by g-tube route every other day if needed for flatulence.       TegretoL 100 mg/5 mL suspension Take 15ml in AM 7.5mL at 4PM and 15ml in PM 3400 mL 2     white petrolatum-mineral oiL 94-3 % ophthalmic ointment Apply 1 Application to both eyes once daily at bedtime. Apply small amount (1/4 inch) to inside of lower eyelid at bedtime daily          Review of Systems  Neurological Exam  Physical Exam  Last Recorded Vitals  Blood pressure 113/78, pulse 84, temperature 36.8 °C (98.2 °F), resp. rate 16, height 1.803 m (5' 10.98\"), weight 77.1 kg (170 lb), SpO2 94%.    Relevant Results   Physical Exam:    General: young man, no acute distress, moves head from side-to-side     Neuro:   eyes open spontaneously   Pupils nonreactive bilaterally  No nystagmus noted  nonverbal at baseline     spastic quadriparesis  RUE contracted at elbows  LUE contracted, extended  BLE minimal movements to nox stim  Unable to elicit reflexes due to contractures    She can  Pelican Coma Scale  Best Eye Response: To verbal stimuli  Best Verbal Response: None  Best Motor Response: Withdraws to pain  Carly Coma Scale Score: 9                 I have personally reviewed the following imaging results [facility]    Result Date: 4/8/2024  These images are not reportable by radiology and will not be interpreted by  Radiologists.    XR chest 1 view    Result Date: 4/7/2024  STUDY: Chest Radiograph;  04/07/2024 at 5:50 PM INDICATION: Rule out pneumonia. COMPARISON: XR chest 04/03/24, 09/06/23, 07/15/23. ACCESSION NUMBER(S): [id number] ORDERING CLINICIAN: NOEMI BRAUN" TECHNIQUE:  Frontal chest was obtained at 1750 hours. FINDINGS: CARDIOMEDIASTINAL SILHOUETTE: Cardiomediastinal silhouette is normal in size and configuration.  LUNGS: Lungs are clear.  ABDOMEN: No remarkable upper abdominal findings.  BONES: No acute osseous changes.    No acute process. Gas-filled distended loops of large and small bowel compatible with ileus. Signed by Julio Curran MD    XR abdomen 1 view    Result Date: 4/7/2024  STUDY: Abdomen Radiographs;  04/07/2024 at 5:50 PM INDICATION: Abdominal distention. COMPARISON: XR chest imaging of same date, 04/07/24. XR KUB 07/13/23, 07/12/23. ACCESSION NUMBER(S): [id number] ORDERING CLINICIAN: TECHNIQUE:  One view(s) of the abdomen (two images). FINDINGS:  There are gas-filled distended large and small bowel loops compatible with ileus. There are no convincing calculi or abnormal calcifications.  No focal osseous abnormalities.      Gas-filled distended large and small bowel compatible with ileus. Signed by Julio Curran MD    CT abdomen pelvis wo IV contrast    Result Date: 4/4/2024  Interpreted By:  Radha Han, STUDY: CT abdomen pelvis without   INDICATION: Signs/Symptoms:large bowel distension on cxr.   COMPARISON: 02/16/2024   ACCESSION NUMBER(S): [id number]   ORDERING CLINICIAN: ASHWIN AMARO   TECHNIQUE: Axial noncontrast CT images of the abdomen and pelvis with coronal and sagittal reconstructed images.   FINDINGS: LOWER CHEST: Lower lobe interstitial opacities similar to prior imaging likely atelectasis and scarring. BONES: No acute osseous abnormality. Mild chronic compression deformity of the L3 vertebral body. ABDOMINAL WALL: Coarse calcifications with soft tissue thickening along the posterior right gluteal muscles.   ABDOMEN: Lack of intravenous contrast limits evaluation of vessels and solid organs. LIVER: 22.2 cm, enlarged. BILE DUCTS: Normal caliber. GALLBLADDER: No calcified gallstones. No wall thickening. PANCREAS: No peripancreatic  inflammatory stranding or duct dilatation. SPLEEN: Within normal limits. ADRENALS: Within normal limits.   KIDNEYS, URETERS, URINARY BLADDER: No hydronephrosis or urinary tract calculus.  The urinary bladder is decompressed with Boss catheter.   VESSELS: Atherosclerotic calcifications without aneurysmal dilatation seen. RETROPERITONEUM: No pathologically enlarged lymph nodes.   PELVIS:   REPRODUCTIVE ORGANS: No abnormality, given limitations of the noncontrast CT.  No significant free pelvic fluid.   BOWEL: Percutaneous gastrostomy tube appears in expected position with decompressed stomach similar to prior imaging. There is marked air in fluid filled distension of the colon, most prominent within the sigmoid colon which appears distended up to 10.8 cm that significant wall thickening. No swirl sign or evidence of obstruction identified. No small bowel distention. Normal appendix. PERITONEUM: No ascites or free air, no fluid collection.       Marked colonic distention with air and fluid measuring up to 10 cm, previously 7 cm however no evidence of wall thickening or pneumatosis. Colonic distension is present on multiple prior exams and may be related to acute on chronic colitis. Acute on chronic ileus may be also considered. No definite evidence of obstruction identified within constraints of marked distension with mass effect.   Medical devices as detailed, hepatomegaly and additional findings.   MACRO: None   Signed by: Radha Han 4/4/2024 12:39 AM Dictation workstation:   [id number]    XR chest 1 view    Result Date: 4/3/2024  Interpreted By:  Denver Shay, STUDY: XR CHEST 1 VIEW;  4/3/2024 10:46 pm   INDICATION: Signs/Symptoms:cough.   COMPARISON: 09/06/2023   ACCESSION NUMBER(S): [id number]   ORDERING CLINICIAN: ASHWIN AMARO   FINDINGS:     Low lung volumes due to cast dilated bowel in the upper abdomen. The heart is flattened due to the aforementioned. The lungs are clear. No pneumothorax.        Similar gaseous dilatation of the colon with upward elevation of the diaphragm. No cardiopulmonary abnormality.     MACRO: None.   Signed by: Denver Shay 4/3/2024 10:57 PM Dictation workstation:   [id number]  .      Assessment/Plan   Principal Problem:    Urinary tract infection associated with indwelling urethral catheter, initial encounter (CMS/[facility])  emma Edgar is a 43 y.o. male history of primary chronic progressive multiple sclerosis requiring total care at baseline s/p PEG, paraplegia, neurogenic bladder, seizure disorder, seborrheic dermatitis, chronic little, mrsa colonization admitted with a CAUTI. Neurology consulted for breakthrough seizure isomedication delay and urinary infection.    Epilepsy Classification:  Epileptic Paroxysmal Episodes  Epileptogenic Zone: bihemispheric       Epileptic seizure semiology:   1. Type 1: Left versive seizureà left clonic         2. Type 2: right arm tonic à right arm clonic     Etiology: multiple sclerosis                                Significant Comorbidities: multiple sclerosis  Onset date: 2013  Diagnosis date: 2013  Previous disease therapies: LEV, PHE, CBZ     Recommendations:  -Please continue the following regimen  -Phenytoin 100 mg 3 times daily  -Carbamazepine -300 mg-150 mg-300 mg  -Keppra 2 g twice daily  -Baclofen 30mg at 10am, 20mg at 1600 and 2200  -Please draw levels for all 3 before next a.m. dose (for true troughs level should be drawn within 30 minutes of next dose)  - please reserve 2 mg Ativan for generalized tonic-clonic seizures lasting for more than 5 minutes or for 2 or more seizures within a 15-minute. (Can give up to 2 doses)  -Please ensure seizure precautions  -Please be has any questions or concerns or if patient has another breakthrough seizure    I spent 60 minutes in the professional and overall care of this patient.      Lissett Reid MD  Neurology PGY 2  Neuro epilepsy p [phone]    Post-rounding Updates:  Patient seen on  rounds. Clinically stable from neurology perspective. Carbamazepine and Keppra levels resulted and acceptable. Phenobarbital level seems to have been collected instead of phenytoin level.    Recommendations:  - Continue medications:   - Phenytoin 100mg TID   - Carbamazepine [id number]   - Keppra 2000mg BID   - Baclofen 30mg at 10am, 20mg at 1600 and 2200  - Please obtain trough phenytoin level from lab 30 minutes prior to his phenytoin dose  - please reserve 2 mg Ativan for generalized tonic-clonic seizures lasting for more than 5 minutes or for 2 or more seizures within a 15-minute. (Can give up to 2 doses)  -Please ensure seizure precautions  -Please be has any questions or concerns or if patient has another breakthrough seizure  -Please page [phone] for any questions or concerns or if patient has another breakthrough seizure    Ang Parkinson MD  Neurology, PGY-2    Patient seen and discussed with attending physician Dr. Lulu Scott.

## 2024-04-11 NOTE — SIGNIFICANT EVENT
Epilepsy Post-rounding Updates:  Patient seen on rounds. Clinically stable from neurology perspective. Carbamazepine and Keppra levels resulted and acceptable. Phenobarbital level seems to have been collected instead of phenytoin level.    Impression: Episode of seizure-like activity consisting of nystagmus, left gaze, and lip smacking most likely breakthrough seizure in setting of infection and medication delay.    Epilepsy Classification:  Epileptic Paroxysmal Episodes  Epileptogenic Zone: bihemispheric       Epileptic seizure semiology:   1. Type 1: Left versive seizureà left clonic         2. Type 2: right arm tonic à right arm clonic     Etiology: multiple sclerosis                                Significant Comorbidities: multiple sclerosis  Onset date: 2013  Diagnosis date: 2013  Previous disease therapies: LEV, PHE, CBZ     Recommendations:  - Continue medications:              - Phenytoin 100mg TID              - Carbamazepine [id number]              - Keppra 2000mg BID              - Baclofen 30mg at 10am, 20mg at 1600 and 2200  - Please obtain trough phenytoin level from lab 30 minutes prior to his phenytoin dose  - please reserve 2 mg Ativan for generalized tonic-clonic seizures lasting for more than 5 minutes or for 2 or more seizures within a 15-minute. (Can give up to 2 doses)  -Please ensure seizure precautions  -Please be has any questions or concerns or if patient has another breakthrough seizure  -Please page [phone] for any questions or concerns or if patient has another breakthrough seizure     Ang Parkinson MD  Neurology, PGY-2

## 2024-04-11 NOTE — CARE PLAN
The patient's goals for the shift include Patient will be free of seizure activity.    The clinical goals for the shift include Patient will remain safe and free from injury.      Problem: Pain  Goal: My pain/discomfort is manageable  Outcome: Progressing     Problem: Safety  Goal: Patient will be injury free during hospitalization  Outcome: Progressing  Goal: I will remain free of falls  Outcome: Progressing     Problem: Daily Care  Goal: Daily care needs are met  Outcome: Progressing     Problem: Psychosocial Needs  Goal: Demonstrates ability to cope with hospitalization/illness  Outcome: Progressing  Goal: Collaborate with me, my family, and caregiver to identify my specific goals  Outcome: Progressing     Problem: Discharge Barriers  Goal: My discharge needs are met  Outcome: Progressing     Problem: Skin  Goal: Decreased wound size/increased tissue granulation at next dressing change  Outcome: Progressing  Goal: Participates in plan/prevention/treatment measures  Outcome: Progressing  Goal: Prevent/manage excess moisture  Outcome: Progressing

## 2024-04-12 VITALS
HEIGHT: 71 IN | TEMPERATURE: 97.9 F | SYSTOLIC BLOOD PRESSURE: 104 MMHG | HEART RATE: 84 BPM | DIASTOLIC BLOOD PRESSURE: 69 MMHG | WEIGHT: 170 LBS | BODY MASS INDEX: 23.8 KG/M2 | RESPIRATION RATE: 16 BRPM | OXYGEN SATURATION: 94 %

## 2024-04-12 PROCEDURE — A4217 STERILE WATER/SALINE, 500 ML: HCPCS

## 2024-04-12 PROCEDURE — 2500000005 HC RX 250 GENERAL PHARMACY W/O HCPCS: Performed by: NURSE PRACTITIONER

## 2024-04-12 PROCEDURE — 2500000001 HC RX 250 WO HCPCS SELF ADMINISTERED DRUGS (ALT 637 FOR MEDICARE OP): Performed by: NURSE PRACTITIONER

## 2024-04-12 PROCEDURE — 51702 INSERT TEMP BLADDER CATH: CPT

## 2024-04-12 PROCEDURE — 99239 HOSP IP/OBS DSCHRG MGMT >30: CPT | Performed by: INTERNAL MEDICINE

## 2024-04-12 PROCEDURE — 2500000004 HC RX 250 GENERAL PHARMACY W/ HCPCS (ALT 636 FOR OP/ED)

## 2024-04-12 PROCEDURE — 2500000004 HC RX 250 GENERAL PHARMACY W/ HCPCS (ALT 636 FOR OP/ED): Performed by: NURSE PRACTITIONER

## 2024-04-12 RX ORDER — MEROPENEM 1 G/1
1 INJECTION, POWDER, FOR SOLUTION INTRAVENOUS ONCE
Status: CANCELLED | OUTPATIENT
Start: 2024-04-12 | End: 2024-04-12

## 2024-04-12 RX ORDER — BACLOFEN 10 MG/1
30 TABLET ORAL DAILY
Start: 2024-04-13 | End: 2024-04-23

## 2024-04-12 RX ORDER — BACLOFEN 20 MG/1
20 TABLET ORAL 2 TIMES DAILY
Start: 2024-04-12 | End: 2024-04-23 | Stop reason: SDUPTHER

## 2024-04-12 RX ADMIN — ACETAMINOPHEN 650 MG: 650 SOLUTION ORAL at 15:02

## 2024-04-12 RX ADMIN — PHENYTOIN 100 MG: 125 SUSPENSION ORAL at 09:47

## 2024-04-12 RX ADMIN — BACLOFEN 20 MG: 10 TABLET ORAL at 16:25

## 2024-04-12 RX ADMIN — VANCOMYCIN HYDROCHLORIDE 1500 MG: 5 INJECTION, POWDER, LYOPHILIZED, FOR SOLUTION INTRAVENOUS at 09:35

## 2024-04-12 RX ADMIN — LEVETIRACETAM 2000 MG: 500 SOLUTION ORAL at 09:46

## 2024-04-12 RX ADMIN — CARBAMAZEPINE 150 MG: 100 SUSPENSION ORAL at 16:25

## 2024-04-12 RX ADMIN — ACETAMINOPHEN 650 MG: 650 SOLUTION ORAL at 09:45

## 2024-04-12 RX ADMIN — BACLOFEN 30 MG: 10 TABLET ORAL at 10:45

## 2024-04-12 RX ADMIN — CARBOXYMETHYLCELLULOSE SODIUM 2 DROP: 5 SOLUTION/ DROPS OPHTHALMIC at 13:25

## 2024-04-12 RX ADMIN — DOCUSATE SODIUM 100 MG: 50 LIQUID ORAL at 09:45

## 2024-04-12 RX ADMIN — MEROPENEM 1 G: 1 INJECTION, POWDER, FOR SOLUTION INTRAVENOUS at 13:24

## 2024-04-12 RX ADMIN — CARBAMAZEPINE 300 MG: 100 SUSPENSION ORAL at 10:45

## 2024-04-12 RX ADMIN — ACETAMINOPHEN 650 MG: 650 SOLUTION ORAL at 03:16

## 2024-04-12 RX ADMIN — PHENYTOIN 100 MG: 125 SUSPENSION ORAL at 15:02

## 2024-04-12 RX ADMIN — CARBOXYMETHYLCELLULOSE SODIUM 2 DROP: 5 SOLUTION/ DROPS OPHTHALMIC at 10:11

## 2024-04-12 RX ADMIN — ASPIRIN 81 MG 81 MG: 81 TABLET ORAL at 16:25

## 2024-04-12 RX ADMIN — MAGNESIUM CITRATE 148 ML: 1.75 LIQUID ORAL at 07:04

## 2024-04-12 RX ADMIN — MEROPENEM 1 G: 1 INJECTION, POWDER, FOR SOLUTION INTRAVENOUS at 05:27

## 2024-04-12 ASSESSMENT — COGNITIVE AND FUNCTIONAL STATUS - GENERAL
CLIMB 3 TO 5 STEPS WITH RAILING: TOTAL
DRESSING REGULAR UPPER BODY CLOTHING: TOTAL
DRESSING REGULAR LOWER BODY CLOTHING: TOTAL
TURNING FROM BACK TO SIDE WHILE IN FLAT BAD: TOTAL
STANDING UP FROM CHAIR USING ARMS: A LOT
TOILETING: TOTAL
MOVING FROM LYING ON BACK TO SITTING ON SIDE OF FLAT BED WITH BEDRAILS: TOTAL
MOBILITY SCORE: 6
DRESSING REGULAR LOWER BODY CLOTHING: TOTAL
CLIMB 3 TO 5 STEPS WITH RAILING: TOTAL
DRESSING REGULAR UPPER BODY CLOTHING: TOTAL
TURNING FROM BACK TO SIDE WHILE IN FLAT BAD: TOTAL
HELP NEEDED FOR BATHING: TOTAL
MOVING FROM LYING ON BACK TO SITTING ON SIDE OF FLAT BED WITH BEDRAILS: TOTAL
WALKING IN HOSPITAL ROOM: TOTAL
TOILETING: TOTAL
MOVING TO AND FROM BED TO CHAIR: TOTAL
DAILY ACTIVITIY SCORE: 6
EATING MEALS: TOTAL
PERSONAL GROOMING: TOTAL
STANDING UP FROM CHAIR USING ARMS: TOTAL
MOBILITY SCORE: 7
EATING MEALS: TOTAL
WALKING IN HOSPITAL ROOM: TOTAL
DAILY ACTIVITIY SCORE: 6
HELP NEEDED FOR BATHING: TOTAL
MOVING TO AND FROM BED TO CHAIR: TOTAL
PERSONAL GROOMING: TOTAL

## 2024-04-12 ASSESSMENT — PAIN SCALES - GENERAL: PAINLEVEL_OUTOF10: 3

## 2024-04-12 NOTE — PROGRESS NOTES
04/12/24 1200   Discharge Planning   Patient expects to be discharged to: Home with mother   Does the patient need discharge transport arranged? Yes   RoundTrip coordination needed? Yes   Has discharge transport been arranged? Yes   What day is the transport expected? 04/12/24   What time is the transport expected? 1800     Patient to discharge home today. Transport arranged via [facility] stretcher for 6pm. Patient mother MD Vee, Naye SANTIAGO aware. Blue slip delivered to unit secretary. Mother asked that this TCC reach out to [employer] regarding mattress concerns. Spoke with rep from [employer] and they will look into warranty on mattress and reach out to mother. Mother updated on conversation.   Susan BALKE, RN  Transitional Care Coordinator (TCC)  [phone]

## 2024-04-12 NOTE — CARE PLAN
The patient's goals for the shift include pt. will be free from seizure during hospitalzation.    The clinical goals for the shift include pt. will have little catheter changed  prior to discharge    Over the shift, the patient did not make progress toward the following goals. Barriers to progression include pt. Was able to be seizure free without 1 small episode during change of little catheter .Recommendations to address these barriers include Pt. Little catheter changed, Meds given as ordered.

## 2024-04-12 NOTE — SIGNIFICANT EVENT
Epilepsy Team     Herve Arreola is a 43 y.o. male history of primary chronic progressive multiple sclerosis requiring total care at baseline s/p PEG, paraplegia, neurogenic bladder, seizure disorder, seborrheic dermatitis, chronic little, MRSA colonization admitted with a CAUTI. Epilepsy consulted for breakthrough seizure in the setting of medication delay and urinary infection.     He is currently on antibiotic course for UIT.    Levels:   Phenytoin 29.9   Carbamazepine 3.7   Levetiracetam 26     Impression: Episode of seizure-like activity consisting of nystagmus, left gaze, and lip smacking most likely breakthrough seizure in setting of infection and medication delay.     Epilepsy Classification:  Epileptic Paroxysmal Episodes  Epileptogenic Zone: bihemispheric       Epileptic seizure semiology:   1. Type 1: Left versive seizureà left clonic         2. Type 2: right arm tonic à right arm clonic     Etiology: multiple sclerosis                                Significant Comorbidities: multiple sclerosis  Onset date: 2013  Diagnosis date: 2013  Previous disease therapies: LEV, PHE, CBZ     Recommendations:  - Continue medications:              - Phenytoin 100mg TID              - Carbamazepine [id number]              - Keppra 2000mg BID              - Baclofen 30mg at 10am, 20mg at 1600 and 2200  - Please reserve 2 mg Ativan for generalized tonic-clonic seizures lasting for more than 5 minutes or for 2 or more seizures within a 15-minute. (Can give up to 2 doses)  - Please ensure seizure precautions  - We will schedule follow up with Epilepsy     Epilepsy team singing off.     Please page with any further questions or concerns.   Epilepsy team pager: [phone]     Edgard Simmons MD  Neurology Resident PGY3

## 2024-04-12 NOTE — NURSING NOTE
Pt. Discharge to home with transport. Midline and heplock removed, tip intact. Pt discharge to home. Mother Vee called @ [phone] with update of last medication and discharge form in pt. Belonging bag. Pt. Left floor accompanied with pt. Transport. Naye Jackson RN

## 2024-04-12 NOTE — DISCHARGE SUMMARY
Discharge Diagnosis  Urinary tract infection associated with indwelling urethral catheter, initial encounter (CMS-HCC)    Issues Requiring Follow-Up  Urology follow up for consideration for supra pubic catheter  Neurology follow up for Seizures.     Test Results Pending At Discharge  Pending Labs       No current pending labs.            Hospital Course   Mr. Edgar is a 43 year old  old male with a PMH of DVT, DM, multiple sclerosis, seizure disorder, neurogenic bladder with chronic little catheter, MS, neurogenic bowel c/b ileus (2011), chronic constipation, GERD, hx of MRSA bacteremia, recurrent UTIs,  Sherrill's syndrome, dysphagia and HTN who is presented to ED from home with his mother  after she was notified that urine cultures obtained on 4/3/24 were positive for P.mirabilis and MRSA. Pt originally presented to [facility] on 4/3 due to concern for UTI. Labs obtained on admit notable for leukopenia, elevated CRP, and mild hyponatremia. KUB showed gas-filled distended large and small bowel compatible with ileus. Upon chart review it appears that pt has had ileus since 2011. Abdomen is distended on exam , however is  soft. Pt is admitted to medicine service for further treatment and management of catheter associated UTI.         #Catheter associated UTI  #hx of recurrent UTIs  - Urine culture: proteus and MRSA.   - Prior Urine culture result (4/3/24):20,000-80,000 CFU/mL P.mirabilis sensitive to Ampicillin, Ancef, Gentamicin, Zosyn, and Bactrim and >100,000 MRSA sensitive to Macrobid, Vancomycin, and Bactrim  - Antibiotic Regimen: Vancomycin RPH to dose (4/7-) and Merrem 1g q8h (4/7-). Transition to PO antibiotic at discharge   - pt takes Methenamine Hippurate 1g BID via PEG tube outpatient (med held on admit)  ID consulted, recs appreciated,   Completed vanc, meropenum x total 5 days,   Follow up with urology for considering Supra pubic catheter,      #hyponatremia (mild)  - Na 134 on admit  Improved to 138,        #neurogenic bowel c/b chronic ileus (2011)  #chronic constipation  - per chart review ileus has been present since 2011  - pt was last seen by GI outpatient on 2/23/24  - abdomen soft on exam with no visible signs of discomfort; per mom abdominal distension is how it has always looked in the past  - continue home bowel regimen: Colace 10 ml daily via PEG tube and Magnesium citrate 148 ml every other day via PEG tube     #H/o seizure disorder  - pt follows Dr. Brenna Scott, last visit via telemedicine 1/30/24  - seizure and aspiration precautions  - continue home dose of Keppra 2000 mg (20 mL) BID at 0900/2100, Dilantin suspension 100 mg TID at 0900/1500/2100, and Tegretol 300 mg at 10a/10p and 150 mg at 1600 via PEG tube  - pt takes Nayzilam 5 mg nasal spray PRN seizures (last filled via [facility] 2/5/24); med is non-formulary, mom states that pt normally has PRN Valium ordered for seizures when hospitalized; pt has allergy to Ativan  He had break through seizure on 4/10 night,   Neurology recs appreciated,  Continue home meds, Phenytoin, Keppra and Tegretol home dose.      #Dysphagia s/p PEG:  - home regimen: Peptamen 1.5 1 carton TID (10a/4p/10p) with 150 ml water flushes before and after boluses  - Vital 1.5 (therapeutic interchange) 1 carton (237 ml) TID with 150 ml water flushes before and after boluses     #H/o multiple sclerosis  - continue home dose of Baclofen 30 mg via PEG tube at 10a and 20 mg via PEG at 1600 and 2200    Discharged home in a stable condition. ( Recently completed [facility])  Discharge day management time > 30 minutes.        Pertinent Physical Exam At Time of Discharge  Vitals:    04/12/24 1534   BP: 104/69   Pulse: 84   Resp: 16   Temp: 36.6 °C (97.9 °F)   SpO2: 94%       Physical Exam  Constitutional:       Comments: Non verbal at base line  Doesn't seem to be in any distress    HENT:      Head: Normocephalic.      Nose: Nose normal.   Eyes:      Pupils: Pupils are equal,  round, and reactive to light.   Neck:      Comments: Movements restricted due to immobility at base line   Cardiovascular:      Pulses: Normal pulses.      Heart sounds: Normal heart sounds. No murmur heard.  Pulmonary:      Effort: Pulmonary effort is normal. No respiratory distress.      Breath sounds: Normal breath sounds. No wheezing.   Abdominal:      Palpations: Abdomen is soft.      Comments: Midly distended, no rigidity    Musculoskeletal:         General: Normal range of motion.   Skin:     General: Skin is warm.   Neurological:      Comments: Patient has advanced MS  Bed bound and non verbal,   Cannot follow instructions,          Home Medications     Medication List      CHANGE how you take these medications     * baclofen 20 mg tablet; Commonly known as: Lioresal; 1 tablet (20 mg)   by g-tube route 2 times a day.; What changed: medication strength, how   much to take, how to take this, when to take this, additional instructions   * baclofen 10 mg tablet; Commonly known as: Lioresal; Take 3 tablets (30   mg) by mouth once daily.; Start taking on: April 13, 2024; What changed:   medication strength, how much to take, when to take this   nasal spray midazolam 5 mg/spray (0.1 mL) spray,non-aerosol; Commonly   known as: Versed; Use one spray in one nostril for convulsive seizure   lasting longer than 3 minutes. May repeat a in the other nostril after 10   minutes if convulsive seizures still ongoing.; What changed: additional   instructions  * This list has 2 medication(s) that are the same as other medications   prescribed for you. Read the directions carefully, and ask your doctor or   other care provider to review them with you.     CONTINUE taking these medications     acetaminophen 160 mg/5 mL (5 mL) suspension; Commonly known as: Tylenol   ADVANCED HEALING (PETROLATUM) TOP   ALPRAZolam 0.5 mg tablet; Commonly known as: Xanax   artificial tears (dextran-hypomel-glycerin) 0.1-0.3-0.2 % ophthalmic    solution   aspirin 81 mg chewable tablet; 1 tablet (81 mg) by g-tube route once   daily. at 4 pm   Botox 200 unit injection; Generic drug: onabotulinumtoxinA   cholecalciferol 10 mcg/mL (400 unit/mL) drops; Commonly known as:   Vitamin D-3; 10 mL (4,000 Units) by g-tube route once daily.   diazePAM 5 mg/mL injection; Commonly known as: Valium   Dilantin-125 125 mg/5 mL suspension; Generic drug: phenytoin; Take 4ml   by PEG tube 3 times a day   docusate sodium 50 mg/5 mL oral liquid; Commonly known as: Colace   hydrocortisone 2.5 % ointment   levETIRAcetam 100 mg/mL solution; Commonly known as: Keppra; 20 mL   (2,000 mg) by g-tube route 2 times a day.   magnesium citrate solution   methenamine hippurate 1 gram tablet; Commonly known as: Hiprex; Take 1   tablet (1 g) by mouth 2 times a day.   selenium sulfide 2.5 % shampoo; Commonly known as: Selsun   simethicone 40 mg/0.6 mL drops; Commonly known as: Mylicon   TegretoL 100 mg/5 mL suspension; Generic drug: carBAMazepine; Take 15ml   in AM 7.5mL at 4PM and 15ml in PM   white petrolatum-mineral oiL 94-3 % ophthalmic ointment; Commonly known   as: Tears Naturale PM       Outpatient Follow-Up  Future Appointments   Date Time Provider Department Center   4/17/2024  1:00 PM Suellen Talbert MD [id number] [facility]   4/30/2024  2:10 PM Rosalinda Renee MD [id number] [facility]   7/11/2024  9:30 AM Feliberto Mendosa MD [id number] [facility]       Julien Thomas MD

## 2024-04-12 NOTE — CARE PLAN
CHW spoke to Patient Mother and both [facility] to assist with Hillroom bed for patient. [facility] do not provide this style of bed to patient due to weight/size of the unit. The bed doesn't breakdown for delivery and will not fit inside a standard home doorway. CHW provided feed back to both family and .      Community Resource Name:   Phone Number:   Staff Member:      Discussed the following topics on behalf of the patient:  []  Behavioral Health Assistance     []  Case Management  []   Assistance  []  Digital Equity Assistance  []  Dental Health Assistance  []  Education Assistance  []  Employment Assistance  []  Financial Strain Relief Assistance  []  Food Insecurity Assistance  []  Healthcare Coverage Assistance  []  Housing Stability Assistance  []  IP Violence Relief Assistance  []  Legal Assistance  [x]  Physical Activity Assistance  []  Social Connection Assistance  []  Stress Relief Assistance   []  Substance Abuse Assistance  []  Transportation Assistance  []  Utility Assistance  []  Other: [insert comment here]    Next Steps:         Reva Cohen LCSW

## 2024-04-16 ENCOUNTER — PATIENT OUTREACH (OUTPATIENT)
Dept: PRIMARY CARE | Facility: CLINIC | Age: 44
End: 2024-04-16
Payer: MEDICAID

## 2024-04-16 DIAGNOSIS — T83.511A URINARY TRACT INFECTION ASSOCIATED WITH INDWELLING URETHRAL CATHETER, INITIAL ENCOUNTER (CMS-HCC): ICD-10-CM

## 2024-04-16 DIAGNOSIS — N39.0 URINARY TRACT INFECTION ASSOCIATED WITH INDWELLING URETHRAL CATHETER, INITIAL ENCOUNTER (CMS-HCC): ICD-10-CM

## 2024-04-16 NOTE — PROGRESS NOTES
Discharge Facility: [facility]  Discharge Diagnosis: Urinary tract infection associated with indwelling urethral catheter, initial encounter   Admission Date: 4/9/2024  Discharge Date: 4/12/2024    PCP Appointment Date: 4/17/2024 13:00  Specialist Appointment Date: 4/23/2024 11:30 Dr. Marcel Clark, GI  4/30/2024 14:10 Dr. Rosalinda Renee, Urology   Hospital Encounter and Summary: Linked   See discharge assessment below for further details    Medications  Medications reviewed with patient/caregiver?: No (No new medications ordered. Baclofen adjusted.) (4/16/2024 10:41 AM)  Is the patient having any side effects they believe may be caused by any medication additions or changes?: No (4/16/2024 10:41 AM)  Does the patient have all medications ordered at discharge?: Yes (4/16/2024 10:41 AM)  Care Management Interventions: No intervention needed (4/16/2024 10:41 AM)    Appointments  Does the patient have a primary care provider?: Yes (4/16/2024 10:41 AM)  Care Management Interventions: Verified appointment date/time/provider (Caregiver concerned about weather. May need to cancel if raining hard. Patient is wheelchair bound and family takes a bus to appts.) (4/16/2024 10:41 AM)  Has the patient kept scheduled appointments due by today?: Not applicable (4/16/2024 10:41 AM)    Patient Teaching  Does the patient have access to their discharge instructions?: Yes (Caregiver has access.) (4/16/2024 10:41 AM)  What is the patient's perception of their health status since discharge?: New symptoms unrelated to diagnosis (Caregiver states UTI seems to have cleared. Urine is clear and without strong odor. Very concerned the patient's bowels have not moved since Sunday. Patient given mag citrate X2. Last dose at 0400 today.) (4/16/2024 10:41 AM)  Is the patient/caregiver able to teach back the hierarchy of who to call/visit for symptoms/problems? PCP, Specialist, Home Health nurse, Urgent Care, ED, 911: Yes (4/16/2024 10:41 AM)    Wrap  Up  Wrap Up Additional Comments: 43yoM with PHMx of  primary chronic progressive multiple sclerosis requiring total care at baseline, s/p PEG, paraplegia, neurogenic bladder, seizure disorder, seborrheic dermatitis, chronic little, mrsa colonization admitted with a CAUTI. ID consulted for antibiotic recommendations. Patient had a breakthrough seizure while hospitalized and neuro consulted. Patient discharged to home with family support. Baclofen dosage adjusted. Urology follow up recommended for consideration for supra pubic catheter. Neurology follow up recommended for seizures. (4/16/2024 10:41 AM)

## 2024-04-17 ENCOUNTER — OFFICE VISIT (OUTPATIENT)
Dept: PRIMARY CARE | Facility: CLINIC | Age: 44
End: 2024-04-17
Payer: MEDICAID

## 2024-04-17 VITALS — SYSTOLIC BLOOD PRESSURE: 100 MMHG | DIASTOLIC BLOOD PRESSURE: 68 MMHG | HEART RATE: 94 BPM | OXYGEN SATURATION: 99 %

## 2024-04-17 DIAGNOSIS — L21.9 CHRONIC SEBORRHEIC DERMATITIS: Primary | ICD-10-CM

## 2024-04-17 DIAGNOSIS — Z00.00 HEALTH CARE MAINTENANCE: ICD-10-CM

## 2024-04-17 PROCEDURE — 3008F BODY MASS INDEX DOCD: CPT | Performed by: STUDENT IN AN ORGANIZED HEALTH CARE EDUCATION/TRAINING PROGRAM

## 2024-04-17 PROCEDURE — 99396 PREV VISIT EST AGE 40-64: CPT | Performed by: STUDENT IN AN ORGANIZED HEALTH CARE EDUCATION/TRAINING PROGRAM

## 2024-04-17 RX ORDER — HYDROCORTISONE 25 MG/G
OINTMENT TOPICAL 2 TIMES DAILY PRN
Qty: 28.35 G | Refills: 11 | Status: SHIPPED | OUTPATIENT
Start: 2024-04-17 | End: 2024-04-23 | Stop reason: SDUPTHER

## 2024-04-17 NOTE — PROGRESS NOTES
Subjective   Patient ID: Israel Edgar is a 43 y.o. male who presents for Annual Exam.  MILLIE Leblanc is here for a hospital follow-up and physical exam.      **PLEASE NOT THE UPDATED MEDICATION LIST PER MOM HAS BEEN PRINTED OUT SEPARATELY. PLEASE VERIFY WITH MOTHER THE FINAL MEDICATION LIST AS THIS  CAN BE SUBJECTED TO CHANGES BASED ON PATIENT'S CONDITION AND VISITs WITH VARIOUS SPECIALIST**  Past Medical History:   Diagnosis Date    Abnormal findings on diagnostic imaging of other specified body structures 10/11/2017    Nonspecific abnormal findings on radiological and examination of intrathoracic organs    Acute embolism and thrombosis of deep veins of unspecified upper extremity (Multi)     Acute deep vein thrombosis of arm    Acute upper respiratory infection, unspecified 12/05/2014    Acute upper respiratory infection    Impacted cerumen, bilateral 09/01/2017    Impacted cerumen of both ears    Nausea with vomiting, unspecified 09/09/2016    Nausea and vomiting in adult    Other conditions influencing health status     Acute Hypoxic Encephalopathy    Other conditions influencing health status     Schizophrenia    Other conditions influencing health status     Lumbar Puncture Traumatic Tap    Other muscle spasm     Muscle spasm    Paraplegia, unspecified (Multi)     Paraparesis of both lower limbs    Personal history of other diseases of urinary system 03/21/2017    History of bladder stone    Personal history of other specified conditions 01/13/2017    History of urinary incontinence    Personal history of other specified conditions     History of headache    Personal history of urinary (tract) infections 01/31/2017    History of urinary tract infection    Personal history of urinary calculi 09/08/2015    History of renal calculi    Personal history of urinary calculi 03/07/2016    History of renal calculi    Unspecified visual loss     Vision problems      Past Surgical History:   Procedure Laterality Date     ILEOSTOMY  08/05/2015    Ileostomy    OTHER SURGICAL HISTORY  08/05/2015    Tracheostomy    OTHER SURGICAL HISTORY  09/16/2016    Feeding Tube      No family history on file.   Allergies   Allergen Reactions    Keflex [Cephalexin] Seizure     Diarrhea; seizure activity, rash, and redness    Levaquin [Levofloxacin] Seizure    Lorazepam Seizure     Increased agitation and complex seizures (tonic clonic)    Piperacillin-Tazobactam Other     erythema multiforme    Adhesive Tape-Silicones Other and Unknown     Adhesive Bandages MISC    Per NH record    Albuterol Unknown    Lacosamide Other     Mother states lethargy, and non responsiveness to stimuli; will not allow this medication    Latex Other     Increased urinary infections    Pantoprazole Diarrhea     Diarrhea, hypokalemia, hypomagnesium    Suppository Adult [Glycerin (Adult)] Other     Mom doesnot want; says previous GI said no suppository     Budesonide Rash     pulmicort nebulizer; Mother states facial rash and tachycardia    Famotidine Diarrhea     Diarrhea    Ipratropium Bromide Rash     Mother states facial rash and tachycardia    Nitrofurantoin Rash     Facial rash and reddness    Ranitidine Rash     Facial rash and redness    Sulfa (Sulfonamide Antibiotics) Hives and Rash    Sulfamethoxazole-Trimethoprim Hives and Rash          Occupation:     Review of Systems   Unable to perform ROS: Patient nonverbal       Objective   Visit Vitals  /68   Pulse 94   SpO2 99%      Physical Exam  HENT:      Head: Normocephalic.      Nose: Nose normal.      Mouth/Throat:      Mouth: Mucous membranes are moist.   Eyes:      Conjunctiva/sclera: Conjunctivae normal.   Cardiovascular:      Rate and Rhythm: Normal rate and regular rhythm.      Heart sounds: Normal heart sounds. No murmur heard.  Pulmonary:      Effort: Pulmonary effort is normal. No respiratory distress.      Breath sounds: Normal breath sounds.   Skin:     General: Skin is warm.   Neurological:      Mental  Status: He is alert.     PEG tube +,  Boss catheter +  Ms. Edgar did bring me photographs of Benji's back, foot.  That shows scars from previous ulcers but showed no definite skin break or signs of inflammation.  Assessment/Plan   Diagnoses and all orders for this visit:  Chronic seborrheic dermatitis  -     hydrocortisone 2.5 % ointment; Apply topically 2 times a day as needed for rash. Apply to scalp and / or face area for seborrheic dermatitis (scalp flaking and reddended facial areas) 2 times a day, As Needed  Health care maintenance  Today's vitals   100/68-blood pressure  92-heart rate  99-saturation on room air      173lbs  last weight per mom      Benji's mother reports that patient is going to go to a nursing home because his mother has to undergo knee surgery and possibly will have to go to rehab after that.    The following is Benji's medication regimen as reported by mom       9AM    10ml of colace  20ml levatiracetam   10 ml vit D   4ml of dilantin   20ml  tylenol( sugar free; aspirin free)  Preservative free eye drops    10AM  30mg baclofen  15ml of tegretol   1 carton of peptomin 1.5    3PM  4ml of dilantin   20 tylenol   Refresh omega- 3 2 drops each eye      4PM  20mg  baclofen  Aspirin 81 chewable  1 carton of peptomen 1.5 by gravity   7.5ml of tegretol      9PM  20ml  keppra  4ml dilantin   20ml tylenol     10PM  20mg  baclofen   15ml tegretol  1 carton of peptomin   Sustane eye ointment       Hydrocortisone 2.5% seborrhic dermatitis; as needed twice daily   Nasolam PRN  5mg/0.1 1 spray per nostril for gtcs lasting for more than 5min and  5mg more minn to get to next nostril         Aquaphor on his buttocks after each bowel movemen and twice daily   Mag citrate -lemon ( not cherry) 296ml/every other day      PRN:  Diazepam 5mg/ml inj  2 ml Simethicone 40mg / 6ml--- 2ml  Trazodone  Alprazolam 0.5mg  for agitaiton  Aquaphor daily       Interdry cloth for the hands( to wrap around the  towel)    Patient's mother did advised to avoid the following  NO rectal suppositires   Dont mix diazepam with nasolam   NO ativan   No latex cather ( only silicone catheters(  TEGRETOL NOT BE MIXED       She did report the following intolerances/allergies    Ativan - intolearnce - exacerbates seizures  Keglex- intolerance - redness of skin , seizures  Latex   Pepcid- intolerance- inc diarrjhea  Vimpat - intelea- inc lethargy; non repsomsive to tactile or verbal stimuli   VANCOMYCIN- INTEOLRANCE( CAN GIVE IF GIVEN VERY SLOWLY)    SULFA- ALLERGY- RASH AND HIVES  BACTRIM- -Allergy   ATROVENT- INC hr AND FACIAL HIVES  PULMOCORT-  INC hr AND FACIAL RASH       ADHESIVES/ BANDAID/ STB LOCKS; EKG LEADS- NOT FOR LONG PERIOD OF TIME   ZOSYN - ERYTHEMA MULTIFORME    niTROFURANTOIN- ALLERGY FACIAL RASH    RANITIDINE- RASH AND REDNESS  PROTONIX - DIARRHOEA, HYPOKALEMIA, HYPOMAG            KENNY CATH ACTHERT 16f  10CC; CHANGES EVERY 15 DAYS   PEG TUBE- care     COLOPLAST FOR BACKSORE  LEAVE AND LIFE lALSTER   HEEL CARE.  FEET CARE           Kenny  Peg tube   ORAL CARE         She reports patient does have physicians and nurse practitioners working at NH to take care of his emergency issues and medication refills.  He will be seeing neurology, gastroenterology, urology [for discussion on suprapubic catheterization], infectious diseases [for his recurrent UTIs]

## 2024-04-22 DIAGNOSIS — G40.909 NONINTRACTABLE EPILEPSY WITHOUT STATUS EPILEPTICUS, UNSPECIFIED EPILEPSY TYPE (MULTI): ICD-10-CM

## 2024-04-22 DIAGNOSIS — L21.9 CHRONIC SEBORRHEIC DERMATITIS: ICD-10-CM

## 2024-04-23 RX ORDER — HYDROCORTISONE 25 MG/G
OINTMENT TOPICAL 2 TIMES DAILY PRN
Qty: 28.35 G | Refills: 11 | Status: SHIPPED | OUTPATIENT
Start: 2024-04-23 | End: 2024-04-24 | Stop reason: SDUPTHER

## 2024-04-23 RX ORDER — BACLOFEN 20 MG/1
20 TABLET ORAL 2 TIMES DAILY
Qty: 60 TABLET | Refills: 0 | Status: SHIPPED | OUTPATIENT
Start: 2024-04-23 | End: 2024-04-26

## 2024-04-23 RX ORDER — BACLOFEN 10 MG/1
TABLET ORAL
Qty: 90 TABLET | Refills: 2 | Status: SHIPPED | OUTPATIENT
Start: 2024-04-23

## 2024-04-24 DIAGNOSIS — L21.9 CHRONIC SEBORRHEIC DERMATITIS: ICD-10-CM

## 2024-04-24 RX ORDER — HYDROCORTISONE 25 MG/G
OINTMENT TOPICAL 2 TIMES DAILY PRN
Qty: 453.6 G | Refills: 11 | Status: SHIPPED | OUTPATIENT
Start: 2024-04-24

## 2024-04-25 ENCOUNTER — APPOINTMENT (OUTPATIENT)
Dept: NEUROLOGY | Facility: CLINIC | Age: 44
End: 2024-04-25
Payer: MEDICAID

## 2024-04-26 DIAGNOSIS — G40.909 NONINTRACTABLE EPILEPSY WITHOUT STATUS EPILEPTICUS, UNSPECIFIED EPILEPSY TYPE (MULTI): ICD-10-CM

## 2024-04-26 RX ORDER — BACLOFEN 20 MG/1
TABLET ORAL
Qty: 60 TABLET | Refills: 0 | Status: SHIPPED | OUTPATIENT
Start: 2024-04-26 | End: 2024-05-29 | Stop reason: SDUPTHER

## 2024-04-30 ENCOUNTER — APPOINTMENT (OUTPATIENT)
Dept: UROLOGY | Facility: CLINIC | Age: 44
End: 2024-04-30
Payer: MEDICAID

## 2024-05-03 ENCOUNTER — PATIENT OUTREACH (OUTPATIENT)
Dept: PRIMARY CARE | Facility: CLINIC | Age: 44
End: 2024-05-03
Payer: MEDICAID

## 2024-05-03 ENCOUNTER — DOCUMENTATION (OUTPATIENT)
Dept: PRIMARY CARE | Facility: CLINIC | Age: 44
End: 2024-05-03
Payer: MEDICAID

## 2024-05-17 ENCOUNTER — DOCUMENTATION (OUTPATIENT)
Dept: PRIMARY CARE | Facility: CLINIC | Age: 44
End: 2024-05-17
Payer: MEDICAID

## 2024-05-17 NOTE — PROGRESS NOTES
Request for paperwork from NH     Did try to give them a call to verify meds and send in the correct regimen as discussed with Ms Vee Edgar. Left a  for call back

## 2024-05-29 ENCOUNTER — DOCUMENTATION (OUTPATIENT)
Dept: PRIMARY CARE | Facility: CLINIC | Age: 44
End: 2024-05-29
Payer: MEDICAID

## 2024-05-29 DIAGNOSIS — L21.9 CHRONIC SEBORRHEIC DERMATITIS: Primary | ICD-10-CM

## 2024-05-29 DIAGNOSIS — G40.909 NONINTRACTABLE EPILEPSY WITHOUT STATUS EPILEPTICUS, UNSPECIFIED EPILEPSY TYPE (MULTI): ICD-10-CM

## 2024-05-29 RX ORDER — BACITRACIN ZINC AND POLYMYXIN B SULFATE 500; 10000 [USP'U]/G; [USP'U]/G
OINTMENT OPHTHALMIC 3 TIMES DAILY
Qty: 3.5 G | Refills: 11 | Status: SHIPPED | OUTPATIENT
Start: 2024-05-29 | End: 2024-06-28

## 2024-05-29 RX ORDER — BACLOFEN 20 MG/1
20 TABLET ORAL 3 TIMES DAILY
Qty: 270 TABLET | Refills: 1 | Status: SHIPPED | OUTPATIENT
Start: 2024-05-29 | End: 2024-11-25

## 2024-05-29 NOTE — PROGRESS NOTES
Spoke to Ms Connie Denton at [facility]. Was advised to reach out to Mr. Sudeep Benitez [phone]; [phone]. I left a  message for call back.     I called back Ms Edgar to let her know about this .SHE reports she will get a new assessment done and send us a message when we are expecting updated paperwork

## 2024-06-03 NOTE — PROGRESS NOTES
Received a call from mauricio valdez about paperwork   Called back to verify the medication list  Says she is busy talking to someone else and wants me to call back again     Called her again to verify meds list ;  Some changes made. I did discusss that it can printed and sent back , but she reports she does not want to hold up the paperwork any longer and wants us to fax the paperwork. She said she will call the NH again to verify the medication list and that its OK to send the medication list

## 2024-06-12 DIAGNOSIS — G40.919 REFRACTORY EPILEPSY (MULTI): ICD-10-CM

## 2024-06-12 DIAGNOSIS — G40.909 NONINTRACTABLE EPILEPSY WITHOUT STATUS EPILEPTICUS, UNSPECIFIED EPILEPSY TYPE (MULTI): ICD-10-CM

## 2024-06-12 DIAGNOSIS — G40.919 INTRACTABLE EPILEPSY WITHOUT STATUS EPILEPTICUS, UNSPECIFIED EPILEPSY TYPE (MULTI): ICD-10-CM

## 2024-06-12 RX ORDER — BACLOFEN 10 MG/1
TABLET ORAL
Qty: 90 TABLET | Refills: 11 | Status: SHIPPED | OUTPATIENT
Start: 2024-06-12

## 2024-06-12 RX ORDER — DOCUSATE SODIUM 50 MG/5ML
100 LIQUID ORAL 2 TIMES DAILY
Qty: 100 ML | Refills: 0 | Status: SHIPPED | OUTPATIENT
Start: 2024-06-12

## 2024-06-12 RX ORDER — BACLOFEN 20 MG/1
20 TABLET ORAL 3 TIMES DAILY
Qty: 270 TABLET | Refills: 3 | Status: SHIPPED | OUTPATIENT
Start: 2024-06-12 | End: 2025-06-07

## 2024-06-12 RX ORDER — SENNOSIDES 8.8 MG/5ML
LIQUID ORAL
COMMUNITY

## 2024-06-12 RX ORDER — BACLOFEN 10 MG/1
TABLET ORAL
Qty: 90 TABLET | Refills: 11 | Status: SHIPPED | OUTPATIENT
Start: 2024-06-12 | End: 2024-06-12 | Stop reason: DRUGHIGH

## 2024-06-12 RX ORDER — LEVETIRACETAM 100 MG/ML
2000 SOLUTION ORAL 2 TIMES DAILY
Qty: 1200 ML | Refills: 2 | Status: SHIPPED | OUTPATIENT
Start: 2024-06-12 | End: 2024-09-10

## 2024-06-14 ENCOUNTER — PATIENT OUTREACH (OUTPATIENT)
Dept: PRIMARY CARE | Facility: CLINIC | Age: 44
End: 2024-06-14
Payer: MEDICAID

## 2024-06-14 ENCOUNTER — TELEPHONE (OUTPATIENT)
Dept: PRIMARY CARE | Facility: CLINIC | Age: 44
End: 2024-06-14
Payer: MEDICAID

## 2024-06-14 NOTE — PROGRESS NOTES
09:26 - Unable to reach patient for monthly post discharge follow up. LVM with call back number for patient to call if needed.     LATE ENTRY   6/14/202415:48 - Returned caregiver message. Per caregiver, the patient's condition has returned to baseline. Caregiver needs to have surgery and the patient will need to go for a respite stay. Plan is for patient to go to [facility]. Caregiver does not think all of the appropriate paperwork has been sent to the facility to accept the patient. Message sent to Juju Gonzales RN Care Manager at PCP office requesting assistance.

## 2024-06-17 ENCOUNTER — TELEPHONE (OUTPATIENT)
Dept: PRIMARY CARE | Facility: CLINIC | Age: 44
End: 2024-06-17
Payer: MEDICAID

## 2024-06-17 DIAGNOSIS — G40.919 REFRACTORY EPILEPSY (MULTI): ICD-10-CM

## 2024-06-17 RX ORDER — CARBAMAZEPINE 100 MG/5ML
SUSPENSION ORAL
Qty: 3400 ML | Refills: 2 | Status: SHIPPED | OUTPATIENT
Start: 2024-06-17

## 2024-06-18 ENCOUNTER — TELEPHONE (OUTPATIENT)
Dept: PRIMARY CARE | Facility: CLINIC | Age: 44
End: 2024-06-18
Payer: MEDICAID

## 2024-06-21 ENCOUNTER — TELEPHONE (OUTPATIENT)
Dept: PRIMARY CARE | Facility: CLINIC | Age: 44
End: 2024-06-21

## 2024-07-02 DIAGNOSIS — G40.919 REFRACTORY EPILEPSY (MULTI): ICD-10-CM

## 2024-07-02 DIAGNOSIS — G35 MULTIPLE SCLEROSIS (MULTI): ICD-10-CM

## 2024-07-02 DIAGNOSIS — G40.909 NONINTRACTABLE EPILEPSY WITHOUT STATUS EPILEPTICUS, UNSPECIFIED EPILEPSY TYPE (MULTI): ICD-10-CM

## 2024-07-02 DIAGNOSIS — L21.9 CHRONIC SEBORRHEIC DERMATITIS: ICD-10-CM

## 2024-07-02 RX ORDER — DOCUSATE SODIUM 50 MG/5ML
100 LIQUID ORAL 2 TIMES DAILY
Qty: 100 ML | Refills: 0 | Status: SHIPPED | OUTPATIENT
Start: 2024-07-02

## 2024-07-02 RX ORDER — BACLOFEN 10 MG/1
TABLET ORAL
Qty: 90 TABLET | Refills: 11 | Status: SHIPPED | OUTPATIENT
Start: 2024-07-02

## 2024-07-02 RX ORDER — CHOLECALCIFEROL (VITAMIN D3) 10(400)/ML
4000 DROPS ORAL DAILY
Qty: 300 ML | Refills: 3 | Status: SHIPPED | OUTPATIENT
Start: 2024-07-02

## 2024-07-02 RX ORDER — ACETAMINOPHEN 160 MG/5ML
640 SUSPENSION ORAL EVERY 6 HOURS
Qty: 118 ML | Refills: 0 | Status: SHIPPED | OUTPATIENT
Start: 2024-07-02

## 2024-07-02 RX ORDER — ELECTROLYTES/DEXTROSE
SOLUTION, ORAL ORAL
Qty: 1000 ML | Refills: 11 | Status: SHIPPED | OUTPATIENT
Start: 2024-07-02

## 2024-07-02 RX ORDER — ACETAMINOPHEN 160 MG/5ML
640 SUSPENSION ORAL EVERY 6 HOURS
Qty: 118 ML | Refills: 0 | Status: SHIPPED | OUTPATIENT
Start: 2024-07-02 | End: 2024-07-02 | Stop reason: DRUGHIGH

## 2024-07-02 RX ORDER — BACLOFEN 20 MG/1
20 TABLET ORAL 3 TIMES DAILY
Qty: 270 TABLET | Refills: 3 | Status: SHIPPED | OUTPATIENT
Start: 2024-07-02 | End: 2025-06-27

## 2024-07-02 RX ORDER — BACITRACIN ZINC AND POLYMYXIN B SULFATE 500; 10000 [USP'U]/G; [USP'U]/G
OINTMENT OPHTHALMIC 3 TIMES DAILY PRN
Qty: 3.5 G | Refills: 11 | Status: SHIPPED | OUTPATIENT
Start: 2024-07-02 | End: 2024-08-01

## 2024-07-02 RX ORDER — CARBAMAZEPINE 100 MG/5ML
SUSPENSION ORAL
Qty: 3400 ML | Refills: 2 | Status: SHIPPED | OUTPATIENT
Start: 2024-07-02 | End: 2024-07-02 | Stop reason: DRUGHIGH

## 2024-07-02 RX ORDER — SENNOSIDES 8.8 MG/5ML
LIQUID ORAL
Qty: 240 ML | Refills: 0 | Status: SHIPPED | OUTPATIENT
Start: 2024-07-02

## 2024-07-02 RX ORDER — CARBAMAZEPINE 100 MG/5ML
SUSPENSION ORAL
Qty: 3400 ML | Refills: 2 | Status: SHIPPED | OUTPATIENT
Start: 2024-07-02

## 2024-07-02 RX ORDER — ARTIFICIAL TEARS 1; 2; 3 MG/ML; MG/ML; MG/ML
1-2 SOLUTION/ DROPS OPHTHALMIC 2 TIMES DAILY
Qty: 12 BOTTLE | Refills: 3 | Status: SHIPPED | OUTPATIENT
Start: 2024-07-02

## 2024-07-02 RX ORDER — BACLOFEN 10 MG/1
TABLET ORAL
Qty: 90 TABLET | Refills: 11 | Status: SHIPPED | OUTPATIENT
Start: 2024-07-02 | End: 2024-07-02 | Stop reason: DRUGHIGH

## 2024-07-02 RX ORDER — PHENYTOIN 125 MG/5ML
SUSPENSION ORAL
Qty: 360 ML | Refills: 11 | Status: SHIPPED | OUTPATIENT
Start: 2024-07-02

## 2024-07-02 NOTE — PROGRESS NOTES
Mom returns to office today presenting at  for medication list;   Has provided us with corrections.   Changed per paperwork given by mom, reprinted and given back     Confirmed it was the right list

## 2024-07-11 ENCOUNTER — APPOINTMENT (OUTPATIENT)
Dept: NEUROLOGY | Facility: CLINIC | Age: 44
End: 2024-07-11
Payer: MEDICAID

## 2024-07-24 DIAGNOSIS — G40.909 NONINTRACTABLE EPILEPSY WITHOUT STATUS EPILEPTICUS, UNSPECIFIED EPILEPSY TYPE (MULTI): ICD-10-CM

## 2024-07-25 RX ORDER — PHENYTOIN 125 MG/5ML
SUSPENSION ORAL
Qty: 360 ML | Refills: 11 | Status: SHIPPED | OUTPATIENT
Start: 2024-07-25

## 2024-08-10 ENCOUNTER — APPOINTMENT (OUTPATIENT)
Dept: RADIOLOGY | Facility: HOSPITAL | Age: 44
End: 2024-08-10
Payer: MEDICAID

## 2024-08-10 ENCOUNTER — HOSPITAL ENCOUNTER (INPATIENT)
Facility: HOSPITAL | Age: 44
End: 2024-08-10
Attending: STUDENT IN AN ORGANIZED HEALTH CARE EDUCATION/TRAINING PROGRAM | Admitting: INTERNAL MEDICINE
Payer: MEDICAID

## 2024-08-10 ENCOUNTER — APPOINTMENT (OUTPATIENT)
Dept: NEUROLOGY | Facility: HOSPITAL | Age: 44
End: 2024-08-10
Payer: MEDICAID

## 2024-08-10 ENCOUNTER — APPOINTMENT (OUTPATIENT)
Dept: CARDIOLOGY | Facility: HOSPITAL | Age: 44
End: 2024-08-10
Payer: MEDICAID

## 2024-08-10 DIAGNOSIS — R60.0 LOCALIZED EDEMA: ICD-10-CM

## 2024-08-10 DIAGNOSIS — G40.909 NONINTRACTABLE EPILEPSY WITHOUT STATUS EPILEPTICUS, UNSPECIFIED EPILEPSY TYPE (MULTI): ICD-10-CM

## 2024-08-10 DIAGNOSIS — Z93.1 GASTROSTOMY STATUS (MULTI): ICD-10-CM

## 2024-08-10 DIAGNOSIS — T83.511A URINARY TRACT INFECTION ASSOCIATED WITH INDWELLING URETHRAL CATHETER, INITIAL ENCOUNTER (CMS-HCC): ICD-10-CM

## 2024-08-10 DIAGNOSIS — L21.9 SEBORRHEIC DERMATITIS: ICD-10-CM

## 2024-08-10 DIAGNOSIS — R57.9 SHOCK (MULTI): Primary | ICD-10-CM

## 2024-08-10 DIAGNOSIS — N39.0 URINARY TRACT INFECTION ASSOCIATED WITH INDWELLING URETHRAL CATHETER, INITIAL ENCOUNTER (CMS-HCC): ICD-10-CM

## 2024-08-10 DIAGNOSIS — R15.9 INCONTINENCE OF FECES, UNSPECIFIED FECAL INCONTINENCE TYPE: ICD-10-CM

## 2024-08-10 DIAGNOSIS — R13.10 DYSPHAGIA, UNSPECIFIED TYPE: ICD-10-CM

## 2024-08-10 DIAGNOSIS — G40.919 REFRACTORY EPILEPSY (MULTI): ICD-10-CM

## 2024-08-10 DIAGNOSIS — G82.50 QUADRIPLEGIA, UNSPECIFIED (MULTI): ICD-10-CM

## 2024-08-10 DIAGNOSIS — G35 MULTIPLE SCLEROSIS (MULTI): ICD-10-CM

## 2024-08-10 DIAGNOSIS — J96.01 ACUTE HYPOXIC RESPIRATORY FAILURE (MULTI): ICD-10-CM

## 2024-08-10 DIAGNOSIS — Z97.8 CHRONIC INDWELLING FOLEY CATHETER: ICD-10-CM

## 2024-08-10 DIAGNOSIS — L89.102 PRESSURE INJURY OF BACK, STAGE 2 (MULTI): ICD-10-CM

## 2024-08-10 DIAGNOSIS — N31.9 NEUROGENIC BLADDER: ICD-10-CM

## 2024-08-10 LAB
ALBUMIN SERPL BCP-MCNC: 3.1 G/DL (ref 3.4–5)
ALP SERPL-CCNC: 82 U/L (ref 33–120)
ALT SERPL W P-5'-P-CCNC: 25 U/L (ref 10–52)
ANION GAP BLDV CALCULATED.4IONS-SCNC: 10 MMOL/L (ref 10–25)
ANION GAP SERPL CALC-SCNC: 15 MMOL/L (ref 10–20)
APPEARANCE UR: ABNORMAL
AST SERPL W P-5'-P-CCNC: 27 U/L (ref 9–39)
BACTERIA SPEC RESP CULT: ABNORMAL
BASE EXCESS BLDV CALC-SCNC: 5.6 MMOL/L (ref -2–3)
BASOPHILS # BLD AUTO: 0.05 X10*3/UL (ref 0–0.1)
BASOPHILS NFR BLD AUTO: 0.4 %
BILIRUB SERPL-MCNC: 0.7 MG/DL (ref 0–1.2)
BILIRUB UR STRIP.AUTO-MCNC: NEGATIVE MG/DL
BODY TEMPERATURE: 37 DEGREES CELSIUS
BUN SERPL-MCNC: 19 MG/DL (ref 6–23)
CA-I BLDV-SCNC: 1.16 MMOL/L (ref 1.1–1.33)
CALCIUM SERPL-MCNC: 9 MG/DL (ref 8.6–10.6)
CHLORIDE BLDV-SCNC: 103 MMOL/L (ref 98–107)
CHLORIDE SERPL-SCNC: 100 MMOL/L (ref 98–107)
CO2 SERPL-SCNC: 29 MMOL/L (ref 21–32)
COLOR UR: YELLOW
CREAT SERPL-MCNC: 0.27 MG/DL (ref 0.5–1.3)
EGFRCR SERPLBLD CKD-EPI 2021: >90 ML/MIN/1.73M*2
EOSINOPHIL # BLD AUTO: 0.01 X10*3/UL (ref 0–0.7)
EOSINOPHIL NFR BLD AUTO: 0.1 %
ERYTHROCYTE [DISTWIDTH] IN BLOOD BY AUTOMATED COUNT: 12.4 % (ref 11.5–14.5)
GLUCOSE BLD MANUAL STRIP-MCNC: 105 MG/DL (ref 74–99)
GLUCOSE BLD MANUAL STRIP-MCNC: 114 MG/DL (ref 74–99)
GLUCOSE BLDV-MCNC: 114 MG/DL (ref 74–99)
GLUCOSE SERPL-MCNC: 101 MG/DL (ref 74–99)
GLUCOSE UR STRIP.AUTO-MCNC: NORMAL MG/DL
GRAM STN SPEC: ABNORMAL
HCO3 BLDV-SCNC: 30.6 MMOL/L (ref 22–26)
HCT VFR BLD AUTO: 26 % (ref 41–52)
HCT VFR BLD EST: 36 % (ref 41–52)
HGB BLD-MCNC: 9.1 G/DL (ref 13.5–17.5)
HGB BLDV-MCNC: 12.1 G/DL (ref 13.5–17.5)
IMM GRANULOCYTES # BLD AUTO: 0.04 X10*3/UL (ref 0–0.7)
IMM GRANULOCYTES NFR BLD AUTO: 0.3 % (ref 0–0.9)
KETONES UR STRIP.AUTO-MCNC: NEGATIVE MG/DL
LACTATE BLDV-SCNC: 1.5 MMOL/L (ref 0.4–2)
LEUKOCYTE ESTERASE UR QL STRIP.AUTO: NEGATIVE
LEVETIRACETAM SERPL-MCNC: 9 UG/ML (ref 10–40)
LYMPHOCYTES # BLD AUTO: 0.83 X10*3/UL (ref 1.2–4.8)
LYMPHOCYTES NFR BLD AUTO: 6.6 %
MAGNESIUM SERPL-MCNC: 1.97 MG/DL (ref 1.6–2.4)
MCH RBC QN AUTO: 31.1 PG (ref 26–34)
MCHC RBC AUTO-ENTMCNC: 35 G/DL (ref 32–36)
MCV RBC AUTO: 89 FL (ref 80–100)
MONOCYTES # BLD AUTO: 0.97 X10*3/UL (ref 0.1–1)
MONOCYTES NFR BLD AUTO: 7.7 %
MRSA DNA SPEC QL NAA+PROBE: NOT DETECTED
MUCOUS THREADS #/AREA URNS AUTO: NORMAL /LPF
NEUTROPHILS # BLD AUTO: 10.62 X10*3/UL (ref 1.2–7.7)
NEUTROPHILS NFR BLD AUTO: 84.9 %
NITRITE UR QL STRIP.AUTO: NEGATIVE
NRBC BLD-RTO: 0 /100 WBCS (ref 0–0)
OXYHGB MFR BLDV: 47.8 % (ref 45–75)
PCO2 BLDV: 45 MM HG (ref 41–51)
PH BLDV: 7.44 PH (ref 7.33–7.43)
PH UR STRIP.AUTO: 8.5 [PH]
PHENYTOIN SERPL-MCNC: 3.8 UG/ML (ref 10–20)
PLATELET # BLD AUTO: 303 X10*3/UL (ref 150–450)
PO2 BLDV: 35 MM HG (ref 35–45)
POTASSIUM BLDV-SCNC: 4.2 MMOL/L (ref 3.5–5.3)
POTASSIUM SERPL-SCNC: 4.1 MMOL/L (ref 3.5–5.3)
PROCALCITONIN SERPL-MCNC: 0.75 NG/ML
PROT SERPL-MCNC: 7.8 G/DL (ref 6.4–8.2)
PROT UR STRIP.AUTO-MCNC: ABNORMAL MG/DL
RBC # BLD AUTO: 2.93 X10*6/UL (ref 4.5–5.9)
RBC # UR STRIP.AUTO: NEGATIVE /UL
RBC #/AREA URNS AUTO: NORMAL /HPF
SAO2 % BLDV: 49 % (ref 45–75)
SODIUM BLDV-SCNC: 139 MMOL/L (ref 136–145)
SODIUM SERPL-SCNC: 140 MMOL/L (ref 136–145)
SP GR UR STRIP.AUTO: 1.02
UROBILINOGEN UR STRIP.AUTO-MCNC: NORMAL MG/DL
VANCOMYCIN SERPL-MCNC: <2 UG/ML (ref 5–20)
WBC # BLD AUTO: 12.5 X10*3/UL (ref 4.4–11.3)
WBC #/AREA URNS AUTO: NORMAL /HPF

## 2024-08-10 PROCEDURE — 84132 ASSAY OF SERUM POTASSIUM: CPT

## 2024-08-10 PROCEDURE — 84145 PROCALCITONIN (PCT): CPT

## 2024-08-10 PROCEDURE — 2500000001 HC RX 250 WO HCPCS SELF ADMINISTERED DRUGS (ALT 637 FOR MEDICARE OP)

## 2024-08-10 PROCEDURE — 82947 ASSAY GLUCOSE BLOOD QUANT: CPT

## 2024-08-10 PROCEDURE — 85025 COMPLETE CBC W/AUTO DIFF WBC: CPT

## 2024-08-10 PROCEDURE — 99255 IP/OBS CONSLTJ NEW/EST HI 80: CPT | Performed by: INTERNAL MEDICINE

## 2024-08-10 PROCEDURE — 5A09357 ASSISTANCE WITH RESPIRATORY VENTILATION, LESS THAN 24 CONSECUTIVE HOURS, CONTINUOUS POSITIVE AIRWAY PRESSURE: ICD-10-PCS | Performed by: STUDENT IN AN ORGANIZED HEALTH CARE EDUCATION/TRAINING PROGRAM

## 2024-08-10 PROCEDURE — 2500000004 HC RX 250 GENERAL PHARMACY W/ HCPCS (ALT 636 FOR OP/ED): Mod: JZ | Performed by: INTERNAL MEDICINE

## 2024-08-10 PROCEDURE — 2500000005 HC RX 250 GENERAL PHARMACY W/O HCPCS

## 2024-08-10 PROCEDURE — 80177 DRUG SCRN QUAN LEVETIRACETAM: CPT

## 2024-08-10 PROCEDURE — 99291 CRITICAL CARE FIRST HOUR: CPT

## 2024-08-10 PROCEDURE — 80202 ASSAY OF VANCOMYCIN: CPT

## 2024-08-10 PROCEDURE — 2500000004 HC RX 250 GENERAL PHARMACY W/ HCPCS (ALT 636 FOR OP/ED)

## 2024-08-10 PROCEDURE — 99254 IP/OBS CNSLTJ NEW/EST MOD 60: CPT | Performed by: STUDENT IN AN ORGANIZED HEALTH CARE EDUCATION/TRAINING PROGRAM

## 2024-08-10 PROCEDURE — 71045 X-RAY EXAM CHEST 1 VIEW: CPT

## 2024-08-10 PROCEDURE — 80053 COMPREHEN METABOLIC PANEL: CPT

## 2024-08-10 PROCEDURE — 93010 ELECTROCARDIOGRAM REPORT: CPT | Performed by: INTERNAL MEDICINE

## 2024-08-10 PROCEDURE — 84295 ASSAY OF SERUM SODIUM: CPT

## 2024-08-10 PROCEDURE — 31720 CLEARANCE OF AIRWAYS: CPT

## 2024-08-10 PROCEDURE — 2500000005 HC RX 250 GENERAL PHARMACY W/O HCPCS: Performed by: STUDENT IN AN ORGANIZED HEALTH CARE EDUCATION/TRAINING PROGRAM

## 2024-08-10 PROCEDURE — 81001 URINALYSIS AUTO W/SCOPE: CPT

## 2024-08-10 PROCEDURE — 80185 ASSAY OF PHENYTOIN TOTAL: CPT

## 2024-08-10 PROCEDURE — 5A0935A ASSISTANCE WITH RESPIRATORY VENTILATION, LESS THAN 24 CONSECUTIVE HOURS, HIGH NASAL FLOW/VELOCITY: ICD-10-PCS | Performed by: STUDENT IN AN ORGANIZED HEALTH CARE EDUCATION/TRAINING PROGRAM

## 2024-08-10 PROCEDURE — 37799 UNLISTED PX VASCULAR SURGERY: CPT

## 2024-08-10 PROCEDURE — 87205 SMEAR GRAM STAIN: CPT

## 2024-08-10 PROCEDURE — 94667 MNPJ CHEST WALL 1ST: CPT

## 2024-08-10 PROCEDURE — 2020000001 HC ICU ROOM DAILY

## 2024-08-10 PROCEDURE — 93005 ELECTROCARDIOGRAM TRACING: CPT

## 2024-08-10 PROCEDURE — 83735 ASSAY OF MAGNESIUM: CPT

## 2024-08-10 PROCEDURE — 74018 RADEX ABDOMEN 1 VIEW: CPT

## 2024-08-10 PROCEDURE — 87641 MR-STAPH DNA AMP PROBE: CPT

## 2024-08-10 PROCEDURE — 71045 X-RAY EXAM CHEST 1 VIEW: CPT | Performed by: RADIOLOGY

## 2024-08-10 RX ORDER — ACETAMINOPHEN 160 MG/5ML
650 SOLUTION ORAL EVERY 4 HOURS PRN
Status: DISCONTINUED | OUTPATIENT
Start: 2024-08-10 | End: 2024-08-10 | Stop reason: SDUPTHER

## 2024-08-10 RX ORDER — ACETAMINOPHEN 650 MG/1
650 SUPPOSITORY RECTAL EVERY 4 HOURS PRN
Status: DISCONTINUED | OUTPATIENT
Start: 2024-08-10 | End: 2024-08-10 | Stop reason: SDUPTHER

## 2024-08-10 RX ORDER — ENOXAPARIN SODIUM 100 MG/ML
40 INJECTION SUBCUTANEOUS EVERY 24 HOURS
Status: DISCONTINUED | OUTPATIENT
Start: 2024-08-10 | End: 2024-08-10

## 2024-08-10 RX ORDER — ENOXAPARIN SODIUM 100 MG/ML
1.5 INJECTION SUBCUTANEOUS EVERY 24 HOURS
Status: DISPENSED | OUTPATIENT
Start: 2024-08-11

## 2024-08-10 RX ORDER — POLYETHYLENE GLYCOL 3350 17 G/17G
17 POWDER, FOR SOLUTION ORAL DAILY
Status: CANCELLED | OUTPATIENT
Start: 2024-08-10

## 2024-08-10 RX ORDER — CARBAMAZEPINE 100 MG/1
150 TABLET, CHEWABLE ORAL
Status: DISPENSED | OUTPATIENT
Start: 2024-08-10

## 2024-08-10 RX ORDER — BACLOFEN 20 MG/1
20 TABLET ORAL 2 TIMES DAILY
Status: DISPENSED | OUTPATIENT
Start: 2024-08-10

## 2024-08-10 RX ORDER — DOXYLAMINE SUCCINATE 25 MG
TABLET ORAL 2 TIMES DAILY
Status: DISPENSED | OUTPATIENT
Start: 2024-08-10

## 2024-08-10 RX ORDER — ACETAMINOPHEN 325 MG/1
650 TABLET ORAL EVERY 4 HOURS PRN
Status: CANCELLED | OUTPATIENT
Start: 2024-08-10

## 2024-08-10 RX ORDER — MIDAZOLAM HYDROCHLORIDE 1 MG/ML
2 INJECTION INTRAMUSCULAR; INTRAVENOUS ONCE
Status: COMPLETED | OUTPATIENT
Start: 2024-08-10 | End: 2024-08-10

## 2024-08-10 RX ORDER — CARBAMAZEPINE 100 MG/1
300 TABLET, CHEWABLE ORAL 2 TIMES DAILY
Status: DISPENSED | OUTPATIENT
Start: 2024-08-10

## 2024-08-10 RX ORDER — ACETAMINOPHEN 650 MG/1
650 SUPPOSITORY RECTAL EVERY 4 HOURS PRN
Status: CANCELLED | OUTPATIENT
Start: 2024-08-10

## 2024-08-10 RX ORDER — ACETAMINOPHEN 160 MG/5ML
650 SOLUTION ORAL EVERY 6 HOURS PRN
Status: ACTIVE | OUTPATIENT
Start: 2024-08-10

## 2024-08-10 RX ORDER — VANCOMYCIN HYDROCHLORIDE 1 G/20ML
INJECTION, POWDER, LYOPHILIZED, FOR SOLUTION INTRAVENOUS DAILY PRN
Status: DISCONTINUED | OUTPATIENT
Start: 2024-08-10 | End: 2024-08-11

## 2024-08-10 RX ORDER — METOPROLOL TARTRATE 25 MG/1
25 TABLET, FILM COATED ORAL EVERY 6 HOURS
Status: DISCONTINUED | OUTPATIENT
Start: 2024-08-10 | End: 2024-08-11

## 2024-08-10 RX ORDER — ACETAMINOPHEN 160 MG/5ML
650 SOLUTION ORAL EVERY 4 HOURS PRN
Status: CANCELLED | OUTPATIENT
Start: 2024-08-10

## 2024-08-10 RX ORDER — POLYETHYLENE GLYCOL 3350 17 G/17G
17 POWDER, FOR SOLUTION ORAL DAILY
Status: ACTIVE | OUTPATIENT
Start: 2024-08-10

## 2024-08-10 RX ORDER — SODIUM CHLORIDE FOR INHALATION 3 %
4 VIAL, NEBULIZER (ML) INHALATION 2 TIMES DAILY
Status: DISCONTINUED | OUTPATIENT
Start: 2024-08-10 | End: 2024-08-10

## 2024-08-10 RX ORDER — CHOLECALCIFEROL (VITAMIN D3) 25 MCG
4000 TABLET ORAL
Status: DISPENSED | OUTPATIENT
Start: 2024-08-10

## 2024-08-10 RX ORDER — ALBUTEROL SULFATE 0.83 MG/ML
2.5 SOLUTION RESPIRATORY (INHALATION) EVERY 4 HOURS PRN
Status: DISCONTINUED | OUTPATIENT
Start: 2024-08-10 | End: 2024-08-10

## 2024-08-10 RX ORDER — ENOXAPARIN SODIUM 100 MG/ML
60 INJECTION SUBCUTANEOUS ONCE
Status: COMPLETED | OUTPATIENT
Start: 2024-08-10 | End: 2024-08-10

## 2024-08-10 RX ORDER — ACETAMINOPHEN 325 MG/1
650 TABLET ORAL EVERY 4 HOURS PRN
Status: DISCONTINUED | OUTPATIENT
Start: 2024-08-10 | End: 2024-08-10

## 2024-08-10 RX ORDER — FOSPHENYTOIN SODIUM 50 MG/ML
100 INJECTION, SOLUTION INTRAMUSCULAR; INTRAVENOUS EVERY 8 HOURS
Status: DISCONTINUED | OUTPATIENT
Start: 2024-08-10 | End: 2024-08-10

## 2024-08-10 RX ORDER — NAPROXEN SODIUM 220 MG/1
81 TABLET, FILM COATED ORAL DAILY
Status: ACTIVE | OUTPATIENT
Start: 2024-08-10

## 2024-08-10 RX ADMIN — Medication 60 L/MIN: at 21:15

## 2024-08-10 RX ADMIN — ENOXAPARIN SODIUM 60 MG: 60 INJECTION SUBCUTANEOUS at 15:32

## 2024-08-10 RX ADMIN — BACLOFEN 20 MG: 20 TABLET ORAL at 09:45

## 2024-08-10 RX ADMIN — SODIUM CHLORIDE, POTASSIUM CHLORIDE, SODIUM LACTATE AND CALCIUM CHLORIDE 1000 ML: 600; 310; 30; 20 INJECTION, SOLUTION INTRAVENOUS at 14:37

## 2024-08-10 RX ADMIN — VANCOMYCIN HYDROCHLORIDE 1750 MG: 5 INJECTION, POWDER, LYOPHILIZED, FOR SOLUTION INTRAVENOUS at 13:37

## 2024-08-10 RX ADMIN — CEFTAZIDIME, AVIBACTAM 2.5 G: 2; .5 POWDER, FOR SOLUTION INTRAVENOUS at 20:51

## 2024-08-10 RX ADMIN — BACLOFEN 20 MG: 20 TABLET ORAL at 20:38

## 2024-08-10 RX ADMIN — CEFTAZIDIME, AVIBACTAM 2.5 G: 2; .5 POWDER, FOR SOLUTION INTRAVENOUS at 15:29

## 2024-08-10 RX ADMIN — CARBAMAZEPINE 150 MG: 100 TABLET, CHEWABLE ORAL at 16:58

## 2024-08-10 RX ADMIN — SODIUM CHLORIDE, POTASSIUM CHLORIDE, SODIUM LACTATE AND CALCIUM CHLORIDE 1000 ML: 600; 310; 30; 20 INJECTION, SOLUTION INTRAVENOUS at 12:59

## 2024-08-10 RX ADMIN — MICONAZOLE NITRATE: 20 CREAM TOPICAL at 20:39

## 2024-08-10 RX ADMIN — PHENYTOIN SODIUM 100 MG: 50 INJECTION INTRAMUSCULAR; INTRAVENOUS at 12:59

## 2024-08-10 RX ADMIN — MIDAZOLAM HYDROCHLORIDE 2 MG: 1 INJECTION, SOLUTION INTRAMUSCULAR; INTRAVENOUS at 13:17

## 2024-08-10 RX ADMIN — DEXTROSE MONOHYDRATE 2000 MG: 50 INJECTION, SOLUTION INTRAVENOUS at 13:26

## 2024-08-10 RX ADMIN — CARBAMAZEPINE 300 MG: 100 TABLET, CHEWABLE ORAL at 20:38

## 2024-08-10 RX ADMIN — ENOXAPARIN SODIUM 40 MG: 100 INJECTION SUBCUTANEOUS at 12:59

## 2024-08-10 RX ADMIN — MINERAL OIL AND WHITE PETROLATUM: 150; 830 OINTMENT OPHTHALMIC at 22:00

## 2024-08-10 RX ADMIN — PHENYTOIN SODIUM 100 MG: 50 INJECTION INTRAMUSCULAR; INTRAVENOUS at 19:49

## 2024-08-10 SDOH — SOCIAL STABILITY: SOCIAL INSECURITY: HAVE YOU HAD ANY THOUGHTS OF HARMING ANYONE ELSE?: UNABLE TO ASSESS

## 2024-08-10 SDOH — SOCIAL STABILITY: SOCIAL INSECURITY: ARE YOU OR HAVE YOU BEEN THREATENED OR ABUSED PHYSICALLY, EMOTIONALLY, OR SEXUALLY BY ANYONE?: UNABLE TO ASSESS

## 2024-08-10 SDOH — SOCIAL STABILITY: SOCIAL INSECURITY: DOES ANYONE TRY TO KEEP YOU FROM HAVING/CONTACTING OTHER FRIENDS OR DOING THINGS OUTSIDE YOUR HOME?: UNABLE TO ASSESS

## 2024-08-10 SDOH — SOCIAL STABILITY: SOCIAL INSECURITY: DO YOU FEEL UNSAFE GOING BACK TO THE PLACE WHERE YOU ARE LIVING?: UNABLE TO ASSESS

## 2024-08-10 SDOH — SOCIAL STABILITY: SOCIAL INSECURITY: ARE THERE ANY APPARENT SIGNS OF INJURIES/BEHAVIORS THAT COULD BE RELATED TO ABUSE/NEGLECT?: UNABLE TO ASSESS

## 2024-08-10 SDOH — SOCIAL STABILITY: SOCIAL INSECURITY: DO YOU FEEL ANYONE HAS EXPLOITED OR TAKEN ADVANTAGE OF YOU FINANCIALLY OR OF YOUR PERSONAL PROPERTY?: UNABLE TO ASSESS

## 2024-08-10 SDOH — SOCIAL STABILITY: SOCIAL INSECURITY: ABUSE: ADULT

## 2024-08-10 SDOH — SOCIAL STABILITY: SOCIAL INSECURITY: WERE YOU ABLE TO COMPLETE ALL THE BEHAVIORAL HEALTH SCREENINGS?: NO

## 2024-08-10 SDOH — SOCIAL STABILITY: SOCIAL INSECURITY: HAS ANYONE EVER THREATENED TO HURT YOUR FAMILY OR YOUR PETS?: UNABLE TO ASSESS

## 2024-08-10 SDOH — SOCIAL STABILITY: SOCIAL INSECURITY: HAVE YOU HAD THOUGHTS OF HARMING ANYONE ELSE?: UNABLE TO ASSESS

## 2024-08-10 ASSESSMENT — ACTIVITIES OF DAILY LIVING (ADL)
ADEQUATE_TO_COMPLETE_ADL: UNABLE TO ASSESS
DRESSING YOURSELF: UNABLE TO ASSESS
WALKS IN HOME: UNABLE TO ASSESS
GROOMING: UNABLE TO ASSESS
HEARING - RIGHT EAR: UNABLE TO ASSESS
JUDGMENT_ADEQUATE_SAFELY_COMPLETE_DAILY_ACTIVITIES: UNABLE TO ASSESS
LACK_OF_TRANSPORTATION: NO
HEARING - LEFT EAR: UNABLE TO ASSESS
TOILETING: UNABLE TO ASSESS
FEEDING YOURSELF: UNABLE TO ASSESS
BATHING: UNABLE TO ASSESS
PATIENT'S MEMORY ADEQUATE TO SAFELY COMPLETE DAILY ACTIVITIES?: UNABLE TO ASSESS

## 2024-08-10 ASSESSMENT — COGNITIVE AND FUNCTIONAL STATUS - GENERAL
DAILY ACTIVITIY SCORE: 6
MOVING TO AND FROM BED TO CHAIR: TOTAL
DRESSING REGULAR LOWER BODY CLOTHING: TOTAL
PATIENT BASELINE BEDBOUND: YES
TOILETING: TOTAL
CLIMB 3 TO 5 STEPS WITH RAILING: TOTAL
TURNING FROM BACK TO SIDE WHILE IN FLAT BAD: TOTAL
MOBILITY SCORE: 6
PERSONAL GROOMING: TOTAL
MOVING FROM LYING ON BACK TO SITTING ON SIDE OF FLAT BED WITH BEDRAILS: TOTAL
EATING MEALS: TOTAL
HELP NEEDED FOR BATHING: TOTAL
STANDING UP FROM CHAIR USING ARMS: TOTAL
WALKING IN HOSPITAL ROOM: TOTAL
DRESSING REGULAR UPPER BODY CLOTHING: TOTAL

## 2024-08-10 ASSESSMENT — PAIN - FUNCTIONAL ASSESSMENT
PAIN_FUNCTIONAL_ASSESSMENT: CPOT (CRITICAL CARE PAIN OBSERVATION TOOL)

## 2024-08-10 ASSESSMENT — COLUMBIA-SUICIDE SEVERITY RATING SCALE - C-SSRS
2. HAVE YOU ACTUALLY HAD ANY THOUGHTS OF KILLING YOURSELF?: NO
1. IN THE PAST MONTH, HAVE YOU WISHED YOU WERE DEAD OR WISHED YOU COULD GO TO SLEEP AND NOT WAKE UP?: NO
6. HAVE YOU EVER DONE ANYTHING, STARTED TO DO ANYTHING, OR PREPARED TO DO ANYTHING TO END YOUR LIFE?: NO

## 2024-08-10 ASSESSMENT — LIFESTYLE VARIABLES
HOW OFTEN DO YOU HAVE A DRINK CONTAINING ALCOHOL: PATIENT UNABLE TO ANSWER
AUDIT-C TOTAL SCORE: -1
HOW OFTEN DO YOU HAVE 6 OR MORE DRINKS ON ONE OCCASION: PATIENT UNABLE TO ANSWER
SKIP TO QUESTIONS 9-10: 0
HOW MANY STANDARD DRINKS CONTAINING ALCOHOL DO YOU HAVE ON A TYPICAL DAY: PATIENT UNABLE TO ANSWER
AUDIT-C TOTAL SCORE: -1

## 2024-08-10 ASSESSMENT — PATIENT HEALTH QUESTIONNAIRE - PHQ9
1. LITTLE INTEREST OR PLEASURE IN DOING THINGS: NOT AT ALL
SUM OF ALL RESPONSES TO PHQ9 QUESTIONS 1 & 2: 0
2. FEELING DOWN, DEPRESSED OR HOPELESS: NOT AT ALL

## 2024-08-10 NOTE — CONSULTS
Inpatient consult to Neurology  Consult performed by: Sushma Venegas MD  Consult ordered by: Arnol Bullock MD      History Of Present Illness  Israel Edgar is a 43 y.o. male with PMH significant for advanced MS with spasticity at baseline, neurogenic bladder with chronic little, epilepsy with questionable breakthrough seizures, DVT, PE, subtotal colectomy with end ileostomy, MRSA colonization, MRDO pneumonia, HTN, quadriplegia, depression, CVA, dysphagia, previous tracheostomy who is admitted to MICU for acute hypoxic respiratory failure on BiPAP, c/f sepsis not requiring pressors. Neurology Epilepsy consulted for concern for seizures.     Patient is non-verbal.  History provided from mother at bedside.  She reports that there was an episode earlier today with foaming at the mouth and increased heart rate lasting a few minutes. His typical episodes are head deviation to left, left-gaze deviation, and left upper extremity clonic movements.  She reports a GTC in early July.  She reports he has not had any episodes with foaming at the mouth in a long time.  She also reports that his low Keppra and phenytoin levels are likely in the setting of his facility missing a dose of his AEDs prior to his admission. She is unclear of the frequency of his seizures.    Home AEDs:  - Tegretol 300 mg 10 AM, 150 mg 4 pm, and 300 mg at 2200  - Phenytoin 100 mg TID  - Keppra 2000 mg BID    On arrival:  Keppra Level: 9  Phenytoin Level: 3.8    Per chart review:  At [facility], patient was admitted on 7/9/24 from [facility] for breakthrough seizures and c/f ogilvies/small bowel obstruction secondary to chronic neurogenic bowel. Found also to have UTI and pneumonia treated with Meropenem (7/21-7/26). Transferred to SICU on 7/30 following prolonged status epilepticus. Continued concerns for sepsis given fevers, ID recommended 5 days Teflaro and Flagyl through (7/29-8/5). Sputum cultures positive for MDR Pseudomonas.  Transferred to [facility] per family request despite fever and pending Bcx after medical team suggested removing PICC line. Patient was febrile on arrival to [facility] 8/3.     At [facility], patient continued to be febrile and tachycardic. Patient had worsening respiratory failure requiring HFNC. Fevers worsened to Tmax 102 on 8/9. Pt then had a seizure 8/9, given total 5mg Versed to break status epilepticus.  Patient's BP was stable with SBP >120 therefore another 2mg of Versed given. Eventually placed on BiPAP after he became hypoxic. Transferred to the [facility] for a higher level of care and per mother's request.     In the [facility] MICU, patient seen and examined at bedside. Patient is dyspneic and hypoxic on BiPAP, desaturations with SpO2 into the 70s/80s. Tachycardic in the 130s/140s. Placed on HFNC with improvement in SpO2 to low 90s. Patient provided 1L LR bolus. Improvement in tachycardia as well to the 110s. Questionable seizures after having increased contractions and foaming at the mouth.    Previous EEG: 3/16/2023: Left hemisphere continuous slow.  6/26/2021: Status semiology left face clonic status; EEG sinus pattern right frontal.    Past Medical History  Past Medical History:   Diagnosis Date    Abnormal findings on diagnostic imaging of other specified body structures 10/11/2017    Nonspecific abnormal findings on radiological and examination of intrathoracic organs    Acute embolism and thrombosis of deep veins of unspecified upper extremity (Multi)     Acute deep vein thrombosis of arm    Acute upper respiratory infection, unspecified 12/05/2014    Acute upper respiratory infection    Impacted cerumen, bilateral 09/01/2017    Impacted cerumen of both ears    Nausea with vomiting, unspecified 09/09/2016    Nausea and vomiting in adult    Other conditions influencing health status     Acute Hypoxic Encephalopathy    Other conditions influencing health status     Schizophrenia     Other conditions influencing health status     Lumbar Puncture Traumatic Tap    Other muscle spasm     Muscle spasm    Paraplegia, unspecified (Multi)     Paraparesis of both lower limbs    Personal history of other diseases of urinary system 03/21/2017    History of bladder stone    Personal history of other specified conditions 01/13/2017    History of urinary incontinence    Personal history of other specified conditions     History of headache    Personal history of urinary (tract) infections 01/31/2017    History of urinary tract infection    Personal history of urinary calculi 09/08/2015    History of renal calculi    Personal history of urinary calculi 03/07/2016    History of renal calculi    Unspecified visual loss     Vision problems     Surgical History  Past Surgical History:   Procedure Laterality Date    ILEOSTOMY  08/05/2015    Ileostomy    OTHER SURGICAL HISTORY  08/05/2015    Tracheostomy    OTHER SURGICAL HISTORY  09/16/2016    Feeding Tube     Social History     Allergies  Keflex [cephalexin], Levaquin [levofloxacin], Lorazepam, Piperacillin-tazobactam, Adhesive tape-silicones, Albuterol, Lacosamide, Latex, Pantoprazole, Suppository adult [glycerin (adult)], Budesonide, Famotidine, Ipratropium bromide, Nitrofurantoin, Ranitidine, Sulfa (sulfonamide antibiotics), and Sulfamethoxazole-trimethoprim  Medications Prior to Admission   Medication Sig Dispense Refill Last Dose    acetaminophen 160 mg/5 mL (5 mL) suspension Take 20.5 mL (650 mg) by mouth every 6 hours. Pt takes med at : 9a/3p/10p/3a 118 mL 0     ALPRAZolam (Xanax) 0.5 mg tablet 1 tablet (0.5 mg) by g-tube route if needed. TABLET SHOULD BE CRUSHED AND GIVEN VIA G-TUBE       artificial tears, dextran-hypomel-glycerin, 0.1-0.3-0.2 % ophthalmic solution Administer 1-2 drops into both eyes 2 times a day. at 9 am and 3 pm while awake/ SLEEP 12 bottle 3     aspirin 81 mg chewable tablet 1 tablet (81 mg) by g-tube route once daily. at 4 pm 30  tablet 11     baclofen (Lioresal) 10 mg tablet TAKE 1 TABLET DAILY  With food at 10am 90 tablet 11     baclofen (Lioresal) 20 mg tablet 1 tablet (20 mg) by g-tube route 3 times a day. With food at 10am,- 4pm- 10pm 270 tablet 3     cholecalciferol (Vitamin D-3) 10 mcg/mL (400 unit/mL) drops 10 mL (4,000 Units) by g-tube route once daily. AT 9am 300 mL 3     diazePAM (Valium) 5 mg/mL injection Inject 3 mL (15 mg) into the muscle. As needed for seizure activity lasting for more than 3 minutes, and if seizures persist may repeat 3 times, then call 911. Do not Mix with Nayzilam.       docusate sodium (Colace) 50 mg/5 mL oral liquid 10 mL (100 mg) by g-tube route 2 times a day. AT 9AM AND 2100 HOURS 100 mL 0     hydrocortisone 2.5 % ointment Apply topically 2 times a day as needed for rash. Apply to scalp and / or face area for seborrheic dermatitis (scalp flaking and reddended facial areas) 2 times a day, As Needed 453.6 g 11     levETIRAcetam 100 mg/mL solution 20 mL (2,000 mg) by g-tube route 2 times a day. Pt takes this medication at 0900 and 2100. 1200 mL 2     medical supply, miscellaneous (MISCELLANEOUS MEDICAL SUPPLY MISC) Apply topically. Water flushes-give 300 mL with each med Pass [150 mL before and 150 mL after]       mineral oil-hydrophilic petrolatum (Aquaphor) ointment Apply topically 2 times a day. And after each bowel moment. Apply on the buttocks 396 g 0     nasal spray midazolam (Versed) 5 mg/spray (0.1 mL) spray,non-aerosol Use one spray in one nostril for convulsive seizure lasting longer than 5 minutes. May repeat a in the other nostril after 5 minutes if convulsive seizures still ongoing CALL 911 3 each 2     nutritional supplements (Osmolite 1.5 Oli) 0.06 gram-1.5 kcal/mL liquid Give 360ml at 10AM and 4PM. And 1 carton at 10PM (with meds and water) 1000 mL 11     phenytoin 125 mg/5 mL suspension Take 4ml by PEG tube 3 times a day AT 9AM, 3PM AND 9PM 360 mL 11     selenium sulfide (Selsun) 2.5 %  shampoo Apply 1 Application topically 1 (one) time per week. Use every other day during a seborrheic dermatitis outbreak or as needed       senna (Senokot) 8.8 mg/5 mL syrup TAKE 10 ML BY MOUTH TWICE DAILY 2 HOURS BEFORE OR 2 HOURS AFTER OTHER MEDS( I.e, 4am and 6PM) 240 mL 0     simethicone (Mylicon) 40 mg/0.6 mL drops 2 mL (133.3333 mg) by g-tube route every other day if needed for flatulence.       TegretoL 100 mg/5 mL suspension Take 15ml in 10 AM, 7.5mL at 4PM and 15ml in 10PM with food ( and baclofen ) 3400 mL 2        Review of Systems  Neurological Exam  Mental Status  Alert. Patient is nonverbal.    Cranial Nerves  CN III, IV, VI: No nystagmus. Pupils equal round and reactive to light bilaterally.  CN VII:  Right: There is no facial weakness.  Left: There is no facial weakness.    Motor   Increased muscle tone. Left upper extremity clonic movements; suppressible. .      Sensory  Withdraws to noxious stimulation of lower extremities.    Reflexes                                            Right                      Left  Brachioradialis                    1+                         1+  Biceps                                 1+                         1+  Patellar                                Tr                         Tr  Achilles                                1+                         1+    Right pathological reflexes: Ankle clonus absent.  Left pathological reflexes: Ankle clonus absent.    Physical Exam  Eyes:      Extraocular Movements: No nystagmus.      Pupils: Pupils are equal, round, and reactive to light.   Cardiovascular:      Rate and Rhythm: Tachycardia present.   Pulmonary:      Effort: Tachypnea present.   Neurological:      Mental Status: He is alert.      Deep Tendon Reflexes:      Reflex Scores:       Bicep reflexes are 1+ on the right side and 1+ on the left side.       Brachioradialis reflexes are 1+ on the right side and 1+ on the left side.       Achilles reflexes are 1+ on the right  "side and 1+ on the left side.      Last Recorded Vitals  Blood pressure 138/71, pulse 109, temperature 36.9 °C (98.4 °F), resp. rate 25, height 1.8 m (5' 10.87\"), weight 64.9 kg (143 lb 1.3 oz), SpO2 97%.    Relevant Results  Results for orders placed or performed during the hospital encounter of 08/10/24 (from the past 24 hour(s))   POCT GLUCOSE   Result Value Ref Range    POCT Glucose 114 (H) 74 - 99 mg/dL   Blood Gas Venous Full Panel Unsolicited   Result Value Ref Range    POCT pH, Venous 7.44 (H) 7.33 - 7.43 pH    POCT pCO2, Venous 45 41 - 51 mm Hg    POCT pO2, Venous 35 35 - 45 mm Hg    POCT SO2, Venous 49 45 - 75 %    POCT Oxy Hemoglobin, Venous 47.8 45.0 - 75.0 %    POCT Hematocrit Calculated, Venous 36.0 (L) 41.0 - 52.0 %    POCT Sodium, Venous 139 136 - 145 mmol/L    POCT Potassium, Venous 4.2 3.5 - 5.3 mmol/L    POCT Chloride, Venous 103 98 - 107 mmol/L    POCT Ionized Calicum, Venous 1.16 1.10 - 1.33 mmol/L    POCT Glucose, Venous 114 (H) 74 - 99 mg/dL    POCT Lactate, Venous 1.5 0.4 - 2.0 mmol/L    POCT Base Excess, Venous 5.6 (H) -2.0 - 3.0 mmol/L    POCT HCO3 Calculated, Venous 30.6 (H) 22.0 - 26.0 mmol/L    POCT Hemoglobin, Venous 12.1 (L) 13.5 - 17.5 g/dL    POCT Anion Gap, Venous 10.0 10.0 - 25.0 mmol/L    Patient Temperature 37.0 degrees Celsius   Comprehensive metabolic panel   Result Value Ref Range    Glucose 101 (H) 74 - 99 mg/dL    Sodium 140 136 - 145 mmol/L    Potassium 4.1 3.5 - 5.3 mmol/L    Chloride 100 98 - 107 mmol/L    Bicarbonate 29 21 - 32 mmol/L    Anion Gap 15 10 - 20 mmol/L    Urea Nitrogen 19 6 - 23 mg/dL    Creatinine 0.27 (L) 0.50 - 1.30 mg/dL    eGFR >90 >60 mL/min/1.73m*2    Calcium 9.0 8.6 - 10.6 mg/dL    Albumin 3.1 (L) 3.4 - 5.0 g/dL    Alkaline Phosphatase 82 33 - 120 U/L    Total Protein 7.8 6.4 - 8.2 g/dL    AST 27 9 - 39 U/L    Bilirubin, Total 0.7 0.0 - 1.2 mg/dL    ALT 25 10 - 52 U/L   Magnesium   Result Value Ref Range    Magnesium 1.97 1.60 - 2.40 mg/dL "   Procalcitonin   Result Value Ref Range    Procalcitonin 0.75 (H) <=0.07 ng/mL   Phenytoin level, total   Result Value Ref Range    Phenytoin 3.8 (L) 10.0 - 20.0 ug/mL   Vancomycin   Result Value Ref Range    Vancomycin <2.0 (L) 5.0 - 20.0 ug/mL   Levetiracetam   Result Value Ref Range    Keppra 9 (L) 10 - 40 ug/mL   Urinalysis with Reflex Culture and Microscopic   Result Value Ref Range    Color, Urine Yellow Light-Yellow, Yellow, Dark-Yellow    Appearance, Urine Turbid (N) Clear    Specific Gravity, Urine 1.021 1.005 - 1.035    pH, Urine 8.5 (N) 5.0, 5.5, 6.0, 6.5, 7.0, 7.5, 8.0    Protein, Urine 30 (1+) (A) NEGATIVE, 10 (TRACE), 20 (TRACE) mg/dL    Glucose, Urine Normal Normal mg/dL    Blood, Urine NEGATIVE NEGATIVE    Ketones, Urine NEGATIVE NEGATIVE mg/dL    Bilirubin, Urine NEGATIVE NEGATIVE    Urobilinogen, Urine Normal Normal mg/dL    Nitrite, Urine NEGATIVE NEGATIVE    Leukocyte Esterase, Urine NEGATIVE NEGATIVE   Urinalysis Microscopic   Result Value Ref Range    WBC, Urine NONE 1-5, NONE /HPF    RBC, Urine NONE NONE, 1-2, 3-5 /HPF    Mucus, Urine 1+ Reference range not established. /LPF   Respiratory Culture/Smear    Specimen: SPUTUM; Fluid   Result Value Ref Range    Respiratory Culture/Smear (A)      Culture not performed. See Gram stain findings. Recollect if clinically indicated.    Gram Stain (A)      Gram stain indicates specimen contains significant salivary contamination.   MRSA Surveillance for Vancomycin De-escalation, PCR    Specimen: Anterior Nares; Swab   Result Value Ref Range    MRSA PCR Not Detected Not Detected   CBC and Auto Differential   Result Value Ref Range    WBC 12.5 (H) 4.4 - 11.3 x10*3/uL    nRBC 0.0 0.0 - 0.0 /100 WBCs    RBC 2.93 (L) 4.50 - 5.90 x10*6/uL    Hemoglobin 9.1 (L) 13.5 - 17.5 g/dL    Hematocrit 26.0 (L) 41.0 - 52.0 %    MCV 89 80 - 100 fL    MCH 31.1 26.0 - 34.0 pg    MCHC 35.0 32.0 - 36.0 g/dL    RDW 12.4 11.5 - 14.5 %    Platelets 303 150 - 450 x10*3/uL     Neutrophils % 84.9 40.0 - 80.0 %    Immature Granulocytes %, Automated 0.3 0.0 - 0.9 %    Lymphocytes % 6.6 13.0 - 44.0 %    Monocytes % 7.7 2.0 - 10.0 %    Eosinophils % 0.1 0.0 - 6.0 %    Basophils % 0.4 0.0 - 2.0 %    Neutrophils Absolute 10.62 (H) 1.20 - 7.70 x10*3/uL    Immature Granulocytes Absolute, Automated 0.04 0.00 - 0.70 x10*3/uL    Lymphocytes Absolute 0.83 (L) 1.20 - 4.80 x10*3/uL    Monocytes Absolute 0.97 0.10 - 1.00 x10*3/uL    Eosinophils Absolute 0.01 0.00 - 0.70 x10*3/uL    Basophils Absolute 0.05 0.00 - 0.10 x10*3/uL   POCT GLUCOSE   Result Value Ref Range    POCT Glucose 105 (H) 74 - 99 mg/dL                                      I have personally reviewed the following imaging results XR abdomen 1 view    Result Date: 8/10/2024  Interpreted By:  Vasile Gee, STUDY: XR ABDOMEN 1 VIEW; 8/10/2024 12:45 pm   INDICATION: Signs/Symptoms:Rule out obstruction.   COMPARISON: Radiograph dated 04/07/2024   ACCESSION NUMBER(S): [id number]   ORDERING CLINICIAN: SHIRLEY MALIK   FINDINGS: Two views of the abdomen and pelvis. Gastrostomy tube is projecting over the left upper quadrant of the abdomen. Presume ostomy is projecting over the right lower abdomen. Midlung skin staples. Paucity of intra-abdominal bowel gas. No evidence of dilated loop bowel is identified. Limited evaluation of pneumoperitoneum on supine imaging, however no gross evidence of free air is noted.   Consulting opacity in the lung bases, left more than right   Osseous structures demonstrate no acute bony changes.       1.  No evidence of dilated gas-filled loop of bowel. Paucity of bowel gas which is nonspecific and might be due to fluid-filled loops of bowel. 2. Medical devices and postsurgical changes as described above. 3. Again seen consolidation in bilateral lung bases, left more than right.   Signed by: Vasile Riley 8/10/2024 1:16 PM Dictation workstation:   [id number]    XR chest 1 view    Result Date:  8/10/2024  Interpreted By:  Vasile Gee and Ritchie Brandon STUDY: XR CHEST 1 VIEW;  8/10/2024 10:00 am   INDICATION: Signs/Symptoms:acute hypoxic resp failure, concerns for HAP.   COMPARISON:   Chest x-ray 04/07/2024.   ACCESSION NUMBER(S): [id number]   ORDERING CLINICIAN: SHIRLEY MALIK   FINDINGS: AP radiograph of the chest was provided.   Left-sided PICC line with the distal tip projecting over the expected location of the right atrium..   CARDIOMEDIASTINAL SILHOUETTE: Cardiomediastinal silhouette is normal in size and configuration.   LUNGS: Mild central pulmonary vascular congestion with trace amount of fluid located in the expected location of the right horizontal fissure. Consolidation in bilateral lung bases, left more than right.   ABDOMEN: No remarkable upper abdominal findings.   BONES: No acute osseous changes.       1. Bibasilar consolidation, left more than right. Correlate with concern for infection. 2. Suggestion of small bilateral pleural effusion.   I personally reviewed the images/study and I agree with the findings as stated by resident Jay Jay Pedro. This study was interpreted at [place], [place].   MACRO: None   Signed by: Vasile Riley 8/10/2024 12:00 PM Dictation workstation:   [id number]    XR chest 1 view    Result Date: 8/5/2024  * * *Final Report* * * DATE OF EXAM: Aug  5 2024  5:53PM   S5X   5290  -  XR CHEST 1V FRONTAL   / ACCESSION #  [id number] PROCEDURE REASON: frequent desaturations      * * * * Physician Interpretation * * * *  EXAMINATION:  CHEST RADIOGRAPH (SINGLE VIEW AP OR PA) CLINICAL HISTORY: MQ:  XC1_5 Comparison:  08/04/2024 RESULT: Lines, tubes, and devices:  None. Lungs and pleura:  No consolidation. No lung mass. No pleural effusion. Cardiomediastinal silhouette:  Normal cardiomediastinal silhouette. Other:  .    IMPRESSION: No acute radiographic abnormality. : GIL   Transcribe  Date/Time: Aug  5 2024  5:57P Dictated by : JAMAR MCCULLOUGH MD This examination was interpreted and the report reviewed and electronically signed by: JAMAR MCCULLOUGH MD on Aug  5 2024  5:58PM  EST    XR chest 1 view    Result Date: 8/4/2024  * * *Final Report* * * DATE OF EXAM: Aug  4 2024 10:12AM   S5X   5290  -  XR CHEST 1V FRONTAL   / ACCESSION #  [id number] PROCEDURE REASON: line verification and eval for pneumonia due to fevers      * * * * Physician Interpretation * * * *    EXAMINATION:  XR CHEST 1V FRONTAL CLINICAL HISTORY:   line verification and eval for pneumonia due to fevers Technique:   XR CHEST 1V FRONTAL -- with views on images Comparison: 8/3/2024 RESULT: Left PICC line with distal tip at the cavoatrial junction. Shallow lung volumes. No focal consolidation, pleural effusion or pneumothorax. Normal cardiomediastinal silhouette. Stable bones.    IMPRESSION: Shallow lung volumes with no acute process identified. Radiographic resolution of pleural effusions and adjacent airspace opacities. Left PICC line with tip at the cavoatrial junction. : [facility]   Transcribe Date/Time: Aug  4 2024 10:31A Dictated by : CAMELIA GARLAND MD This examination was interpreted and the report reviewed and electronically signed by: CAMELIA GARLAND MD on Aug  4 2024 10:33AM  EST    XR chest 1 view    Result Date: 8/3/2024  * * *Final Report* * * DATE OF EXAM: Aug  3 2024 11:39AM   FVX   5290  -  XR CHEST 1V FRONTAL   / ACCESSION #  [id number] PROCEDURE REASON: Shortness of breath      * * * * Physician Interpretation * * * *  EXAMINATION:  CHEST RADIOGRAPH (SINGLE VIEW AP OR PA) CLINICAL HISTORY: Shortness of breath MQ:  XC1_5 Comparison:  08/02/2024 RESULT: Lines, tubes, and devices:  Left PICC line travels to the caval atrial region. Lungs and pleura:  Small pleural effusions with basilar atelectasis and mild vascular congestion.  No pneumothorax seen Cardiomediastinal silhouette:  Normal cardiomediastinal  silhouette. Other:  .    IMPRESSION: Pleural effusions with basilar atelectasis : GIL   Transcribe Date/Time: Aug  3 2024 12:27P Dictated by : MIKAYLA PETERSON MD This examination was interpreted and the report reviewed and electronically signed by: MIKAYLA PETERSON MD on Aug  3 2024 12:29PM  EST    XR chest 1 view    Result Date: 8/2/2024  * * *Final Report* * * DATE OF EXAM: Aug  2 2024  8:18AM   FVX   5376  -  XR CHEST 1V FRONTAL PORT  / ACCESSION #  [id number] PROCEDURE REASON: Evaluate tube, line,  or lead position      * * * * Physician Interpretation * * * *  EXAMINATION:  CHEST RADIOGRAPH (PORTABLE SINGLE VIEW AP) Exam Date/Time:  8/2/2024 8:18 AM CLINICAL HISTORY: Evaluate tube, line,  or lead position MQ:  XCPR_5 Comparison:  X-ray 07/31/2024 RESULT: Lines, tubes, and devices:  Left-sided PICC line with tip at the cavoatrial junction.  Enteric tube with tip in the stomach. Lungs and pleura:  No pneumothorax.  Hazy opacities in lung bases bilaterally Cardiomediastinal silhouette:  Stable cardiomediastinal silhouette. Other:  Trace free air underneath the right hemidiaphragm.    IMPRESSION: 1.  Life-support devices. 2.  Hazy opacities in the lung bases bilaterally, right greater than left.  Improved on the right and stable on the left compared to prior study. 3.  Trace free air underneath right hemidiaphragm, correlating with free air seen on prior CT scan. : [facility]   Transcribe Date/Time: Aug  2 2024  8:33A Dictated by : LOS RUSSO MD This examination was interpreted and the report reviewed and electronically signed by: LOS RUSSO MD on Aug  2 2024  8:36AM  EST    XR chest 1 view    Result Date: 7/31/2024  * * *Final Report* * * DATE OF EXAM: Jul 31 2024  5:35AM   FVX   5376  -  XR CHEST 1V FRONTAL PORT  / ACCESSION #  [id number] PROCEDURE REASON: Hypoxemia      * * * * Physician Interpretation * * * *  EXAMINATION:  CHEST RADIOGRAPH (PORTABLE SINGLE VIEW AP) Exam Date/Time:   7/31/2024 5:35 AM CLINICAL HISTORY: Hypoxemia MQ:  XCPR_5 Comparison:  07/30/2024 RESULT: Lines, tubes, and devices:  Enteric tube remains in the region of the stomach.  LEFT PICC unchanged with tip at the cavoatrial junction. Lungs and pleura:  Persistent hazy opacity at the RIGHT lung base without visible pleural effusion.  Stable appearance of LEFT costophrenic angle which may suggest small pleural effusion.  No visible consolidation or pneumothorax.  No acute congestion. Cardiomediastinal silhouette:  Stable cardiomediastinal silhouette.    IMPRESSION: 1.  No acute congestion. 2.  Persistent nonspecific hazy opacity at the RIGHT lung base. : [employer]   Transcribe Date/Time: Jul 31 2024  5:59A Dictated by : MACHELLE RAMIREZ MD This examination was interpreted and the report reviewed and electronically signed by: MACHELLE RAMIREZ MD on Jul 31 2024  6:03AM  EST    XR abdomen 2 views supine and erect or decub    Result Date: 7/31/2024  * * *Final Report* * * DATE OF EXAM: Jul 31 2024  2:16AM   FVX   5289  -  XR ABDOMEN 1V SUPINE  / ACCESSION #  [id number] PROCEDURE REASON: Evaluate tube, line or lead position      * * * * Physician Interpretation * * * *  EXAMINATION:  XR ABDOMEN 1V SUPINE CLINICAL HISTORY: Evaluate tube, line or lead position Technique:   XR ABDOMEN 1V SUPINE Comparison: CT abdomen pelvis with contrast 07/29/2024 RESULT/    IMPRESSION: Lines, tubes and devices: Enteric tube is curled in the stomach with the tip overlying the proximal stomach similar to the GE junction.   Percutaneous gastrostomy tube overlies the left upper quadrant.  Midline surgical staples. Bowel: Dilated loops of small bowel in the upper abdomen. Abdomen: Evidence of free intraperitoneal air as seen on recent CT. : [employer]   Transcribe Date/Time: Jul 31 2024  3:03A Dictated by : ALLEN SALCEDO MD This examination was interpreted and the report reviewed and electronically signed by: ALLEN SALCEDO MD on Jul  31 2024  3:04AM  EST    XR chest 1 view    Result Date: 7/30/2024  * * *Final Report* * * DATE OF EXAM: Jul 30 2024  7:09PM   FVX   5290  -  XR CHEST 1V FRONTAL   / ACCESSION #  [id number] PROCEDURE REASON: Shortness of breath      * * * * Physician Interpretation * * * *  EXAMINATION:  CHEST RADIOGRAPH (SINGLE VIEW AP OR PA) CLINICAL HISTORY: Shortness of breath MQ:  XC1_5 Comparison:  Abdomen radiograph 07/30/2024 time stamped [id number], chest radiograph 07/29/2024 RESULT: Patient position unspecified Lines, tubes, and devices:  Preexistent left arm PICC traceable to the low SVC.  NG/OG has been placed with side-port and tip proximal intragastric.  External devices and artifacts. Lungs and pleura:  Similarly hypoexpanded partially consolidated lower lobes, left greater than right.  Normal upper lung vascularity.  No evident pneumothorax. Cardiomediastinal silhouette:  Similar contours accentuated by chest hypoexpansion.  Mildly enlarged hilar vessels.  Normal size cardiac silhouette. Other:  Smaller volume upper abdominal pneumoperitoneum compared to yesterday's chest radiograph.  Not dissimilar from abdomen radiograph earlier today.    IMPRESSION: 1.  Interval placement of NG tube compared to yesterday's chest radiograph, in good position 2.  Smaller volume upper abdominal pneumoperitoneum compared to yesterday's chest radiograph 3.  Similarly hypoexpanded partially consolidated lower lobes. : [facility]   Transcribe Date/Time: Jul 30 2024  8:10P Dictated by : TESS RICHARD MD This examination was interpreted and the report reviewed and electronically signed by: TESS RICHARD MD on Jul 30 2024  8:18PM  EST    XR abdomen 2 views supine and erect or decub    Result Date: 7/30/2024  * * *Final Report* * * DATE OF EXAM: Jul 30 2024 11:15AM   [facility]   5289  -  XR ABDOMEN 1V SUPINE  / ACCESSION #  [id number] PROCEDURE REASON: Evaluate tube, line or lead position      * * * * Physician Interpretation * * * *  ABDOMINAL  X-RAY  HISTORY: Evaluate tube, line or lead position COMPARISON: 7/30/2024 at 7:49 AM TECHNIQUE: Supine AP view of the abdomen RESULT: See impression    IMPRESSION: Gastric drainage tube is in place with tip seen superimposed over left upper quadrant expected location of the gastric fundus. Gastrostomy tube is in place with balloon seen superimposed over the expected location of the body the stomach. Midline laparotomy staples are identified. Ostomy site in the right lower quadrant. Several gas-filled slightly prominent loops of bowel in the left abdomen. Persistence of free intraperitoneal air. There are no stones detected over the kidneys or the expected course of the ureters bilaterally. Visible lung bases appear clear. : [facility]   Transcribe Date/Time: Jul 30 2024  1:19P Dictated by : KIMI HECK MD This examination was interpreted and the report reviewed and electronically signed by: KIMI HECK MD on Jul 30 2024  1:21PM  EST    XR abdomen 2 views supine and erect or decub    Result Date: 7/30/2024  * * *Final Report* * * DATE OF EXAM: Jul 30 2024  8:27AM   FV[facility]   5289  -  XR ABDOMEN 1V SUPINE  / ACCESSION #  [id number] PROCEDURE REASON: Evaluate tube, line or lead position      * * * * Physician Interpretation * * * *  XR ABDOMEN 1V SUPINE PROVIDED HISTORY: Evaluate tube, line or lead position COMPARISON: 07/29/2024 TECHNIQUE: Supine abdomen RESULT: Dilated small intestinal loops is noted.  These measure up to 6.4 cm in the left abdomen.  Free air is suggested.  No renal stones are seen.    IMPRESSION: Dilated small intestinal loops again noted with free air in the abdomen similar to previous CT : [facility]   Transcribe Date/Time: Jul 30 2024  9:47A Dictated by : MIKAYLA PETERSON MD This examination was interpreted and the report reviewed and electronically signed by: MIKAYLA PETERSON MD on Jul 30 2024  9:48AM  EST    CT head wo IV contrast    Result Date: 7/30/2024  * * *Final Report* * * DATE  OF EXAM: Jul 30 2024  2:46AM   [facility]   0504  -  CT BRAIN WO IVCON  / ACCESSION #  [id number] PROCEDURE REASON: Seizure disorder, clinical change      * * * * Physician Interpretation * * * *  EXAMINATION: CT BRAIN WO IVCON CLINICAL HISTORY: Seizure TECHNIQUE:  Serial axial images without IV contrast were obtained from the vertex to the foramen magnum. MQ:  CTBWO_3 CT Radiation dose: Integrated Dose-Length Product (DLP) for this visit =   824  mGy*cm CT Dose Reduction Employed: No dose reduction techniques were required COMPARISON: None. RESULT: Post-operative change: None. Acute change: No evidence of an acute infarct or other acute parenchymal process. Hemorrhage: No evidence of acute intracranial hemorrhage. ECASS hemorrhagic transformation score: Not Applicable Mass Lesion / Mass Effect: There is no evidence of an intracranial mass or extraaxial fluid collection. No significant mass effect. Chronic change: Extensive confluent foci of low attenuation are present in the supratentorial white matter which is nonspecific but likely represents extensive microvascular ischemia. Parenchyma: There is severe generalized volume loss. The brain parenchyma is otherwise within normal limits for age. Ventricles: Ventricular enlargement concordant with the degree of parenchymal volume loss. Paranasal sinuses and skull base: The visualized paranasal sinuses are grossly clear. The skull base and imaged soft tissues are unremarkable. Localizer images: No additional findings.    IMPRESSION: 1.  No evidence of acute intracranial hemorrhage or acute large territory infarct. 2.  Severe generalized parenchymal volume loss. 3.  Extensive periventricular white matter hypoattenuation, nonspecific but most commonly reflecting severe chronic microvascular ischemic change. : [facility]   Transcribe Date/Time: Jul 30 2024  4:25A Dictated by : LÓPEZ TUCKER MD This examination was interpreted and the report reviewed and electronically  signed by: LÓPEZ TUCKER MD on Jul 30 2024  4:29AM  EST    XR chest 1 view    Result Date: 7/30/2024  * * *Final Report* * * DATE OF EXAM: Jul 30 2024  2:33AM   FV[facility]   5290  -  XR CHEST 1V FRONTAL   / ACCESSION #  [id number] PROCEDURE REASON: Shortness of breath      * * * * Physician Interpretation * * * *  EXAMINATION:  CHEST RADIOGRAPH (SINGLE VIEW AP OR PA) CLINICAL HISTORY: Shortness of breath MQ:  XC1_5 Comparison:  07/29/2024 chest and abdominal CT's. RESULT: Lines, tubes, and devices:  The left upper extremity PICC line terminates right to the midline in the distal tip projecting over the region of the superior aspect of the right atrium.  EKG electrodes. Lungs and pleura:  Bibasilar opacities likely represents aspiration pneumonia.  No pleural effusion or sizable pneumothorax. Cardiomediastinal silhouette:  Normal cardiomediastinal silhouette. Other:  Redemonstration of large intra-abdominal free air as seen on the previous CT studies with gastric distention.    IMPRESSION: Bibasilar aspiration pneumonia. Redemonstration of intra-abdominal free air and gastric distention. : [facility]   Transcribe Date/Time: Jul 30 2024  3:28A Dictated by : DARLYN NEWTON MD This examination was interpreted and the report reviewed and electronically signed by: DARLYN NEWTON MD on Jul 30 2024  3:32AM  EST    CT abdomen pelvis w IV contrast    Result Date: 7/29/2024  * * *Final Report* * * DATE OF EXAM: Jul 29 2024 12:24PM   [facility]   0530  -  CT ABD/PEL W IVCON  / ACCESSION #  [id number] PROCEDURE REASON: Abdominal abscess/infection suspected      * * * * Physician Interpretation * * * *  CT OF CHEST (PE protocol), ABDOMEN AND PELVIS WITH CONTRAST CLINICAL HISTORY: Multiple sclerosis, spastic quadriplegia, neurogenic bladder/bowel. Recent subtotal colectomy. Febrile on 7/28/2024 with increased WBC. TECHNIQUE:  High-resolution helical scanning of the chest after IV contrast using the pulmonary embolism protocol followed by  routine helical scanning of the abdomen and pelvis. Contrast: IV:  100 ml of Omnipaque 350 Oral:  None. CT Radiation dose: Integrated Dose-length product (DLP): 714 mGy*cm. CT Dose Reduction Employed: Automated exposure control (AEC) COMPARISON: 7/21/2024 CT of chest, abdomen and pelvis LIMITATIONS:  None. RESULT: CHEST FINDINGS: Lines, tubes, and devices:  None. Pulmonary arteries: Suboptimal contrast opacification of pulmonary system compared to 7/21/2024. Previously noted right lower lobe pulmonary emboli are no longer visualized. No obvious new pulmonary embolism detected. Main pulmonary artery diameter is within normal limits. Lung parenchyma and airways: Central airways are patent. Previously noted right middle lobe infiltrate has resolved. Bilateral lower lobe consolidated infiltrates improved since 7/21/2024. Previously noted groundglass opacities also improved. Pleural space: No pleural effusion or pleural thickening. Lower neck, lymph nodes, and mediastinum: The imaged thyroid gland is unremarkable. No adenopathy in the supraclavicular, axillary, mediastinal, or hilar regions. Heart, pericardium, and thoracic vessels: The thoracic aorta is normal in caliber. No cardiac chamber enlargement. No significant pericardial effusion or thickening. No coronary atherosclerotic calcifications are noted, although the study is not optimized for coronary assessment. Bones and soft tissues: No destructive bone lesions. Chest wall is unremarkable. Limitations:  None. ABDOMEN/PELVIS FINDINGS: Peritoneum/mesentery: Persistent moderate pneumoperitoneum which is mainly in the right upper abdomen and is relatively unchanged. Minimal residual perihepatic and perisplenic ascites fluid. Mild ascites in the midpelvis above the bladder appears mildly improved. No loculated fluid collection. Liver: No mass. Biliary: No dilation of gallbladder or bile ducts. Spleen: Normal. Pancreas: No mass or dilated pancreatic duct. Adrenals: No  mass. Kidneys/urinary: No urinary obstruction or renal mass bilaterally. Urinary bladder is decompressed around a Boss catheter balloon. GI tract: Persistent dilation of multiple small bowel loops in the mid and left hemiabdomen. Maximum diameter of the dilated small bowel in the left hemiabdomen there is about 5.2 cm (coronal image 58). Compare with maximum diameter of 5.0 cm on 7/21/2024.  Distal small bowel is decompressed. Gastric distention unchanged. Gastrostomy tube in satisfactory position with internal and within the gastric lumen. Dilation of the distal esophagus has improved. Changes of a subtotal colectomy and right lower quadrant and ileostomy. 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 Lymph nodes: No adenopathy. Vasculature: Unremarkable. Pelvis: Mild ascites fluid. No pelvic mass. Bones/soft tissue: Left anterior abdominal wall small gas collection lateral to the mucous fistula has increased 7/21/2024. Right-sided anterior abdominal wall gas collection is relatively unchanged midline skin closure staples again noted. Chronic compression deformity of L3 superior endplate is unchanged.  (topogram) images: No additional findings.    IMPRESSION: 1.  Previously noted right lower lobe pulmonary emboli are no longer visualized. No new emboli detected. 2.  Compared to 7/21/2024, resolution of right middle lobe infiltrate and improvement in bilateral lower lobe infiltrates and bilateral groundglass opacities. 3.  Dilation of small bowel in mid and left hemiabdomen slightly increased compared to 7/21/2024. Small bowel finding consistent with small bowel obstruction versus ileus. Persistent gastric distention. Dilation of esophagus has improved. 4.  Persistent moderate pneumoperitoneum and mild ascites. 5.  No abscess or loculated fluid collections. : [facility]   Transcribe Date/Time: Jul 29 2024  1:02P Dictated by : NAZIA FERNANDEZ MD This examination was  interpreted and the report reviewed and electronically signed by: NAZIA FERNANDEZ MD on Jul 29 2024  1:47PM  EST    CT chest w IV contrast    Result Date: 7/29/2024  * * *Final Report* * * DATE OF EXAM: Jul 29 2024 12:24PM   [facility]   0539  -  CT CHEST W IVCON  / ACCESSION #  [id number] PROCEDURE REASON: Chest trauma, blunt      * * * * Physician Interpretation * * * *  CT OF CHEST (PE protocol), ABDOMEN AND PELVIS WITH CONTRAST CLINICAL HISTORY: Multiple sclerosis, spastic quadriplegia, neurogenic bladder/bowel. Recent subtotal colectomy. Febrile on 7/28/2024 with increased WBC. TECHNIQUE:  High-resolution helical scanning of the chest after IV contrast using the pulmonary embolism protocol followed by routine helical scanning of the abdomen and pelvis. Contrast: IV:  100 ml of Omnipaque 350 Oral:  None. CT Radiation dose: Integrated Dose-length product (DLP): 714 mGy*cm. CT Dose Reduction Employed: Automated exposure control (AEC) COMPARISON: 7/21/2024 CT of chest, abdomen and pelvis LIMITATIONS:  None. RESULT: CHEST FINDINGS: Lines, tubes, and devices:  None. Pulmonary arteries: Suboptimal contrast opacification of pulmonary system compared to 7/21/2024. Previously noted right lower lobe pulmonary emboli are no longer visualized. No obvious new pulmonary embolism detected. Main pulmonary artery diameter is within normal limits. Lung parenchyma and airways: Central airways are patent. Previously noted right middle lobe infiltrate has resolved. Bilateral lower lobe consolidated infiltrates improved since 7/21/2024. Previously noted groundglass opacities also improved. Pleural space: No pleural effusion or pleural thickening. Lower neck, lymph nodes, and mediastinum: The imaged thyroid gland is unremarkable. No adenopathy in the supraclavicular, axillary, mediastinal, or hilar regions. Heart, pericardium, and thoracic vessels: The thoracic aorta is normal in caliber. No cardiac chamber enlargement. No significant  pericardial effusion or thickening. No coronary atherosclerotic calcifications are noted, although the study is not optimized for coronary assessment. Bones and soft tissues: No destructive bone lesions. Chest wall is unremarkable. Limitations:  None. ABDOMEN/PELVIS FINDINGS: Peritoneum/mesentery: Persistent moderate pneumoperitoneum which is mainly in the right upper abdomen and is relatively unchanged. Minimal residual perihepatic and perisplenic ascites fluid. Mild ascites in the midpelvis above the bladder appears mildly improved. No loculated fluid collection. Liver: No mass. Biliary: No dilation of gallbladder or bile ducts. Spleen: Normal. Pancreas: No mass or dilated pancreatic duct. Adrenals: No mass. Kidneys/urinary: No urinary obstruction or renal mass bilaterally. Urinary bladder is decompressed around a Boss catheter balloon. GI tract: Persistent dilation of multiple small bowel loops in the mid and left hemiabdomen. Maximum diameter of the dilated small bowel in the left hemiabdomen there is about 5.2 cm (coronal image 58). Compare with maximum diameter of 5.0 cm on 7/21/2024.  Distal small bowel is decompressed. Gastric distention unchanged. Gastrostomy tube in satisfactory position with internal and within the gastric lumen. Dilation of the distal esophagus has improved. Changes of a subtotal colectomy and right lower quadrant and ileostomy. 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 Lymph nodes: No adenopathy. Vasculature: Unremarkable. Pelvis: Mild ascites fluid. No pelvic mass. Bones/soft tissue: Left anterior abdominal wall small gas collection lateral to the mucous fistula has increased 7/21/2024. Right-sided anterior abdominal wall gas collection is relatively unchanged midline skin closure staples again noted. Chronic compression deformity of L3 superior endplate is unchanged.  (topogram) images: No additional findings.    IMPRESSION: 1.  Previously  noted right lower lobe pulmonary emboli are no longer visualized. No new emboli detected. 2.  Compared to 7/21/2024, resolution of right middle lobe infiltrate and improvement in bilateral lower lobe infiltrates and bilateral groundglass opacities. 3.  Dilation of small bowel in mid and left hemiabdomen slightly increased compared to 7/21/2024. Small bowel finding consistent with small bowel obstruction versus ileus. Persistent gastric distention. Dilation of esophagus has improved. 4.  Persistent moderate pneumoperitoneum and mild ascites. 5.  No abscess or loculated fluid collections. : [facility]   Transcribe Date/Time: Jul 29 2024  1:02P Dictated by : NAZIA FERNANDEZ MD This examination was interpreted and the report reviewed and electronically signed by: NAZIA FERNANDEZ MD on Jul 29 2024  1:47PM  EST    XR chest 1 view    Result Date: 7/29/2024  * * *Final Report* * * DATE OF EXAM: Jul 29 2024  8:08AM   [facility]   5376  -  XR CHEST 1V FRONTAL PORT  / ACCESSION #  [id number] PROCEDURE REASON: Fever      * * * * Physician Interpretation * * * *  EXAMINATION:  CHEST RADIOGRAPH (PORTABLE SINGLE VIEW AP) Exam Date/Time:  7/29/2024 8:08 AM CLINICAL HISTORY: Fever MQ:  XCPR_5 Comparison:  07/28/2024 RESULT: Lines, tubes, and devices:  Left upper extremity PICC with catheter tip near the superior cavoatrial junction. Lungs and pleura:  Low lung volumes with resultant bronchovascular prominence.  No overt consolidation, pleural effusion or pneumothorax. Cardiomediastinal silhouette:  Stable cardiomediastinal silhouette. Other:  Moderate to large volume pneumoperitoneum.    IMPRESSION: Moderate to large volume pneumoperitoneum, similar to slightly increased compared to prior when accounting for differences in patient positioning. : [facility]   Transcribe Date/Time: Jul 29 2024  8:38A Dictated by : ERIC SMILEY MD This examination was interpreted and the report reviewed and electronically signed by: ERIC  MD BALJIT on Jul 29 2024  8:45AM  EST    XR abdomen 2 views supine and erect or decub    Result Date: 7/23/2024  * * *Final Report* * * DATE OF EXAM: Jul 23 2024  4:21AM   [facility]   5289  -  XR ABDOMEN 1V SUPINE  / ACCESSION #  [id number] PROCEDURE REASON: Nausea/Vomiting      * * * * Physician Interpretation * * * *  EXAMINATION:  XR ABDOMEN 1V SUPINE   CLINICAL HISTORY: Nausea/Vomiting Technique:   XR ABDOMEN 1V SUPINE Comparison: None. RESULT: Overall paucity of bowel gas.  No evidence of bowel obstruction.   Included portions of the lung bases are clear.    IMPRESSION: See result. : [facility]   Transcribe Date/Time: Jul 23 2024  5:08A Dictated by : FORD REN MD This examination was interpreted and the report reviewed and electronically signed by: FORD REN MD on Jul 23 2024  5:11AM  EST    FL GI small bowel 2 hour    Result Date: 7/22/2024  * * *Final Report* * * DATE OF EXAM: Jul 22 2024  9:12PM   [facility]   5383  -  XR SMALL BOWEL SERIES  / ACCESSION #  [id number] PROCEDURE REASON: Bowel obstruction suspected      * * * * Physician Interpretation * * * *  EXAMINATION:  XR SMALL BOWEL SERIES HISTORY: Background history of multiple sclerosis, subtotal colectomy and medial right lower 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  No  image.  Documented administration 150 mL Omnipaque 350 via PEG tube.  Initial image labeled 0 minutes AP portable supine shows mid and proximal gastric opacification.  Subsequent periodic supine frontal recumbent images through 8 hours.  By 4 hours there is opacification of the lower right abdominal decompressed small  bowel loops.  Contrast density is too faint to allow assessment for extraluminal contrast leakage.    IMPRESSION: Enteric contrast occupies decompressed lower right abdominal mid and distal small bowel loops by 4 hours.  Contrast also persists within abnormally distended more proximal small bowel loops throughout the study : [facility]   Transcribe Date/Time: Jul 22 2024  9:28P Dictated by : TESS RICHARD MD This examination was interpreted and the report reviewed and electronically signed by: TESS RICHARD MD on Jul 22 2024  9:44PM  EST    XR chest 1 view    Result Date: 7/22/2024  * * *Final Report* * * DATE OF EXAM: Jul 22 2024  6:42PM   [facility]   5376  -  XR CHEST 1V FRONTAL PORT  / ACCESSION #  [id number] PROCEDURE REASON: Shortness of breath      * * * * Physician Interpretation * * * *  EXAMINATION:  CHEST RADIOGRAPH (PORTABLE SINGLE VIEW AP) Exam Date/Time:  7/22/2024 6:42 PM CLINICAL HISTORY: Shortness of breath MQ:  XCPR_5 Comparison:  1 day prior RESULT: Lines, tubes, and devices:  Left PICC in place with the tip projecting over the superior cavoatrial junction. Lungs and pleura:  No pneumothorax or effusion. Left basilar atelectasis. Cardiomediastinal silhouette:  Stable cardiomediastinal silhouette. Other: Free air.  Contrast in the gastric lumen.    IMPRESSION: Left basilar atelectasis.  Redemonstrated free air. : [facility]   Transcribe Date/Time: Jul 22 2024  7:05P Dictated by : FRANCIE DUMONT MD This examination was interpreted and the report reviewed and electronically signed by: FRANCIE DUMONT MD on Jul 22 2024  7:11PM  EST    CT angio chest w and wo IV contrast    Result Date: 7/21/2024  * * *Final Report* * * DATE OF EXAM: Jul 21 2024  8:22AM   [facility]   0540  -  CT CHEST W IVCON PE  / ACCESSION #  [id number] PROCEDURE REASON: Pulmonary embolism (PE) suspected, positive D-dimer      * * * * Physician Interpretation * * * *  EXAMINATION:  CHEST CT WITH CONTRAST  (PULMONARY EMBOLISM PROTOCOL) CLINICAL HISTORY: progressive multiple sclerosis, megacolon with 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 Technique:  Spiral CT acquisition of the chest from the thoracic inlet to the upper abdomen following IV contrast.  Axial 1 and 3 mm thick slices plus coronal and sagittal reformatted images. MQ:  CTCP_5 Contrast:  100 mL Omnipaque 350 IV CT Radiation dose: Integrated Dose-length product (DLP) for this visit =   1835 mGy*cm CT Dose Reduction Employed: Automated exposure control (AEC) Comparison:  Abdomen and pelvic CT dated 07/12/2024 RESULT: Limitations:  None. Evaluation for thromboembolic disease:      - Right heart chambers:  No thromboembolic disease.      - Main pulmonary arteries:  No thromboembolic disease.      - Lobar pulmonary arteries:  No thromboembolic disease.      - Segmental/subsegmental pulmonary arteries:  There are small nonocclusive intraluminal filling defects within the right lower lobe segmental and subsegmental pulmonary artery (5:131, 145; 7:16 to 18), and linear filling defect within the right upper lobe segmental and subsegmental pulmonary artery laterally (5:103, 104; 7:17)      - Additional pulmonary artery findings:  The main pulmonary artery is normal in caliber. Lines, tubes, and devices:  None. Lung parenchyma and Pleural space: There are mild groundglass attenuation   airspace opacities bilaterally, patchy right middle lobe infiltrate and confluent bilateral lower lobe consolidative airspace opacities (right greater than left) with small right pleural effusion. Lower neck, lymph nodes, and mediastinum:  The imaged thyroid gland is normal.  There is mild right hilar adenopathy.. There are small calcified subcarinal lymph nodes. Heart, pericardium, and thoracic vessels:  The thoracic aorta is normal in caliber. Mild cardiomegaly. No coronary artery atherosclerotic  calcifications are noted, although the study is not optimized for coronary assessment. No pericardial effusion or thickening. No right heart strain. Bones and soft tissues:  No destructive bone lesion. Chest wall is unremarkable.  Upper abdomen:  Please refer to abdominal and pelvic CT report. Localizer images: No additional findings.    IMPRESSION: FINDINGS COMPATIBLE WITH ACUTE RIGHT PULMONARY EMBOLISM. NO RIGHT HEART STRAIN. MILD GROUNDGLASS ATTENUATION AIRSPACE OPACITIES BILATERALLY, PATCHY RIGHT MIDDLE LOBE INFILTRATE AND CONFLUENT BILATERAL LOWER LOBE CONSOLIDATIVE AIRSPACE OPACITIES (RIGHT GREATER THAN LEFT) WITH SMALL RIGHT PLEURAL EFFUSION AND MILD RIGHT HILAR ADENOPATHY. CRITICAL TEST/RESULTS: CRITICAL TEST/RESULTS: ACUITY: CRITICAL FINDING: PULMONARY EMBOLUS COMMUNICATION:  COMMUNICATED WITH NAE NGUYEN MD ON 7/21/2024 8:52 AM   VIA VERBAL COMMUNICATION. --END OF FINDING-- : [facility]   Transcribe Date/Time: Jul 21 2024  8:56A Dictated by : LARA NOVOA MD This examination was interpreted and the report reviewed and   electronically signed by: LARA NOVOA MD on Jul 21 2024  9:12AM  EST    CT abdomen pelvis w IV contrast    Result Date: 7/21/2024  * * *Final Report* * * DATE OF EXAM: Jul 21 2024  8:22AM   [facility]   0530  -  CT ABD/PEL W IVCON  / ACCESSION #  [id number] PROCEDURE REASON: suspected pneumoperitoneum on CXR      * * * * Physician Interpretation * * * *  EXAMINATION:  CT ABDOMEN AND PELVIS WITH IV CONTRAST CLINICAL HISTORY: progressive multiple sclerosis, megacolon with 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 TECHNIQUE: CT of the abdomen and pelvis was performed using standard technique, scanning from just above the dome of the diaphragm to the symphysis pubis. MQ:  CTAP_3 Contrast: IV:  100 ml of Omnipaque 350 CT Radiation dose: Integrated Dose-length product (DLP) for this visit =   1211.5 mGy*cm. CT  Dose Reduction Employed: Automated exposure control (AEC) COMPARISON: KUB dated 07/21/2024, abdomen and pelvic CT dated 07/12/2024 RESULT: Liver: No mass. Biliary: No bile duct dilation.  The gallbladder is present. Spleen: No mass. No splenomegaly. Pancreas: No mass or duct dilation. Adrenals: No mass. Kidneys: No renal mass.  No hydronephrosis. GI tract: The esophagus, stomach and duodenum are distended with air-fluid levels. There are multiple fluid-filled distended small bowel loops in the upper mid abdomen, measuring up to 4.6 cm and the left midabdomen. The mid and distal small bowel loops are decompressed. Findings are suggestive of mid small bowel obstruction, new since 07/12/2024. There is moderate pneumoperitoneum, new since 07/12/2024. Postsurgical change of subtotal colectomy with right lower quadrant end ileostomy, new since 07/12/2024. There is linear radiopaque suture line within the mucous fistula extending to the left anterior abdominal wall anteriorly. There is fluid filled distal sigmoid and rectum.  The gastrostomy tube is again identified. Lymph nodes: No abdominal or pelvic lymphadenopathy. Mesentery/Peritoneum: There is mild perihepatic and perisplenic ascites. Retroperitoneum: No mass. Vasculature:  No abdominal aortic aneurysm. Pelvis: There is moderate anterior pelvic ascites, new since 07/12/2024. The urinary bladder contains moderate amount of air and is decompressed by Boss catheter. Bones/Soft Tissues: No significant finding. There is mild subcutaneous emphysema along the mid anterior abdominal wall and midline skin staples, new since 07/12/2024. Lower thorax: A chest CT performed was reported separately. Localizer images: No additional findings.    IMPRESSION: FINDINGS SUGGESTIVE OF MID SMALL BOWEL OBSTRUCTION WITH MODERATE PNEUMOPERITONEUM, MILD PERIHEPATIC, PERISPLENIC AND MODERATE ANTERIOR PELVIC ASCITES, NEW SINCE 07/12/2024. SUGGEST GI SURGICAL CONSULT AND FOLLOW-UP.  COMMUNICATION:  COMMUNICATED WITH NAE NGUYEN MD ON 7/21/2024 8:52 AM   VIA VERBAL COMMUNICATION. : [employer]   Transcribe Date/Time: Jul 21 2024  8:24A Dictated by : LARA NOVOA MD This examination was interpreted and the report reviewed and electronically signed by: LARA NOVOA MD on Jul 21 2024  8:56AM  EST    XR abdomen 2 views supine and erect or decub    Result Date: 7/21/2024  * * *Final Report* * * DATE OF EXAM: Jul 21 2024  6:47AM   FVX   5289  -  XR ABDOMEN 1V SUPINE  / ACCESSION #  [id number] PROCEDURE REASON: Bowel obstruction suspected      * * * * Physician Interpretation * * * *  EXAMINATION:  XR ABDOMEN 1V SUPINE CLINICAL HISTORY: Bowel obstruction suspected Technique:   XR ABDOMEN 1V SUPINE -- NOT APPLICABLE with 1 views on 2 images Comparison: Multiple abdominal radiographs and CT dating back to 07/09/2024. RESULT: Lucency of the upper abdomen felt to represent pneumoperitoneum.  Gaseous distention of the stomach and colon.  Multiple dilated small bowel loops.    IMPRESSION: Lucency of the upper abdomen concerning for pneumoperitoneum.  Recommend CT for further evaluation as recently recommended. Colonic distention and dilated small bowel loops consistent with obstruction. : [employer]   Transcribe Date/Time: Jul 21 2024  6:51A Dictated by : KIRBY ROSALES MD This examination was interpreted and the report reviewed and electronically signed by: KIRBY ROSALES MD on Jul 21 2024  6:54AM  EST    XR chest 1 view    Result Date: 7/21/2024  * * *Final Report* * * DATE OF EXAM: Jul 21 2024  3:53AM   FVX   5376  -  XR CHEST 1V FRONTAL PORT  / ACCESSION #  [id number] PROCEDURE REASON: Shortness of breath      * * * * Physician Interpretation * * * *  EXAMINATION:  CHEST RADIOGRAPH (SINGLE VIEW AP OR PA) CLINICAL HISTORY: Shortness of breath MQ:  XC1_5 Comparison:  07/12/2024, 07/09/2024 RESULT: See impression.    IMPRESSION: A new left PICC appears in satisfactory position. The  cardiopulmonary exam is not significantly changed Marked colonic distention again contributes to an appearance concerning for pneumoperitoneum.  This has been previously questioned and excluded on prior imaging.  If there are concerning findings on abdominal exam, repeat CT can be considered. : [facility]   Transcribe Date/Time: Jul 21 2024  4:42A Dictated by : JENSEN JOHNSON MD This examination was interpreted and the report reviewed and electronically signed by: JENSEN JOHNSON MD on Jul 21 2024  4:46AM  EST    IR VASCULAR ACCESS TEAM PICC INSERTION RADIO (AK,AV,EU,FV,HL,GALLO,MM,SP,UN)    Result Date: 7/17/2024  Sada Miranda RN     7/17/2024 10:32 AM PICC NURSE INSERTION NOTE DATE OF PROCEDURE: July 17, 2024   TIME OF PROCEDURE: 1020 ORDERING PHYSICIAN: Kimberly Mcintyre INFORMED CONSENT: Obtained per hospital policy. INDICATION FOR LINE PLACEMENT: IV access TPN CONDITION OF LINE PLACEMENT: Sterile PRIMARY PROCEDURALIST: Thu Kelly RN ASSISTANT: Sada Miranda RN PRE-PROCEDURE REVIEW ALLERGIES Allergen Reactions  Ativan [Lorazepam]   Intolerance   Increased agitation and partial complex seizures involving full body tonic/clonic activity    Keflex [Cephalexin]  Intolerance   Increased diarrhea seizure activity, rash and redness    Vimpat [Lacosamide]  Intolerance   Increased lethargy, nonresponsiveness to tactile and verbal stimulating stimuli    Adhesive Tape (Margo* Unknown   All plastic tape, band aids, EKG leads stat locks (anything on the skin for an extended period of time)    Atrovent [Ipratropi* Rash, Intolerance   Facial rash and increased heart rate    Bactrim [Sulfametho* Rash, Hives  Latex                Intolerance   Increased urinary infections    Nitrofurantoin       Rash   Facial rash and redness    Pepcid [Famotidine]  Intolerance   Increased diarrhea  Protonix [Pantopraz* Intolerance   Diarrhea, hypokalemia, hypomagnesia    Pulmicort [Budesoni* Rash, Intolerance    Facial rash and increased heart rate  Ranitidine           Rash   Facial rash and redness  Sulfa (Sulfonamide * Rash, Hives  Vancomycin           Unknown   Must be administered slowly- skin rash  Zosyn [Piperacillin* Other: See Comments   Scattered blisters on back (erythema multiforme)   Known History of Upper Venous Thrombosis: No Known History of Permanent Pacemaker or Automated Implanted Cardiac Device: No Previous Breast Surgery of Lymph Node Dissection: No Estimated Glomerular Filtration Rate Date Value Ref Range Status 07/17/2024 138 >=60 mL/min/1.73m² Final   Comment:   Estimated Glomerular Filtration Rate (eGFR) is calculated using the 2021 CKD-EPI creatinine equation. This equation utilizes serum creatinine, sex, and age as parameters. The creatinine assay has traceable calibration to isotope dilution-mass spectrometry. Refer to KDIGO guidelines for clinical interpretation. In patients with unstable renal function, e.g. those with acute kidney injury, the eGFR may not accurately reflect actual GFR. eGFR- Date Value Ref Range Status 05/10/2019 >60 >60 Final History of Renal Disease: No Ultrasound Assessment Complete: Yes PROCEDURE NARRATIVE SAFE PRACTICE Hand Hygiene per Hospital Policy: Yes Skin Preparation Unit Dose Applicator Used: Chloraprep (CHG + alcohol), allowed to dry. Procedure Surface Cleansed with Antimicrobial Wipes: Yes Barriers Used by Proceduralist and all Assisting Personnel: Yes UNIVERSAL PROTOCOL / SAFETY CHECKLIST Procedure to be Performed: Left Arm PICC line insertion with ultrasound guidance Sign In: A Moment of CARE was completed. Personnel directly involved with the procedure wore the appropriate PPE (Personal Protective Equipment). Patient/Surrogate Stated/Verified: PATIENT VERIFIED(optional for EMERGENT procedures): Patient name, Date of Birth, Relevant   allergies, and The intended procedure Time Out Communication: Intended patient and procedure match the source  documents. Consent documented and matches the intended procedure. Relevant labs, photos, and/or imaging studies have been reviewed. Correct side/site marked and visible. Medications required for procedure verified. No fire risk assessment and interventions applicable. Sign Out: SIGN OUT (optional for EMERGENT procedures): No specimen collected. Sada Miranda RN   CATHETER PLACEMENT Brand:  [employer]                  Lot:  [id number] Number of Lumens: 2 Type of PICC: Power Injectable PICC Lumen Size: 5 [place] PLACEMENT TECHNIQUE Lidocaine: Yes,  Lidocaine 1%  Volume 2 mL Subcutaneous Modified Seldinger Technique Used to Place Line via the Left Basilic Ultrasound Guidance: Yes Number of Attempts at Insertion: 1 Ensured control of guidewire during all aspects of the procedure: Yes Accounted for entire guidewire upon removal: Yes Internal Length: 42 cm     External Length: 1 cm      Trim Length: 43 cm Mid-Arm Circumference: 33 centimeters Post Insertion Pain Level Related to Procedure: 0 Action Taken to Address Pain: None needed Verified Placement: Blood return and flushes with ease and Tip location system or device indicates the tip is located in the SVC/CAJ. Line was Flushed with 20 mL normal saline Line Secured with: Securement device Sterile Dressing Applied and Dated: Yes Sterile Caps on all Ports Prior to Leaving Procedure Area: Yes, Disinfection caps applied SPECIMENS: None COMPLICATIONS: None Patient Education Materials: Placed in chart [facility] Line Insertion checklist was utilized during this procedure. QUESTIONS or PROBLEMS: Call [phone] SIGNATURE: Sada Miranda RN PATIENT NAME: Israel Edgar DATE: July 17, 2024 MRN: [id number] TIME: 10:20 AM PAGER/CONTACT PHONE: [phone]     IR VASCULAR ACCESS TEAM PICC INSERTION RADIO (AK,AV,EU,FV,HL,GALLO,MM,SP,UN)    Result Date: 7/16/2024  Gertrude Mitchell RN     7/16/2024  4:23 PM PICC NURSE INSERTION NOTE DATE OF PROCEDURE: July 16, 2024   TIME OF  PROCEDURE: 1600 ORDERING PHYSICIAN: Kimberly Jane NP INFORMED CONSENT: Obtained per hospital policy. INDICATION FOR LINE PLACEMENT: TPN CONDITION OF LINE PLACEMENT: Sterile PRIMARY PROCEDURALIST: Gertrude Mitchell RN ASSISTANT: Thanh Hightower RN COMPLICATIONS: Yes, went to bedside to place PICC. Upon assessment patient has no veins that can accessed successfully due to contractures. Bedside RN and Kimberly Jane NP updated. QUESTIONS or PROBLEMS: Call [phone] SIGNATURE: Gertrude Mitchell RN PATIENT NAME: Israel Edgar DATE: July 16, 2024 MRN: [id number] TIME: 4:22 PM PAGER/CONTACT PHONE:      XR abdomen 2 views supine and erect or decub    Result Date: 7/16/2024  * * *Final Report* * * DATE OF EXAM: Jul 16 2024  6:29AM   FVX   5289  -  XR ABDOMEN 1V SUPINE  / ACCESSION #  [id number] PROCEDURE REASON: Abdominal distension      * * * * Physician Interpretation * * * *  HISTORY: Abdominal distension   TECHNIQUE:  XR ABDOMEN 1V SUPINE COMPARISON:  Previous day RESULT: No significant change in gaseous dilatation of colon with distal ascending colon measuring up to approximately 11 cm.    IMPRESSION: No significant change. : [facility]   Transcribe Date/Time: Jul 16 2024  7:55A Dictated by : ALBA FAGAN MD This examination was interpreted and the report reviewed and electronically signed by: ALBA FAGAN MD on Jul 16 2024  8:23AM  EST    XR abdomen 2 views supine and erect or decub    Result Date: 7/15/2024  * * *Final Report* * * DATE OF EXAM: Jul 15 2024  1:25PM   FVX   5289  -  XR ABDOMEN 1V SUPINE  / ACCESSION #  [id number] PROCEDURE REASON: Abdominal distension      * * * * Physician Interpretation * * * *  EXAMINATION:  XR ABDOMEN 1V SUPINE CLINICAL HISTORY: Abdominal distension Technique:   XR ABDOMEN 1V SUPINE -- with 1 views on 3 images Comparison: X-ray 07/15/2024 RESULT: Significant, diffuse gas-distention of the colon.  Transverse colon measures up to 11.4 cm.  Similar to prior  study.    IMPRESSION: Significant, diffuse gaseous distention of the colon, similar to prior study. : [facility]   Transcribe Date/Time: Jul 15 2024  3:41P Dictated by : LOS RUSSO MD This examination was interpreted and the report reviewed and electronically signed by: LOS RUSSO MD on Jul 15 2024  3:44PM  EST    XR abdomen 2 views supine and erect or decub    Result Date: 7/15/2024  * * *Final Report* * * DATE OF EXAM: Jul 15 2024  8:30AM   FVO   5289  -  XR ABDOMEN 1V SUPINE  / ACCESSION #  [id number] PROCEDURE REASON: Abdominal distension      * * * * Physician Interpretation * * * *  HISTORY: Abdominal distention   TECHNIQUE: Portable frontal supine views of the abdomen were performed. Two images were archived. COMPARISON: KUB dated 07/14/2024, 07/13/2024, 07/12/2024 RESULT: Diffuse gaseous colonic distention remains unchanged.  No abnormal calcifications are identified.  There are multiple pelvic phleboliths, unchanged.  There is mild degenerative change of the lower lumbar spine, stable.    IMPRESSION: NO SIGNIFICANT INTERVAL CHANGE SINCE 07/14/2024. : [facility]   Transcribe Date/Time: Jul 15 2024  9:08A Dictated by : LARA NOVOA MD This examination was interpreted and the report reviewed and electronically signed by: LARA NOVOA MD on Jul 15 2024  9:10AM  EST    XR abdomen 2 views supine and erect or decub    Result Date: 7/15/2024  * * *Final Report* * * DATE OF EXAM: Jul 14 2024  6:11PM   FVX   5289  -  XR ABDOMEN 1V SUPINE  / ACCESSION #  [id number] PROCEDURE REASON: Ileus vs obstruction      * * * * Physician Interpretation * * * *  EXAMINATION:  XR ABDOMEN 1V SUPINE CLINICAL HISTORY: Ileus vs obstruction Technique:   XR ABDOMEN 1V SUPINE -- with 1 views on 1 images Comparison: X-ray 07/14/2024 RESULT: Diffuse gaseous distention of loops of large and small bowel throughout the abdomen and pelvis.  Degree of distention of the colon appears improved although the degree of  distention of the small bowel appears worsened.  Transverse colon measures up to 8.7 cm.    IMPRESSION: Diffuse gaseous distention of loops of large and small bowel throughout the abdomen and pelvis.  The degree of distention in the colon is improved but degree of distention of the small bowel is worsened. : [facility]   Transcribe Date/Time: Jul 15 2024  8:04A Dictated by : LOS RUSSO MD This examination was interpreted and the report reviewed and electronically signed by: LOS RUSSO MD on Jul 15 2024  8:06AM  EST    FLEXIBLE SIGMOIDOSCOPY    Result Date: 7/14/2024  [facility] Gastrointestinal Endoscopy Patient Name: Israel Edgar Procedure Date: 7/14/2024 1:51 PM MRN: [id number] Account Number: [id number] YOB: 1980 Admit Type: Inpatient Age: 43 Room:  GI TRAVEL CART Gender: Male Note Status: Finalized Attending MD: Schuyler Chairez MD, [id number] Procedure:                Flexible Sigmoidoscopy Indications:              Colonic Pseudo-obstruction Providers:                Schuyler Chairez MD, Jeannine Vergara RN, Pat Padron RN (assisting nurse) Patient Profile:          This is a 43 year old male. Referring Physician:      Schuyler Chairez MD (Referring MD) Medicines:                Fentanyl 50 micrograms IV Complications:            No immediate complications. Procedure:                Pre-Anesthesia Assessment:                           - Prior to the procedure, a History and Physical                           was performed, and patient medications and                           allergies were reviewed. The patient is unable                           to give consent secondary to the patient's                           altered mental status. The risks and benefits of                           the procedure and the sedation options and risks                           were discussed with the patient's mother. All                           questions were  answered and informed consent was                           obtained. Patient identification and proposed                           procedure were verified by the physician and the                           nurse in the procedure room. Mental Status                           Examination: lethargic. Airway Examination:                           normal oropharyngeal airway and neck mobility.                           Respiratory Examination: clear to auscultation.                           CV Examination: normal. Prophylactic                           Antibiotics: The patient does not require                           prophylactic antibiotics. Prior Anticoagulants:                           The patient has taken no anticoagulant or                           antiplatelet agents. ASA Grade Assessment: IV -                           A patient with severe systemic disease that is a                           constant threat to life. After reviewing the                           risks and benefits, the patient was deemed in                           satisfactory condition to undergo the procedure.                           The anesthesia plan was to use moderate sedation                           / analgesia (conscious sedation). Immediately                           prior to administration of medications, the                           patient was re-assessed for adequacy to receive                           sedatives. The heart rate, respiratory rate,                           oxygen saturations, blood pressure, adequacy of                           pulmonary ventilation, and response to care were                           monitored throughout the procedure. The physical                           status of the patient was re-assessed after the                           procedure.                           After obtaining informed consent, the scope was                           passed under direct vision. The Endoscope was                            introduced through the anus and advanced to the                           descending colon. The Colonoscope was introduced                           through the anus and advanced to. The flexible                           sigmoidoscopy was accomplished without                           difficulty. The patient tolerated the procedure                           well. The quality of the bowel preparation was                           fair. Moderate Sedation:      Moderate (conscious) sedation was administered by the nurse and      supervised by the endoscopist. The following parameters were      monitored: oxygen saturation, heart rate, blood pressure, and      response to care. Total physician intraservice time was 3 minutes.      The administration of moderate sedation was initiated at 14:14 PM. Total Procedure Duration: 0 hours 3 minutes 43 seconds Findings:      The perianal and digital rectal examinations were normal.      The lumen of the sigmoid colon and descending colon was significantly      dilated. Air was suctionned out as much as possible.      The exam was otherwise without abnormality. Impression:               - Preparation of the colon was fair.                           - Dilated in the sigmoid colon and in the                           descending colon.                           - The examination was otherwise normal.                           - No specimens collected. Recommendation:           - Continue present medications.                           - Return patient to ICU for ongoing care. Procedure Code(s):        --- Professional ---                           34467 Diagnosis Code(s):        --- Professional ---                           K59.39 CPT copyright 2021 American Medical Association. All rights reserved. The codes documented in this report are preliminary and upon  review may be revised to meet current compliance requirements. Attending Participation:       I personally performed the entire procedure. Scope In: 2:15:33 PM Scope Out: 2:19:16 PM MD Schuyler Oliveira MD 7/14/2024 2:24:07 PM This report has been signed electronically by Schuyler Chairez MD Number of Addenda: 0 Note Initiated On: 7/14/2024 1:51 PM Estimated Blood Loss:     Estimated blood loss: none.    XR abdomen 2 views supine and erect or decub    Result Date: 7/14/2024  * * *Final Report* * * DATE OF EXAM: Jul 14 2024  7:34AM   FVX   5289  -  XR ABDOMEN 1V SUPINE  / ACCESSION #  [id number] PROCEDURE REASON: Ileus vs obstruction      * * * * Physician Interpretation * * * *  EXAMINATION:  XR ABDOMEN 1V SUPINE CLINICAL HISTORY: Ileus vs obstruction Technique:   XR ABDOMEN 1V SUPINE -- NOT APPLICABLE with 1 views on 2 images Comparison: X-rays dated 07/13/2024, 7/12/2024 and 7/11/2024.  CT the abdomen pelvis dated 07/12/2024. RESULT: Slight interval increase in caliber of dilated LEFT mid abdominal dilated bowel loop (colonic loop) when compared with 07/13/2024 where it measured 13.2 cm in caliber, but similar compared with 07/12/2024, measuring 14.6 cm in caliber.  Stable dilated colonic loop in the RIGHT upper quadrant when compared with both prior exams measuring approximately 12.0 cm.   Although the walls of the colon are outlined, appearance is unchanged and free air was not appreciated on CT dated 07/12/2024.  Radiodensity in the pelvis consistent with reported clip from endoscopy. Boss catheter is thought to be in place.  The previously seen/reported gastrostomy catheter may be in place just to the RIGHT of the midline at the L1 level, however it cannot be confirmed with certainty.    IMPRESSION: Slightly increased caliber of LEFT-sided colonic loop dilation when compared with 07/13/2024 but similar when compared with 07/12/2024, as described above.  Again, this may related to pseudoobstruction/[place] syndrome or mechanical, distal large bowel obstruction. Limited evaluation for  pneumoperitoneum, however the appearance is unchanged when compared with prior exams and pneumoperitoneum was not noted on CT dated 07/12/2024. : [facility]   Transcribe Date/Time: Jul 14 2024  7:39A Dictated by : ARMANDO GARRETT MD This examination was interpreted and the report reviewed and electronically signed by: ARMANDO GARRETT MD on Jul 14 2024  7:45AM  EST    XR abdomen 2 views supine and erect or decub    Result Date: 7/13/2024  * * *Final Report* * * DATE OF EXAM: Jul 13 2024  7:09AM   FVX   5289  -  XR ABDOMEN 1V SUPINE  / ACCESSION #  [id number] PROCEDURE REASON: Abdominal distension      * * * * Physician Interpretation * * * *  TECHNIQUE: Portable supine abdomen HISTORY:  Colonic diameter. COMPARISON STUDY: 07/12/2024 RESULT:  There are multiple superimposed dilated bowel loops limiting evaluation.  There is a dilated bowel loop in the left upper quadrant measuring 13.2 cm in caliber (14.7 cm compared to the recent x-ray.     The PEG tube identified in the previous study is not visualized with certainty.  From this examination I cannot exclude free air however the recent CT scan did not demonstrate free air. There is a Boss catheter in place.    IMPRESSION:  1. Multiple dilated bowel loops demonstrating slight interval improvement.  From this examination I cannot exclude free air however the recent CT scan demonstrated no pneumoperitoneum. : [facility]   Transcribe Date/Time: Jul 13 2024 10:14A Dictated by : EFRA FREDERICK MD This examination was interpreted and the report reviewed and electronically signed by: EFRA FREDERICK MD on Jul 13 2024 10:18AM  EST    XR chest 1 view    Result Date: 7/12/2024  * * *Final Report* * * DATE OF EXAM: Jul 12 2024  6:45AM   FVX   5376  -  XR CHEST 1V FRONTAL PORT  / ACCESSION #  [id number] PROCEDURE REASON: Other      * * * * Physician Interpretation * * * *  EXAMINATION:  XR CHEST 1V FRONTAL PORT CLINICAL HISTORY: Other, Looking for free air under  the hemidiaphragm Technique:   XR CHEST 1V FRONTAL PORT Comparison: Chest radiograph 07/09/2024, abdominal radiograph 07/12/2024 and CT abdomen pelvis without contrast 07/09/2024 RESULT/    IMPRESSION: Lines, tubes and devices: None. Lungs and pleura: Low lung volumes with bibasilar atelectasis.  No large pleural effusions. Cardiomediastinal silhouette: Within normal limits. Upper abdomen: Increased lucency in the upper abdomen similar to the prior exam.  Free air is difficult to exclude given the diffuse colonic obstruction.  If the patient's clinical status has changed and there is persistent concern for free air CT abdomen and pelvis is recommended for further evaluation. : [facility]   Transcribe Date/Time: Jul 12 2024  6:45A Dictated by : ALLEN SALCEDO MD This examination was interpreted and the report reviewed and electronically signed by: ALLEN SALCEDO MD on Jul 12 2024  6:52AM  EST    XR abdomen 2 views supine and erect or decub    Result Date: 7/12/2024  * * *Final Report* * * DATE OF EXAM: Jul 12 2024  5:16AM   FVX   [id number]  -  XR ABDOMEN 1V SUPINE  / ACCESSION #  [id number] PROCEDURE REASON: Abdominal distension      * * * * Physician Interpretation * * * *  EXAMINATION:  XR ABDOMEN 1V SUPINE CLINICAL HISTORY:   abdominal distension Technique:   XR ABDOMEN 1V SUPINE -- NOT APPLICABLE with 1 views on 3 images DATE:7/12/2024 5:22 AM Comparison: 07/11/2024 abdominal radiograph (07/09/2024 abdominal CT. RESULT: Boss catheter is detected in the pelvis.  Calcified phlebolith seen in the pelvis. Redemonstration colonic distention, increased since the previous radiograph. Intra-abdominal free air cannot be excluded on this supine radiograph.   Please correlate clinically and if needed abdominal CT or upright view of the chest can be considered.    IMPRESSION:Redemonstration colonic distention, increased since the previous radiograph. Intra-abdominal free air cannot be excluded on this supine  radiograph.   Please correlate clinically and if needed abdominal CT or upright view of the chest can be considered. URGENT RESULTS Acuity: Urgent Communication:  Communicated with Dr. Carrillo on  06:02  via verbal communication. --END OF FINDING-- : GIL   Transcribe Date/Time: Jul 12 2024  5:22A Dictated by : DARLYN NEWTON MD This examination was interpreted and the report reviewed and electronically signed by: DARLYN NEWTON MD on Jul 12 2024  6:02AM  EST  .     Assessment/Plan   Principal Problem:    Shock (Multi)  Israel Edgar is a 43 y.o. male with PMH significant for advanced MS with spasticity at baseline, neurogenic bladder with chronic little, epilepsy with questionable breakthrough seizures, DVT, PE, subtotal colectomy with end ileostomy, MRSA colonization, MRDO pneumonia, HTN, quadriplegia, depression, CVA, dysphagia, previous tracheostomy who is admitted to MICU for acute hypoxic respiratory failure on BiPAP, c/f sepsis not requiring pressors. Neurology Epilepsy consulted for concern for seizures. Patient's mother provided video demonstrating patient's typical seizures. Episode of foaming at the mouth without his stereotyped movements is not consistent with seizures. Furthermore, preliminary EEG report consistent with a right frontal epileptogenic lesion. No seizures seen. Although AED levels low on admission, given there have been no seizures captured, there is no indication to load or increase his AEDs at this time.     Recommendations  - Continue video EEG  - Continue Tegretol 300 mg 10 AM, 150 mg 4 pm, and 300 mg at 2200  - Continue Phenytoin 100 mg TID  - Continue Keppra 2000 mg BID    Patient discussed with attending physician, Dr. Ontiveros.    Sushma Venegas MD  Child Neurology PGY3     ==========================    Senior addendum:  History and examination contributed to, and note reviewed and edited as appropriate by Altaf Martin, Neurology resident,  PGY-3      ===================    Post staffing:  EEG with right frontal PLEDs and patient is having some intermittent clonic movements on the left arm Unclear if time locked with EEG findings. Will place EMG leads for clarification.     - Please load with fosphenytoin 20mg/kg now and continue on PTH 100mg Q8   - Please obtain troph PTH level 8/12 AM   - [id number]   Darshan Welch MD     I saw and evaluated the patient. I personally obtained the key and critical portions of the history and physical exam or was physically present for key and critical portions performed by the resident/fellow. I reviewed the resident/fellow's documentation and discussed the patient with the resident/fellow. I agree with the resident/fellow's medical decision making as documented in the note.    Maranda Mosher MD

## 2024-08-10 NOTE — HOSPITAL COURSE
Isarel Edgar is a 43 y.o. male patient with PMH of advanced MS with spasticity at baseline, neurogenic bladder with chronic little, epilepsy with questionable breakthrough seizures, DVT, PE, MRSA colonization, MRDO pneumonia, HTN, quadriplegia, depression, CVA, dysphagia, previous tracheostomy. Admitted from [facility] to the MICU on 08/10/24 for acute hypoxic respiratory failure likely secondary to MRD Pseudomonas versus pulmonary embolism. Of note, patient was recently admitted to [facility] with concerns for SBO, s/p colonic decompression, neostigmine x2, subtotal colectomy with endo ileostomy at OSH. Also previously treated for MDR Pseudomonas and UTI with several antibiotics in succession at [facility] and on discharge to [facility], including meropenem, ceftaroline, flagyl, micafungin, and ceftazidime/avibactam.  He developed a fever, tachycardia and hypoxia.  He had episode of status epilepticus and was gien versed (allergy to lorazepam) and was transferred to [facility] for further care.    Originally requiring HFNC, then transitioned to BiPAP after decompensation at [facility]. He was transferred to [facility] MICU on 8/10 and was initially on bipap but was tachypneic with desaturations so placed on HFNC.  He remained febrile, tachycardic, and saturating in the low 90s; vancomycin was started  (8/10-14) as well as ceftazidime/avibactam (8/10-16) as ID approval. restarted for MRSA and MDR pseudomonas .    Concerns for potential PE despite being on therapeutic Lovenox (per report a right side PE was found at [facility]; On Ct a/p on 7/29 previous seen RLL pulmonary emboli were no longer seen and improvement in RML infiltrate and bilateral lower lobe infiltrates and GGO improved.  On arrival here however, patient unstable to obtain CT PE initially but CT PE study on 8/14 did not show PE in main pulm artery or its branches. But concern for aspiration pneumonitis vs multifocal infection of lower lobes left > right.   There  was concern for seizures after found to have subtherapeutic AED levels, currently on continuous EEG monitoring without epileptiform discharges seen. It did show a right  frontal epileptogenic lesion.  On arrival his dilantin level was low, dilantin and keppra restarted.     Carbamazepine restarted on 8/14.    ID notified that blood culture grew microbacterium which is likely a contaminant .  Vanco was stopped.    He was weaned from airvo to 10 L NC on 8/18 then down to 3-4L  with bipap at night.   On 8/19 he was transferred to SDU for continuation of care.  He is continuing to get aggressive bronchial hygiene.  He was weaned to room air and is stable on.  His BP decreased on 8/25 so 750 ml IVF given with improvement in blood pressure.

## 2024-08-10 NOTE — CONSULTS
Vancomycin Dosing by Pharmacy- INITIAL    Israel Edgar is a 43 y.o. year old male who Pharmacy has been consulted for vancomycin dosing for pneumonia. Based on the patient's indication and renal status this patient will be dosed based on a goal AUC of 400-600.     Renal function is currently stable.    Visit Vitals  BP (!) 156/124 (BP Location: Right arm, Patient Position: Lying)   Pulse (!) 118   Temp 37.9 °C (100.2 °F) (Temporal)   Resp (!) 29        Lab Results   Component Value Date    CREATININE 0.44 (L) 2024    CREATININE 0.45 (L) 2024    CREATININE 0.51 2024    CREATININE 0.39 (L) 2024        Patient weight is as follows:   Vitals:    08/10/24 0922   Weight: 64.9 kg (143 lb 1.3 oz)       Cultures:  No results found for the encounter in last 14 days.        No intake/output data recorded.  I/O during current shift:  No intake/output data recorded.    Temp (24hrs), Av.4 °C (99.4 °F), Min:36.7 °C (98.1 °F), Max:37.9 °C (100.2 °F)         Assessment/Plan     Patient will be given a loading dose of 1750 mg.  Will initiate vancomycin maintenance,  1250 mg every 12 hours.    Loading dose: 1750 mg at 13:00 08/10/2024.  Regimen: 1250 mg IV every 12 hours.  Start time: 01:00 on 2024  Exposure target: AUC24 (range)400-600 mg/L.hr   AUC24,ss: 463 mg/L.hr  Probability of AUC24 > 400: 65 %  Ctrough,ss: 12.4 mg/L  Probability of Ctrough,ss > 20: 19 %  Probability of nephrotoxicity (Lodise ELEAZAR ): 8 %    Follow-up level will be ordered on 24 at 10:00 unless clinically indicated sooner.  Will continue to monitor renal function daily while on vancomycin and order serum creatinine at least every 48 hours if not already ordered.  Follow for continued vancomycin needs, clinical response, and signs/symptoms of toxicity.       Juanita Booth, PharmD

## 2024-08-10 NOTE — SIGNIFICANT EVENT
"Preliminary EEG Report     This vEEG is consistent with a right frontal epileptogenic lesion. No seizures seen.     This EEG was read from 1448 to 1509 on 08/10/24 .     The final impression will be available tomorrow under Chart Review in the Media tab. If the plan is to discontinue the video EEG, please place a \"Discontinue Continuous VEEG\" order in the EMR; otherwise the EEG tech will not receive the notification.      Yung Hernandez DO  Adult Epilepsy Fellow  [facility] [place]     "

## 2024-08-10 NOTE — H&P
Medical Intensive Care - History and Physical   Subjective    Israel Edgar is a 43 y.o. male patient admitted on 8/10/2024 with following ICU needs: Acute hypoxic respiratory failure on BiPAP, c/f sepsis not requiring pressors    HPI:  Israel Edgar is a 43 y.o. male with PMH significant for advanced MS with spasticity at baseline, neurogenic bladder with chronic little, epilepsy with questionable breakthrough seizures, DVT, PE, subtotal colectomy with end ileostomy, MRSA colonization, MRDO pneumonia, HTN, quadriplegia, depression, CVA, dysphagia, previous tracheostomy. History obtained from chart review.    At [facility], patient was admitted on 7/9/24 from [facility] for breakthrough seizures and c/f ogilvies/small bowel obstruction secondary to chronic neurogenic bowel. Status post neostigmine x2 on 7/10. He underwent decompressive colonoscopy 7/15 followed by subtotal colectomy and end ileostomy with mucous fistula on 7/17. Post-op course further complicated by right sided PE treated with therapeutic anticoagulation and partial SBO/ileus managed conservatively with venting of his previously placed gastrostomy tube.  Found also to have UTI and pneumonia treated with Meropenem (7/21-7/26). Transferred to SICU on 7/30 following prolonged status epilepticus. Continued concerns for sepsis given fevers, ID recommended 5 days Teflaro and Flagyl through (7/29-8/5). Sputum cultures positive for MDR Pseudomonas. Transferred to [facility] per family request despite fever and pending Bcx after medical team suggested removing PICC line. Patient was febrile on arrival to [facility] 8/3.    At [facility], patient continued to be febrile and tachycardic. He was started on meropenem and subsequently switched to Ceftazidime / Avibactam on 8/10 after worsening procalcitonin levels. 8/3 sputum cultures again positive for MDR Pseudomonas and Proteus. Resp therapy noting thick yellow secetions. Patient  "had worsening respiratory failure requiring HHFNC. Fevers worsened to Tmax 102 on 8/9. Pt then had a seizure 8/9, given total 5mg Versed to break status epilepticus.  Patient's BP was stable with SBP >120 therefore another 2mg of Versed given. Eventually placed on BiPAP after he became hypoxic. Transferred to the [facility] for a higher level of care and per mother's request.    In the [facility] MICU, patient seen and examined at bedside. Patient is dyspneic and hypoxic on BiPAP, desaturations with SpO2 into the 70s/80s. Tachycardic in the 130s/140s. Placed on HFNC with improvement in SpO2 to low 90s. Patient provided 1L LR bolus. Improvement in tachycardia as well to the 110s. Questionable seizures after having increased contractions and foaming at the mouth.    Code status: Per [facility], pt is full code but no intubation. Spoke with mother (documented POA) Vee WatsonPatrick at the bedside. She states he would \"want everything done until God says its time.\" Attempted to clarify code status multiple times, rephrasing the question to ensure understanding; however, mother was avoidant to question, continuing to state that he needs to have CT scans and fluids provided because he is in sepsis. After discuss with another resident physician, Dr. Helga Cannon, mother requests that patient be FULL CODE.    Of note, the following was documented from Dr. Bren Fischer (SICU) about the mother's behavior at [facility]:    8/2/24: \"Unfortunately, the patient's mother has not been agreeable for transfer. I spoke with her over the phone earlier today and she agreed to come in this afternoon. Upon arrival, her behavior resulted in her being escorted out of the hospital. At this time, the patient will remain in the ICU.\"    8/3/24: \"At 11:00 AM, I called the patient's mother for an update due to the overnight fever. I recommended removal of PICC and replacement with a temporary IJ central line. She stated " "that \"my son does not have a fever\" and questioned the validity of our monitors and objective data. Mrs Edgar was concerned about \"michael nurses\" taking care of the patient here in the ICU at . She does not trust that there is an infection related to the PICC. She does not consent to removal of the PICC. Later in the conversation, she asked that her son be transferred to another facility ASAP. I asked her to clarify which facility and she made it clear that she only agrees to '[facility].' I reviewed ongoing workup regarding the fever and pending blood cultures. She voiced understanding, but requested transfer regardless. I did warn her about the potential for re-admission. In brief, Mrs Edgar thinks her son is appropriate for transfer at this time.\"    Review of Systems:  Review of Systems   Unable to perform ROS: Patient nonverbal          Meds    Home medications:  Current Outpatient Medications   Medication Instructions    acetaminophen (TYLENOL) 650 mg, oral, Every 6 hours, Pt takes med at : 9a/3p/10p/3a    ALPRAZolam (XANAX) 0.5 mg, g-tube, As needed, TABLET SHOULD BE CRUSHED AND GIVEN VIA G-TUBE    artificial tears, dextran-hypomel-glycerin, 0.1-0.3-0.2 % ophthalmic solution 1-2 drops, Both Eyes, 2 times daily, at 9 am and 3 pm while awake/ SLEEP    aspirin 81 mg, g-tube, Daily, at 4 pm    baclofen (Lioresal) 10 mg tablet TAKE 1 TABLET DAILY  With food at 10am    baclofen (LIORESAL) 20 mg, g-tube, 3 times daily, With food at 10am,- 4pm- 10pm    cholecalciferol (VITAMIN D-3) 4,000 Units, g-tube, Daily, AT 9am    diazePAM (VALIUM) 15 mg, intramuscular, As needed for seizure activity lasting for more than 3 minutes, and if seizures persist may repeat 3 times, then call 911. Do not Mix with Nayzilam.     docusate sodium (COLACE) 100 mg, g-tube, 2 times daily, AT 9AM AND 2100 HOURS    hydrocortisone 2.5 % ointment Topical, 2 times daily PRN, Apply to scalp and / or face area for seborrheic dermatitis (scalp flaking " and reddended facial areas) 2 times a day, As Needed    levETIRAcetam (KEPPRA) 2,000 mg, g-tube, 2 times daily, Pt takes this medication at 0900 and 2100.    medical supply, miscellaneous (MISCELLANEOUS MEDICAL SUPPLY MISC) topical (top), Water flushes-give 300 mL with each med Pass [150 mL before and 150 mL after]    mineral oil-hydrophilic petrolatum (Aquaphor) ointment Topical, 2 times daily, And after each bowel moment. Apply on the buttocks    nasal spray midazolam (Versed) 5 mg/spray (0.1 mL) spray,non-aerosol Use one spray in one nostril for convulsive seizure lasting longer than 5 minutes. May repeat a in the other nostril after 5 minutes if convulsive seizures still ongoing CALL 911    nutritional supplements (Osmolite 1.5 Oli) 0.06 gram-1.5 kcal/mL liquid Give 360ml at 10AM and 4PM. And 1 carton at 10PM (with meds and water)    phenytoin 125 mg/5 mL suspension Take 4ml by PEG tube 3 times a day AT 9AM, 3PM AND 9PM    selenium sulfide (Selsun) 2.5 % shampoo 1 Application, Topical, Once Weekly, Use every other day during a seborrheic dermatitis outbreak or as needed    senna (Senokot) 8.8 mg/5 mL syrup TAKE 10 ML BY MOUTH TWICE DAILY 2 HOURS BEFORE OR 2 HOURS AFTER OTHER MEDS( I.e, 4am and 6PM)    simethicone (Mylicon) 40 mg/0.6 mL drops 2 mL, g-tube, Every 48 hours PRN    TegretoL 100 mg/5 mL suspension Take 15ml in 10 AM, 7.5mL at 4PM and 15ml in 10PM with food ( and baclofen )        Inpatient medications:  Scheduled medications  aspirin, 81 mg, g-tube, Daily  baclofen, 20 mg, g-tube, BID  baclofen, 30 mg, g-tube, q24h  carBAMazepine, 150 mg, g-tube, Daily before evening meal  carBAMazepine, 300 mg, g-tube, BID  cholecalciferol, 4,000 Units, g-tube, Daily  enoxaparin, 40 mg, subcutaneous, q24h  lactated Ringer's, 1,000 mL, intravenous, Once  levETIRAcetam, 2,000 mg, intravenous, q12h  meropenem, 2 g, intravenous, q8h  [Held by provider] metoprolol tartrate, 25 mg, g-tube, q6h  miconazole, , Topical,  "BID  phenytoin, 100 mg, intravenous, q8h  polyethylene glycol, 17 g, oral, Daily  sodium chloride, 4 mL, nebulization, BID  vancomycin, 1,750 mg, intravenous, Once  [START ON 8/11/2024] vancomycin, 1,250 mg, intravenous, q12h  white petrolatum-mineral oiL, , ophthalmic (eye), Daily      Continuous medications     PRN medications  PRN medications: acetaminophen **OR** acetaminophen **OR** acetaminophen, vancomycin     Objective    Blood pressure (!) 156/124, pulse (!) 118, temperature 37.9 °C (100.2 °F), temperature source Temporal, resp. rate (!) 29, height 1.676 m (5' 5.98\"), weight 64.9 kg (143 lb 1.3 oz), SpO2 93%.     Physical Exam  Constitutional:       Comments: Non-verbal (at baseline)   HENT:      Head: Normocephalic and atraumatic.   Eyes:      Extraocular Movements: Extraocular movements intact.      Pupils: Pupils are equal, round, and reactive to light.   Cardiovascular:      Heart sounds: Normal heart sounds.   Pulmonary:      Effort: Respiratory distress present.      Breath sounds: No stridor. No wheezing.      Comments: Tachypneic, on BiPAP  Musculoskeletal:      Right lower leg: No edema.      Left lower leg: No edema.      Comments: Spastic, flexed extremities. Patient moving around in bed, appears uncomfortable.   Skin:     General: Skin is warm and dry.   Neurological:      General: No focal deficit present.      Mental Status: Mental status is at baseline.          No intake or output data in the 24 hours ending 08/10/24 1254  Net IO Since Admission: No IO data has been entered for this period [08/10/24 1254]   Labs:               Micro/ID:   Lab Results   Component Value Date    URINECULTURE >100,000 Proteus mirabilis (A) 04/07/2024    BLOODCULT  03/14/2023     No Growth at 1 days~No Growth at 2 days~No Growth at 3 days~NO GROWTH at 4 days - FINAL REPORT    BLOODCULT  03/14/2023     No Growth at 1 days~No Growth at 2 days~No Growth at 3 days~NO GROWTH at 4 days - FINAL REPORT       Summary of " Key Imaging Results  CXR 8/10/24: 1. Bibasilar consolidation, left more than right. Correlate with concern for infection. 2. Suggestion of small bilateral pleural effusion.  KUB 8/10/24: No evidence of dilated gas-filled loop of bowel. Paucity of bowel gas which is nonspecific and might be due to fluid-filled loops of bowel.  TTE 8/1/24:  CONCLUSIONS: Technically difficult exam due to body habitus and suboptimal positioning. The left ventricle is normal in size. Left ventricular systolic function is normal. EF = 70 ± 5% (2D biplane) Normal left ventricular diastolic function. The right ventricle is dilated. Right ventricular systolic function is moderately decreased. There are no significant valvular abnormalities. Exam was compared with the prior  echocardiographic exam performed on 02/03/2024, RV better seen and function moderately reduced.    Assessment and Plan     Assessment:  Israel Edgar is a 43 y.o. male patient with PMH of advanced MS with spasticity at baseline, neurogenic bladder with chronic little, epilepsy with questionable breakthrough seizures, DVT, PE, MRSA colonization, MRDO pneumonia, HTN, quadriplegia, depression, CVA, dysphagia, previous tracheostomy. Admitted to the MICU on 08/10/24 for acute hypoxic respiratory failure likely secondary to MRD Pseudomonas versus pulmonary embolism. Originally requiring HFNC, then transitioned to BiPAP after decompensation at LTAC. Now back on HFNC in MICU and saturating in the low 90s. Covering MRSA and MDR pseudomonas with vanc and boo and plan to restart Avycaz 2.5mg q8h after approval from ID. Neurology consulted for epilepsy and concern for seizures.    Mechanical Ventilation: none  Sedation/Analgesia:  none  Restraints: no    Plan:  NEUROLOGY/PSYCH:  #Multiple sclerosis  Continue home Baclofen 20 mg BID  #Epilepsy  #Ativan allergy  -S/p Versed 2 mg after concerns for seizure at bedside, although difficult to assess as patient's extremities are  contracted at baseline. Reportedly was foaming at the mouth with worsening contractions in the extremities and head shaking.  -Phenytoin and Keppra levels subtherapeutic  Management:  Continue Keppra 2000 IV BID, fosphenytoin 100 q8h through G-tube, Carbamazepine 200 daily/300 BID  Ordered continuous video EEG  Neurology consulted for AED management and seizure workup  Continue Versed PRN for breakthrough seizures (given Ativan allergy)    CARDIOVASCULAR:  #Tachycardia in 130s-140s  Hold metop tartrate 25 q6h, unclear indication for this medication. No documented history of atrial fibrillation.  Clear EKG unable to be obtained due to patient restlessness  Management:  S/p 1L bolus LR with improvement in heart rate and hypoxia. Ordered additional 1L bolus (for total 2L).  Continue to monitor and provide additional fluids PRN    PULMONARY:  #AHRF 2/2, concerns for hospital acquired PNA 2/2 MRD Pseudomonas versus acute PE  CXR 8/5/24: No acute radiographic abnormality  CXR 8/10: L>R bibasilar consolidation, appears improved since comparison film  VBG 8/10 11:54am: pH 7.44, CO2 45, lactate 1.5  Management:  Obtain procal  Bronchopulmonary hygiene  Repeat blood, urine, sputum cultures  Intermittent HF between BiPAP  Consider CT PE once stable (FiO2<60%), tachycardia and SpO2 improved    #Hx right PE 7/21/24 while hospitalized  Previously on IV heparin  Now on therapeutic Lovenox  Management:  Continue therapeutic Lovenox    RENAL/GENITOURINARY:  #Chronic little, last replaced date unknown, likely before 7/2024  #Neurogenic bladder secondary to MS  Management:  Replace little  Follow urine cultures    GASTROENTEROLOGY:  #Hx Colonic dilatation, status post neostigmine 7/10 with response. Repeat dose given by GI 7/12.  #Hx ogalvie syndrome  #Hx decompressive colonoscopy 7/15, subtotal colectomy, end ileostomy with mucous fistula on 7/17  #Chronic neurogenic bowel  TPN dependent since 7/17  Giving all medications through IV  except the following through the G-tube: Tegretol, aspirin, baclofen, Vitamin D, Miralax  KUB 8/10 without evidence for bowel obstruction  Management:  Keep NPO pending potential procedures    ENDOCRINOLOGY:  No active issues    HEMATOLOGY:  No active issues    MUSCULOSKELETAL/ SKIN:  No active issues    INFECTIOUS DISEASE:  #C/f sepsis secondary to Pneumonia vs UTI  #Severe penicillin allergy to Zosyn, Keflex, bactrim  - Bcx 8/3: with no growth on 8/5  - Sputum cx 8/4: multidrug resistant Pseudomonas and Proteus mirabilis, sensitivities unknown  - Urine cx 8/3: unknown results from LTAC  - Antibiotic history: Meropenem (7/21-7/26), Ceftaroline (7/29 - 8/3), Flagyl (7/29 - 8/3), Micafungin (unknown dates), Meropenem (8/5 - 8/10) after worsening procal to 0.91, Ceftazidime / Avibactam (8/10 - 8/10)  Management:  ID consulted  Start Vanc and boo, plan to restart Avycaz 2.5mg q8h after approval from ID  Follow up MRSA swab  Follow-up new blood, urine, sputum cultures    ICU Check List     FEN  Fluids: PRN  Electrolytes: PRN  Nutrition: NPO Diet; Effective now   Prophylaxis:  DVT ppx: Home therapeutic Lovenox  GI ppx: None (allergies to Protonix/Pepcid)  Bowel care: Miralax  Hardware:  Catheter: Boss (replaced 8/10)  Access: PIV  Drains: RLQ G-tube to gravity, RLQ Ileostomy  Lines: Left PICC for TPN (placed 7/17)  Social:  Code: Full Code    HPOA: Vee Murray (mother) [phone]   Disposition: JOSUÉ Licona MD   08/10/24 at 12:54 PM     Disclaimer: Documentation completed with the information available at the time of input. The times in the chart may not be reflective of actual patient care times, interventions, or procedures. Documentation occurs after the physical care of the patient.

## 2024-08-11 ENCOUNTER — APPOINTMENT (OUTPATIENT)
Dept: RADIOLOGY | Facility: HOSPITAL | Age: 44
End: 2024-08-11
Payer: MEDICAID

## 2024-08-11 VITALS
OXYGEN SATURATION: 96 % | DIASTOLIC BLOOD PRESSURE: 73 MMHG | HEIGHT: 71 IN | RESPIRATION RATE: 22 BRPM | WEIGHT: 150.57 LBS | TEMPERATURE: 98.1 F | SYSTOLIC BLOOD PRESSURE: 144 MMHG | HEART RATE: 97 BPM | BODY MASS INDEX: 21.08 KG/M2

## 2024-08-11 LAB
ALBUMIN SERPL BCP-MCNC: 2.8 G/DL (ref 3.4–5)
ANION GAP BLDA CALCULATED.4IONS-SCNC: 6 MMO/L (ref 10–25)
ANION GAP SERPL CALC-SCNC: 12 MMOL/L (ref 10–20)
BACTERIA BLD CULT: NORMAL
BACTERIA BLD CULT: NORMAL
BASE EXCESS BLDA CALC-SCNC: 6.4 MMOL/L (ref -2–3)
BASOPHILS # BLD AUTO: 0.08 X10*3/UL (ref 0–0.1)
BASOPHILS NFR BLD AUTO: 1 %
BODY TEMPERATURE: 37 DEGREES CELSIUS
BUN SERPL-MCNC: 12 MG/DL (ref 6–23)
CA-I BLDA-SCNC: 1.23 MMOL/L (ref 1.1–1.33)
CALCIUM SERPL-MCNC: 8.5 MG/DL (ref 8.6–10.6)
CARBAMAZEPINE SERPL-MCNC: 3.7 UG/ML (ref 4–12)
CHLORIDE BLDA-SCNC: 101 MMOL/L (ref 98–107)
CHLORIDE SERPL-SCNC: 100 MMOL/L (ref 98–107)
CO2 SERPL-SCNC: 29 MMOL/L (ref 21–32)
CREAT SERPL-MCNC: 0.3 MG/DL (ref 0.5–1.3)
EGFRCR SERPLBLD CKD-EPI 2021: >90 ML/MIN/1.73M*2
EOSINOPHIL # BLD AUTO: 0.17 X10*3/UL (ref 0–0.7)
EOSINOPHIL NFR BLD AUTO: 2 %
ERYTHROCYTE [DISTWIDTH] IN BLOOD BY AUTOMATED COUNT: 12.5 % (ref 11.5–14.5)
GLUCOSE BLDA-MCNC: 123 MG/DL (ref 74–99)
GLUCOSE SERPL-MCNC: 107 MG/DL (ref 74–99)
HCO3 BLDA-SCNC: 30.5 MMOL/L (ref 22–26)
HCT VFR BLD AUTO: 26.8 % (ref 41–52)
HCT VFR BLD EST: 30 % (ref 41–52)
HGB BLD-MCNC: 9.3 G/DL (ref 13.5–17.5)
HGB BLDA-MCNC: 9.9 G/DL (ref 13.5–17.5)
HOLD SPECIMEN: NORMAL
IMM GRANULOCYTES # BLD AUTO: 0.04 X10*3/UL (ref 0–0.7)
IMM GRANULOCYTES NFR BLD AUTO: 0.5 % (ref 0–0.9)
LACTATE BLDA-SCNC: 0.7 MMOL/L (ref 0.4–2)
LYMPHOCYTES # BLD AUTO: 0.97 X10*3/UL (ref 1.2–4.8)
LYMPHOCYTES NFR BLD AUTO: 11.7 %
MAGNESIUM SERPL-MCNC: 1.9 MG/DL (ref 1.6–2.4)
MCH RBC QN AUTO: 31.1 PG (ref 26–34)
MCHC RBC AUTO-ENTMCNC: 34.7 G/DL (ref 32–36)
MCV RBC AUTO: 90 FL (ref 80–100)
MONOCYTES # BLD AUTO: 1.07 X10*3/UL (ref 0.1–1)
MONOCYTES NFR BLD AUTO: 12.9 %
NEUTROPHILS # BLD AUTO: 5.97 X10*3/UL (ref 1.2–7.7)
NEUTROPHILS NFR BLD AUTO: 71.9 %
NRBC BLD-RTO: 0 /100 WBCS (ref 0–0)
OXYHGB MFR BLDA: 93.9 % (ref 94–98)
PCO2 BLDA: 41 MM HG (ref 38–42)
PH BLDA: 7.48 PH (ref 7.38–7.42)
PHOSPHATE SERPL-MCNC: 3.9 MG/DL (ref 2.5–4.9)
PLATELET # BLD AUTO: 319 X10*3/UL (ref 150–450)
PO2 BLDA: 71 MM HG (ref 85–95)
POTASSIUM BLDA-SCNC: 3.9 MMOL/L (ref 3.5–5.3)
POTASSIUM SERPL-SCNC: 3.8 MMOL/L (ref 3.5–5.3)
RBC # BLD AUTO: 2.99 X10*6/UL (ref 4.5–5.9)
SAO2 % BLDA: 96 % (ref 94–100)
SARS-COV-2 RNA RESP QL NAA+PROBE: NOT DETECTED
SODIUM BLDA-SCNC: 134 MMOL/L (ref 136–145)
SODIUM SERPL-SCNC: 137 MMOL/L (ref 136–145)
WBC # BLD AUTO: 8.3 X10*3/UL (ref 4.4–11.3)

## 2024-08-11 PROCEDURE — 36415 COLL VENOUS BLD VENIPUNCTURE: CPT

## 2024-08-11 PROCEDURE — 82435 ASSAY OF BLOOD CHLORIDE: CPT

## 2024-08-11 PROCEDURE — 95700 EEG CONT REC W/VID EEG TECH: CPT

## 2024-08-11 PROCEDURE — 85025 COMPLETE CBC W/AUTO DIFF WBC: CPT

## 2024-08-11 PROCEDURE — 99291 CRITICAL CARE FIRST HOUR: CPT

## 2024-08-11 PROCEDURE — 84132 ASSAY OF SERUM POTASSIUM: CPT

## 2024-08-11 PROCEDURE — 2500000004 HC RX 250 GENERAL PHARMACY W/ HCPCS (ALT 636 FOR OP/ED)

## 2024-08-11 PROCEDURE — 2020000001 HC ICU ROOM DAILY

## 2024-08-11 PROCEDURE — 87040 BLOOD CULTURE FOR BACTERIA: CPT

## 2024-08-11 PROCEDURE — 2500000004 HC RX 250 GENERAL PHARMACY W/ HCPCS (ALT 636 FOR OP/ED): Performed by: INTERNAL MEDICINE

## 2024-08-11 PROCEDURE — 71045 X-RAY EXAM CHEST 1 VIEW: CPT

## 2024-08-11 PROCEDURE — 2500000004 HC RX 250 GENERAL PHARMACY W/ HCPCS (ALT 636 FOR OP/ED): Mod: JZ

## 2024-08-11 PROCEDURE — 87632 RESP VIRUS 6-11 TARGETS: CPT

## 2024-08-11 PROCEDURE — 84100 ASSAY OF PHOSPHORUS: CPT

## 2024-08-11 PROCEDURE — 87635 SARS-COV-2 COVID-19 AMP PRB: CPT

## 2024-08-11 PROCEDURE — 95715 VEEG EA 12-26HR INTMT MNTR: CPT

## 2024-08-11 PROCEDURE — 37799 UNLISTED PX VASCULAR SURGERY: CPT

## 2024-08-11 PROCEDURE — 94660 CPAP INITIATION&MGMT: CPT

## 2024-08-11 PROCEDURE — 95720 EEG PHY/QHP EA INCR W/VEEG: CPT | Performed by: PSYCHIATRY & NEUROLOGY

## 2024-08-11 PROCEDURE — 2500000005 HC RX 250 GENERAL PHARMACY W/O HCPCS: Performed by: STUDENT IN AN ORGANIZED HEALTH CARE EDUCATION/TRAINING PROGRAM

## 2024-08-11 PROCEDURE — 2500000001 HC RX 250 WO HCPCS SELF ADMINISTERED DRUGS (ALT 637 FOR MEDICARE OP)

## 2024-08-11 PROCEDURE — 83735 ASSAY OF MAGNESIUM: CPT

## 2024-08-11 PROCEDURE — 71045 X-RAY EXAM CHEST 1 VIEW: CPT | Performed by: RADIOLOGY

## 2024-08-11 PROCEDURE — 2500000005 HC RX 250 GENERAL PHARMACY W/O HCPCS

## 2024-08-11 PROCEDURE — 80156 ASSAY CARBAMAZEPINE TOTAL: CPT

## 2024-08-11 PROCEDURE — 74018 RADEX ABDOMEN 1 VIEW: CPT

## 2024-08-11 RX ORDER — ACETAMINOPHEN 10 MG/ML
1000 INJECTION, SOLUTION INTRAVENOUS ONCE
Status: COMPLETED | OUTPATIENT
Start: 2024-08-11 | End: 2024-08-11

## 2024-08-11 RX ADMIN — DEXTROSE MONOHYDRATE 1350 MG PE: 50 INJECTION, SOLUTION INTRAVENOUS at 12:15

## 2024-08-11 RX ADMIN — PHENYTOIN SODIUM 100 MG: 50 INJECTION INTRAMUSCULAR; INTRAVENOUS at 18:18

## 2024-08-11 RX ADMIN — Medication 100 PERCENT: at 00:34

## 2024-08-11 RX ADMIN — MICONAZOLE NITRATE 1 APPLICATION: 20 CREAM TOPICAL at 09:40

## 2024-08-11 RX ADMIN — Medication 4000 UNITS: at 07:19

## 2024-08-11 RX ADMIN — Medication 100 PERCENT: at 20:27

## 2024-08-11 RX ADMIN — DEXTROSE MONOHYDRATE 2000 MG: 50 INJECTION, SOLUTION INTRAVENOUS at 11:57

## 2024-08-11 RX ADMIN — CEFTAZIDIME, AVIBACTAM 2.5 G: 2; .5 POWDER, FOR SOLUTION INTRAVENOUS at 13:31

## 2024-08-11 RX ADMIN — ENOXAPARIN SODIUM 100 MG: 100 INJECTION SUBCUTANEOUS at 13:31

## 2024-08-11 RX ADMIN — Medication 60 L/MIN: at 07:49

## 2024-08-11 RX ADMIN — MINERAL OIL AND WHITE PETROLATUM: 150; 830 OINTMENT OPHTHALMIC at 22:00

## 2024-08-11 RX ADMIN — ACETAMINOPHEN 1000 MG: 1000 INJECTION INTRAVENOUS at 20:15

## 2024-08-11 RX ADMIN — PHENYTOIN SODIUM 100 MG: 50 INJECTION INTRAMUSCULAR; INTRAVENOUS at 03:37

## 2024-08-11 RX ADMIN — CARBOXYMETHYLCELLULOSE SODIUM 2 DROP: 5 SOLUTION/ DROPS OPHTHALMIC at 09:00

## 2024-08-11 RX ADMIN — CEFTAZIDIME, AVIBACTAM 2.5 G: 2; .5 POWDER, FOR SOLUTION INTRAVENOUS at 05:07

## 2024-08-11 RX ADMIN — CEFTAZIDIME, AVIBACTAM 2.5 G: 2; .5 POWDER, FOR SOLUTION INTRAVENOUS at 21:11

## 2024-08-11 RX ADMIN — VANCOMYCIN HYDROCHLORIDE 1250 MG: 5 INJECTION, POWDER, LYOPHILIZED, FOR SOLUTION INTRAVENOUS at 02:05

## 2024-08-11 RX ADMIN — DEXTROSE MONOHYDRATE 2000 MG: 50 INJECTION, SOLUTION INTRAVENOUS at 00:46

## 2024-08-11 RX ADMIN — MICONAZOLE NITRATE: 20 CREAM TOPICAL at 21:00

## 2024-08-11 ASSESSMENT — COGNITIVE AND FUNCTIONAL STATUS - GENERAL
MOVING FROM LYING ON BACK TO SITTING ON SIDE OF FLAT BED WITH BEDRAILS: TOTAL
WALKING IN HOSPITAL ROOM: TOTAL
HELP NEEDED FOR BATHING: TOTAL
TURNING FROM BACK TO SIDE WHILE IN FLAT BAD: TOTAL
PERSONAL GROOMING: TOTAL
EATING MEALS: TOTAL
TOILETING: TOTAL
MOVING TO AND FROM BED TO CHAIR: TOTAL
DRESSING REGULAR LOWER BODY CLOTHING: TOTAL
MOBILITY SCORE: 6
DAILY ACTIVITIY SCORE: 6
STANDING UP FROM CHAIR USING ARMS: TOTAL
CLIMB 3 TO 5 STEPS WITH RAILING: TOTAL
DRESSING REGULAR UPPER BODY CLOTHING: TOTAL

## 2024-08-11 ASSESSMENT — PAIN - FUNCTIONAL ASSESSMENT
PAIN_FUNCTIONAL_ASSESSMENT: CPOT (CRITICAL CARE PAIN OBSERVATION TOOL)

## 2024-08-11 NOTE — PROGRESS NOTES
" Medical Intensive Care - Daily Progress Note   Subjective    Israel Edgar is a 43 y.o. year old male patient admitted on 8/10/2024 with following ICU needs: respiratory failure on BIPAP, c/f sepsis not requiring pressors    Interval History:  Overnight, the patient had no acute events. However, during his CT chest exam, he was unable to lie flat as he repeatedly desaturating and the CT exam was aborted. In the morning, the patient was requiring oxygen support and was put on 100% FiO2 on BIPAP. During suction of his endotracheal tube, green bile was removed. VBG one hour off of BIPAP was 7.48/41/71. PEG was transitioned to low intermittent suction.    Meds    Scheduled medications  aspirin, 81 mg, g-tube, Daily  baclofen, 20 mg, g-tube, BID  baclofen, 30 mg, g-tube, q24h  carBAMazepine, 150 mg, g-tube, Daily before evening meal  carBAMazepine, 300 mg, g-tube, BID  cefTAZidime-avibactam, 2.5 g, intravenous, q8h  cholecalciferol, 4,000 Units, g-tube, Daily  enoxaparin, 1.5 mg/kg, subcutaneous, q24h  lactated Ringer's, 1,000 mL, intravenous, Once  levETIRAcetam, 2,000 mg, intravenous, q12h  lubricating eye drops, 2 drop, Both Eyes, BID  [Held by provider] metoprolol tartrate, 25 mg, g-tube, q6h  miconazole, , Topical, BID  phenytoin, 100 mg, intravenous, q8h  polyethylene glycol, 17 g, oral, Daily  white petrolatum-mineral oiL, , ophthalmic (eye), Daily      Continuous medications     PRN medications  PRN medications: acetaminophen, oxygen     Objective    Blood pressure 168/82, pulse 108, temperature 37.2 °C (99 °F), temperature source Temporal, resp. rate 24, height 1.8 m (5' 10.87\"), weight 68.3 kg (150 lb 9.2 oz), SpO2 95%.     Physical Exam  Constitutional:       Comments: Non-verbal (at baseline)   HENT:      Head: Normocephalic and atraumatic.   Eyes:      Extraocular Movements: Extraocular movements intact.      Pupils: Pupils are equal, round, and reactive to light.   Cardiovascular:      Heart sounds: Normal " heart sounds.   Pulmonary:      Effort: Respiratory distress present.      Breath sounds: No stridor. No wheezing.      Comments: Tachypneic, on BiPAP  Musculoskeletal:      Right lower leg: No edema.      Left lower leg: No edema.      Comments: Spastic, flexed extremities. Patient moving around in bed, appears uncomfortable.   Skin:     General: Skin is warm and dry.   Neurological:      General: No focal deficit present.      Mental Status: Mental status is at baseline.       Intake/Output Summary (Last 24 hours) at 8/11/2024 1234  Last data filed at 8/11/2024 1215  Gross per 24 hour   Intake 4295 ml   Output 2135 ml   Net 2160 ml     Labs:   Results from last 72 hours   Lab Units 08/11/24  0359 08/10/24  1156   SODIUM mmol/L 137 140   POTASSIUM mmol/L 3.8 4.1   CHLORIDE mmol/L 100 100   CO2 mmol/L 29 29   BUN mg/dL 12 19   CREATININE mg/dL 0.30* 0.27*   GLUCOSE mg/dL 107* 101*   CALCIUM mg/dL 8.5* 9.0   ANION GAP mmol/L 12 15   EGFR mL/min/1.73m*2 >90 >90   PHOSPHORUS mg/dL 3.9  --       Results from last 72 hours   Lab Units 08/11/24  0359 08/10/24  1644   WBC AUTO x10*3/uL 8.3 12.5*   HEMOGLOBIN g/dL 9.3* 9.1*   HEMATOCRIT % 26.8* 26.0*   PLATELETS AUTO x10*3/uL 319 303   NEUTROS PCT AUTO % 71.9 84.9   LYMPHS PCT AUTO % 11.7 6.6   MONOS PCT AUTO % 12.9 7.7   EOS PCT AUTO % 2.0 0.1        Micro/ID:     Lab Results   Component Value Date    URINECULTURE >100,000 Proteus mirabilis (A) 04/07/2024    BLOODCULT Loaded on Instrument - Culture in progress 08/11/2024    BLOODCULT Loaded on Instrument - Culture in progress 08/11/2024       Summary of key imaging results from the last 24 hours  CXR 8/11/24: 1.  Prominent interstitial markings likely consistent with pulmonary   edema. 2. Small to moderate bilateral pleural effusions appear slightly larger when compared to the prior exam. 3. Underlying infectious infiltrate not excluded.   CXR 8/10/24: 1. Bibasilar consolidation, left more than right. Correlate with  concern for infection. 2. Suggestion of small bilateral pleural effusion.  KUB 8/10/24: No evidence of dilated gas-filled loop of bowel. Paucity of bowel gas which is nonspecific and might be due to fluid-filled loops of bowel.  TTE 8/1/24:  CONCLUSIONS: Technically difficult exam due to body habitus and suboptimal positioning. The left ventricle is normal in size. Left ventricular systolic function is normal. EF = 70 ± 5% (2D biplane) Normal left ventricular diastolic function. The right ventricle is dilated. Right ventricular systolic function is moderately decreased. There are no significant valvular abnormalities. Exam was compared with the prior  echocardiographic exam performed on 02/03/2024, RV better seen and function moderately reduced.    Assessment and Plan     Assessment: Israel Edgar is a 43 y.o. year old male patient admitted on 8/10/2024 with Israel Edgar is a 43 y.o. male patient with PMH of advanced MS with spasticity at baseline, neurogenic bladder with chronic little, epilepsy with questionable breakthrough seizures, DVT, PE, MRSA colonization, MRDO pneumonia, HTN, quadriplegia, depression, CVA, dysphagia, previous tracheostomy. Admitted to the MICU on 08/10/24 for acute hypoxic respiratory failure likely secondary to MRD Pseudomonas versus pulmonary embolism. Originally requiring HFNC, then transitioned to BiPAP after decompensation at LTAC. Now back on HFNC in MICU and saturating in the low 90s. Covering MRSA and MDR pseudomonas with vanc and boo and plan to restart Avycaz 2.5mg q8h after approval from ID. Neurology consulted for epilepsy and concern for seizures.     Mechanical Ventilation: none  Sedation/Analgesia:  none  Restraints: no    Summary for 08/11/24  :  Requiring greater oxygen support and bile is being suctioned out from endotracheal tubePEG tube changed to low intermittent suction  Loading with fosphenytoin 20 mg/mg and then maintain with 100mg phenytoin 100 q8 after loading  and keppra 2g q12  Holding tegretol  Ordered COVID test     Plan:  NEUROLOGY/PSYCH:  #Multiple sclerosis  Continue home Baclofen 20 mg BID  #Epilepsy  #Ativan allergy  -S/p Versed 2 mg after concerns for seizure at bedside, although difficult to assess as patient's extremities are contracted at baseline. Reportedly was foaming at the mouth with worsening contractions in the extremities and head shaking.  -Phenytoin and Keppra levels subtherapeutic  Management:  Loading with fosphenytoin 20 mg/mg and then maintain with 100mg phenytoin 100 q8 after loading and keppra 2g q12  Will hold tegretol due to PEG tube suction   Continuous video EEG ongoing  Neurology consulted for AED management and seizure workup  Continue Versed PRN for breakthrough seizures (given Ativan allergy)     CARDIOVASCULAR:  #Tachycardia in 130s-140s  Hold metop tartrate 25 q6h, unclear indication for this medication. No documented history of atrial fibrillation.  Clear EKG unable to be obtained due to patient restlessness  Management:  S/p 1L bolus LR with improvement in heart rate and hypoxia. Ordered additional 1L bolus (for total 2L).  Continue to monitor      PULMONARY:  #AHRF 2/2, concerns for hospital acquired PNA 2/2 MRD Pseudomonas versus acute PE  CXR 8/5/24: No acute radiographic abnormality  CXR 8/10: L>R bibasilar consolidation, appears improved since comparison film  VBG 8/10 11:54am: pH 7.44, CO2 45, lactate 1.5  Management:  Bronchopulmonary hygiene  Tachycardia is improved after lasix yesterday  Blood cultures pending  Intermittent HF between BiPAP  Consider CT PE once stable (FiO2<60%), tachycardia and SpO2 improved     #Hx right PE 7/21/24 while hospitalized  Previously on IV heparin  Now on therapeutic Lovenox  Management:  Continue therapeutic Lovenox     RENAL/GENITOURINARY:  #Chronic little, last replaced date unknown, likely before 7/2024  #Neurogenic bladder secondary to MS  Management:  Replace little  Follow urine  cultures     GASTROENTEROLOGY:  #Hx Colonic dilatation, status post neostigmine 7/10 with response. Repeat dose given by GI 7/12.  #Hx ogalvie syndrome  #Hx decompressive colonoscopy 7/15, subtotal colectomy, end ileostomy with mucous fistula on 7/17  #Chronic neurogenic bowel  TPN dependent since 7/17  Giving all medications through IV except the following through the G-tube: Tegretol, aspirin, baclofen, Vitamin D, Miralax  KUB 8/10 without evidence for bowel obstruction  Management:  Keep NPO pending potential procedures  PEG tube on low intermittent suction     ENDOCRINOLOGY:  No active issues     HEMATOLOGY:  No active issues     MUSCULOSKELETAL/ SKIN:  No active issues     INFECTIOUS DISEASE:  #C/f sepsis secondary to Pneumonia vs UTI  #Severe penicillin allergy to Zosyn, Keflex, bactrim  - Bcx 8/3: with no growth on 8/5  - Sputum cx 8/4: multidrug resistant Pseudomonas and Proteus mirabilis, sensitivities unknown  - Urine cx 8/3: unknown results from LTAC  - Antibiotic history: Meropenem (7/21-7/26), Ceftaroline (7/29 - 8/3), Flagyl (7/29 - 8/3), Micafungin (unknown dates), Meropenem (8/5 - 8/10) after worsening procal to 0.91, Vancomycin + Ceftazidime / Avibactam (8/10 - now)  Management:  ID consulted  On vancomycin + Avycaz 2.5mg q8h   Follow up MRSA swab  Follow-up new blood, urine, sputum cultures    ICU Check List       ICU Liberation: Intervention:   Assess, Prevent, Manage Pain CPOT     Both SAT and SBT [] SAT  [] SBT 30-60 min [] Extubate to NC  [] Extubate to NIV  [] Discuss Trach   Choice of analgesia and sedation RASS:       Delirium: Assess, prevent and manage CAM +    Early Mobility and Exercise  [] PT /OT consult   Family Engagement and Empowerment [x]Family updated []SW consult     FEN  Fluids: PRN  Electrolytes: PRN  Nutrition: NPO Diet; Effective now   Prophylaxis:  DVT ppx: Home therapeutic Lovenox  GI ppx: None (allergies to Protonix/Pepcid)  Bowel care: Miralax  Hardware:  Catheter: Boss  (replaced 8/10)  Access: PIV  Drains: RLQ G-tube to gravity, RLQ Ileostomy  Lines: Left PICC for TPN (placed 7/17)  Social:  Code: Full Code    HPOA: Vee Murray (mother) [phone]   Disposition: NIMISHAFLIP    Trevin Alvarez MD   08/11/24 at 8:08 AM     Disclaimer: Documentation completed with the information available at the time of input. The times in the chart may not be reflective of actual patient care times, interventions, or procedures. Documentation occurs after the physical care of the patient.

## 2024-08-11 NOTE — PROGRESS NOTES
Vancomycin Dosing by Pharmacy- Cessation of Therapy    Consult to pharmacy for vancomycin dosing has been discontinued by the prescriber, pharmacy will sign off at this time.    Please call pharmacy if there are further questions or re-enter a consult if vancomycin is resumed.     Gwendolyn Whitfield, PharmD

## 2024-08-11 NOTE — CONSULTS
Inpatient consult to Infectious Diseases  Consult performed by: Gunner Mauricio DO  Consult ordered by: Arnol Bullock MD        Referred by Arnol Bullock MD    Primary MD: Suellen Talbert MD    Reason For Consult  Acute hypoxic respiratory with recent presumed hospital-acquired pneumonia with sputum culture with MDR Pseudomonas that was previously on Avycaz.    History Of Present Illness  Israel Edgar is a 43 y.o. male with past medical history of advanced MS, neurogenic bladder with chronic Boss placement, epilepsy, DVT, PE, subtotal colectomy with end ileostomy, quadriplegia, and CVA who initially presented from [facility] to [facility] on 7/9/2024 for breakthrough seizures.  Initial workup was concerning for Ogilvies/SBO secondary to to chronic neurogenic bowel.  Patient was given neostigmine x 2 on 710.  He underwent decompressive colonoscopy on 715 followed by subtotal colectomy and end ileostomy with mucous fistula on 717.  Postop course was further complicated by right sided PE which was treated with therapeutic anticoagulation.  Partial SBO/ileus managed conservatively with venting of previously placed gastrotomy tube.  The patient was found to have a urinary tract infection and pneumonia and was treated with meropenem (7/21-7/26).  He was transferred to the SICU on 7/30/2024 following prolonged status epilepticus.  Patient had continued concerns for sepsis given his persistent fevers.  ID was consulted and recommended 5 days of Teflaro and Flagyl (7/29-8/5).  Sputum cultures were positive for multidrug-resistant Pseudomonas.  Was transferred to [facility] per family request despite fever and pending blood cultures after medical team suggested removing the PICC line.  Patient was transferred to [facility] on 8/3 where he continued to be febrile and tachycardic.  Sputum cultures on 8/3 again were positive for MDR Pseudomonas and Proteus.  The patient was continued  "on treatment with meropenem.  Patient had worsening respiratory failure requiring HHFNC.  Fevers worsened to Tmax of 102 on 8/9.  Patient had a seizure on 8/9 and was given 5 mg of Versed to break status epilepticus.  He was subsequently switched to ceftazidime/tazobactam on 8/10 after worsening procalcitonin levels.  He was eventually placed on BiPAP after he became hypoxic.  Patient was transferred to [facility] for higher level of care and per the mother's request.    Upon evaluation at [facility] MICU, patient remained dyspneic and hypoxic while on BiPAP.  Patient would have desaturations with SpO2 into the 70s and 80s as well as tachycardia.  He was placed on high flow nasal cannula with SpO2 in the low 90s.  When evaluating patient today, he was nonverbal.  His mother was present who provided history which was also seen in the chart.  The patient's mother noted that patient has several drug allergies which is listed in the patient's chart.  She also notes that patient has had multiple infections in the past and states \" Israel is colonized with Pseudomonas, Proteus, and MRSA.\"  She notes that he has not had any recent travel and stays at home with her.      Past Medical History  He has a past medical history of Abnormal findings on diagnostic imaging of other specified body structures (10/11/2017), Acute embolism and thrombosis of deep veins of unspecified upper extremity (Multi), Acute upper respiratory infection, unspecified (12/05/2014), Impacted cerumen, bilateral (09/01/2017), Nausea with vomiting, unspecified (09/09/2016), Other conditions influencing health status, Other conditions influencing health status, Other conditions influencing health status, Other muscle spasm, Paraplegia, unspecified (Multi), Personal history of other diseases of urinary system (03/21/2017), Personal history of other specified conditions (01/13/2017), Personal history of other specified conditions, Personal history of urinary (tract) " infections (01/31/2017), Personal history of urinary calculi (09/08/2015), Personal history of urinary calculi (03/07/2016), and Unspecified visual loss.    Surgical History  He has a past surgical history that includes Other surgical history (08/05/2015); Ileostomy (08/05/2015); and Other surgical history (09/16/2016).     Social History     Occupational History    Not on file   Tobacco Use    Smoking status: Not on file    Smokeless tobacco: Not on file   Substance and Sexual Activity    Alcohol use: Not on file    Drug use: Not on file    Sexual activity: Not on file     Travel History   Travel since 07/10/24    No documented travel since 07/10/24              Family History  No family history on file.  Allergies  Keflex [cephalexin], Levaquin [levofloxacin], Lorazepam, Piperacillin-tazobactam, Adhesive tape-silicones, Albuterol, Lacosamide, Latex, Pantoprazole, Suppository adult [glycerin (adult)], Budesonide, Famotidine, Ipratropium bromide, Nitrofurantoin, Ranitidine, Sulfa (sulfonamide antibiotics), and Sulfamethoxazole-trimethoprim     Immunization History   Administered Date(s) Administered    Pfizer Gray Cap SARS-CoV-2 06/28/2022    Pfizer Purple Cap SARS-CoV-2 05/27/2021, 06/17/2021, 01/05/2022     Medications  Home medications:  Medications Prior to Admission   Medication Sig Dispense Refill Last Dose    acetaminophen 160 mg/5 mL (5 mL) suspension Take 20.5 mL (650 mg) by mouth every 6 hours. Pt takes med at : 9a/3p/10p/3a 118 mL 0     ALPRAZolam (Xanax) 0.5 mg tablet 1 tablet (0.5 mg) by g-tube route if needed. TABLET SHOULD BE CRUSHED AND GIVEN VIA G-TUBE       artificial tears, dextran-hypomel-glycerin, 0.1-0.3-0.2 % ophthalmic solution Administer 1-2 drops into both eyes 2 times a day. at 9 am and 3 pm while awake/ SLEEP 12 bottle 3     aspirin 81 mg chewable tablet 1 tablet (81 mg) by g-tube route once daily. at 4 pm 30 tablet 11     baclofen (Lioresal) 10 mg tablet TAKE 1 TABLET DAILY  With food at  10am 90 tablet 11     baclofen (Lioresal) 20 mg tablet 1 tablet (20 mg) by g-tube route 3 times a day. With food at 10am,- 4pm- 10pm 270 tablet 3     cholecalciferol (Vitamin D-3) 10 mcg/mL (400 unit/mL) drops 10 mL (4,000 Units) by g-tube route once daily. AT 9am 300 mL 3     diazePAM (Valium) 5 mg/mL injection Inject 3 mL (15 mg) into the muscle. As needed for seizure activity lasting for more than 3 minutes, and if seizures persist may repeat 3 times, then call 911. Do not Mix with Nayzilam.       docusate sodium (Colace) 50 mg/5 mL oral liquid 10 mL (100 mg) by g-tube route 2 times a day. AT 9AM AND 2100 HOURS 100 mL 0     hydrocortisone 2.5 % ointment Apply topically 2 times a day as needed for rash. Apply to scalp and / or face area for seborrheic dermatitis (scalp flaking and reddended facial areas) 2 times a day, As Needed 453.6 g 11     levETIRAcetam 100 mg/mL solution 20 mL (2,000 mg) by g-tube route 2 times a day. Pt takes this medication at 0900 and 2100. 1200 mL 2     medical supply, miscellaneous (MISCELLANEOUS MEDICAL SUPPLY MISC) Apply topically. Water flushes-give 300 mL with each med Pass [150 mL before and 150 mL after]       mineral oil-hydrophilic petrolatum (Aquaphor) ointment Apply topically 2 times a day. And after each bowel moment. Apply on the buttocks 396 g 0     nasal spray midazolam (Versed) 5 mg/spray (0.1 mL) spray,non-aerosol Use one spray in one nostril for convulsive seizure lasting longer than 5 minutes. May repeat a in the other nostril after 5 minutes if convulsive seizures still ongoing CALL 911 3 each 2     nutritional supplements (Osmolite 1.5 Oli) 0.06 gram-1.5 kcal/mL liquid Give 360ml at 10AM and 4PM. And 1 carton at 10PM (with meds and water) 1000 mL 11     phenytoin 125 mg/5 mL suspension Take 4ml by PEG tube 3 times a day AT 9AM, 3PM AND 9PM 360 mL 11     selenium sulfide (Selsun) 2.5 % shampoo Apply 1 Application topically 1 (one) time per week. Use every other day  "during a seborrheic dermatitis outbreak or as needed       senna (Senokot) 8.8 mg/5 mL syrup TAKE 10 ML BY MOUTH TWICE DAILY 2 HOURS BEFORE OR 2 HOURS AFTER OTHER MEDS( I.e, 4am and 6PM) 240 mL 0     simethicone (Mylicon) 40 mg/0.6 mL drops 2 mL (133.3333 mg) by g-tube route every other day if needed for flatulence.       TegretoL 100 mg/5 mL suspension Take 15ml in 10 AM, 7.5mL at 4PM and 15ml in 10PM with food ( and baclofen ) 3400 mL 2      Current medications:  Scheduled medications  aspirin, 81 mg, g-tube, Daily  baclofen, 20 mg, g-tube, BID  baclofen, 30 mg, g-tube, q24h  carBAMazepine, 150 mg, g-tube, Daily before evening meal  carBAMazepine, 300 mg, g-tube, BID  cefTAZidime-avibactam, 2.5 g, intravenous, q8h  cholecalciferol, 4,000 Units, g-tube, Daily  [START ON 8/11/2024] enoxaparin, 1.5 mg/kg, subcutaneous, q24h  lactated Ringer's, 1,000 mL, intravenous, Once  levETIRAcetam, 2,000 mg, intravenous, q12h  lubricating eye drops, 2 drop, Both Eyes, BID  [Held by provider] metoprolol tartrate, 25 mg, g-tube, q6h  miconazole, , Topical, BID  phenytoin, 100 mg, intravenous, q8h  polyethylene glycol, 17 g, oral, Daily  [START ON 8/11/2024] vancomycin, 1,250 mg, intravenous, q12h  white petrolatum-mineral oiL, , ophthalmic (eye), Daily      Continuous medications     PRN medications  PRN medications: acetaminophen, oxygen, vancomycin    Review of Systems   Unable to perform ROS: Patient nonverbal        Objective  Range of Vitals (last 24 hours)  Heart Rate:  [100-147]   Temp:  [36.7 °C (98.1 °F)-39.6 °C (103.3 °F)]   Resp:  [19-55]   BP: (112-180)/()   Height:  [167.6 cm (5' 5.98\")-180 cm (5' 10.87\")]   Weight:  [64.9 kg (143 lb 1.3 oz)-65.3 kg (144 lb)]   SpO2:  [85 %-100 %]   Daily Weight  08/10/24 : 64.9 kg (143 lb 1.3 oz)    Body mass index is 20.03 kg/m².     Physical Exam  HENT:      Right Ear: External ear normal.      Left Ear: External ear normal.   Eyes:      General: No scleral icterus.     " Conjunctiva/sclera: Conjunctivae normal.   Cardiovascular:      Rate and Rhythm: Regular rhythm.      Comments: Tachycardic  Pulmonary:      Comments: Currently on high flow nasal cannula, tachypneic  Abdominal:      Palpations: Abdomen is soft.      Tenderness: There is no abdominal tenderness.   Musculoskeletal:      Comments: Spastic movements   Skin:     General: Skin is warm and dry.   Neurological:      Mental Status: He is alert.      Comments: Patient is nonverbal and is at baseline mentation per mother, currently has EEG leads on          Relevant Results    Labs  Results from last 72 hours   Lab Units 08/10/24  1644   WBC AUTO x10*3/uL 12.5*   HEMOGLOBIN g/dL 9.1*   HEMATOCRIT % 26.0*   PLATELETS AUTO x10*3/uL 303   NEUTROS PCT AUTO % 84.9   LYMPHS PCT AUTO % 6.6   MONOS PCT AUTO % 7.7   EOS PCT AUTO % 0.1     Results from last 72 hours   Lab Units 08/10/24  1156   SODIUM mmol/L 140   POTASSIUM mmol/L 4.1   CHLORIDE mmol/L 100   CO2 mmol/L 29   BUN mg/dL 19   CREATININE mg/dL 0.27*   GLUCOSE mg/dL 101*   CALCIUM mg/dL 9.0   ANION GAP mmol/L 15   EGFR mL/min/1.73m*2 >90     Results from last 72 hours   Lab Units 08/10/24  1156   ALK PHOS U/L 82   BILIRUBIN TOTAL mg/dL 0.7   PROTEIN TOTAL g/dL 7.8   ALT U/L 25   AST U/L 27   ALBUMIN g/dL 3.1*     Estimated Creatinine Clearance: 125 mL/min (A) (by C-G formula based on SCr of 0.27 mg/dL (L)).  C-Reactive Protein   Date Value Ref Range Status   04/07/2024 2.81 (H) <1.00 mg/dL Final     CRP   Date Value Ref Range Status   03/19/2023 1.25 (A) mg/dL Final     Comment:     REF VALUE  < 1.00       Sedimentation Rate   Date Value Ref Range Status   03/19/2023 13 0 - 15 mm/h Final     HIV 1 and 2 Screen   Date Value Ref Range Status   03/19/2023 NONREACTIVE NONREACTIVE Final     Comment:      HIV Ag/Ab screen is performed using the [employer]   HIV Ag/Ab Combo assay which detects the presence of HIV    p24 antigen as well as antibodies to HIV-1   (Group M and O)  and HIV-2.  .  No laboratory evidence of HIV infection. If acute HIV infection is   suspected, consider testing for HIV RNA by PCR (viral load).       Hepatitis C Ab   Date Value Ref Range Status   05/16/2018 NON-REACTIVE NONREACTIVE Final     Comment:      Patients receiving more than 5 mg/day of biotin may have interference   in test results.  A sample should be taken no sooner than eight hours   after  previous dose. Contact [phone] for additional information.        HCV PCR Quant   Date Value Ref Range Status   03/20/2023 NOT DETECTED IU/mL Final     Comment:     REF VALUE  NOT DETECTED       Microbiology    Susceptibility  Sputum culture 8/4/24  Organism Antibiotic Method Susceptibility   Pseudomonas aeruginosa Ceftazidime MINIMUM INHIBITORY CONCENTRATION (VIZION) 16: Intermediate   Pseudomonas aeruginosa Cefepime MINIMUM INHIBITORY CONCENTRATION (VIZION) 16: Intermediate   Pseudomonas aeruginosa Meropenem MINIMUM INHIBITORY CONCENTRATION (VIZION) 4: Intermediate   Pseudomonas aeruginosa Piperacillin + Tazobactam MINIMUM INHIBITORY CONCENTRATION (VIZION) 32: Intermediate   Pseudomonas aeruginosa Ceftazidime/Avibactam MINIMUM INHIBITORY CONCENTRATION (VIZION) 8: Sensitive   Pseudomonas aeruginosa Ceftolozane/Tazobactam MINIMUM INHIBITORY CONCENTRATION (VIZION) <=2: Sensitive   Pseudomonas aeruginosa Gentamicin MINIMUM INHIBITORY CONCENTRATION (VIZION) Resistant   Pseudomonas aeruginosa Ciprofloxacin MINIMUM INHIBITORY CONCENTRATION (VIZION) 2: Resistant   Pseudomonas aeruginosa Tobramycin ETEST 1: Sensitive   Proteus mirabilis Ampicillin MINIMUM INHIBITORY CONCENTRATION (VITEK) <=2: Sensitive   Proteus mirabilis Ceftriaxone MINIMUM INHIBITORY CONCENTRATION (VITEK) <=1: Sensitive   Proteus mirabilis Cefepime MINIMUM INHIBITORY CONCENTRATION (VITEK) <=1: Sensitive   Proteus mirabilis Meropenem MINIMUM INHIBITORY CONCENTRATION (VITEK) <=0.25: Sensitive   Proteus mirabilis Ampicillin + Sulbactam MINIMUM  INHIBITORY CONCENTRATION (VITEK) <=2: Sensitive   Proteus mirabilis Piperacillin + Tazobactam MINIMUM INHIBITORY CONCENTRATION (VITEK) <=4: Sensitive   Proteus mirabilis Gentamicin MINIMUM INHIBITORY CONCENTRATION (VITEK) <=1: Sensitive   Proteus mirabilis Tobramycin MINIMUM INHIBITORY CONCENTRATION (VITEK) <=1: Sensitive   Proteus mirabilis Trimethoprim + Sulfamethoxazole MINIMUM INHIBITORY CONCENTRATION (VITEK) <=20: Sensitive   Proteus mirabilis Ciprofloxacin MINIMUM INHIBITORY CONCENTRATION (VITEK) 2: Resistant     Imaging  Chest x-ray 8/10/2024  1. Bibasilar consolidation, left more than right. Correlate with  concern for infection.  2. Suggestion of small bilateral pleural effusion.  Abdominal x-ray-8/10/2024  Two views of the abdomen and pelvis. Gastrostomy tube is projecting  over the left upper quadrant of the abdomen. Presume ostomy is  projecting over the right lower abdomen. Midlung skin staples.  Paucity of intra-abdominal bowel gas. No evidence of dilated loop  bowel is identified. Limited evaluation of pneumoperitoneum on supine  imaging, however no gross evidence of free air is noted.      Consulting opacity in the lung bases, left more than right      Assessment/Plan     Assessment and Plan  Israel Edgar is a 43 y.o. male with past medical history of advanced MS, neurogenic bladder with chronic Boss placement, epilepsy, DVT, PE, subtotal colectomy with end ileostomy, quadriplegia, and CVA who initially presented from [facility] to [facility] on 7/9/2024 for breakthrough seizures.  He has had a prolonged hospitalization where he has had subtotal colectomy and end ileostomy with mucous fistula on 7/17.  He was found to have a urinary tract infection and pneumonia treated with meropenem 7/21-7/26.  He developed prolonged status epilepticus on 7/30 in which continued concerns for sepsis given his fevers led to ID recommending 5 days of Teflaro and Flagyl 7/29-8/5.  Sputum cultures obtained  grew multi drug-resistant Pseudomonas aeruginosa.  Patient was transferred to [facility] on 8/3 per family request after medical team suggested removing the PICC line.  Patient continued to be febrile and tachycardic.  Sputum cultures were obtained on 8/3 which were positive for multidrug resistant Pseudomonas which was intermediate to meropenem and Proteus which was resistant ciprofloxacin.  He was continued on meropenem however had worsening respiratory failure as well as worsening fevers.  He was subsequently switched to ceftazidime/tazobactam on 8/10 given cliical deterioration and worsening procalcitonin.  He was placed on BiPAP and transferred to [facility] for higher level of care.    # Acute hypoxic respiratory failure secondary to pneumonia -previous sputum cultures with Pseudomonas and Proteus Mirabilis   #History of MDR Pseudomonas aeruginosa    At this point, patient continues to be febrile, tachypneic and requiring high flow nasal cannula.  He had a chest x-ray performed on 8/10 which shows bibasilar consolidation that is worse on the left versus the right.  Respiratory cultures obtained on 8/10 had significant salivary contamination.  Blood cultures have been obtained.  I reviewed the sputum culture data from 8/3 which grew Pseudomonas aeruginosa that has intermediate susceptibility to meropenem and Proteus mirabilis.  Given clinical picture and discussions with the primary team, I agree that patient should continue ceftazidime/tazobactam to treat pneumonia likely caused by resistant Pseudomonas aeruginosa as well as Proteus mirabilis.  Patient should have a CT of the chest to further characterize his pneumonia.  Vancomycin can be discontinued in light of sputum culture data collected on 8/3 and negative MRSA surveillance.     Recommendations  1.  Continue IV ceftazidime-avibactam (Avycaz) 2.5 g every 8 hours  2.  Discontinue IV vancomycin  3.  Please order CT of the chest  4.  Monitor CBC, BMP, and  for fever  5.  ID will continue to follow    I discussed findings, evaluation, and plan with primary team in conjunction with ID attending , Dr. Craig.  ID will continue to follow this patient. Please secure chat with any further questions. Thank you for involving us in the care of this patient.     Gunner Mauricio DO  PGY-IV, Infectious Disease Fellow  Infectious Disease Team A

## 2024-08-11 NOTE — CARE PLAN
The patient's goals for the shift include      Problem: Pain - Adult  Goal: Verbalizes/displays adequate comfort level or baseline comfort level  Outcome: Progressing     Problem: Safety - Adult  Goal: Free from fall injury  Outcome: Progressing     Problem: Skin  Goal: Decreased wound size/increased tissue granulation at next dressing change  Outcome: Progressing  Flowsheets (Taken 8/11/2024 0531)  Decreased wound size/increased tissue granulation at next dressing change:   Promote sleep for wound healing   Protective dressings over bony prominences  Goal: Prevent/manage excess moisture  Outcome: Progressing  Flowsheets (Taken 8/11/2024 0531)  Prevent/manage excess moisture:   Cleanse incontinence/protect with barrier cream   Monitor for/manage infection if present   Follow provider orders for dressing changes   Moisturize dry skin  Goal: Prevent/minimize sheer/friction injuries  Outcome: Progressing  Flowsheets (Taken 8/11/2024 0531)  Prevent/minimize sheer/friction injuries:   Complete micro-shifts as needed if patient unable. Adjust patient position to relieve pressure points, not a full turn   Use pull sheet   HOB 30 degrees or less   Turn/reposition every 2 hours/use positioning/transfer devices  Goal: Promote skin healing  Outcome: Progressing  Flowsheets (Taken 8/11/2024 0531)  Promote skin healing:   Assess skin/pad under line(s)/device(s)   Protective dressings over bony prominences   Turn/reposition every 2 hours/use positioning/transfer devices   Ensure correct size (line/device) and apply per  instructions   Rotate device position/do not position patient on device     The clinical goals for the shift include pt will maintain an spo2>92% throughout the shift    Pt did not meet the goal. He started the shift on AIRVO at 60L/70%, but desatted into the low to mid-80s, requiring bipap at 100% fio2.

## 2024-08-12 ENCOUNTER — APPOINTMENT (OUTPATIENT)
Dept: VASCULAR MEDICINE | Facility: HOSPITAL | Age: 44
End: 2024-08-12
Payer: MEDICAID

## 2024-08-12 ENCOUNTER — APPOINTMENT (OUTPATIENT)
Dept: CARDIOLOGY | Facility: HOSPITAL | Age: 44
End: 2024-08-12
Payer: MEDICAID

## 2024-08-12 ENCOUNTER — APPOINTMENT (OUTPATIENT)
Dept: RADIOLOGY | Facility: HOSPITAL | Age: 44
End: 2024-08-12
Payer: MEDICAID

## 2024-08-12 LAB
25(OH)D3 SERPL-MCNC: 69 NG/ML (ref 30–100)
ALBUMIN SERPL BCP-MCNC: 2.8 G/DL (ref 3.4–5)
ANION GAP SERPL CALC-SCNC: 12 MMOL/L (ref 10–20)
AORTIC VALVE PEAK VELOCITY: 1.05 M/S
AV PEAK GRADIENT: 4.4 MMHG
AVA (PEAK VEL): 3.4 CM2
BASE EXCESS BLDA CALC-SCNC: 3.7 MMOL/L (ref -2–3)
BASOPHILS # BLD AUTO: 0.07 X10*3/UL (ref 0–0.1)
BASOPHILS NFR BLD AUTO: 1.2 %
BODY TEMPERATURE: 37 DEGREES CELSIUS
BUN SERPL-MCNC: 9 MG/DL (ref 6–23)
CALCIUM SERPL-MCNC: 8.5 MG/DL (ref 8.6–10.6)
CHLORIDE SERPL-SCNC: 98 MMOL/L (ref 98–107)
CO2 SERPL-SCNC: 30 MMOL/L (ref 21–32)
CREAT SERPL-MCNC: 0.34 MG/DL (ref 0.5–1.3)
EGFRCR SERPLBLD CKD-EPI 2021: >90 ML/MIN/1.73M*2
EJECTION FRACTION: 68 %
EOSINOPHIL # BLD AUTO: 0.21 X10*3/UL (ref 0–0.7)
EOSINOPHIL NFR BLD AUTO: 3.6 %
ERYTHROCYTE [DISTWIDTH] IN BLOOD BY AUTOMATED COUNT: 12.4 % (ref 11.5–14.5)
GLUCOSE SERPL-MCNC: 108 MG/DL (ref 74–99)
HCO3 BLDA-SCNC: 27.7 MMOL/L (ref 22–26)
HCT VFR BLD AUTO: 26.1 % (ref 41–52)
HGB BLD-MCNC: 9 G/DL (ref 13.5–17.5)
IMM GRANULOCYTES # BLD AUTO: 0.04 X10*3/UL (ref 0–0.7)
IMM GRANULOCYTES NFR BLD AUTO: 0.7 % (ref 0–0.9)
INHALED O2 CONCENTRATION: 80 %
LEFT ATRIUM VOLUME AREA LENGTH INDEX BSA: 15.1 ML/M2
LEFT VENTRICLE INTERNAL DIMENSION DIASTOLE: 4.6 CM (ref 3.5–6)
LEFT VENTRICULAR OUTFLOW TRACT DIAMETER: 2.4 CM
LYMPHOCYTES # BLD AUTO: 1.03 X10*3/UL (ref 1.2–4.8)
LYMPHOCYTES NFR BLD AUTO: 17.5 %
MAGNESIUM SERPL-MCNC: 1.82 MG/DL (ref 1.6–2.4)
MCH RBC QN AUTO: 30.8 PG (ref 26–34)
MCHC RBC AUTO-ENTMCNC: 34.5 G/DL (ref 32–36)
MCV RBC AUTO: 89 FL (ref 80–100)
MITRAL VALVE E/A RATIO: 0.93
MONOCYTES # BLD AUTO: 0.95 X10*3/UL (ref 0.1–1)
MONOCYTES NFR BLD AUTO: 16.1 %
NEUTROPHILS # BLD AUTO: 3.6 X10*3/UL (ref 1.2–7.7)
NEUTROPHILS NFR BLD AUTO: 60.9 %
NRBC BLD-RTO: 0 /100 WBCS (ref 0–0)
OXYHGB MFR BLDA: 87.6 % (ref 94–98)
PCO2 BLDA: 39 MM HG (ref 38–42)
PH BLDA: 7.46 PH (ref 7.38–7.42)
PHENYTOIN SERPL-MCNC: 18.3 UG/ML (ref 10–20)
PHOSPHATE SERPL-MCNC: 4.1 MG/DL (ref 2.5–4.9)
PLATELET # BLD AUTO: 354 X10*3/UL (ref 150–450)
PO2 BLDA: 57 MM HG (ref 85–95)
POTASSIUM SERPL-SCNC: 3.7 MMOL/L (ref 3.5–5.3)
RBC # BLD AUTO: 2.92 X10*6/UL (ref 4.5–5.9)
RIGHT VENTRICLE FREE WALL PEAK S': 11 CM/S
SAO2 % BLDA: 90 % (ref 94–100)
SODIUM SERPL-SCNC: 136 MMOL/L (ref 136–145)
TRICUSPID ANNULAR PLANE SYSTOLIC EXCURSION: 1.8 CM
WBC # BLD AUTO: 5.9 X10*3/UL (ref 4.4–11.3)

## 2024-08-12 PROCEDURE — 2500000005 HC RX 250 GENERAL PHARMACY W/O HCPCS: Performed by: STUDENT IN AN ORGANIZED HEALTH CARE EDUCATION/TRAINING PROGRAM

## 2024-08-12 PROCEDURE — 2020000001 HC ICU ROOM DAILY

## 2024-08-12 PROCEDURE — 82805 BLOOD GASES W/O2 SATURATION: CPT | Performed by: STUDENT IN AN ORGANIZED HEALTH CARE EDUCATION/TRAINING PROGRAM

## 2024-08-12 PROCEDURE — 3E0G76Z INTRODUCTION OF NUTRITIONAL SUBSTANCE INTO UPPER GI, VIA NATURAL OR ARTIFICIAL OPENING: ICD-10-PCS | Performed by: INTERNAL MEDICINE

## 2024-08-12 PROCEDURE — 2500000005 HC RX 250 GENERAL PHARMACY W/O HCPCS: Performed by: NURSE PRACTITIONER

## 2024-08-12 PROCEDURE — 99291 CRITICAL CARE FIRST HOUR: CPT | Performed by: NURSE PRACTITIONER

## 2024-08-12 PROCEDURE — 83735 ASSAY OF MAGNESIUM: CPT

## 2024-08-12 PROCEDURE — 80069 RENAL FUNCTION PANEL: CPT

## 2024-08-12 PROCEDURE — 95715 VEEG EA 12-26HR INTMT MNTR: CPT

## 2024-08-12 PROCEDURE — 80185 ASSAY OF PHENYTOIN TOTAL: CPT

## 2024-08-12 PROCEDURE — 93970 EXTREMITY STUDY: CPT

## 2024-08-12 PROCEDURE — 94660 CPAP INITIATION&MGMT: CPT

## 2024-08-12 PROCEDURE — 2500000001 HC RX 250 WO HCPCS SELF ADMINISTERED DRUGS (ALT 637 FOR MEDICARE OP)

## 2024-08-12 PROCEDURE — 85025 COMPLETE CBC W/AUTO DIFF WBC: CPT

## 2024-08-12 PROCEDURE — 93970 EXTREMITY STUDY: CPT | Performed by: INTERNAL MEDICINE

## 2024-08-12 PROCEDURE — 82306 VITAMIN D 25 HYDROXY: CPT | Performed by: NURSE PRACTITIONER

## 2024-08-12 PROCEDURE — 2500000004 HC RX 250 GENERAL PHARMACY W/ HCPCS (ALT 636 FOR OP/ED): Mod: JZ | Performed by: INTERNAL MEDICINE

## 2024-08-12 PROCEDURE — 93306 TTE W/DOPPLER COMPLETE: CPT | Performed by: INTERNAL MEDICINE

## 2024-08-12 PROCEDURE — 95720 EEG PHY/QHP EA INCR W/VEEG: CPT | Performed by: PSYCHIATRY & NEUROLOGY

## 2024-08-12 PROCEDURE — 94668 MNPJ CHEST WALL SBSQ: CPT

## 2024-08-12 PROCEDURE — 2500000005 HC RX 250 GENERAL PHARMACY W/O HCPCS

## 2024-08-12 PROCEDURE — 93306 TTE W/DOPPLER COMPLETE: CPT

## 2024-08-12 PROCEDURE — 2500000004 HC RX 250 GENERAL PHARMACY W/ HCPCS (ALT 636 FOR OP/ED)

## 2024-08-12 PROCEDURE — 74018 RADEX ABDOMEN 1 VIEW: CPT

## 2024-08-12 PROCEDURE — 37799 UNLISTED PX VASCULAR SURGERY: CPT

## 2024-08-12 PROCEDURE — 31720 CLEARANCE OF AIRWAYS: CPT

## 2024-08-12 RX ADMIN — MICONAZOLE NITRATE: 20 CREAM TOPICAL at 21:00

## 2024-08-12 RX ADMIN — CEFTAZIDIME, AVIBACTAM 2.5 G: 2; .5 POWDER, FOR SOLUTION INTRAVENOUS at 20:17

## 2024-08-12 RX ADMIN — DEXTROSE MONOHYDRATE 2000 MG: 50 INJECTION, SOLUTION INTRAVENOUS at 00:55

## 2024-08-12 RX ADMIN — DEXTROSE MONOHYDRATE 2000 MG: 50 INJECTION, SOLUTION INTRAVENOUS at 12:40

## 2024-08-12 RX ADMIN — BACLOFEN 20 MG: 20 TABLET ORAL at 09:03

## 2024-08-12 RX ADMIN — PHENYTOIN SODIUM 100 MG: 50 INJECTION INTRAMUSCULAR; INTRAVENOUS at 17:53

## 2024-08-12 RX ADMIN — Medication 60 L/MIN: at 09:51

## 2024-08-12 RX ADMIN — BACLOFEN 30 MG: 20 TABLET ORAL at 16:11

## 2024-08-12 RX ADMIN — PHENYTOIN SODIUM 100 MG: 50 INJECTION INTRAMUSCULAR; INTRAVENOUS at 11:43

## 2024-08-12 RX ADMIN — MICONAZOLE NITRATE: 20 CREAM TOPICAL at 09:03

## 2024-08-12 RX ADMIN — ENOXAPARIN SODIUM 100 MG: 100 INJECTION SUBCUTANEOUS at 16:10

## 2024-08-12 RX ADMIN — Medication 80 PERCENT: at 13:20

## 2024-08-12 RX ADMIN — Medication 50 L/MIN: at 15:20

## 2024-08-12 RX ADMIN — POLYETHYLENE GLYCOL 3350 17 G: 17 POWDER, FOR SOLUTION ORAL at 09:03

## 2024-08-12 RX ADMIN — PHENYTOIN SODIUM 100 MG: 50 INJECTION INTRAMUSCULAR; INTRAVENOUS at 02:30

## 2024-08-12 RX ADMIN — PERFLUTREN 2 ML OF DILUTION: 6.52 INJECTION, SUSPENSION INTRAVENOUS at 12:17

## 2024-08-12 RX ADMIN — Medication 50 L/MIN: at 16:43

## 2024-08-12 RX ADMIN — BACLOFEN 20 MG: 20 TABLET ORAL at 20:17

## 2024-08-12 RX ADMIN — Medication 60 L/MIN: at 12:20

## 2024-08-12 RX ADMIN — Medication 80 PERCENT: at 10:57

## 2024-08-12 RX ADMIN — CEFTAZIDIME, AVIBACTAM 2.5 G: 2; .5 POWDER, FOR SOLUTION INTRAVENOUS at 05:19

## 2024-08-12 RX ADMIN — Medication 50 L/MIN: at 07:45

## 2024-08-12 RX ADMIN — Medication 60 L/MIN: at 11:00

## 2024-08-12 RX ADMIN — MINERAL OIL AND WHITE PETROLATUM: 150; 830 OINTMENT OPHTHALMIC at 22:00

## 2024-08-12 RX ADMIN — CEFTAZIDIME, AVIBACTAM 2.5 G: 2; .5 POWDER, FOR SOLUTION INTRAVENOUS at 12:43

## 2024-08-12 RX ADMIN — ASPIRIN 81 MG 81 MG: 81 TABLET ORAL at 09:03

## 2024-08-12 RX ADMIN — Medication 50 L/MIN: at 20:16

## 2024-08-12 SDOH — ECONOMIC STABILITY: FOOD INSECURITY: WITHIN THE PAST 12 MONTHS, THE FOOD YOU BOUGHT JUST DIDN'T LAST AND YOU DIDN'T HAVE MONEY TO GET MORE.: NEVER TRUE

## 2024-08-12 SDOH — ECONOMIC STABILITY: INCOME INSECURITY: IN THE LAST 12 MONTHS, WAS THERE A TIME WHEN YOU WERE NOT ABLE TO PAY THE MORTGAGE OR RENT ON TIME?: NO

## 2024-08-12 SDOH — ECONOMIC STABILITY: INCOME INSECURITY: IN THE PAST 12 MONTHS, HAS THE ELECTRIC, GAS, OIL, OR WATER COMPANY THREATENED TO SHUT OFF SERVICE IN YOUR HOME?: NO

## 2024-08-12 SDOH — ECONOMIC STABILITY: FOOD INSECURITY: WITHIN THE PAST 12 MONTHS, YOU WORRIED THAT YOUR FOOD WOULD RUN OUT BEFORE YOU GOT MONEY TO BUY MORE.: NEVER TRUE

## 2024-08-12 SDOH — ECONOMIC STABILITY: TRANSPORTATION INSECURITY
IN THE PAST 12 MONTHS, HAS THE LACK OF TRANSPORTATION KEPT YOU FROM MEDICAL APPOINTMENTS OR FROM GETTING MEDICATIONS?: NO

## 2024-08-12 SDOH — SOCIAL STABILITY: SOCIAL INSECURITY
WITHIN THE LAST YEAR, HAVE YOU BEEN KICKED, HIT, SLAPPED, OR OTHERWISE PHYSICALLY HURT BY YOUR PARTNER OR EX-PARTNER?: NO

## 2024-08-12 SDOH — ECONOMIC STABILITY: HOUSING INSECURITY: AT ANY TIME IN THE PAST 12 MONTHS, WERE YOU HOMELESS OR LIVING IN A SHELTER (INCLUDING NOW)?: NO

## 2024-08-12 SDOH — SOCIAL STABILITY: SOCIAL INSECURITY: WITHIN THE LAST YEAR, HAVE YOU BEEN HUMILIATED OR EMOTIONALLY ABUSED IN OTHER WAYS BY YOUR PARTNER OR EX-PARTNER?: NO

## 2024-08-12 SDOH — SOCIAL STABILITY: SOCIAL INSECURITY: WITHIN THE LAST YEAR, HAVE YOU BEEN AFRAID OF YOUR PARTNER OR EX-PARTNER?: NO

## 2024-08-12 SDOH — SOCIAL STABILITY: SOCIAL INSECURITY
WITHIN THE LAST YEAR, HAVE TO BEEN RAPED OR FORCED TO HAVE ANY KIND OF SEXUAL ACTIVITY BY YOUR PARTNER OR EX-PARTNER?: NO

## 2024-08-12 SDOH — ECONOMIC STABILITY: HOUSING INSECURITY: IN THE PAST 12 MONTHS, HOW MANY TIMES HAVE YOU MOVED WHERE YOU WERE LIVING?: 1

## 2024-08-12 SDOH — ECONOMIC STABILITY: TRANSPORTATION INSECURITY
IN THE PAST 12 MONTHS, HAS LACK OF TRANSPORTATION KEPT YOU FROM MEETINGS, WORK, OR FROM GETTING THINGS NEEDED FOR DAILY LIVING?: NO

## 2024-08-12 ASSESSMENT — PAIN - FUNCTIONAL ASSESSMENT
PAIN_FUNCTIONAL_ASSESSMENT: CPOT (CRITICAL CARE PAIN OBSERVATION TOOL)

## 2024-08-12 NOTE — PROGRESS NOTES
SOCIAL WORK NOTE   NOK: Mother is Guardian (confirmed on [facility] site)   DME: at facility, but some equipment at home   Home Care:  Facility   HD: n/a   PCP:  Additional information:    Patient unable to participate. SW spoke with mother for assessment (please see flowsheets for further details). She explained that patient initially has lived at home for years with her, she is RN. She took him to SNF ([facility]) for Respite in June so she should have a procedure. He was subsequently hospitalized and sent to [facility]. Mother declined patient retuning, reporting plan is home. SW explained that home can be considered, but depending on how ill he is, it may not be an option. Mother, denied this as an option. She noted if patient did return home, he needs a new hospital bed and new mattress. She would be agreeable to home care. Social work to follow.   Shavon Capone, TITO, LISW-S ([id number])

## 2024-08-12 NOTE — PROGRESS NOTES
Physical Therapy                 Therapy Communication Note    Patient Name: Israel Edgar  MRN: [id number]  Today's Date: 8/12/2024     Discipline: Physical Therapy    Missed Visit Reason: Missed Visit Reason: Other (Comment) (from previous PT eval at [facility], patient is total assist for all care and mobility, does not follow commands, nor track at baseline. Patient at functional baseline, no skilled PT needs at this time. PT will D/C orders.)    Missed Time: Cancel    Lana Alvarez, PT, DPT

## 2024-08-12 NOTE — CONSULTS
"Nutrition Initial Assessment:   Nutrition Assessment    Reason for Assessment: Admission nursing screening    Patient is a 43 y.o. male presenting from an OSH.  He has been at the OSH since 7/9 possible seizures and abdominal distention. Course c/b Marmora's and colonic dilation and suspected neurogenic bowel r/t multiple sclerosis.  Underwent colectomy with ostomy and mucus fistula placement on 7/17.    Past medical history includes advanced MS with spasticity at baseline, neurogenic bladder with chronic little, epilepsy with questionable breakthrough seizures, DVT, PE, subtotal colectomy with end ileostomy on 7/17/24, MRSA colonization, MRDO pneumonia, HTN, quadriplegia, depression, CVA, dysphagia, previous tracheostomy; has PEG in place for enteral access.     Chart reviewed RDN notes at [facility].  Appears that he had briefly been on TF until bowel issues required him to be NPO starting on 7/10.  Appears that he was restarted on trickle TF post-op on 7/18  and did not start on TPN until 7/22 (has PICC for central line access).  Home TF was Osmolite 1.5-- total of 960mls daily.  Osmolite 1.5 was mom's preferred formula and did not want any substitutes she she brought his formula to the hospital from home.  Also was getting ProSouce NoCarb Neutral (?number of packets vs scoops).    Nutrition History:  Food and Nutrient History: Pt unable to provide nutrition history at visit.       Anthropometrics:  Height: 180 cm (5' 10.87\")   Weight: 69.6 kg (153 lb 7 oz)   BMI (Calculated): 21.48  IBW/kg (Dietitian Calculated): 78.2 kg  Percent of IBW: 89 %     Weight History:     8/12/24: 69.6kg  8/10/24: 65.3kg (admit to [facility])  7/17/24: 75.8kg  7/3/24: 78.5kg  3/16/24: 78kg  2/16/24: 78.6kg  12/5/22: 78.7kg    Based on admit weight to [facility] and weight at [facility] on 7/17. He his down 8% in less than one month. His usual body weight appears to be in the 78kg range.  He would be down 11% from usual body weight (a little more than one " "month ago).    Weight Change %:       Nutrition Focused Physical Exam Findings:    Subcutaneous Fat Loss:   Orbital Fat Pads: Mild-Moderate (slight dark circles and slight hollowing)  Buccal Fat Pads: Well nourished (full, rounded cheeks)  Triceps: Mild-Moderate (less than ample fat tissue)  Muscle Wasting:  Temporalis: Mild-Moderate (slight depression)  Pectoralis (Clavicular Region): Mild-Moderate (some protrusion of clavicle)  Deltoid/Trapezius: Mild-Moderate (slight protrusion of acromion process)  Quadriceps: Mild-Moderate (mild depression on inner and outer thigh)  Gastrocnemius: Mild-Moderate (not well developed muscle)  Edema:  Edema: +1 trace  Edema Location: generalized  Physical Findings:  Skin: Positive (PI coccyx)    Nutrition Significant Labs:  BMP Trend:   Results from last 7 days   Lab Units 08/12/24  0156 08/11/24  0359 08/10/24  1156   GLUCOSE mg/dL 108* 107* 101*   CALCIUM mg/dL 8.5* 8.5* 9.0   SODIUM mmol/L 136 137 140   POTASSIUM mmol/L 3.7 3.8 4.1   CO2 mmol/L 30 29 29   CHLORIDE mmol/L 98 100 100   BUN mg/dL 9 12 19   CREATININE mg/dL 0.34* 0.30* 0.27*    , A1C:  Lab Results   Component Value Date    HGBA1C 5.4 04/07/2024   , BG POCT trend:   Results from last 7 days   Lab Units 08/10/24  1948 08/10/24  0911   POCT GLUCOSE mg/dL 105* 114*    , Renal Lab Trend:   Results from last 7 days   Lab Units 08/12/24  0156 08/11/24  0359 08/10/24  1156 08/10/24  1156   POTASSIUM mmol/L 3.7 3.8  --  4.1   PHOSPHORUS mg/dL 4.1 3.9   < >  --    SODIUM mmol/L 136 137  --  140   MAGNESIUM mg/dL 1.82 1.90  --  1.97   EGFR mL/min/1.73m*2 >90 >90  --  >90   BUN mg/dL 9 12  --  19   CREATININE mg/dL 0.34* 0.30*  --  0.27*    < > = values in this interval not displayed.    , Vit D: No results found for: \"VITD25\"     Nutrition Specific Medications:  Scheduled medications  aspirin, 81 mg, g-tube, Daily  baclofen, 20 mg, g-tube, BID  baclofen, 30 mg, g-tube, q24h  [Held by provider] carBAMazepine, 150 mg, g-tube, " Daily before evening meal  [Held by provider] carBAMazepine, 300 mg, g-tube, BID  cefTAZidime-avibactam, 2.5 g, intravenous, q8h  cholecalciferol, 4,000 Units, g-tube, Daily  enoxaparin, 1.5 mg/kg, subcutaneous, q24h  lactated Ringer's, 1,000 mL, intravenous, Once  levETIRAcetam, 2,000 mg, intravenous, q12h  lubricating eye drops, 2 drop, Both Eyes, BID  miconazole, , Topical, BID  oxygen, , inhalation, Continuous - Inhalation  phenytoin, 100 mg, intravenous, q8h  polyethylene glycol, 17 g, oral, Daily  white petrolatum-mineral oiL, , ophthalmic (eye), Daily      Continuous medications     PRN medications  PRN medications: acetaminophen, oxygen     I/O:    ;      Pt does have output in his ostomy bag.     Dietary Orders (From admission, onward)       Start     Ordered    08/10/24 0922  NPO Diet; Effective now  Diet effective now         08/10/24 0921                     Estimated Needs:   Total Energy Estimated Needs (kCal):  (7041-9054)  Method for Estimating Needs: MSJ= 1566  Total Protein Estimated Needs (g):  (90+)  Method for Estimating Needs: 1.3 x 69.6kg            Nutrition Diagnosis   Malnutrition Diagnosis  Patient has Malnutrition Diagnosis: Yes  Diagnosis Status: New  Malnutrition Diagnosis: Severe malnutrition related to acute disease or injury  As Evidenced by: pt with prolonged time at OSH without full nutrition support (appears about 12 days) gracie noted 8% loss in weight in less than one month and areas of mild to moderate fat and muscle losses on physical exam        Nutrition Interventions/Recommendations         Nutrition Prescription:   If plan is to use PEG for nutrition: Vital 1.5@ start rate of 15mls/hr.  Increase by 10mls q8hrs until goal rate of 55mls/hr reached.  If plan is TPN/lipids: standard Clinimix-E amino acids 5%/dextrose 15% start rate of 40mls/hr.  Run at this rate for the first 24hrs,  If lytes and sugars controlled in that time, increase after 24hrs to goal rate of 83mls/hr.     Also order 250mls of SMOF lipids to be given over 12hrs at night at 21mls/hr.   Check Vitamin D level        Nutrition Interventions:    TF at goal= 1980kcals, 90grams protein     TPN at goal as needed with lipids= 1916kcals, 100grams protein    Nutrition Education:   Not appropriate       Nutrition Monitoring and Evaluation   Food/Nutrient Related History Monitoring  Monitoring and Evaluation Plan:  (route of nutrition support: TPN vs TF)          Time Spent/Follow-up Reminder:   Time Spent (min): 75 minutes  Last Date of Nutrition Visit: 08/12/24  Nutrition Follow-Up Needed?: Dietitian to reassess per policy  Follow up Comment: TF and TPN recs

## 2024-08-12 NOTE — CARE PLAN
Problem: Pain - Adult  Goal: Verbalizes/displays adequate comfort level or baseline comfort level  Outcome: Progressing     Problem: Safety - Adult  Goal: Free from fall injury  Outcome: Progressing     Problem: Discharge Planning  Goal: Discharge to home or other facility with appropriate resources  Outcome: Progressing     Problem: Chronic Conditions and Co-morbidities  Goal: Patient's chronic conditions and co-morbidity symptoms are monitored and maintained or improved  Outcome: Progressing     Problem: Skin  Goal: Decreased wound size/increased tissue granulation at next dressing change  Outcome: Progressing  Flowsheets (Taken 8/12/2024 1015)  Decreased wound size/increased tissue granulation at next dressing change:   Promote sleep for wound healing   Protective dressings over bony prominences  Goal: Participates in plan/prevention/treatment measures  Outcome: Progressing  Flowsheets (Taken 8/12/2024 1015)  Participates in plan/prevention/treatment measures:   Discuss with provider PT/OT consult   Elevate heels  Goal: Prevent/manage excess moisture  Outcome: Progressing  Flowsheets (Taken 8/12/2024 1015)  Prevent/manage excess moisture:   Cleanse incontinence/protect with barrier cream   Monitor for/manage infection if present   Follow provider orders for dressing changes   Moisturize dry skin  Goal: Prevent/minimize sheer/friction injuries  Outcome: Progressing  Flowsheets (Taken 8/12/2024 1015)  Prevent/minimize sheer/friction injuries:   Complete micro-shifts as needed if patient unable. Adjust patient position to relieve pressure points, not a full turn   Increase activity/out of bed for meals   HOB 30 degrees or less   Use pull sheet   Turn/reposition every 2 hours/use positioning/transfer devices  Goal: Promote/optimize nutrition  Outcome: Progressing  Flowsheets (Taken 8/12/2024 1015)  Promote/optimize nutrition:   Discuss with provider if NPO > 2 days   Monitor/record intake including meals  Goal:  Promote skin healing  Outcome: Progressing  Flowsheets (Taken 8/12/2024 1015)  Promote skin healing:   Assess skin/pad under line(s)/device(s)   Turn/reposition every 2 hours/use positioning/transfer devices   Protective dressings over bony prominences   Ensure correct size (line/device) and apply per  instructions   Rotate device position/do not position patient on device     Problem: Nutrition  Goal: Less than 5 days NPO/clear liquids  Outcome: Progressing  Goal: Oral intake greater than 50%  Outcome: Progressing  Goal: Oral intake greater 75%  Outcome: Progressing  Goal: Consume prescribed supplement  Outcome: Progressing  Goal: Adequate PO fluid intake  Outcome: Progressing  Goal: Nutrition support goals are met within 48 hrs  Outcome: Progressing  Goal: Nutrition support is meeting 75% of nutrient needs  Outcome: Progressing  Goal: Tube feed tolerance  Outcome: Progressing  Goal: BG  mg/dL  Outcome: Progressing  Goal: Lab values WNL  Outcome: Progressing  Goal: Electrolytes WNL  Outcome: Progressing  Goal: Promote healing  Outcome: Progressing  Goal: Maintain stable weight  Outcome: Progressing  Goal: Reduce weight from edema/fluid  Outcome: Progressing  Goal: Gradual weight gain  Outcome: Progressing  Goal: Improve ostomy output  Outcome: Progressing

## 2024-08-12 NOTE — CARE PLAN
The patient's goals for the shift include    Problem: Pain - Adult  Goal: Verbalizes/displays adequate comfort level or baseline comfort level  Outcome: Progressing     Problem: Safety - Adult  Goal: Free from fall injury  Outcome: Progressing     Problem: Skin  Goal: Participates in plan/prevention/treatment measures  Outcome: Progressing  Flowsheets (Taken 8/12/2024 0630)  Participates in plan/prevention/treatment measures:   Elevate heels   Discuss with provider PT/OT consult  Goal: Prevent/minimize sheer/friction injuries  Outcome: Progressing  Flowsheets (Taken 8/12/2024 0630)  Prevent/minimize sheer/friction injuries:   Complete micro-shifts as needed if patient unable. Adjust patient position to relieve pressure points, not a full turn   Increase activity/out of bed for meals   Use pull sheet   HOB 30 degrees or less   Turn/reposition every 2 hours/use positioning/transfer devices  Goal: Promote skin healing  Outcome: Progressing  Flowsheets (Taken 8/12/2024 0630)  Promote skin healing:   Assess skin/pad under line(s)/device(s)   Protective dressings over bony prominences   Turn/reposition every 2 hours/use positioning/transfer devices   Ensure correct size (line/device) and apply per  instructions   Rotate device position/do not position patient on device       The clinical goals for the shift include pt will maintain an spo2>92% throughout the shift.    Pt partially met the goal. At the beginning of the shift, he was on AIRVO 60L/80%, but began desatting as low as 72%, so he was put back on bipap. He satted in the mid to high 90s for the remainder of the shift.

## 2024-08-12 NOTE — PROGRESS NOTES
Occupational Therapy                 Therapy Communication Note    Patient Name: Israel Edgar  MRN: [id number]  Today's Date: 8/12/2024     Discipline: Occupational Therapy    Missed Visit Reason: Missed Visit Reason: Patient placed on medical hold (pt dependent at baseline. Requires total assist for ADLs. Will d/c OT orders at this time)    Missed Time: Attempt    Comment:

## 2024-08-12 NOTE — PROGRESS NOTES
"Medical Intensive Care - Daily Progress Note   Subjective    Israel Edgar is a 43 y.o. year old male patient admitted on 8/10/2024 with following ICU needs: high FiO2/bipap    Interval History:  On Bipap overnight, placed on Airvo 60 L/80% this am.  Ongoing hypoxia requiring day time bipap  Bedside POCUS by team during rounds showing LLL consolidation    Meds    Scheduled medications  aspirin, 81 mg, g-tube, Daily  baclofen, 20 mg, g-tube, BID  baclofen, 30 mg, g-tube, q24h  [Held by provider] carBAMazepine, 150 mg, g-tube, Daily before evening meal  [Held by provider] carBAMazepine, 300 mg, g-tube, BID  cefTAZidime-avibactam, 2.5 g, intravenous, q8h  cholecalciferol, 4,000 Units, g-tube, Daily  enoxaparin, 1.5 mg/kg, subcutaneous, q24h  lactated Ringer's, 1,000 mL, intravenous, Once  levETIRAcetam, 2,000 mg, intravenous, q12h  lubricating eye drops, 2 drop, Both Eyes, BID  miconazole, , Topical, BID  phenytoin, 100 mg, intravenous, q8h  polyethylene glycol, 17 g, oral, Daily  white petrolatum-mineral oiL, , ophthalmic (eye), Daily      Continuous medications     PRN medications  PRN medications: acetaminophen, oxygen     Objective    Blood pressure 157/76, pulse 100, temperature 36.5 °C (97.7 °F), temperature source Temporal, resp. rate 20, height 1.8 m (5' 10.87\"), weight 69.6 kg (153 lb 7 oz), SpO2 96%.     Constitutional: obtunded  Eyes:  no icterus   ENMT: mucous membranes moist, no apparent injury, no lesions seen  Head/Neck: Neck supple, no apparent injury. Old trach site  Respiratory/Thorax: Lungs diminished on L, no wheezing, on Airvo 60 L/80%  Cardiovascular: Regular, rate and rhythm, no murmurs, normal S1 and S2  Gastrointestinal: flat, soft, +ileostomy, mucus fistula, PEG  Musculoskeletal: BLE and BUE contractures  Extremities: no edema  Neurological: obtunded  Skin: Warm and dry, LUE PICC intact        Intake/Output Summary (Last 24 hours) at 8/12/2024 0709  Last data filed at 8/12/2024 0400  Gross per " 24 hour   Intake 1260 ml   Output 1345 ml   Net -85 ml     Labs:   Results from last 72 hours   Lab Units 08/12/24  0156 08/11/24  0359 08/10/24  1156   SODIUM mmol/L 136 137 140   POTASSIUM mmol/L 3.7 3.8 4.1   CHLORIDE mmol/L 98 100 100   CO2 mmol/L 30 29 29   BUN mg/dL 9 12 19   CREATININE mg/dL 0.34* 0.30* 0.27*   GLUCOSE mg/dL 108* 107* 101*   CALCIUM mg/dL 8.5* 8.5* 9.0   ANION GAP mmol/L 12 12 15   EGFR mL/min/1.73m*2 >90 >90 >90   PHOSPHORUS mg/dL 4.1 3.9  --       Results from last 72 hours   Lab Units 08/12/24  0156 08/11/24  0359 08/10/24  1644   WBC AUTO x10*3/uL 5.9 8.3 12.5*   HEMOGLOBIN g/dL 9.0* 9.3* 9.1*   HEMATOCRIT % 26.1* 26.8* 26.0*   PLATELETS AUTO x10*3/uL 354 319 303   NEUTROS PCT AUTO % 60.9 71.9 84.9   LYMPHS PCT AUTO % 17.5 11.7 6.6   MONOS PCT AUTO % 16.1 12.9 7.7   EOS PCT AUTO % 3.6 2.0 0.1        Micro/ID:     Lab Results   Component Value Date    URINECULTURE >100,000 Proteus mirabilis (A) 04/07/2024    BLOODCULT Loaded on Instrument - Culture in progress 08/11/2024    BLOODCULT Loaded on Instrument - Culture in progress 08/11/2024       Summary of key imaging results from the last 24 hours  8/11 KUB   IMPRESSION:  1. Persistent small bilateral pleural effusion with bibasilar  atelectasis/infiltrate, left more than right. No significant interval  change.  2. Nonobstructive bowel gas pattern.  3. Medical devices as described above.  8/11 CXR:  IMPRESSION:  1. Persistent small bilateral pleural effusion with bibasilar  atelectasis/infiltrate, left more than right. No significant interval  change.  2. Nonobstructive bowel gas pattern.    Assessment and Plan     Assessment: Israel Edgar is a 43 y.o. year old male patient with PMH significant for advanced MS with spasticity at baseline, neurogenic bladder with chronic little, epilepsy with questionable breakthrough seizures, DVT, PE, subtotal colectomy with end ileostomy, MRSA colonization, MRDO pneumonia, HTN, quadriplegia, depression,  "CVA, dysphagia, previous tracheostomy admitted on 8/10/2024 with following ICU needs: Acute hypoxic respiratory failure on BiPAP, c/f sepsis not requiring pressors     Mechanical Ventilation: none  Sedation/Analgesia:  none  Restraints: no    Summary for 24  :  Continue vEEG.  No changes in AED per neuro  Requiring day time bipap, attempting to wean to Airvo  Starting Vital 1.5 TF    Plan:  NEUROLOGY/PSYCH:  Dx: advanced MS (dx ) with spasticity at baseline,  epilepsy with questionable breakthrough seizures, quadriplegia, stroke, depression   vEEG: Impression: This cvEEG is indicative of an epileptogenic structural lesion in the right hemisphere and moderate diffuse encephalopathy. Left arm myoclonic movements are seen, unclear if they have EEG correlate. EMG lead will be placed.  Home AEDs: Tegretol 300 mg 10 AM, 150 mg 4 pm, and 300 mg at 2200, Phenytoin 100 mg TID, Keppra 2000 mg BID  Management:  S/p fosphenytoin load with 20 mg/kg.  Continue Keppra 2000 IV BID, fosphenytoin 100 q8h through G-tube  Holding Carbamazepine 200 daily/300 BID (continue to hold per neuro)  Neurology consulted for AED management and seizure workup  Continue Versed PRN for breakthrough seizures (given Ativan allergy)  Obtain Phenytoin levels (order as \"total\" level)- 30 mins before am dose (ordered for 0200)  Continue home Baclofen  PRN Tylenol for pain    CARDIOVASCULAR:  Dx: HTN  Home meds: Metoprolol Tartrate 25 mg Q6H  BP variable with -150  Management:  Holding antihypertensives    PULMONARY:  Dx: AHRF 2/2, concerns for hospital acquired PNA 2/2 MRD Pseudomonas versus acute PE   Bedside POCUS today showing LLL consolidation  , AB/46/39/57 on Airvo 60 L/80%.  Placed back on bipap post-ABG  Management:  Nocturnal bipap, , rate 20 & Airvo 60 L/80%.  Now requiring continuous bipap  Continue cough assist and NT suctioning    RENAL/GENITOURINARY:  Dx: neurogenic bladder with chronic little  Little replaced " 8/11  Management:  Continue little, do not remove  Vit D supp    GASTROENTEROLOGY:  Dx: decompressive colonoscopy 7/15, subtotal colectomy with end ileostomy & mucus fisulta (7/2024), dysphagia, PEG, Hx Colonic dilatation, status post neostigmine 7/10 with response. Repeat dose given by GI 7/12.  Hx of effie syndrome   +BM via ileostomy, liquid brown  Management:  Diet: start Vital 1.5 @ 15 ml/hr, can titrate up by 10 ml Q8H to goal 55 ml/hr  Bowel Regimen: Miralax daily    ENDOCRINOLOGY:  Dx: No acute issues  Management:  A1C 5.4 (4/2024)    HEMATOLOGY:  Dx: hx of PE/DVT, Normocytic anemia  No signs of bleeding  Management:  Continue therapeutic lovenox  BLE DVT U/S pending    MUSCULOSKELETAL/ SKIN:  Dx: sacral pressure injury (POA)  Management:  Sacral Mepilex, Q2H turns    INFECTIOUS DISEASE:  Dx: PNA (PSA, Proteus on previous cx), UTI.  hx of MRSA colonization, MRDO PSA pneumonia  Management:  Tmax: 37.3 C, WBC 5.9  ID following  MICRO: 8/10 BC x2 NGTD, Sputum contamination, MRSA neg, 8/11 Covid neg  Antimicrobials: ceftazidime-avibactam (Avycaz) 2.5 g every 8 hours     ICU Check List       ICU Liberation: Intervention:   Assess, Prevent, Manage Pain CPOT - monitor   Both SAT and SBT [] SAT  [] SBT 30-60 min [] Extubate to NC  [] Extubate to NIV  [] Discuss Trach   Choice of analgesia and sedation RASS:    none monitor   Delirium: Assess, prevent and manage CAM - monitor   Early Mobility and Exercise  [x] PT /OT consult   Family Engagement and Empowerment []Family updated [x]SW consult     FEN  Fluids: PRN  Electrolytes: PRN  Nutrition: NPO  Prophylaxis:  DVT ppx: Lovenox (therapeutic)  GI ppx: PPI  Bowel care: Miralax  Hardware:  Catheter: Little  Drains: Ileostomy  Lines: PICC, PEG  Social:  Code: Full Code    HPOA: Vee Corona, (mother), [phone]  Disposition: continue MICU for AHRF requiring high FiO2. Patient remains clinically tenuous.  Discharge planning: From [facility].    talked with mother who would like to take him home when medically ready (previously lived with mom and she was primary caregiver- before hospitalization in July)    PANKAJ Munguia-CNP   08/12/24 at 7:09 AM     Pt discussed with Dr. Bell seen and examined. All labs, VS and previous plan of care reviewed.  I spent 60 minutes in the professional and overall care of this patient.      Disclaimer: Documentation completed with the information available at the time of input. The times in the chart may not be reflective of actual patient care times, interventions, or procedures. Documentation occurs after the physical care of the patient.

## 2024-08-12 NOTE — SIGNIFICANT EVENT
Epilepsy Updates:    Israel Edgar is a 43 y.o. male with PMH significant for advanced MS with spasticity at baseline, neurogenic bladder with chronic little, epilepsy with questionable breakthrough seizures, DVT, PE, subtotal colectomy with end ileostomy, MRSA colonization, MRDO pneumonia, HTN, quadriplegia, depression, CVA, dysphagia, previous tracheostomy who is admitted to MICU for acute hypoxic respiratory failure on BiPAP, c/f sepsis not requiring pressors. Neurology Epilepsy consulted for concern for seizures. Patient's mother provided video demonstrating patient's typical seizures. Episode of foaming at the mouth without his stereotyped movements is not consistent with seizures. S/p Fosphenyl 20mg/kg load since phenytoin levels were subtherapeutic.     cvEE/10-: indicative of an epileptogenic structural lesion in the right hemisphere and moderate diffuse encephalopathy. Left arm myoclonic movements are seen, unclear if they have EEG correlate. EMG lead will be placed.     Current AEDs:  - Keppra 2g BID   Keppra level 8/10: 9  - Phenytoin 100mg q8hrs   Phenytoin trough : 18.3  - Holding home Carbamazepine 300mg 10 AM, 150mg 4 pm, and 300mg at 2200   Carbamazepine level : 3.7     Recommendations  - Continue video EEG  - Continue IV Phenytoin 100 mg q8hrs  - Continue IV Keppra 2000 mg BID  - Please obtain daily trough levels of Phenytoin and Keppra (draw lab 30min prior to dosing)    Nasra Yan MD   PGY-3 Neurology  Epilepsy [phone]

## 2024-08-13 ENCOUNTER — APPOINTMENT (OUTPATIENT)
Dept: RADIOLOGY | Facility: HOSPITAL | Age: 44
End: 2024-08-13
Payer: MEDICAID

## 2024-08-13 LAB
ALBUMIN SERPL BCP-MCNC: 2.9 G/DL (ref 3.4–5)
ANION GAP SERPL CALC-SCNC: 13 MMOL/L (ref 10–20)
BASOPHILS # BLD AUTO: 0.06 X10*3/UL (ref 0–0.1)
BASOPHILS NFR BLD AUTO: 0.7 %
BUN SERPL-MCNC: 5 MG/DL (ref 6–23)
CALCIUM SERPL-MCNC: 8.5 MG/DL (ref 8.6–10.6)
CHLORIDE SERPL-SCNC: 98 MMOL/L (ref 98–107)
CO2 SERPL-SCNC: 28 MMOL/L (ref 21–32)
CREAT SERPL-MCNC: 0.31 MG/DL (ref 0.5–1.3)
EGFRCR SERPLBLD CKD-EPI 2021: >90 ML/MIN/1.73M*2
EOSINOPHIL # BLD AUTO: 0.07 X10*3/UL (ref 0–0.7)
EOSINOPHIL NFR BLD AUTO: 0.8 %
ERYTHROCYTE [DISTWIDTH] IN BLOOD BY AUTOMATED COUNT: 12.3 % (ref 11.5–14.5)
FOLATE SERPL-MCNC: 12 NG/ML
GLUCOSE SERPL-MCNC: 140 MG/DL (ref 74–99)
HCT VFR BLD AUTO: 27.5 % (ref 41–52)
HGB BLD-MCNC: 9.9 G/DL (ref 13.5–17.5)
HGB RETIC QN: 30 PG (ref 28–38)
IMM GRANULOCYTES # BLD AUTO: 0.05 X10*3/UL (ref 0–0.7)
IMM GRANULOCYTES NFR BLD AUTO: 0.6 % (ref 0–0.9)
IMMATURE RETIC FRACTION: 22.3 %
IRON SATN MFR SERPL: ABNORMAL %
IRON SERPL-MCNC: <10 UG/DL (ref 35–150)
LEVETIRACETAM SERPL-MCNC: 11 UG/ML (ref 10–40)
LYMPHOCYTES # BLD AUTO: 0.8 X10*3/UL (ref 1.2–4.8)
LYMPHOCYTES NFR BLD AUTO: 9.2 %
MAGNESIUM SERPL-MCNC: 1.84 MG/DL (ref 1.6–2.4)
MCH RBC QN AUTO: 31.8 PG (ref 26–34)
MCHC RBC AUTO-ENTMCNC: 36 G/DL (ref 32–36)
MCV RBC AUTO: 88 FL (ref 80–100)
MONOCYTES # BLD AUTO: 1.09 X10*3/UL (ref 0.1–1)
MONOCYTES NFR BLD AUTO: 12.5 %
NEUTROPHILS # BLD AUTO: 6.67 X10*3/UL (ref 1.2–7.7)
NEUTROPHILS NFR BLD AUTO: 76.2 %
NRBC BLD-RTO: 0 /100 WBCS (ref 0–0)
PHENYTOIN SERPL-MCNC: 18.8 UG/ML (ref 10–20)
PHOSPHATE SERPL-MCNC: 2.9 MG/DL (ref 2.5–4.9)
PLATELET # BLD AUTO: 421 X10*3/UL (ref 150–450)
POTASSIUM SERPL-SCNC: 3.6 MMOL/L (ref 3.5–5.3)
RBC # BLD AUTO: 3.11 X10*6/UL (ref 4.5–5.9)
RETICS #: 0.07 X10*6/UL (ref 0.02–0.12)
RETICS/RBC NFR AUTO: 2.4 % (ref 0.5–2)
SODIUM SERPL-SCNC: 135 MMOL/L (ref 136–145)
TIBC SERPL-MCNC: ABNORMAL UG/DL
UIBC SERPL-MCNC: 159 UG/DL (ref 110–370)
VIT B12 SERPL-MCNC: 1466 PG/ML (ref 211–911)
WBC # BLD AUTO: 8.7 X10*3/UL (ref 4.4–11.3)

## 2024-08-13 PROCEDURE — 2500000001 HC RX 250 WO HCPCS SELF ADMINISTERED DRUGS (ALT 637 FOR MEDICARE OP): Performed by: NURSE PRACTITIONER

## 2024-08-13 PROCEDURE — 37799 UNLISTED PX VASCULAR SURGERY: CPT

## 2024-08-13 PROCEDURE — 83735 ASSAY OF MAGNESIUM: CPT

## 2024-08-13 PROCEDURE — 82746 ASSAY OF FOLIC ACID SERUM: CPT

## 2024-08-13 PROCEDURE — 94668 MNPJ CHEST WALL SBSQ: CPT

## 2024-08-13 PROCEDURE — 71045 X-RAY EXAM CHEST 1 VIEW: CPT

## 2024-08-13 PROCEDURE — 37799 UNLISTED PX VASCULAR SURGERY: CPT | Performed by: NURSE PRACTITIONER

## 2024-08-13 PROCEDURE — 95720 EEG PHY/QHP EA INCR W/VEEG: CPT | Performed by: PSYCHIATRY & NEUROLOGY

## 2024-08-13 PROCEDURE — 80177 DRUG SCRN QUAN LEVETIRACETAM: CPT | Performed by: NURSE PRACTITIONER

## 2024-08-13 PROCEDURE — 94660 CPAP INITIATION&MGMT: CPT

## 2024-08-13 PROCEDURE — 83540 ASSAY OF IRON: CPT

## 2024-08-13 PROCEDURE — 2500000005 HC RX 250 GENERAL PHARMACY W/O HCPCS: Performed by: NURSE PRACTITIONER

## 2024-08-13 PROCEDURE — 71045 X-RAY EXAM CHEST 1 VIEW: CPT | Performed by: RADIOLOGY

## 2024-08-13 PROCEDURE — 2500000005 HC RX 250 GENERAL PHARMACY W/O HCPCS: Performed by: INTERNAL MEDICINE

## 2024-08-13 PROCEDURE — 85025 COMPLETE CBC W/AUTO DIFF WBC: CPT

## 2024-08-13 PROCEDURE — 36415 COLL VENOUS BLD VENIPUNCTURE: CPT | Performed by: NURSE PRACTITIONER

## 2024-08-13 PROCEDURE — 82607 VITAMIN B-12: CPT

## 2024-08-13 PROCEDURE — 85045 AUTOMATED RETICULOCYTE COUNT: CPT

## 2024-08-13 PROCEDURE — 87040 BLOOD CULTURE FOR BACTERIA: CPT | Performed by: NURSE PRACTITIONER

## 2024-08-13 PROCEDURE — 31720 CLEARANCE OF AIRWAYS: CPT

## 2024-08-13 PROCEDURE — 99291 CRITICAL CARE FIRST HOUR: CPT | Performed by: NURSE PRACTITIONER

## 2024-08-13 PROCEDURE — 2500000005 HC RX 250 GENERAL PHARMACY W/O HCPCS

## 2024-08-13 PROCEDURE — 95715 VEEG EA 12-26HR INTMT MNTR: CPT

## 2024-08-13 PROCEDURE — 2500000004 HC RX 250 GENERAL PHARMACY W/ HCPCS (ALT 636 FOR OP/ED)

## 2024-08-13 PROCEDURE — 2500000004 HC RX 250 GENERAL PHARMACY W/ HCPCS (ALT 636 FOR OP/ED): Mod: JZ | Performed by: INTERNAL MEDICINE

## 2024-08-13 PROCEDURE — 2500000001 HC RX 250 WO HCPCS SELF ADMINISTERED DRUGS (ALT 637 FOR MEDICARE OP)

## 2024-08-13 PROCEDURE — 84100 ASSAY OF PHOSPHORUS: CPT

## 2024-08-13 PROCEDURE — 2020000001 HC ICU ROOM DAILY

## 2024-08-13 PROCEDURE — 80185 ASSAY OF PHENYTOIN TOTAL: CPT | Performed by: NURSE PRACTITIONER

## 2024-08-13 RX ORDER — BACLOFEN 10 MG/1
20 TABLET ORAL
Status: DISCONTINUED | OUTPATIENT
Start: 2024-08-13 | End: 2024-08-27 | Stop reason: HOSPADM

## 2024-08-13 RX ORDER — VANCOMYCIN HYDROCHLORIDE 1 G/20ML
INJECTION, POWDER, LYOPHILIZED, FOR SOLUTION INTRAVENOUS DAILY PRN
Status: DISCONTINUED | OUTPATIENT
Start: 2024-08-13 | End: 2024-08-15

## 2024-08-13 RX ORDER — BACLOFEN 10 MG/1
30 TABLET ORAL
Status: DISCONTINUED | OUTPATIENT
Start: 2024-08-14 | End: 2024-08-27 | Stop reason: HOSPADM

## 2024-08-13 RX ADMIN — PHENYTOIN SODIUM 100 MG: 50 INJECTION INTRAMUSCULAR; INTRAVENOUS at 02:40

## 2024-08-13 RX ADMIN — DEXTROSE MONOHYDRATE 2000 MG: 50 INJECTION, SOLUTION INTRAVENOUS at 12:20

## 2024-08-13 RX ADMIN — Medication 50 L/MIN: at 04:38

## 2024-08-13 RX ADMIN — Medication 4000 UNITS: at 06:46

## 2024-08-13 RX ADMIN — PHENYTOIN SODIUM 100 MG: 50 INJECTION INTRAMUSCULAR; INTRAVENOUS at 09:38

## 2024-08-13 RX ADMIN — Medication 60 PERCENT: at 13:00

## 2024-08-13 RX ADMIN — BACLOFEN 20 MG: 20 TABLET ORAL at 08:38

## 2024-08-13 RX ADMIN — ENOXAPARIN SODIUM 100 MG: 100 INJECTION SUBCUTANEOUS at 13:07

## 2024-08-13 RX ADMIN — Medication 50 L/MIN: at 02:18

## 2024-08-13 RX ADMIN — VANCOMYCIN HYDROCHLORIDE 1250 MG: 5 INJECTION, POWDER, LYOPHILIZED, FOR SOLUTION INTRAVENOUS at 20:30

## 2024-08-13 RX ADMIN — CEFTAZIDIME, AVIBACTAM 2.5 G: 2; .5 POWDER, FOR SOLUTION INTRAVENOUS at 13:07

## 2024-08-13 RX ADMIN — BACLOFEN 20 MG: 20 TABLET ORAL at 20:36

## 2024-08-13 RX ADMIN — POLYETHYLENE GLYCOL 3350 17 G: 17 POWDER, FOR SOLUTION ORAL at 08:39

## 2024-08-13 RX ADMIN — MINERAL OIL AND WHITE PETROLATUM: 150; 830 OINTMENT OPHTHALMIC at 23:24

## 2024-08-13 RX ADMIN — Medication 40 L/MIN: at 08:00

## 2024-08-13 RX ADMIN — CARBOXYMETHYLCELLULOSE SODIUM 2 DROP: 5 SOLUTION/ DROPS OPHTHALMIC at 08:38

## 2024-08-13 RX ADMIN — Medication 60 PERCENT: at 21:38

## 2024-08-13 RX ADMIN — CARBAMAZEPINE 150 MG: 100 TABLET, CHEWABLE ORAL at 16:14

## 2024-08-13 RX ADMIN — DEXTROSE MONOHYDRATE 2000 MG: 50 INJECTION, SOLUTION INTRAVENOUS at 00:53

## 2024-08-13 RX ADMIN — ASPIRIN 81 MG 81 MG: 81 TABLET ORAL at 08:39

## 2024-08-13 RX ADMIN — PHENYTOIN SODIUM 100 MG: 50 INJECTION INTRAMUSCULAR; INTRAVENOUS at 17:57

## 2024-08-13 RX ADMIN — CEFTAZIDIME, AVIBACTAM 2.5 G: 2; .5 POWDER, FOR SOLUTION INTRAVENOUS at 22:05

## 2024-08-13 RX ADMIN — CARBAMAZEPINE 300 MG: 100 TABLET, CHEWABLE ORAL at 20:36

## 2024-08-13 RX ADMIN — CARBAMAZEPINE 300 MG: 100 TABLET, CHEWABLE ORAL at 08:40

## 2024-08-13 RX ADMIN — CEFTAZIDIME, AVIBACTAM 2.5 G: 2; .5 POWDER, FOR SOLUTION INTRAVENOUS at 05:19

## 2024-08-13 ASSESSMENT — PAIN - FUNCTIONAL ASSESSMENT
PAIN_FUNCTIONAL_ASSESSMENT: CPOT (CRITICAL CARE PAIN OBSERVATION TOOL)

## 2024-08-13 NOTE — PROGRESS NOTES
"Medical Intensive Care - Daily Progress Note   Subjective    Israel Edgar is a 43 y.o. year old male patient admitted on 8/10/2024 with following ICU needs: high flow oxygen and noninvasive mechanical ventilation    Interval History:  Brief desaturation this am and placed on BIPAP  Blood culture from 8/11 growing gram pos pleomorphic bacilli    Meds    Scheduled medications  aspirin, 81 mg, g-tube, Daily  baclofen, 20 mg, g-tube, BID  baclofen, 30 mg, g-tube, q24h  carBAMazepine, 150 mg, g-tube, Daily before evening meal  carBAMazepine, 300 mg, g-tube, BID  cefTAZidime-avibactam, 2.5 g, intravenous, q8h  cholecalciferol, 4,000 Units, g-tube, Daily  enoxaparin, 1.5 mg/kg, subcutaneous, q24h  lactated Ringer's, 1,000 mL, intravenous, Once  levETIRAcetam, 2,000 mg, intravenous, q12h  lubricating eye drops, 2 drop, Both Eyes, BID  miconazole, , Topical, BID  oxygen, , inhalation, Continuous - Inhalation  phenytoin, 100 mg, intravenous, q8h  polyethylene glycol, 17 g, oral, Daily  white petrolatum-mineral oiL, , ophthalmic (eye), Daily      Continuous medications     PRN medications  PRN medications: acetaminophen     Objective    Blood pressure 128/72, pulse 103, temperature 36.3 °C (97.3 °F), temperature source Temporal, resp. rate 24, height 1.8 m (5' 10.87\"), weight 67 kg (147 lb 11.3 oz), SpO2 95%.     Physical Exam  Constitutional:       Appearance: He is ill-appearing.   HENT:      Mouth/Throat:      Mouth: Mucous membranes are moist.   Eyes:      Conjunctiva/sclera: Conjunctivae normal.   Cardiovascular:      Rate and Rhythm: Normal rate and regular rhythm.   Pulmonary:      Breath sounds: Decreased air movement present.   Abdominal:      Comments: Ileostomy, PEG   Genitourinary:     Comments: Chronic little  Musculoskeletal:      Comments: Spastic quadriplegia   Skin:     General: Skin is warm.   Neurological:      Comments: obtunded            Intake/Output Summary (Last 24 hours) at 8/13/2024 1235  Last data " filed at 8/13/2024 1220  Gross per 24 hour   Intake 2505 ml   Output 2435 ml   Net 70 ml     Labs:   Results from last 72 hours   Lab Units 08/13/24  0256 08/12/24  0156 08/11/24  0359   SODIUM mmol/L 135* 136 137   POTASSIUM mmol/L 3.6 3.7 3.8   CHLORIDE mmol/L 98 98 100   CO2 mmol/L 28 30 29   BUN mg/dL 5* 9 12   CREATININE mg/dL 0.31* 0.34* 0.30*   GLUCOSE mg/dL 140* 108* 107*   CALCIUM mg/dL 8.5* 8.5* 8.5*   ANION GAP mmol/L 13 12 12   EGFR mL/min/1.73m*2 >90 >90 >90   PHOSPHORUS mg/dL 2.9 4.1 3.9      Results from last 72 hours   Lab Units 08/13/24 0256 08/12/24 0156 08/11/24  0359   WBC AUTO x10*3/uL 8.7 5.9 8.3   HEMOGLOBIN g/dL 9.9* 9.0* 9.3*   HEMATOCRIT % 27.5* 26.1* 26.8*   PLATELETS AUTO x10*3/uL 421 354 319   NEUTROS PCT AUTO % 76.2 60.9 71.9   LYMPHS PCT AUTO % 9.2 17.5 11.7   MONOS PCT AUTO % 12.5 16.1 12.9   EOS PCT AUTO % 0.8 3.6 2.0        Micro/ID:     Lab Results   Component Value Date    URINECULTURE >100,000 Proteus mirabilis (A) 04/07/2024    BLOODCULT No growth at 1 day 08/11/2024    BLOODCULT No growth at 2 days 08/11/2024       Summary of key imaging results from the last 24 hours  Abdomen 8/12  1.  Nonspecific paucity of gas with no dilated gas-filled loop of bowel.  2. Medical devices and postsurgical changes as described above.  3. Persistent pleural effusion and consolidation in lung bases.    Assessment and Plan     Assessment: Israel Edgar is a 43 y.o. year old male patient admitted on 8/10/2024 with AHRF    Mechanical Ventilation: none  Sedation/Analgesia:  none  Restraints: no    Summary for 08/13/24  :  Intermittent BIPAP during the day  Wean high flow    Plan:  NEUROLOGY/PSYCH:  Dx:  Seizures, advanced MS  epileptogenic structural lesion in the right hemisphere and moderate diffuse encephalopathy   Management:  Resume home Tegretol  Cont. Phenytoin  Cont keppra  Keppra level 30 minutes prior to dose  Cont baclofen    CARDIOVASCULAR:  Dx:  Hx of HTN  Home meds: Metoprolol  Tartrate 25 mg Q6H   Management:  Hold meds for now    PULMONARY:  Dx:  Hx of PE, AHRF 2/2, concerns for hospital acquired PNA 2/2 MRD Pseudomonas versus acute PE   Airvo and BIPAP  Management:  Aggressive bronchopulmonary hygiene  Wean high flow  BIPAP intermittent during the day and at night    RENAL/GENITOURINARY:  Dx:  Chronic little due to retention  Little replaced 8/11  Management:  Strict I/O  Daily RFP    GASTROENTEROLOGY:  Dx:  Hx of decompressive colonoscopy 7/15, 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  KUB as above on 8/12  Management:  Ostomy care  Enteral feeding    ENDOCRINOLOGY:  Dx:  NO issues    HEMATOLOGY:  Dx:  Hx of DVT/PE  H/H stable  Management:  Therapeutic lovenox  Daily CBC    MUSCULOSKELETAL/ SKIN:  Dx:  pressure injury on sacrum, spastic quadriplegia  Wound care following  Management:  Sacral mepilex  Turn Q2h  Home baclofen    INFECTIOUS DISEASE:  Dx:  PNA (PSA, Proteus on previous cx), UTI.  hx of MRSA colonization, MRDO PSA pneumonia   Blood from 8/11 with gram + bacilli  Management:  Ceftaz-avibactam  Follow up ID recs    ICU Check List       ICU Liberation: Intervention:   Assess, Prevent, Manage Pain CPOT - NA   Both SAT and SBT [] SAT  [] SBT 30-60 min [] Extubate to NC  [] Extubate to NIV  [] Discuss Trach   Choice of analgesia and sedation RASS: NA  none NA   Delirium: Assess, prevent and manage CAM - NA   Early Mobility and Exercise  [x] PT /OT consult   Family Engagement and Empowerment [x]Family updated [x]SW consult     FEN  Fluids: PRN  Electrolytes: replete as needed  Nutrition: enteral feeding  Prophylaxis:  DVT ppx: therapeutic Lovenox  GI ppx: none  Bowel care: miralax  Hardware:  Catheter: chronic little  Access: PICC  Social:  Code: Full Code    HPOA: Vee Kevindebbie, (mother), [phone]   Disposition: MICU while requiring intermittent BIPAP and aggressive broncho pulm hygiene.   Consider SDU transfer in next few days.    PANKAJ Webster-CNP   08/13/24 at 12:35 PM     Disclaimer: Documentation completed with the information available at the time of input. The times in the chart may not be reflective of actual patient care times, interventions, or procedures. Documentation occurs after the physical care of the patient.

## 2024-08-13 NOTE — CONSULTS
Vancomycin Dosing by Pharmacy- INITIAL    Israel Edgar is a 43 y.o. year old male who Pharmacy has been consulted for vancomycin dosing for cellulitis, skin and soft tissue. Based on the patient's indication and renal status this patient will be dosed based on a goal AUC of 400-600.     Renal function is currently stable.    Visit Vitals  /76   Pulse 108   Temp 36.4 °C (97.5 °F) (Temporal)   Resp 16        Lab Results   Component Value Date    CREATININE 0.31 (L) 2024    CREATININE 0.34 (L) 2024    CREATININE 0.30 (L) 2024    CREATININE 0.27 (L) 08/10/2024        Patient weight is as follows:   Vitals:    24 0600   Weight: 67 kg (147 lb 11.3 oz)       Cultures:  No results found for the encounter in last 14 days.        I/O last 3 completed shifts:  In: 2690 (40.1 mL/kg) [I.V.:500 (7.5 mL/kg); NG/GT:740; IV Piggyback:1450]  Out: 2820 (42.1 mL/kg) [Urine:2220 (0.9 mL/kg/hr); Emesis/NG output:400; Stool:200]  Weight: 67 kg   I/O during current shift:  I/O this shift:  In: 895 [NG/GT:395; IV Piggyback:500]  Out: 390 [Urine:390]    Temp (24hrs), Av.4 °C (97.6 °F), Min:36.1 °C (97 °F), Max:37 °C (98.6 °F)         Assessment/Plan     Patient will not be given a loading dose.  Will initiate vancomycin maintenance,  1,250 mg every 12 hours.    This dosing regimen is predicted by [employer] to result in the following pharmacokinetic parameters:  Loading dose: N/A  Regimen: 1250 mg IV every 12 hours.  Start time: 14:05 on 2024  Exposure target: AUC24 (range)400-600 mg/L.hr   AUC24,ss: 461 mg/L.hr  Probability of AUC24 > 400: 65 %  Ctrough,ss: 12.4 mg/L  Probability of Ctrough,ss > 20: 19 %  Probability of nephrotoxicity (Lodise ELEAZAR 2009): 8 %    Follow-up level will be ordered on  with am labs unless clinically indicated sooner.  Will continue to monitor renal function daily while on vancomycin and order serum creatinine at least every 48 hours if not already ordered.  Follow for  continued vancomycin needs, clinical response, and signs/symptoms of toxicity.       Jackson Lim, [facility]

## 2024-08-13 NOTE — CARE PLAN
The patient's goals for the shift include    Problem: Pain - Adult  Goal: Verbalizes/displays adequate comfort level or baseline comfort level  Outcome: Progressing     Problem: Safety - Adult  Goal: Free from fall injury  Outcome: Progressing     Problem: Skin  Goal: Decreased wound size/increased tissue granulation at next dressing change  Outcome: Progressing  Flowsheets (Taken 8/13/2024 0556)  Decreased wound size/increased tissue granulation at next dressing change:   Promote sleep for wound healing   Protective dressings over bony prominences  Goal: Prevent/manage excess moisture  Outcome: Progressing  Flowsheets (Taken 8/13/2024 0556)  Prevent/manage excess moisture:   Moisturize dry skin   Monitor for/manage infection if present   Cleanse incontinence/protect with barrier cream  Goal: Prevent/minimize sheer/friction injuries  Outcome: Progressing  Flowsheets (Taken 8/13/2024 0556)  Prevent/minimize sheer/friction injuries:   Complete micro-shifts as needed if patient unable. Adjust patient position to relieve pressure points, not a full turn   Use pull sheet   HOB 30 degrees or less   Turn/reposition every 2 hours/use positioning/transfer devices  Goal: Promote/optimize nutrition  Outcome: Progressing  Flowsheets (Taken 8/13/2024 0556)  Promote/optimize nutrition:   Assist with feeding   Monitor/record intake including meals   Consume > 50% meals/supplements  Goal: Promote skin healing  Outcome: Progressing  Flowsheets (Taken 8/13/2024 0556)  Promote skin healing:   Assess skin/pad under line(s)/device(s)   Protective dressings over bony prominences   Turn/reposition every 2 hours/use positioning/transfer devices   Ensure correct size (line/device) and apply per  instructions   Rotate device position/do not position patient on device       The clinical goals for the shift include pt will be weaned from bipap back to airvo by the end of the shift    Pt met the goal.      [id number] Comprehensive

## 2024-08-13 NOTE — SIGNIFICANT EVENT
Epilepsy Updates:     Israel Edgar is a 43 y.o. male with PMH significant for advanced MS with spasticity at baseline, neurogenic bladder with chronic little, epilepsy with questionable breakthrough seizures, DVT, PE, subtotal colectomy with end ileostomy, MRSA colonization, MRDO pneumonia, HTN, quadriplegia, depression, CVA, dysphagia, previous tracheostomy who is admitted to MICU for acute hypoxic respiratory failure on BiPAP with pseudomonas pneumonia, c/f sepsis not requiring pressors. Neurology Epilepsy consulted for concern for seizures. Patient's mother provided video demonstrating patient's typical seizures. Episode of foaming at the mouth without his stereotyped movements is not consistent with seizures. S/p Fosphenyl 20mg/kg load on  since phenytoin levels were subtherapeutic. Home carbamazepine on hold d/t limited enteral access, but able to be restarted on .     cvEE/10-: indicative of an epileptogenic structural lesion in the right hemisphere and moderate diffuse encephalopathy. Left arm myoclonic movements are seen, unclear if they have EEG correlate. EMG lead will be placed.   -: highly epileptogenic right structural lesion with runs of right frontal epileptiform activity and moderate diffuse encephalopathy. Multiple left arm clonic events were captured with no clear EEG correlate.    Current AEDs:  - Keppra 2g BID  - Phenytoin 100mg q8hrs  - Carbamazepine 300mg 10 AM, 150mg 4 pm, and 300mg at 2200    Levels:  Keppra trough: 9 (8/10)  Phenytoin trough: 18.3 (), 18.8 ()  Carbamazepine trough: 3.7 ()     Recommendations:  - Continue video EEG  - Continue IV Phenytoin 100 mg q8hrs  - Continue IV Keppra 2000 mg BID  - Restart home Carbamazepine 300mg 10 AM, 150mg 4 pm, and 300mg at 2200  - Please obtain daily trough levels of Phenytoin, Carbamazepine, and Keppra (draw lab 30min prior to dosing)  - Do not hold any seizure medication, regardless of trough levels      Nasra Yan MD   PGY-3 Neurology  Epilepsy [phone]

## 2024-08-13 NOTE — CONSULTS
Wound Care Consult     Visit Date: 8/13/2024      Patient Name: Israel Edgar         MRN: [id number]           YOB: 1980     Reason for Consult: s/p ileostomy        08/13/24 1705   Ileostomy Standard (leora Cardona) RLQ   Earliest Known Present: 08/10/24   Hand Hygiene Completed: Yes  Ileostomy Type: (c) Standard (Dulce, leora)  Location: RLQ   Stomal Appliance 2 piece;Clean;Dry;Intact   Site/Stoma Assessment Red   Peristomal Assessment GAYE   Treatment Placement checked   Output Description Brown;Loose         Wound Team Summary Assessment: Pouching checked and intact.  Patient's wife wanted pouch change.  While getting supplies, patient's wife was thinking patient was having a seizure, and did not want the pouch changed.  Additional supplies left at the bedside.     Wound Team Plan: Plan to meet patient tomorrow for pouch change.     Roxy Bueno, MSN, RN, CWCN, COCN  08/13/24 6:01 PM

## 2024-08-14 ENCOUNTER — APPOINTMENT (OUTPATIENT)
Dept: RADIOLOGY | Facility: HOSPITAL | Age: 44
End: 2024-08-14
Payer: MEDICAID

## 2024-08-14 LAB
ADENOVIRUS RVP, VIRC: NOT DETECTED
ALBUMIN SERPL BCP-MCNC: 2.9 G/DL (ref 3.4–5)
ANION GAP SERPL CALC-SCNC: 13 MMOL/L (ref 10–20)
ATRIAL RATE: 143 BPM
BASOPHILS # BLD AUTO: 0.07 X10*3/UL (ref 0–0.1)
BASOPHILS NFR BLD AUTO: 1 %
BUN SERPL-MCNC: 7 MG/DL (ref 6–23)
CALCIUM SERPL-MCNC: 8.3 MG/DL (ref 8.6–10.6)
CARBAMAZEPINE SERPL-MCNC: 2.9 UG/ML (ref 4–12)
CHLORIDE SERPL-SCNC: 99 MMOL/L (ref 98–107)
CO2 SERPL-SCNC: 27 MMOL/L (ref 21–32)
CREAT SERPL-MCNC: 0.32 MG/DL (ref 0.5–1.3)
EGFRCR SERPLBLD CKD-EPI 2021: >90 ML/MIN/1.73M*2
ENTEROVIRUS/RHINOVIRUS RVP, VIRC: NOT DETECTED
EOSINOPHIL # BLD AUTO: 0.21 X10*3/UL (ref 0–0.7)
EOSINOPHIL NFR BLD AUTO: 3.1 %
ERYTHROCYTE [DISTWIDTH] IN BLOOD BY AUTOMATED COUNT: 12.4 % (ref 11.5–14.5)
GLUCOSE SERPL-MCNC: 128 MG/DL (ref 74–99)
HCT VFR BLD AUTO: 27.5 % (ref 41–52)
HGB BLD-MCNC: 9.6 G/DL (ref 13.5–17.5)
HUMAN BOCAVIRUS RVP, VIRC: NOT DETECTED
HUMAN CORONAVIRUS RVP, VIRC: NOT DETECTED
IMM GRANULOCYTES # BLD AUTO: 0.02 X10*3/UL (ref 0–0.7)
IMM GRANULOCYTES NFR BLD AUTO: 0.3 % (ref 0–0.9)
INFLUENZA A , VIRC: NOT DETECTED
INFLUENZA A H1N1-09 , VIRC: NOT DETECTED
INFLUENZA B PCR, VIRC: NOT DETECTED
LEVETIRACETAM SERPL-MCNC: 34 UG/ML (ref 10–40)
LYMPHOCYTES # BLD AUTO: 0.9 X10*3/UL (ref 1.2–4.8)
LYMPHOCYTES NFR BLD AUTO: 13.3 %
MAGNESIUM SERPL-MCNC: 1.91 MG/DL (ref 1.6–2.4)
MCH RBC QN AUTO: 31.2 PG (ref 26–34)
MCHC RBC AUTO-ENTMCNC: 34.9 G/DL (ref 32–36)
MCV RBC AUTO: 89 FL (ref 80–100)
METAPNEUMOVIRUS , VIRC: NOT DETECTED
MONOCYTES # BLD AUTO: 1.11 X10*3/UL (ref 0.1–1)
MONOCYTES NFR BLD AUTO: 16.4 %
NEUTROPHILS # BLD AUTO: 4.44 X10*3/UL (ref 1.2–7.7)
NEUTROPHILS NFR BLD AUTO: 65.9 %
NRBC BLD-RTO: 0 /100 WBCS (ref 0–0)
P AXIS: 67 DEGREES
P OFFSET: 205 MS
P ONSET: 149 MS
PARAINFLUENZA PCR, VIRC: NOT DETECTED
PHENYTOIN SERPL-MCNC: 15.8 UG/ML (ref 10–20)
PHOSPHATE SERPL-MCNC: 3.4 MG/DL (ref 2.5–4.9)
PLATELET # BLD AUTO: 452 X10*3/UL (ref 150–450)
POTASSIUM SERPL-SCNC: 4 MMOL/L (ref 3.5–5.3)
PR INTERVAL: 148 MS
Q ONSET: 223 MS
QRS COUNT: 22 BEATS
QRS DURATION: 84 MS
QT INTERVAL: 270 MS
QTC CALCULATION(BAZETT): 416 MS
QTC FREDERICIA: 360 MS
R AXIS: 92 DEGREES
RBC # BLD AUTO: 3.08 X10*6/UL (ref 4.5–5.9)
RSV PCR, RVP, VIRC: NOT DETECTED
SODIUM SERPL-SCNC: 135 MMOL/L (ref 136–145)
T AXIS: 67 DEGREES
T OFFSET: 358 MS
VANCOMYCIN SERPL-MCNC: 7.5 UG/ML (ref 5–20)
VENTRICULAR RATE: 143 BPM
WBC # BLD AUTO: 6.8 X10*3/UL (ref 4.4–11.3)

## 2024-08-14 PROCEDURE — 2500000001 HC RX 250 WO HCPCS SELF ADMINISTERED DRUGS (ALT 637 FOR MEDICARE OP)

## 2024-08-14 PROCEDURE — 2500000001 HC RX 250 WO HCPCS SELF ADMINISTERED DRUGS (ALT 637 FOR MEDICARE OP): Performed by: NURSE PRACTITIONER

## 2024-08-14 PROCEDURE — 2500000004 HC RX 250 GENERAL PHARMACY W/ HCPCS (ALT 636 FOR OP/ED): Mod: JZ | Performed by: INTERNAL MEDICINE

## 2024-08-14 PROCEDURE — 2500000004 HC RX 250 GENERAL PHARMACY W/ HCPCS (ALT 636 FOR OP/ED): Performed by: NURSE PRACTITIONER

## 2024-08-14 PROCEDURE — 2550000001 HC RX 255 CONTRASTS: Performed by: INTERNAL MEDICINE

## 2024-08-14 PROCEDURE — 94660 CPAP INITIATION&MGMT: CPT

## 2024-08-14 PROCEDURE — 2020000001 HC ICU ROOM DAILY

## 2024-08-14 PROCEDURE — 80185 ASSAY OF PHENYTOIN TOTAL: CPT | Performed by: NURSE PRACTITIONER

## 2024-08-14 PROCEDURE — 95720 EEG PHY/QHP EA INCR W/VEEG: CPT | Performed by: PSYCHIATRY & NEUROLOGY

## 2024-08-14 PROCEDURE — 71275 CT ANGIOGRAPHY CHEST: CPT

## 2024-08-14 PROCEDURE — 99291 CRITICAL CARE FIRST HOUR: CPT | Performed by: NURSE PRACTITIONER

## 2024-08-14 PROCEDURE — 80069 RENAL FUNCTION PANEL: CPT

## 2024-08-14 PROCEDURE — 85025 COMPLETE CBC W/AUTO DIFF WBC: CPT

## 2024-08-14 PROCEDURE — 83735 ASSAY OF MAGNESIUM: CPT

## 2024-08-14 PROCEDURE — 2500000004 HC RX 250 GENERAL PHARMACY W/ HCPCS (ALT 636 FOR OP/ED)

## 2024-08-14 PROCEDURE — 99232 SBSQ HOSP IP/OBS MODERATE 35: CPT | Performed by: INTERNAL MEDICINE

## 2024-08-14 PROCEDURE — 80177 DRUG SCRN QUAN LEVETIRACETAM: CPT

## 2024-08-14 PROCEDURE — 94668 MNPJ CHEST WALL SBSQ: CPT

## 2024-08-14 PROCEDURE — 2500000005 HC RX 250 GENERAL PHARMACY W/O HCPCS: Performed by: NURSE PRACTITIONER

## 2024-08-14 PROCEDURE — 80156 ASSAY CARBAMAZEPINE TOTAL: CPT

## 2024-08-14 PROCEDURE — 2500000005 HC RX 250 GENERAL PHARMACY W/O HCPCS

## 2024-08-14 PROCEDURE — 95715 VEEG EA 12-26HR INTMT MNTR: CPT

## 2024-08-14 PROCEDURE — 37799 UNLISTED PX VASCULAR SURGERY: CPT

## 2024-08-14 PROCEDURE — 80202 ASSAY OF VANCOMYCIN: CPT

## 2024-08-14 PROCEDURE — 71275 CT ANGIOGRAPHY CHEST: CPT | Performed by: RADIOLOGY

## 2024-08-14 RX ORDER — FUROSEMIDE 10 MG/ML
20 INJECTION INTRAMUSCULAR; INTRAVENOUS ONCE
Status: COMPLETED | OUTPATIENT
Start: 2024-08-14 | End: 2024-08-14

## 2024-08-14 RX ADMIN — CARBAMAZEPINE 300 MG: 100 TABLET, CHEWABLE ORAL at 21:39

## 2024-08-14 RX ADMIN — ACETAMINOPHEN 650 MG: 160 SOLUTION ORAL at 16:02

## 2024-08-14 RX ADMIN — CEFTAZIDIME, AVIBACTAM 2.5 G: 2; .5 POWDER, FOR SOLUTION INTRAVENOUS at 22:39

## 2024-08-14 RX ADMIN — ENOXAPARIN SODIUM 100 MG: 100 INJECTION SUBCUTANEOUS at 13:43

## 2024-08-14 RX ADMIN — CARBAMAZEPINE 300 MG: 100 TABLET, CHEWABLE ORAL at 09:02

## 2024-08-14 RX ADMIN — VANCOMYCIN HYDROCHLORIDE 1250 MG: 5 INJECTION, POWDER, LYOPHILIZED, FOR SOLUTION INTRAVENOUS at 17:35

## 2024-08-14 RX ADMIN — IOHEXOL 37 ML: 350 INJECTION, SOLUTION INTRAVENOUS at 11:29

## 2024-08-14 RX ADMIN — DEXTROSE MONOHYDRATE 2000 MG: 50 INJECTION, SOLUTION INTRAVENOUS at 01:20

## 2024-08-14 RX ADMIN — DEXTROSE MONOHYDRATE 2000 MG: 50 INJECTION, SOLUTION INTRAVENOUS at 12:32

## 2024-08-14 RX ADMIN — POLYETHYLENE GLYCOL 3350 17 G: 17 POWDER, FOR SOLUTION ORAL at 08:44

## 2024-08-14 RX ADMIN — Medication 50 PERCENT: at 20:22

## 2024-08-14 RX ADMIN — ASPIRIN 81 MG 81 MG: 81 TABLET ORAL at 08:45

## 2024-08-14 RX ADMIN — PHENYTOIN SODIUM 100 MG: 50 INJECTION INTRAMUSCULAR; INTRAVENOUS at 17:26

## 2024-08-14 RX ADMIN — CARBAMAZEPINE 150 MG: 100 TABLET, CHEWABLE ORAL at 16:02

## 2024-08-14 RX ADMIN — CEFTAZIDIME, AVIBACTAM 2.5 G: 2; .5 POWDER, FOR SOLUTION INTRAVENOUS at 08:44

## 2024-08-14 RX ADMIN — Medication 4000 UNITS: at 09:00

## 2024-08-14 RX ADMIN — PHENYTOIN SODIUM 100 MG: 50 INJECTION INTRAMUSCULAR; INTRAVENOUS at 09:47

## 2024-08-14 RX ADMIN — PHENYTOIN SODIUM 100 MG: 50 INJECTION INTRAMUSCULAR; INTRAVENOUS at 02:12

## 2024-08-14 RX ADMIN — Medication 50 L/MIN: at 08:00

## 2024-08-14 RX ADMIN — BACLOFEN 20 MG: 20 TABLET ORAL at 21:40

## 2024-08-14 RX ADMIN — BACLOFEN 30 MG: 20 TABLET ORAL at 08:46

## 2024-08-14 RX ADMIN — CEFTAZIDIME, AVIBACTAM 2.5 G: 2; .5 POWDER, FOR SOLUTION INTRAVENOUS at 14:50

## 2024-08-14 RX ADMIN — VANCOMYCIN HYDROCHLORIDE 1250 MG: 5 INJECTION, POWDER, LYOPHILIZED, FOR SOLUTION INTRAVENOUS at 05:57

## 2024-08-14 RX ADMIN — ACETAMINOPHEN 650 MG: 160 SOLUTION ORAL at 21:39

## 2024-08-14 RX ADMIN — CARBOXYMETHYLCELLULOSE SODIUM 1 DROP: 5 SOLUTION/ DROPS OPHTHALMIC at 08:45

## 2024-08-14 RX ADMIN — BACLOFEN 20 MG: 20 TABLET ORAL at 16:02

## 2024-08-14 RX ADMIN — FUROSEMIDE 20 MG: 10 INJECTION, SOLUTION INTRAMUSCULAR; INTRAVENOUS at 12:11

## 2024-08-14 RX ADMIN — CARBOXYMETHYLCELLULOSE SODIUM 1 DROP: 5 SOLUTION/ DROPS OPHTHALMIC at 14:50

## 2024-08-14 RX ADMIN — MINERAL OIL AND WHITE PETROLATUM: 150; 830 OINTMENT OPHTHALMIC at 21:41

## 2024-08-14 ASSESSMENT — PAIN - FUNCTIONAL ASSESSMENT
PAIN_FUNCTIONAL_ASSESSMENT: CPOT (CRITICAL CARE PAIN OBSERVATION TOOL)
PAIN_FUNCTIONAL_ASSESSMENT: CPOT (CRITICAL CARE PAIN OBSERVATION TOOL)

## 2024-08-14 NOTE — PROGRESS NOTES
"Medical Intensive Care - Daily Progress Note   Subjective    Israel Edgar is a 43 y.o. year old male patient admitted on 8/10/2024 with following ICU needs: high flow oxygen and noninvasive mechanical ventilation    Interval History:  BIPAP all night then AirVo 50/90 this am  Blood culture from 8/11 growing gram pos pleomorphic bacilli - started Vanco    Meds    Scheduled medications  aspirin, 81 mg, g-tube, Daily  baclofen, 20 mg, g-tube, 2 times per day  baclofen, 30 mg, g-tube, Daily  carBAMazepine, 150 mg, g-tube, Daily before evening meal  carBAMazepine, 300 mg, g-tube, BID  cefTAZidime-avibactam, 2.5 g, intravenous, q8h  cholecalciferol, 4,000 Units, g-tube, Daily  enoxaparin, 1.5 mg/kg, subcutaneous, q24h  furosemide, 20 mg, intravenous, Once  levETIRAcetam, 2,000 mg, intravenous, q12h  artificial tears, 1 drop, Both Eyes, BID  oxygen, , inhalation, Continuous - Inhalation  phenytoin, 100 mg, intravenous, q8h  polyethylene glycol, 17 g, oral, Daily  vancomycin, 1,250 mg, intravenous, q12h  white petrolatum-mineral oiL, , ophthalmic (eye), Daily      Continuous medications     PRN medications  PRN medications: acetaminophen, vancomycin     Objective    Blood pressure 122/57, pulse 103, temperature 36.6 °C (97.9 °F), resp. rate 20, height 1.8 m (5' 10.87\"), weight 69.2 kg (152 lb 8.9 oz), SpO2 98%.     Physical Exam  Constitutional:       Appearance: He is ill-appearing.   HENT:      Mouth/Throat:      Mouth: Mucous membranes are moist.   Eyes:      Conjunctiva/sclera: Conjunctivae normal.   Cardiovascular:      Rate and Rhythm: Normal rate and regular rhythm.   Pulmonary:      Breath sounds: Decreased air movement present.   Abdominal:      Comments: Ileostomy, PEG   Genitourinary:     Comments: Chronic little  Musculoskeletal:      Comments: Spastic quadriplegia   Skin:     General: Skin is warm.   Neurological:      Comments: obtunded            Intake/Output Summary (Last 24 hours) at 8/14/2024 1121  Last " data filed at 8/14/2024 1100  Gross per 24 hour   Intake 3000 ml   Output 1790 ml   Net 1210 ml     Labs:   Results from last 72 hours   Lab Units 08/14/24  0355 08/13/24  0256 08/12/24  0156   SODIUM mmol/L 135* 135* 136   POTASSIUM mmol/L 4.0 3.6 3.7   CHLORIDE mmol/L 99 98 98   CO2 mmol/L 27 28 30   BUN mg/dL 7 5* 9   CREATININE mg/dL 0.32* 0.31* 0.34*   GLUCOSE mg/dL 128* 140* 108*   CALCIUM mg/dL 8.3* 8.5* 8.5*   ANION GAP mmol/L 13 13 12   EGFR mL/min/1.73m*2 >90 >90 >90   PHOSPHORUS mg/dL 3.4 2.9 4.1      Results from last 72 hours   Lab Units 08/14/24 0355 08/13/24 0256 08/12/24  0156   WBC AUTO x10*3/uL 6.8 8.7 5.9   HEMOGLOBIN g/dL 9.6* 9.9* 9.0*   HEMATOCRIT % 27.5* 27.5* 26.1*   PLATELETS AUTO x10*3/uL 452* 421 354   NEUTROS PCT AUTO % 65.9 76.2 60.9   LYMPHS PCT AUTO % 13.3 9.2 17.5   MONOS PCT AUTO % 16.4 12.5 16.1   EOS PCT AUTO % 3.1 0.8 3.6        Micro/ID:     Lab Results   Component Value Date    URINECULTURE >100,000 Proteus mirabilis (A) 04/07/2024    BLOODCULT Loaded on Instrument - Culture in progress 08/13/2024       Summary of key imaging results from the last 24 hours     CT PE - negative for acute PE  1. Left-greater-than-right lower lobe airway filling defects with   near-complete consolidation/atelectasis involving the lower lobes and   additional nodular consolidations in the dependent lungs. Overall   constellation of findings is suggestive of aspiration pneumonitis in   the appropriate clinical setting. Main differential considerations   include atelectasis from mucous plugging or multifocal infection.   2. Mild interlobular septal thickening and small bilateral pleural   effusions suggests a component of pulmonary interstitial edema.   Correlate with fluid volume status.       Assessment and Plan     Assessment: Israel Edgar is a 43 y.o. year old male patient admitted on 8/10/2024 with AHRF    Mechanical Ventilation: none  Sedation/Analgesia:  none  Restraints: no    Summary for  08/14/24  :  Intermittent BIPAP during the day  Wean high flow  Establish better IV access  CT PE      Plan:  NEUROLOGY/PSYCH:  Dx:  Seizures, advanced MS  epileptogenic structural lesion in the right hemisphere and moderate diffuse encephalopathy   Management:  Cont. Tegretol  Cont. Phenytoin  Cont keppra  Cont baclofen    CARDIOVASCULAR:  Dx:  Hx of HTN  Home meds: Metoprolol Tartrate 25 mg Q6H   Management:  Hold meds for now    PULMONARY:  Dx:  Hx of PE, AHRF 2/2, concerns for hospital acquired PNA 2/2 MDR Pseudomonas versus acute PE.  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  KUB as above on 8/12  Management:  Ostomy care  Consult surgery for staple removal on 8/18  Enteral feeding    ENDOCRINOLOGY:  Dx:  NO issues    HEMATOLOGY:  Dx:  Hx of DVT/PE  H/H stable  Management:  Therapeutic lovenox  Daily CBC    MUSCULOSKELETAL/ SKIN:  Dx:  pressure injury on sacrum, spastic quadriplegia  Wound care following  Management:  Sacral mepilex  Turn Q2h  Home baclofen    INFECTIOUS DISEASE:  Dx:  PNA (PSA, Proteus on previous cx), UTI.  hx of MRSA colonization, MDRO PSA pneumonia   Blood from 8/11 with gram + bacilli  Management:  Ceftaz-avibactam and Vanco  Follow up ID recs    ICU Check List       ICU Liberation: Intervention:   Assess, Prevent, Manage Pain CPOT - NA   Both SAT and SBT [] SAT  [] SBT 30-60 min [] Extubate to NC  [] Extubate to NIV  [] Discuss Trach   Choice of analgesia and sedation RASS: NA  none NA   Delirium: Assess, prevent and manage CAM -  NA   Early Mobility and Exercise  [x] PT /OT consult   Family Engagement and Empowerment [x]Family updated [x]SW consult     FEN  Fluids: PRN  Electrolytes: replete as needed  Nutrition: enteral feeding  Prophylaxis:  DVT ppx: therapeutic Lovenox  GI ppx: none  Bowel care: miralax  Hardware:  Catheter: chronic little  Access: PICC  Social:  Code: Full Code    HPOA: Vee Corona, (mother), [phone]   Disposition: MICU while requiring intermittent BIPAP and aggressive broncho pulm hygiene.  Consider SDU transfer in next few days.    PANKAJ Webster-CNP   08/14/24 at 11:21 AM     Disclaimer: Documentation completed with the information available at the time of input. The times in the chart may not be reflective of actual patient care times, interventions, or procedures. Documentation occurs after the physical care of the patient.

## 2024-08-14 NOTE — CONSULTS
Vancomycin Dosing by Pharmacy- FOLLOW UP    Israel Edgar is a 43 y.o. year old male who Pharmacy has been consulted for vancomycin dosing for cellulitis, skin and soft tissue. Based on the patient's indication and renal status this patient is being dosed based on a goal AUC of 400-600.     Renal function is currently stable.    Current vancomycin dose: 1250 mg given every 12 hours    Estimated vancomycin AUC on current dose: 409 mg/L.hr     Visit Vitals  /65   Pulse 106   Temp 36.6 °C (97.9 °F)   Resp 22        Lab Results   Component Value Date    CREATININE 0.32 (L) 2024    CREATININE 0.31 (L) 2024    CREATININE 0.34 (L) 2024    CREATININE 0.30 (L) 2024        Patient weight is as follows:   Vitals:    24 0000   Weight: 69.2 kg (152 lb 8.9 oz)       Cultures:  No results found for the encounter in last 14 days.       I/O last 3 completed shifts:  In: 3710 (53.6 mL/kg) [NG/GT:1860; IV Piggyback:1850]  Out: 2775 (40.1 mL/kg) [Urine:1750 (0.7 mL/kg/hr); Stool:1025]  Weight: 69.2 kg   I/O during current shift:  No intake/output data recorded.    Temp (24hrs), Av.7 °C (98 °F), Min:36.3 °C (97.3 °F), Max:37.6 °C (99.7 °F)      Assessment/Plan    Within goal AUC range. Continue current vancomycin regimen.    This dosing regimen is predicted by [employer] to result in the following pharmacokinetic parameters:  Loading dose: N/A  Regimen: 1250 mg IV every 12 hours.  Start time: 13:57 on 2024  Exposure target: AUC24 (range)400-600 mg/L.hr   AUC24,ss: 409 mg/L.hr  Probability of AUC24 > 400: 55 %  Ctrough,ss: 10.5 mg/L  Probability of Ctrough,ss > 20: 3 %  Probability of nephrotoxicity (Lodise ELEAZAR ): 6 %    The next level will be obtained on 24 with morning labs. May be obtained sooner if clinically indicated.   Will continue to monitor renal function daily while on vancomycin and order serum creatinine at least every 48 hours if not already ordered.  Follow for  continued vancomycin needs, clinical response, and signs/symptoms of toxicity.       Deidre Lafleur, PharmD

## 2024-08-14 NOTE — CARE PLAN
The patient's goals for the shift include  sleep    The clinical goals for the shift include tolerating bipap and maintaining pox >90%     Over the shift, the patient did not make progress toward the following goals. Barriers to progression include . Recommendations to address these barriers include .

## 2024-08-14 NOTE — CONSULTS
Wound Care Consult     Visit Date: 8/14/2024      Patient Name: Israel Edgar         MRN: [id number]           YOB: 1980     Reason for Consult: ileostomy care         08/14/24 1515   Ileostomy Standard (leora Cardona) RLQ   Earliest Known Present: 08/10/24   Hand Hygiene Completed: Yes  Ileostomy Type: (c) Standard (leora Cardona)  Location: RLQ   Stomal Appliance Leaking;Changed;2 piece   Site/Stoma Assessment Red   Stoma Size (cm) 3.2 cm   Peristomal Assessment Clean;Intact   Treatment Pouch change   Output Description Brown;Loose     Ostomy type: s/p ilestomy    size: 1 1/4 in      color: red      protruding: budded  Ander: none  Functioning: brown loose/liquid stool  Mucocutaneous junction: intact  Peristomal skin: intact, no sting skin prep applied   Pouching: barrier ring, 2 piece Kaila soft convex wafer with high output pouch  Ostomy Education: none at this time  Plan: assess stoma/pouching 2 times a week and as needed.    Roxy Bueno, MSN, RN, CWCN, COCN  08/14/24 5:03 PM

## 2024-08-14 NOTE — CARE PLAN
Problem: Pain - Adult  Goal: Verbalizes/displays adequate comfort level or baseline comfort level  Outcome: Progressing     Problem: Safety - Adult  Goal: Free from fall injury  Outcome: Progressing     Problem: Discharge Planning  Goal: Discharge to home or other facility with appropriate resources  Outcome: Progressing     Problem: Chronic Conditions and Co-morbidities  Goal: Patient's chronic conditions and co-morbidity symptoms are monitored and maintained or improved  Outcome: Progressing     Problem: Skin  Goal: Decreased wound size/increased tissue granulation at next dressing change  Outcome: Progressing  Goal: Participates in plan/prevention/treatment measures  Outcome: Progressing  Goal: Prevent/manage excess moisture  Outcome: Progressing  Goal: Prevent/minimize sheer/friction injuries  Outcome: Progressing  Flowsheets (Taken 8/14/2024 8048)  Prevent/minimize sheer/friction injuries:   Use pull sheet   Increase activity/out of bed for meals   Complete micro-shifts as needed if patient unable. Adjust patient position to relieve pressure points, not a full turn   HOB 30 degrees or less   Turn/reposition every 2 hours/use positioning/transfer devices  Goal: Promote/optimize nutrition  Outcome: Progressing  Goal: Promote skin healing  Outcome: Progressing     Problem: Nutrition  Goal: Less than 5 days NPO/clear liquids  Outcome: Progressing  Goal: Oral intake greater than 50%  Outcome: Progressing  Goal: Consume prescribed supplement  Outcome: Progressing

## 2024-08-15 LAB
ALBUMIN SERPL BCP-MCNC: 3.1 G/DL (ref 3.4–5)
ANION GAP SERPL CALC-SCNC: 13 MMOL/L (ref 10–20)
BACTERIA BLD CULT: NORMAL
BASOPHILS # BLD AUTO: 0.07 X10*3/UL (ref 0–0.1)
BASOPHILS NFR BLD AUTO: 1.4 %
BUN SERPL-MCNC: 9 MG/DL (ref 6–23)
CALCIUM SERPL-MCNC: 9 MG/DL (ref 8.6–10.6)
CARBAMAZEPINE SERPL-MCNC: 3.4 UG/ML (ref 4–12)
CHLORIDE SERPL-SCNC: 97 MMOL/L (ref 98–107)
CO2 SERPL-SCNC: 30 MMOL/L (ref 21–32)
CREAT SERPL-MCNC: 0.3 MG/DL (ref 0.5–1.3)
EGFRCR SERPLBLD CKD-EPI 2021: >90 ML/MIN/1.73M*2
EOSINOPHIL # BLD AUTO: 0.3 X10*3/UL (ref 0–0.7)
EOSINOPHIL NFR BLD AUTO: 5.8 %
ERYTHROCYTE [DISTWIDTH] IN BLOOD BY AUTOMATED COUNT: 12.3 % (ref 11.5–14.5)
GLUCOSE BLD MANUAL STRIP-MCNC: 147 MG/DL (ref 74–99)
GLUCOSE SERPL-MCNC: 104 MG/DL (ref 74–99)
HCT VFR BLD AUTO: 29.9 % (ref 41–52)
HGB BLD-MCNC: 10.5 G/DL (ref 13.5–17.5)
IMM GRANULOCYTES # BLD AUTO: 0.03 X10*3/UL (ref 0–0.7)
IMM GRANULOCYTES NFR BLD AUTO: 0.6 % (ref 0–0.9)
LEVETIRACETAM SERPL-MCNC: 39 UG/ML (ref 10–40)
LYMPHOCYTES # BLD AUTO: 0.83 X10*3/UL (ref 1.2–4.8)
LYMPHOCYTES NFR BLD AUTO: 16.2 %
MAGNESIUM SERPL-MCNC: 1.98 MG/DL (ref 1.6–2.4)
MCH RBC QN AUTO: 31.2 PG (ref 26–34)
MCHC RBC AUTO-ENTMCNC: 35.1 G/DL (ref 32–36)
MCV RBC AUTO: 89 FL (ref 80–100)
MONOCYTES # BLD AUTO: 0.68 X10*3/UL (ref 0.1–1)
MONOCYTES NFR BLD AUTO: 13.3 %
NEUTROPHILS # BLD AUTO: 3.22 X10*3/UL (ref 1.2–7.7)
NEUTROPHILS NFR BLD AUTO: 62.7 %
NRBC BLD-RTO: 0 /100 WBCS (ref 0–0)
PHENYTOIN SERPL-MCNC: 11.6 UG/ML (ref 10–20)
PHOSPHATE SERPL-MCNC: 4.5 MG/DL (ref 2.5–4.9)
PLATELET # BLD AUTO: 481 X10*3/UL (ref 150–450)
POTASSIUM SERPL-SCNC: 3.9 MMOL/L (ref 3.5–5.3)
RBC # BLD AUTO: 3.37 X10*6/UL (ref 4.5–5.9)
SODIUM SERPL-SCNC: 136 MMOL/L (ref 136–145)
WBC # BLD AUTO: 5.1 X10*3/UL (ref 4.4–11.3)

## 2024-08-15 PROCEDURE — 31720 CLEARANCE OF AIRWAYS: CPT

## 2024-08-15 PROCEDURE — 94668 MNPJ CHEST WALL SBSQ: CPT

## 2024-08-15 PROCEDURE — 82947 ASSAY GLUCOSE BLOOD QUANT: CPT

## 2024-08-15 PROCEDURE — 85025 COMPLETE CBC W/AUTO DIFF WBC: CPT

## 2024-08-15 PROCEDURE — 80156 ASSAY CARBAMAZEPINE TOTAL: CPT

## 2024-08-15 PROCEDURE — 2500000004 HC RX 250 GENERAL PHARMACY W/ HCPCS (ALT 636 FOR OP/ED)

## 2024-08-15 PROCEDURE — 2500000004 HC RX 250 GENERAL PHARMACY W/ HCPCS (ALT 636 FOR OP/ED): Mod: JZ | Performed by: INTERNAL MEDICINE

## 2024-08-15 PROCEDURE — 2500000001 HC RX 250 WO HCPCS SELF ADMINISTERED DRUGS (ALT 637 FOR MEDICARE OP): Performed by: NURSE PRACTITIONER

## 2024-08-15 PROCEDURE — 95720 EEG PHY/QHP EA INCR W/VEEG: CPT | Performed by: PSYCHIATRY & NEUROLOGY

## 2024-08-15 PROCEDURE — 2500000005 HC RX 250 GENERAL PHARMACY W/O HCPCS

## 2024-08-15 PROCEDURE — 94660 CPAP INITIATION&MGMT: CPT

## 2024-08-15 PROCEDURE — 2020000001 HC ICU ROOM DAILY

## 2024-08-15 PROCEDURE — 2500000005 HC RX 250 GENERAL PHARMACY W/O HCPCS: Performed by: NURSE PRACTITIONER

## 2024-08-15 PROCEDURE — 83735 ASSAY OF MAGNESIUM: CPT

## 2024-08-15 PROCEDURE — 2500000001 HC RX 250 WO HCPCS SELF ADMINISTERED DRUGS (ALT 637 FOR MEDICARE OP)

## 2024-08-15 PROCEDURE — 80177 DRUG SCRN QUAN LEVETIRACETAM: CPT

## 2024-08-15 PROCEDURE — 80185 ASSAY OF PHENYTOIN TOTAL: CPT | Performed by: NURSE PRACTITIONER

## 2024-08-15 PROCEDURE — 99291 CRITICAL CARE FIRST HOUR: CPT | Performed by: NURSE PRACTITIONER

## 2024-08-15 PROCEDURE — 37799 UNLISTED PX VASCULAR SURGERY: CPT

## 2024-08-15 PROCEDURE — 80069 RENAL FUNCTION PANEL: CPT

## 2024-08-15 PROCEDURE — 95715 VEEG EA 12-26HR INTMT MNTR: CPT

## 2024-08-15 RX ORDER — SELENIUM SULFIDE 2.5 MG/100ML
LOTION TOPICAL 2 TIMES WEEKLY
Status: DISCONTINUED | OUTPATIENT
Start: 2024-08-16 | End: 2024-08-27 | Stop reason: HOSPADM

## 2024-08-15 RX ORDER — ACETAMINOPHEN 160 MG/5ML
650 SOLUTION ORAL EVERY 6 HOURS
Status: DISCONTINUED | OUTPATIENT
Start: 2024-08-15 | End: 2024-08-27 | Stop reason: HOSPADM

## 2024-08-15 RX ADMIN — CARBOXYMETHYLCELLULOSE SODIUM 1 DROP: 5 SOLUTION/ DROPS OPHTHALMIC at 08:02

## 2024-08-15 RX ADMIN — CEFTAZIDIME, AVIBACTAM 2.5 G: 2; .5 POWDER, FOR SOLUTION INTRAVENOUS at 14:11

## 2024-08-15 RX ADMIN — CEFTAZIDIME, AVIBACTAM 2.5 G: 2; .5 POWDER, FOR SOLUTION INTRAVENOUS at 23:16

## 2024-08-15 RX ADMIN — PHENYTOIN SODIUM 100 MG: 50 INJECTION INTRAMUSCULAR; INTRAVENOUS at 02:01

## 2024-08-15 RX ADMIN — DEXTROSE MONOHYDRATE 2000 MG: 50 INJECTION, SOLUTION INTRAVENOUS at 11:59

## 2024-08-15 RX ADMIN — VANCOMYCIN HYDROCHLORIDE 1250 MG: 5 INJECTION, POWDER, LYOPHILIZED, FOR SOLUTION INTRAVENOUS at 05:47

## 2024-08-15 RX ADMIN — Medication 40 L/MIN: at 13:30

## 2024-08-15 RX ADMIN — PHENYTOIN SODIUM 100 MG: 50 INJECTION INTRAMUSCULAR; INTRAVENOUS at 18:32

## 2024-08-15 RX ADMIN — DEXTROSE MONOHYDRATE 2000 MG: 50 INJECTION, SOLUTION INTRAVENOUS at 00:55

## 2024-08-15 RX ADMIN — CARBOXYMETHYLCELLULOSE SODIUM 1 DROP: 5 SOLUTION/ DROPS OPHTHALMIC at 14:11

## 2024-08-15 RX ADMIN — CARBAMAZEPINE 300 MG: 100 TABLET, CHEWABLE ORAL at 08:03

## 2024-08-15 RX ADMIN — Medication 4000 UNITS: at 08:00

## 2024-08-15 RX ADMIN — Medication 50 L/MIN: at 07:35

## 2024-08-15 RX ADMIN — MINERAL OIL AND WHITE PETROLATUM: 150; 830 OINTMENT OPHTHALMIC at 21:20

## 2024-08-15 RX ADMIN — POLYETHYLENE GLYCOL 3350 17 G: 17 POWDER, FOR SOLUTION ORAL at 08:00

## 2024-08-15 RX ADMIN — BACLOFEN 30 MG: 20 TABLET ORAL at 08:03

## 2024-08-15 RX ADMIN — CARBAMAZEPINE 300 MG: 100 TABLET, CHEWABLE ORAL at 21:20

## 2024-08-15 RX ADMIN — ACETAMINOPHEN 650 MG: 160 SOLUTION ORAL at 17:24

## 2024-08-15 RX ADMIN — CARBAMAZEPINE 150 MG: 100 TABLET, CHEWABLE ORAL at 16:24

## 2024-08-15 RX ADMIN — ENOXAPARIN SODIUM 100 MG: 100 INJECTION SUBCUTANEOUS at 12:02

## 2024-08-15 RX ADMIN — PHENYTOIN SODIUM 100 MG: 50 INJECTION INTRAMUSCULAR; INTRAVENOUS at 10:05

## 2024-08-15 RX ADMIN — CEFTAZIDIME, AVIBACTAM 2.5 G: 2; .5 POWDER, FOR SOLUTION INTRAVENOUS at 07:01

## 2024-08-15 RX ADMIN — Medication 40 L/MIN: at 17:00

## 2024-08-15 RX ADMIN — Medication 50 L/MIN: at 06:37

## 2024-08-15 RX ADMIN — Medication 40 L/MIN: at 09:53

## 2024-08-15 RX ADMIN — BACLOFEN 20 MG: 20 TABLET ORAL at 16:24

## 2024-08-15 RX ADMIN — BACLOFEN 20 MG: 20 TABLET ORAL at 20:59

## 2024-08-15 RX ADMIN — Medication 50 PERCENT: at 20:46

## 2024-08-15 RX ADMIN — ACETAMINOPHEN 650 MG: 160 SOLUTION ORAL at 23:16

## 2024-08-15 RX ADMIN — ASPIRIN 81 MG 81 MG: 81 TABLET ORAL at 08:00

## 2024-08-15 NOTE — PROGRESS NOTES
Israel Edgar is a 43 y.o. male on day 4 of admission presenting with Shock (Multi).    Subjective   Interval History:   Afebrile. No leukocytosis.     Review of Systems    Objective   Range of Vitals (last 24 hours)  Heart Rate:  []   Temp:  [36.5 °C (97.7 °F)-37.4 °C (99.3 °F)]   Resp:  [14-35]   BP: ()/(33-82)   Weight:  [69.2 kg (152 lb 8.9 oz)]   SpO2:  [90 %-100 %]   Daily Weight  08/14/24 : 69.2 kg (152 lb 8.9 oz)    Body mass index is 21.36 kg/m².    Antibiotics  cefTAZidime-avibactam (Avycaz) IV 2.5 g in 100 mL D5W (ADV/MBP)  vancomycin - 1250 mg/250 mL    Relevant Results  Labs  Results from last 72 hours   Lab Units 08/14/24  0355 08/13/24 0256 08/12/24  0156   WBC AUTO x10*3/uL 6.8 8.7 5.9   HEMOGLOBIN g/dL 9.6* 9.9* 9.0*   HEMATOCRIT % 27.5* 27.5* 26.1*   PLATELETS AUTO x10*3/uL 452* 421 354   NEUTROS PCT AUTO % 65.9 76.2 60.9   LYMPHS PCT AUTO % 13.3 9.2 17.5   MONOS PCT AUTO % 16.4 12.5 16.1   EOS PCT AUTO % 3.1 0.8 3.6     Results from last 72 hours   Lab Units 08/14/24  0355 08/13/24  0256 08/12/24  0156   SODIUM mmol/L 135* 135* 136   POTASSIUM mmol/L 4.0 3.6 3.7   CHLORIDE mmol/L 99 98 98   CO2 mmol/L 27 28 30   BUN mg/dL 7 5* 9   CREATININE mg/dL 0.32* 0.31* 0.34*   GLUCOSE mg/dL 128* 140* 108*   CALCIUM mg/dL 8.3* 8.5* 8.5*   ANION GAP mmol/L 13 13 12   EGFR mL/min/1.73m*2 >90 >90 >90   PHOSPHORUS mg/dL 3.4 2.9 4.1     IMPRESSION: CT chest  1. Left-greater-than-right lower lobe airway filling defects with  near-complete consolidation/atelectasis involving the lower lobes and  additional nodular consolidations in the dependent lungs. Overall  constellation of findings is suggestive of aspiration pneumonitis in  the appropriate clinical setting. Main differential considerations  include atelectasis from mucous plugging or multifocal infection.  2. Mild interlobular septal thickening and small bilateral pleural  effusions suggests a component of pulmonary interstitial edema.  Correlate  with fluid volume status.    Assessment and Plan  Israel Edgar is a 43 y.o. male with past medical history of advanced MS, neurogenic bladder with chronic Boss placement, epilepsy, DVT, PE, subtotal colectomy with end ileostomy, quadriplegia, and CVA who initially presented from [facility] to [facility] on 7/9/2024 for breakthrough seizures.  He has had a prolonged hospitalization where he has had subtotal colectomy and end ileostomy with mucous fistula on 7/17.  He was found to have a urinary tract infection and pneumonia treated with meropenem 7/21-7/26.  He developed prolonged status epilepticus on 7/30 in which continued concerns for sepsis given his fevers led to ID recommending 5 days of Teflaro and Flagyl 7/29-8/5.  Sputum cultures obtained grew multi drug-resistant Pseudomonas aeruginosa.  Patient was transferred to [facility] on 8/3 per family request after medical team suggested removing the PICC line.  Patient continued to be febrile and tachycardic.  Sputum cultures were obtained on 8/3 which were positive for multidrug resistant Pseudomonas which was intermediate to meropenem and Proteus which was resistant ciprofloxacin.  He was continued on meropenem however had worsening respiratory failure as well as worsening fevers.  He was subsequently switched to ceftazidime/tazobactam on 8/10 given cliical deterioration and worsening procalcitonin.  He was placed on BiPAP and transferred to [facility] for higher level of care.     # Acute hypoxic respiratory failure secondary to pneumonia -previous sputum cultures with Pseudomonas and Proteus Mirabilis   #History of MDR Pseudomonas aeruginosa     At this point, patient continues to be febrile, tachypneic and requiring high flow nasal cannula.  He had a chest x-ray performed on 8/10 which shows bibasilar consolidation that is worse on the left versus the right.  Respiratory cultures obtained on 8/10 had significant salivary contamination.  Blood  cultures have been obtained.  I reviewed the sputum culture data from 8/3 which grew Pseudomonas aeruginosa that has intermediate susceptibility to meropenem and Proteus mirabilis.  Given clinical picture and discussions with the primary team, I agree that patient should continue ceftazidime/tazobactam to treat pneumonia likely caused by resistant Pseudomonas aeruginosa as well as Proteus mirabilis.  Patient should have a CT of the chest to further characterize his pneumonia.  Vancomycin can be discontinued in light of sputum culture data collected on 8/3 and negative MRSA surveillance.    Updates on 8/14  Patient has been clinically improved with ceftazidime-avibactam. 1 out 2 sets blood cultures grew with microbacterium, which would be a contaminant. Repeat blood cultures 8/13 remain negative. Can stop vancomycin.     Recommendations  1.  Continue IV ceftazidime-avibactam (Avycaz) 2.5 g every 8 hours for 7 days; end date 8/16  2.  Discontinue IV vancomycin  3.  Will sign off    Discussed with Dr. Craig. Please text me via Epic chat if you have any questions or concerns regarding this patient. ID will sign off.    Gurinder Gandhi MD  ID fellow [id number]  Team A Pager [phone]

## 2024-08-15 NOTE — CARE PLAN
Problem: Skin  Goal: Decreased wound size/increased tissue granulation at next dressing change  Outcome: Progressing  Flowsheets (Taken 8/15/2024 1430)  Decreased wound size/increased tissue granulation at next dressing change:   Promote sleep for wound healing   Protective dressings over bony prominences   Utilize specialty bed per algorithm  Goal: Participates in plan/prevention/treatment measures  Outcome: Progressing  Flowsheets (Taken 8/15/2024 1430)  Participates in plan/prevention/treatment measures:   Discuss with provider PT/OT consult   Elevate heels  Goal: Prevent/manage excess moisture  Outcome: Progressing  Flowsheets (Taken 8/15/2024 1430)  Prevent/manage excess moisture:   Follow provider orders for dressing changes   Moisturize dry skin   Monitor for/manage infection if present   Cleanse incontinence/protect with barrier cream  Goal: Prevent/minimize sheer/friction injuries  Outcome: Progressing  Flowsheets (Taken 8/14/2024 1351 by Leopoldo Kern, RN)  Prevent/minimize sheer/friction injuries:   Use pull sheet   Increase activity/out of bed for meals   Complete micro-shifts as needed if patient unable. Adjust patient position to relieve pressure points, not a full turn   HOB 30 degrees or less   Turn/reposition every 2 hours/use positioning/transfer devices  Goal: Promote/optimize nutrition  Outcome: Progressing  Flowsheets (Taken 8/15/2024 1430)  Promote/optimize nutrition:   Discuss with provider if NPO > 2 days   Monitor/record intake including meals  Goal: Promote skin healing  Outcome: Progressing  Flowsheets (Taken 8/15/2024 1430)  Promote skin healing:   Assess skin/pad under line(s)/device(s)   Turn/reposition every 2 hours/use positioning/transfer devices

## 2024-08-15 NOTE — SIGNIFICANT EVENT
"Epilepsy Sign Off Note    Israel Edgar is a 43 y.o. male with PMH significant for advanced MS with spasticity at baseline, neurogenic bladder with chronic little, epilepsy with questionable breakthrough seizures, DVT, PE, subtotal colectomy with end ileostomy, MRSA colonization, MRDO pneumonia, HTN, quadriplegia, depression, CVA, dysphagia, previous tracheostomy who is admitted to MICU for acute hypoxic respiratory failure on BiPAP with pseudomonas pneumonia, c/f sepsis not requiring pressors. Neurology Epilepsy consulted for concern for seizures. Patient's mother provided video demonstrating patient's typical seizures. Episode of foaming at the mouth without his stereotyped movements is not consistent with seizures. S/p Fosphenyl 20mg/kg load on  since phenytoin levels were subtherapeutic. Home carbamazepine was held d/t limited enteral access, but restarted on . EEG improving with 3 AEDs on board. Patient has stable EEG, no seizures, and is on a stable dose of anti-seizure medications.      cvEE/10-: indicative of an epileptogenic structural lesion in the right hemisphere and moderate diffuse encephalopathy. Left arm myoclonic movements are seen, unclear if they have EEG correlate. EMG lead will be placed.   -: highly epileptogenic right structural lesion with runs of right frontal epileptiform activity and moderate diffuse encephalopathy. Multiple left arm clonic events were captured with no clear EEG correlate.  -: stable EEG. 2 push button events (chewing and grimacing) with no EEG correlate  -: improvement in degree of encephalopathy and epileptogenicity compared to yesterday  -8/15: improvement in degree of encephalopathy and epileptogenicity compared to yesterday     Current AEDs:  - Keppra 2g BID  - Phenytoin 100mg q8hrs  - Carbamazepine 300mg 10 AM, 150mg 4 pm, and 300mg at 2200     Recommendations:  - Can discontinue EEG (place \"discontinue EEG\" order)  - " Continue IV Phenytoin 100 mg q8hrs (can convert to PO once medically stable)  - Continue IV Keppra 2000 mg BID (can convert to PO once medically stable)  - Continue Carbamazepine 300mg 10 AM, 150mg 4 pm, and 300mg at 2200  - Do not hold any seizure medication  - Epilepsy team will sign off. Please feel free to reach out with any further questions/concerns.      Nasra Yan MD   PGY-3 Neurology  Epilepsy [phone]

## 2024-08-15 NOTE — PROGRESS NOTES
SOCIAL WORK NOTE   -ICU Treatment Plan: Patient on Bipap, but hopeful for weaning and SDU  -Support System:  mother is guardian   -Planned Disposition: TBD, family prefers home   -Additional information:   Social work to follow.  TITO Wan, LISW-S ([id number])

## 2024-08-15 NOTE — PROGRESS NOTES
"Medical Intensive Care - Daily Progress Note   Subjective    Israel Edgar is a 43 y.o. year old male patient admitted on 8/10/2024 with following ICU needs: high flow oxygen and noninvasive mechanical ventilation    Interval History:  BIPAP all night then AirVo 50/50 this am      Meds    Scheduled medications  aspirin, 81 mg, g-tube, Daily  baclofen, 20 mg, g-tube, 2 times per day  baclofen, 30 mg, g-tube, Daily  carBAMazepine, 150 mg, g-tube, Daily before evening meal  carBAMazepine, 300 mg, g-tube, BID  cefTAZidime-avibactam, 2.5 g, intravenous, q8h  cholecalciferol, 4,000 Units, g-tube, Daily  enoxaparin, 1.5 mg/kg, subcutaneous, q24h  levETIRAcetam, 2,000 mg, intravenous, q12h  artificial tears, 1 drop, Both Eyes, BID  oxygen, , inhalation, Continuous - Inhalation  phenytoin, 100 mg, intravenous, q8h  polyethylene glycol, 17 g, oral, Daily  white petrolatum-mineral oiL, , ophthalmic (eye), Daily      Continuous medications     PRN medications  PRN medications: acetaminophen     Objective    Blood pressure 129/68, pulse (!) 112, temperature 36 °C (96.8 °F), temperature source Temporal, resp. rate (!) 28, height 1.8 m (5' 10.87\"), weight 67.5 kg (148 lb 13 oz), SpO2 (!) 85%.     Physical Exam  Constitutional:       Appearance: He is ill-appearing.   HENT:      Mouth/Throat:      Mouth: Mucous membranes are moist.   Eyes:      Conjunctiva/sclera: Conjunctivae normal.   Cardiovascular:      Rate and Rhythm: Normal rate and regular rhythm.   Pulmonary:      Breath sounds: Examination of the right-upper field reveals rhonchi. Examination of the left-upper field reveals rhonchi. Examination of the right-middle field reveals rhonchi. Examination of the left-middle field reveals rhonchi. Examination of the right-lower field reveals rhonchi. Examination of the left-lower field reveals rhonchi. Rhonchi present.   Abdominal:      Comments: Ileostomy, PEG   Genitourinary:     Comments: Chronic little  Musculoskeletal:      " Comments: Spastic quadriplegia   Skin:     General: Skin is warm.   Neurological:      Comments: obtunded            Intake/Output Summary (Last 24 hours) at 8/15/2024 1113  Last data filed at 8/15/2024 1015  Gross per 24 hour   Intake 2710 ml   Output 2121 ml   Net 589 ml     Labs:   Results from last 72 hours   Lab Units 08/15/24  0322 08/14/24  0355 08/13/24  0256   SODIUM mmol/L 136 135* 135*   POTASSIUM mmol/L 3.9 4.0 3.6   CHLORIDE mmol/L 97* 99 98   CO2 mmol/L 30 27 28   BUN mg/dL 9 7 5*   CREATININE mg/dL 0.30* 0.32* 0.31*   GLUCOSE mg/dL 104* 128* 140*   CALCIUM mg/dL 9.0 8.3* 8.5*   ANION GAP mmol/L 13 13 13   EGFR mL/min/1.73m*2 >90 >90 >90   PHOSPHORUS mg/dL 4.5 3.4 2.9      Results from last 72 hours   Lab Units 08/15/24  0322 08/14/24  0355 08/13/24  0256   WBC AUTO x10*3/uL 5.1 6.8 8.7   HEMOGLOBIN g/dL 10.5* 9.6* 9.9*   HEMATOCRIT % 29.9* 27.5* 27.5*   PLATELETS AUTO x10*3/uL 481* 452* 421   NEUTROS PCT AUTO % 62.7 65.9 76.2   LYMPHS PCT AUTO % 16.2 13.3 9.2   MONOS PCT AUTO % 13.3 16.4 12.5   EOS PCT AUTO % 5.8 3.1 0.8        Micro/ID:     Lab Results   Component Value Date    URINECULTURE >100,000 Proteus mirabilis (A) 04/07/2024    BLOODCULT No growth at 1 day 08/13/2024       Summary of key imaging results from the last 24 hours     CT PE - negative for acute PE  1. Left-greater-than-right lower lobe airway filling defects with   near-complete consolidation/atelectasis involving the lower lobes and   additional nodular consolidations in the dependent lungs. Overall   constellation of findings is suggestive of aspiration pneumonitis in   the appropriate clinical setting. Main differential considerations   include atelectasis from mucous plugging or multifocal infection.   2. Mild interlobular septal thickening and small bilateral pleural   effusions suggests a component of pulmonary interstitial edema.   Correlate with fluid volume status.       Assessment and Plan     Assessment: Israel Edgar is  a 43 y.o. year old male patient admitted on 8/10/2024 with AHRF    Mechanical Ventilation: none  Sedation/Analgesia:  none  Restraints: no    Summary for 08/15/24  :  Intermittent BIPAP during the day  Wean high flow  Stop vanco    Plan:  NEUROLOGY/PSYCH:  Dx:  Seizures, advanced MS  epileptogenic structural lesion in the right hemisphere and moderate diffuse encephalopathy   Management:  Cont. Tegretol  Cont. Phenytoin  Cont keppra  Cont baclofen    CARDIOVASCULAR:  Dx:  Hx of HTN  Home meds: Metoprolol Tartrate 25 mg Q6H   Management:  Hold meds for now    PULMONARY:  Dx:  Hx of PE, AHRF 2/2, concerns for hospital acquired PNA 2/2 MDR Pseudomonas versus acute PE.  CXR not worsening, concern hypoxia not correlating with imaging  Airvo and BIPAP  Management:  Aggressive bronchopulmonary hygiene  Wean high flow  BIPAP intermittent during the day and at night  No need for diuretics today    RENAL/GENITOURINARY:  Dx:  Chronic little due to retention  Little replaced 8/11  Management:  Strict I/O  Daily RFP    GASTROENTEROLOGY:  Dx:  Hx of decompressive colonoscopy 7/15, subtotal colectomy with end ileostomy & mucus fisulta (7/2024), dysphagia, PEG, Hx Colonic dilatation and Starlight syndrome.  KUB as above on 8/12  Management:  Ostomy care  Consult surgery for staple removal on 8/18  Enteral feeding    ENDOCRINOLOGY:  Dx:  NO issues    HEMATOLOGY:  Dx:  Hx of DVT/PE  H/H stable  Management:  Therapeutic lovenox  Daily CBC    MUSCULOSKELETAL/ SKIN:  Dx:  pressure injury on sacrum, spastic quadriplegia  Wound care following  Management:  Sacral mepilex  Turn Q2h  Home baclofen    INFECTIOUS DISEASE:  Dx:  PNA (PSA, Proteus on previous cx), UTI.  hx of MRSA colonization, MDRO PSA pneumonia   Blood from 8/11 with gram + bacilli  Management:  Ceftaz-avibactam until 8/16  Follow up ID recs    ICU Check List       ICU Liberation: Intervention:   Assess, Prevent, Manage Pain CPOT - NA   Both SAT and SBT [] SAT  [] SBT 30-60 min  [] Extubate to NC  [] Extubate to NIV  [] Discuss Trach   Choice of analgesia and sedation RASS: NA  none NA   Delirium: Assess, prevent and manage CAM - NA   Early Mobility and Exercise  [x] PT /OT consult   Family Engagement and Empowerment [x]Family updated [x]SW consult     FEN  Fluids: PRN  Electrolytes: replete as needed  Nutrition: enteral feeding  Prophylaxis:  DVT ppx: therapeutic Lovenox  GI ppx: none  Bowel care: miralax  Hardware:  Catheter: chronic little  Access: PICC  Social:  Code: Full Code    HPOA: Vee Corona, (mother), [phone]   Disposition: MICU while requiring intermittent BIPAP and aggressive broncho pulm hygiene.  Consider SDU transfer in next few days.    PANKAJ Webster-CNP   08/15/24 at 11:13 AM     Disclaimer: Documentation completed with the information available at the time of input. The times in the chart may not be reflective of actual patient care times, interventions, or procedures. Documentation occurs after the physical care of the patient.

## 2024-08-15 NOTE — PROGRESS NOTES
Vancomycin Dosing by Pharmacy- Cessation of Therapy    Consult to pharmacy for vancomycin dosing has been discontinued by the prescriber, pharmacy will sign off at this time.    Please call pharmacy if there are further questions or re-enter a consult if vancomycin is resumed.     HERBERT MARSHALL

## 2024-08-16 ENCOUNTER — APPOINTMENT (OUTPATIENT)
Dept: RADIOLOGY | Facility: HOSPITAL | Age: 44
End: 2024-08-16
Payer: MEDICAID

## 2024-08-16 LAB
ALBUMIN SERPL BCP-MCNC: 3.2 G/DL (ref 3.4–5)
ANION GAP SERPL CALC-SCNC: 14 MMOL/L (ref 10–20)
BASOPHILS # BLD AUTO: 0.09 X10*3/UL (ref 0–0.1)
BASOPHILS NFR BLD AUTO: 1.6 %
BUN SERPL-MCNC: 10 MG/DL (ref 6–23)
CALCIUM SERPL-MCNC: 8.8 MG/DL (ref 8.6–10.6)
CHLORIDE SERPL-SCNC: 99 MMOL/L (ref 98–107)
CO2 SERPL-SCNC: 28 MMOL/L (ref 21–32)
CREAT SERPL-MCNC: 0.34 MG/DL (ref 0.5–1.3)
EGFRCR SERPLBLD CKD-EPI 2021: >90 ML/MIN/1.73M*2
EOSINOPHIL # BLD AUTO: 0.34 X10*3/UL (ref 0–0.7)
EOSINOPHIL NFR BLD AUTO: 6 %
ERYTHROCYTE [DISTWIDTH] IN BLOOD BY AUTOMATED COUNT: 12.8 % (ref 11.5–14.5)
GLUCOSE BLD MANUAL STRIP-MCNC: 192 MG/DL (ref 74–99)
GLUCOSE SERPL-MCNC: 134 MG/DL (ref 74–99)
HCT VFR BLD AUTO: 32.7 % (ref 41–52)
HGB BLD-MCNC: 10.7 G/DL (ref 13.5–17.5)
IMM GRANULOCYTES # BLD AUTO: 0.04 X10*3/UL (ref 0–0.7)
IMM GRANULOCYTES NFR BLD AUTO: 0.7 % (ref 0–0.9)
LYMPHOCYTES # BLD AUTO: 1.06 X10*3/UL (ref 1.2–4.8)
LYMPHOCYTES NFR BLD AUTO: 18.8 %
MAGNESIUM SERPL-MCNC: 1.99 MG/DL (ref 1.6–2.4)
MCH RBC QN AUTO: 31.2 PG (ref 26–34)
MCHC RBC AUTO-ENTMCNC: 32.7 G/DL (ref 32–36)
MCV RBC AUTO: 95 FL (ref 80–100)
MONOCYTES # BLD AUTO: 0.78 X10*3/UL (ref 0.1–1)
MONOCYTES NFR BLD AUTO: 13.8 %
NEUTROPHILS # BLD AUTO: 3.33 X10*3/UL (ref 1.2–7.7)
NEUTROPHILS NFR BLD AUTO: 59.1 %
NRBC BLD-RTO: 0 /100 WBCS (ref 0–0)
PHENYTOIN SERPL-MCNC: 10.5 UG/ML (ref 10–20)
PHOSPHATE SERPL-MCNC: 4.4 MG/DL (ref 2.5–4.9)
PLATELET # BLD AUTO: 572 X10*3/UL (ref 150–450)
POTASSIUM SERPL-SCNC: 4.3 MMOL/L (ref 3.5–5.3)
RBC # BLD AUTO: 3.43 X10*6/UL (ref 4.5–5.9)
SODIUM SERPL-SCNC: 137 MMOL/L (ref 136–145)
WBC # BLD AUTO: 5.6 X10*3/UL (ref 4.4–11.3)

## 2024-08-16 PROCEDURE — 80185 ASSAY OF PHENYTOIN TOTAL: CPT | Performed by: NURSE PRACTITIONER

## 2024-08-16 PROCEDURE — 71045 X-RAY EXAM CHEST 1 VIEW: CPT

## 2024-08-16 PROCEDURE — 85025 COMPLETE CBC W/AUTO DIFF WBC: CPT

## 2024-08-16 PROCEDURE — 71045 X-RAY EXAM CHEST 1 VIEW: CPT | Performed by: RADIOLOGY

## 2024-08-16 PROCEDURE — 83735 ASSAY OF MAGNESIUM: CPT

## 2024-08-16 PROCEDURE — 31720 CLEARANCE OF AIRWAYS: CPT

## 2024-08-16 PROCEDURE — 37799 UNLISTED PX VASCULAR SURGERY: CPT

## 2024-08-16 PROCEDURE — 2500000001 HC RX 250 WO HCPCS SELF ADMINISTERED DRUGS (ALT 637 FOR MEDICARE OP)

## 2024-08-16 PROCEDURE — 2500000001 HC RX 250 WO HCPCS SELF ADMINISTERED DRUGS (ALT 637 FOR MEDICARE OP): Performed by: NURSE PRACTITIONER

## 2024-08-16 PROCEDURE — 2500000005 HC RX 250 GENERAL PHARMACY W/O HCPCS: Performed by: NURSE PRACTITIONER

## 2024-08-16 PROCEDURE — 94668 MNPJ CHEST WALL SBSQ: CPT

## 2024-08-16 PROCEDURE — 2500000004 HC RX 250 GENERAL PHARMACY W/ HCPCS (ALT 636 FOR OP/ED)

## 2024-08-16 PROCEDURE — 80069 RENAL FUNCTION PANEL: CPT

## 2024-08-16 PROCEDURE — 5A09457 ASSISTANCE WITH RESPIRATORY VENTILATION, 24-96 CONSECUTIVE HOURS, CONTINUOUS POSITIVE AIRWAY PRESSURE: ICD-10-PCS | Performed by: INTERNAL MEDICINE

## 2024-08-16 PROCEDURE — 2500000005 HC RX 250 GENERAL PHARMACY W/O HCPCS

## 2024-08-16 PROCEDURE — 82947 ASSAY GLUCOSE BLOOD QUANT: CPT

## 2024-08-16 PROCEDURE — 99291 CRITICAL CARE FIRST HOUR: CPT

## 2024-08-16 PROCEDURE — 2020000001 HC ICU ROOM DAILY

## 2024-08-16 PROCEDURE — 2500000004 HC RX 250 GENERAL PHARMACY W/ HCPCS (ALT 636 FOR OP/ED): Performed by: INTERNAL MEDICINE

## 2024-08-16 PROCEDURE — 94660 CPAP INITIATION&MGMT: CPT

## 2024-08-16 RX ADMIN — BACLOFEN 20 MG: 20 TABLET ORAL at 20:42

## 2024-08-16 RX ADMIN — CARBOXYMETHYLCELLULOSE SODIUM 1 DROP: 5 SOLUTION/ DROPS OPHTHALMIC at 15:36

## 2024-08-16 RX ADMIN — Medication 50 PERCENT: at 20:30

## 2024-08-16 RX ADMIN — BACLOFEN 20 MG: 20 TABLET ORAL at 15:35

## 2024-08-16 RX ADMIN — ACETAMINOPHEN 650 MG: 160 SOLUTION ORAL at 17:28

## 2024-08-16 RX ADMIN — MINERAL OIL AND WHITE PETROLATUM: 150; 830 OINTMENT OPHTHALMIC at 22:00

## 2024-08-16 RX ADMIN — PHENYTOIN SODIUM 100 MG: 50 INJECTION INTRAMUSCULAR; INTRAVENOUS at 09:21

## 2024-08-16 RX ADMIN — PHENYTOIN SODIUM 100 MG: 50 INJECTION INTRAMUSCULAR; INTRAVENOUS at 17:30

## 2024-08-16 RX ADMIN — DEXTROSE MONOHYDRATE 2000 MG: 50 INJECTION, SOLUTION INTRAVENOUS at 14:04

## 2024-08-16 RX ADMIN — BACLOFEN 30 MG: 20 TABLET ORAL at 09:21

## 2024-08-16 RX ADMIN — CEFTAZIDIME, AVIBACTAM 2.5 G: 2; .5 POWDER, FOR SOLUTION INTRAVENOUS at 22:56

## 2024-08-16 RX ADMIN — ACETAMINOPHEN 650 MG: 160 SOLUTION ORAL at 12:36

## 2024-08-16 RX ADMIN — Medication 4000 UNITS: at 06:07

## 2024-08-16 RX ADMIN — ACETAMINOPHEN 650 MG: 160 SOLUTION ORAL at 22:56

## 2024-08-16 RX ADMIN — PHENYTOIN SODIUM 100 MG: 50 INJECTION INTRAMUSCULAR; INTRAVENOUS at 01:58

## 2024-08-16 RX ADMIN — CEFTAZIDIME, AVIBACTAM 2.5 G: 2; .5 POWDER, FOR SOLUTION INTRAVENOUS at 06:07

## 2024-08-16 RX ADMIN — CARBAMAZEPINE 300 MG: 100 TABLET, CHEWABLE ORAL at 09:20

## 2024-08-16 RX ADMIN — POLYETHYLENE GLYCOL 3350 17 G: 17 POWDER, FOR SOLUTION ORAL at 09:21

## 2024-08-16 RX ADMIN — CARBAMAZEPINE 150 MG: 100 TABLET, CHEWABLE ORAL at 15:34

## 2024-08-16 RX ADMIN — ACETAMINOPHEN 650 MG: 160 SOLUTION ORAL at 06:07

## 2024-08-16 RX ADMIN — CEFTAZIDIME, AVIBACTAM 2.5 G: 2; .5 POWDER, FOR SOLUTION INTRAVENOUS at 15:33

## 2024-08-16 RX ADMIN — ASPIRIN 81 MG 81 MG: 81 TABLET ORAL at 09:19

## 2024-08-16 RX ADMIN — Medication 40 L/MIN: at 08:00

## 2024-08-16 RX ADMIN — CARBAMAZEPINE 300 MG: 100 TABLET, CHEWABLE ORAL at 20:42

## 2024-08-16 RX ADMIN — CARBOXYMETHYLCELLULOSE SODIUM 1 DROP: 5 SOLUTION/ DROPS OPHTHALMIC at 09:48

## 2024-08-16 RX ADMIN — DEXTROSE MONOHYDRATE 2000 MG: 50 INJECTION, SOLUTION INTRAVENOUS at 01:23

## 2024-08-16 RX ADMIN — ENOXAPARIN SODIUM 100 MG: 100 INJECTION SUBCUTANEOUS at 12:36

## 2024-08-16 ASSESSMENT — PAIN - FUNCTIONAL ASSESSMENT
PAIN_FUNCTIONAL_ASSESSMENT: CPOT (CRITICAL CARE PAIN OBSERVATION TOOL)

## 2024-08-16 NOTE — PROGRESS NOTES
"Medical Intensive Care - Daily Progress Note   Subjective    Israel Edgar is a 43 y.o. year old male patient admitted on 8/10/2024 with following ICU needs: NIV (BiPAP) and HFNC need.    Interval History:  Pt remained on BIPAP o/n requiring frequent NT suctioning    Meds    Scheduled medications  acetaminophen, 650 mg, g-tube, q6h  aspirin, 81 mg, g-tube, Daily  baclofen, 20 mg, g-tube, 2 times per day  baclofen, 30 mg, g-tube, Daily  carBAMazepine, 150 mg, g-tube, Daily before evening meal  carBAMazepine, 300 mg, g-tube, BID  cefTAZidime-avibactam, 2.5 g, intravenous, q8h  cholecalciferol, 4,000 Units, g-tube, Daily  enoxaparin, 1.5 mg/kg, subcutaneous, q24h  levETIRAcetam, 2,000 mg, intravenous, q12h  artificial tears, 1 drop, Both Eyes, BID  oxygen, , inhalation, Continuous - Inhalation  phenytoin, 100 mg, intravenous, q8h  polyethylene glycol, 17 g, oral, Daily  selenium sulfide, , Topical, Once per day on Monday Friday  white petrolatum-mineral oiL, , ophthalmic (eye), Daily      Continuous medications     PRN medications       Objective    Blood pressure 116/62, pulse 109, temperature 35.7 °C (96.3 °F), temperature source Temporal, resp. rate 22, height 1.8 m (5' 10.87\"), weight 63.9 kg (140 lb 14 oz), SpO2 100%.     Physical Exam  Constitutional:       Appearance: He is ill-appearing.   Eyes:      Pupils: Pupils are equal, round, and reactive to light.   Cardiovascular:      Rate and Rhythm: Regular rhythm. Tachycardia present.   Pulmonary:      Effort: Accessory muscle usage present.      Breath sounds: Decreased air movement present.   Abdominal:      General: Abdomen is flat. Bowel sounds are normal.      Palpations: Abdomen is soft.   Musculoskeletal:      Cervical back: Neck supple.   Skin:     General: Skin is warm and dry.   Neurological:      General: No focal deficit present.      Mental Status: Mental status is at baseline.            Intake/Output Summary (Last 24 hours) at 8/16/2024 0718  Last " data filed at 8/16/2024 0607  Gross per 24 hour   Intake 2505 ml   Output 1811 ml   Net 694 ml     Labs:   Results from last 72 hours   Lab Units 08/16/24  0415 08/15/24  0322 08/14/24  0355   SODIUM mmol/L 137 136 135*   POTASSIUM mmol/L 4.3 3.9 4.0   CHLORIDE mmol/L 99 97* 99   CO2 mmol/L 28 30 27   BUN mg/dL 10 9 7   CREATININE mg/dL 0.34* 0.30* 0.32*   GLUCOSE mg/dL 134* 104* 128*   CALCIUM mg/dL 8.8 9.0 8.3*   ANION GAP mmol/L 14 13 13   EGFR mL/min/1.73m*2 >90 >90 >90   PHOSPHORUS mg/dL 4.4 4.5 3.4      Results from last 72 hours   Lab Units 08/16/24  0415 08/15/24  0322 08/14/24  0355   WBC AUTO x10*3/uL 5.6 5.1 6.8   HEMOGLOBIN g/dL 10.7* 10.5* 9.6*   HEMATOCRIT % 32.7* 29.9* 27.5*   PLATELETS AUTO x10*3/uL 572* 481* 452*   NEUTROS PCT AUTO % 59.1 62.7 65.9   LYMPHS PCT AUTO % 18.8 16.2 13.3   MONOS PCT AUTO % 13.8 13.3 16.4   EOS PCT AUTO % 6.0 5.8 3.1                 Micro/ID:     Lab Results   Component Value Date    URINECULTURE >100,000 Proteus mirabilis (A) 04/07/2024    BLOODCULT No growth at 2 days 08/13/2024       Summary of key imaging results from the last 24 hours  XR CHEST 1 VIEW; 8/16/2024 8:39 am   IMPRESSION:  1.  Slight interval improvement in left basilar airspace  disease/atelectasis    Assessment and Plan     Assessment: Israel Edgar is a 43 y.o. year old male patient admitted on 8/10/2024 with AHRF 2/2 MDR PSA PNA    Mechanical Ventilation: none  Sedation/Analgesia:  none  Restraints: no    Summary for 08/16/24  :  Agressive B/P hygiene   C/w Avycxz (original stop date was 8/16; will cont with atb for an undetermined amount of time)    Plan:  NEUROLOGY/PSYCH:  Dx:  Seizures  Advance MS  Quadriplegia   Management:  vEEG shows right right hemispheric epileptogenic lesion and a mild diffuse encephalopathy.    C/w Keppra, Phenytoin, and Tegretol  C/w baclofen    CARDIOVASCULAR:  Dx:  PMH of HTN  Management:  Home meds (metop tartrate 25 mg Q6)  Holding all antihypertensive meds ISO acute  illness    PULMONARY:  FiO2 (%):  [50 %-60 %] 50 %   Dx:  AHRF 2/2 MDR PSA PNA requiring HFNC vs BIPAP  Management:  C/w aggressive B/P hygiene   NT suction as needed  Transition from BIPAP to Airvo  CXR 8/16; shows slight improvement in left basilar airspace disease    RENAL/GENITOURINARY:  Dx:  Chronic urinary retention  Neurogenic bladder   Management:  S/p little replaced on 8/11  RFP daily    GASTROENTEROLOGY:  Dx:  Hx of decompressive colonoscopy 7/15, 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 hx of MRSA colonization, MDRO PSA pneumonia   Management:  Extending Ceftaz-avibactam indefinitely  Follow up on ID recs      ICU Check List       ICU Liberation: Intervention:   Assess, Prevent, Manage Pain CPOT - NA   Both SAT and SBT [] SAT  [] SBT 30-60 min [] Extubate to NC  [] Extubate to NIV  [] Discuss Trach   Choice of analgesia and sedation RASS: NA  none NA   Delirium: Assess, prevent and manage CAM - NA   Early Mobility and Exercise  [x] PT /OT consult   Family Engagement and Empowerment []Family updated [x]SW consult     FEN  Fluids: PRN   Electrolytes: K<4, Mg >2  Nutrition: TF  Prophylaxis:  DVT ppx: therapeutic Lovenox   GI ppx: None  Bowel care: Miralax   Hardware:         PICC - Adult Double lumen Left Basilic vein (Active)   Earliest Known Present: 08/10/24   Hand Hygiene Completed: Yes  Lumen Type: Double lumen  Orientation: Left   Location: Basilic vein   Number of days: 6       Urethral Catheter Straight-tip 16 Fr. (Active)   Placement Date/Time: 08/10/24 1630   Placed by: BETTY Delgado RN  Catheter Type: Straight-tip  Tube Size (Fr.): 16 Fr.  Catheter Balloon Size: 10 mL  Urine Returned: Yes   Number of days: 5       Gastrostomy/Enterostomy PEG-jejunostomy RLQ (Active)   Earliest Known Present: 08/10/24   Type: PEG-jejunostomy  Location: RLQ   Number of days: 6       Ileostomy Standard (Dulce, end) RLQ (Active)   Earliest Known Present: 08/10/24   Hand Hygiene Completed: Yes  Ileostomy Type: (c) Standard (Dulce, end)  Location: RLQ   Number of days: 6       Social:  Code: Full Code    HPOA: Vee Corona, (mother), [phone]   Disposition: MICU will requiring intermittent BIPAP w/ aggressive B/P hygiene.      Chan Soria, PANKAJ-CNP   08/16/24 at 7:18 AM     Disclaimer: Documentation completed with the information available at the time of input. The times in the chart may not be reflective of actual patient care times, interventions, or procedures. Documentation occurs after the physical care of the patient.

## 2024-08-16 NOTE — CARE PLAN
Problem: Safety - Adult  Goal: Free from fall injury  Outcome: Progressing  Flowsheets (Taken 8/16/2024 0153)  Free from fall injury: Instruct family/caregiver on patient safety     Problem: Chronic Conditions and Co-morbidities  Goal: Patient's chronic conditions and co-morbidity symptoms are monitored and maintained or improved  Outcome: Progressing     Problem: Skin  Goal: Prevent/manage excess moisture  Outcome: Progressing  Flowsheets (Taken 8/16/2024 0153)  Prevent/manage excess moisture:   Cleanse incontinence/protect with barrier cream   Moisturize dry skin   Monitor for/manage infection if present  Goal: Prevent/minimize sheer/friction injuries  Outcome: Progressing  Flowsheets (Taken 8/16/2024 0153)  Prevent/minimize sheer/friction injuries:   Complete micro-shifts as needed if patient unable. Adjust patient position to relieve pressure points, not a full turn   HOB 30 degrees or less   Turn/reposition every 2 hours/use positioning/transfer devices   Use pull sheet  Goal: Promote/optimize nutrition  Outcome: Progressing  Flowsheets (Taken 8/16/2024 0153)  Promote/optimize nutrition:   Monitor/record intake including meals   Discuss with provider if NPO > 2 days  Goal: Promote skin healing  Outcome: Progressing  Flowsheets (Taken 8/16/2024 0153)  Promote skin healing:   Assess skin/pad under line(s)/device(s)   Ensure correct size (line/device) and apply per  instructions   Protective dressings over bony prominences   Rotate device position/do not position patient on device   Turn/reposition every 2 hours/use positioning/transfer devices

## 2024-08-16 NOTE — CARE PLAN
The patient's goals for the shift include      The clinical goals for the shift include patient will reduce need for BiPaP and move to airvo.      Problem: Pain - Adult  Goal: Verbalizes/displays adequate comfort level or baseline comfort level  Outcome: Progressing  Flowsheets (Taken 8/16/2024 1129)  Verbalizes/displays adequate comfort level or baseline comfort level:   Assess pain using appropriate pain scale   Administer analgesics based on type and severity of pain and evaluate response   Implement non-pharmacological measures as appropriate and evaluate response   Consider cultural and social influences on pain and pain management     Problem: Safety - Adult  Goal: Free from fall injury  Outcome: Progressing  Flowsheets (Taken 8/16/2024 1129)  Free from fall injury: Instruct family/caregiver on patient safety     Problem: Discharge Planning  Goal: Discharge to home or other facility with appropriate resources  Outcome: Progressing  Flowsheets (Taken 8/16/2024 1129)  Discharge to home or other facility with appropriate resources:   Identify barriers to discharge with patient and caregiver   Identify discharge learning needs (meds, wound care, etc)     Problem: Chronic Conditions and Co-morbidities  Goal: Patient's chronic conditions and co-morbidity symptoms are monitored and maintained or improved  Outcome: Progressing  Flowsheets (Taken 8/16/2024 1129)  Care Plan - Patient's Chronic Conditions and Co-Morbidity Symptoms are Monitored and Maintained or Improved:   Monitor and assess patient's chronic conditions and comorbid symptoms for stability, deterioration, or improvement   Collaborate with multidisciplinary team to address chronic and comorbid conditions and prevent exacerbation or deterioration   Update acute care plan with appropriate goals if chronic or comorbid symptoms are exacerbated and prevent overall improvement and discharge     Problem: Skin  Goal: Decreased wound size/increased tissue  granulation at next dressing change  Outcome: Progressing  Flowsheets (Taken 8/16/2024 1129)  Decreased wound size/increased tissue granulation at next dressing change:   Promote sleep for wound healing   Protective dressings over bony prominences   Utilize specialty bed per algorithm  Goal: Participates in plan/prevention/treatment measures  Outcome: Progressing  Flowsheets (Taken 8/16/2024 1129)  Participates in plan/prevention/treatment measures:   Discuss with provider PT/OT consult   Elevate heels  Goal: Prevent/manage excess moisture  Outcome: Progressing  Flowsheets (Taken 8/16/2024 1129)  Prevent/manage excess moisture:   Cleanse incontinence/protect with barrier cream   Moisturize dry skin   Monitor for/manage infection if present   Follow provider orders for dressing changes  Goal: Prevent/minimize sheer/friction injuries  Outcome: Progressing  Flowsheets (Taken 8/16/2024 1129)  Prevent/minimize sheer/friction injuries:   Complete micro-shifts as needed if patient unable. Adjust patient position to relieve pressure points, not a full turn   Use pull sheet   HOB 30 degrees or less   Turn/reposition every 2 hours/use positioning/transfer devices   Utilize specialty bed per algorithm  Goal: Promote/optimize nutrition  Outcome: Progressing  Flowsheets (Taken 8/16/2024 1129)  Promote/optimize nutrition:   Monitor/record intake including meals   Discuss with provider if NPO > 2 days  Goal: Promote skin healing  Outcome: Progressing  Flowsheets (Taken 8/16/2024 1129)  Promote skin healing:   Assess skin/pad under line(s)/device(s)   Protective dressings over bony prominences   Turn/reposition every 2 hours/use positioning/transfer devices   Ensure correct size (line/device) and apply per  instructions   Rotate device position/do not position patient on device     Problem: Nutrition  Goal: Less than 5 days NPO/clear liquids  Outcome: Progressing  Goal: Oral intake greater than 50%  Outcome:  Progressing  Goal: Oral intake greater 75%  Outcome: Progressing  Goal: Consume prescribed supplement  Outcome: Progressing  Goal: Adequate PO fluid intake  Outcome: Progressing  Goal: Nutrition support goals are met within 48 hrs  Outcome: Progressing  Goal: Nutrition support is meeting 75% of nutrient needs  Outcome: Progressing  Goal: Tube feed tolerance  Outcome: Progressing  Goal: BG  mg/dL  Outcome: Progressing  Goal: Lab values WNL  Outcome: Progressing  Goal: Electrolytes WNL  Outcome: Progressing  Goal: Promote healing  Outcome: Progressing  Goal: Maintain stable weight  Outcome: Progressing  Goal: Reduce weight from edema/fluid  Outcome: Progressing  Goal: Gradual weight gain  Outcome: Progressing  Goal: Improve ostomy output  Outcome: Progressing     Problem: Fall/Injury  Goal: Not fall by end of shift  Outcome: Progressing  Goal: Be free from injury by end of the shift  Outcome: Progressing  Goal: Verbalize understanding of personal risk factors for fall in the hospital  Outcome: Progressing  Goal: Verbalize understanding of risk factor reduction measures to prevent injury from fall in the home  Outcome: Progressing  Goal: Use assistive devices by end of the shift  Outcome: Progressing  Goal: Pace activities to prevent fatigue by end of the shift  Outcome: Progressing

## 2024-08-17 LAB
ALBUMIN SERPL BCP-MCNC: 3.1 G/DL (ref 3.4–5)
ANION GAP SERPL CALC-SCNC: 10 MMOL/L (ref 10–20)
BACTERIA BLD AEROBE CULT: ABNORMAL
BACTERIA BLD CULT: ABNORMAL
BACTERIA BLD CULT: NORMAL
BASOPHILS # BLD AUTO: 0.12 X10*3/UL (ref 0–0.1)
BASOPHILS NFR BLD AUTO: 2.2 %
BUN SERPL-MCNC: 9 MG/DL (ref 6–23)
CALCIUM SERPL-MCNC: 8.6 MG/DL (ref 8.6–10.6)
CHLORIDE SERPL-SCNC: 100 MMOL/L (ref 98–107)
CO2 SERPL-SCNC: 31 MMOL/L (ref 21–32)
CREAT SERPL-MCNC: 0.33 MG/DL (ref 0.5–1.3)
EGFRCR SERPLBLD CKD-EPI 2021: >90 ML/MIN/1.73M*2
EOSINOPHIL # BLD AUTO: 0.35 X10*3/UL (ref 0–0.7)
EOSINOPHIL NFR BLD AUTO: 6.3 %
ERYTHROCYTE [DISTWIDTH] IN BLOOD BY AUTOMATED COUNT: 13 % (ref 11.5–14.5)
GLUCOSE SERPL-MCNC: 136 MG/DL (ref 74–99)
GRAM STN SPEC: ABNORMAL
HCT VFR BLD AUTO: 31.9 % (ref 41–52)
HGB BLD-MCNC: 10.6 G/DL (ref 13.5–17.5)
IMM GRANULOCYTES # BLD AUTO: 0.04 X10*3/UL (ref 0–0.7)
IMM GRANULOCYTES NFR BLD AUTO: 0.7 % (ref 0–0.9)
LYMPHOCYTES # BLD AUTO: 1.24 X10*3/UL (ref 1.2–4.8)
LYMPHOCYTES NFR BLD AUTO: 22.3 %
MAGNESIUM SERPL-MCNC: 1.96 MG/DL (ref 1.6–2.4)
MCH RBC QN AUTO: 31.1 PG (ref 26–34)
MCHC RBC AUTO-ENTMCNC: 33.2 G/DL (ref 32–36)
MCV RBC AUTO: 94 FL (ref 80–100)
MONOCYTES # BLD AUTO: 0.62 X10*3/UL (ref 0.1–1)
MONOCYTES NFR BLD AUTO: 11.2 %
NEUTROPHILS # BLD AUTO: 3.19 X10*3/UL (ref 1.2–7.7)
NEUTROPHILS NFR BLD AUTO: 57.3 %
NRBC BLD-RTO: 0 /100 WBCS (ref 0–0)
PHOSPHATE SERPL-MCNC: 4.5 MG/DL (ref 2.5–4.9)
PLATELET # BLD AUTO: 544 X10*3/UL (ref 150–450)
POTASSIUM SERPL-SCNC: 4.4 MMOL/L (ref 3.5–5.3)
RBC # BLD AUTO: 3.41 X10*6/UL (ref 4.5–5.9)
SODIUM SERPL-SCNC: 137 MMOL/L (ref 136–145)
WBC # BLD AUTO: 5.6 X10*3/UL (ref 4.4–11.3)

## 2024-08-17 PROCEDURE — 84100 ASSAY OF PHOSPHORUS: CPT

## 2024-08-17 PROCEDURE — 2020000001 HC ICU ROOM DAILY

## 2024-08-17 PROCEDURE — 2500000001 HC RX 250 WO HCPCS SELF ADMINISTERED DRUGS (ALT 637 FOR MEDICARE OP): Performed by: NURSE PRACTITIONER

## 2024-08-17 PROCEDURE — 2500000004 HC RX 250 GENERAL PHARMACY W/ HCPCS (ALT 636 FOR OP/ED): Mod: JZ | Performed by: INTERNAL MEDICINE

## 2024-08-17 PROCEDURE — 2500000005 HC RX 250 GENERAL PHARMACY W/O HCPCS

## 2024-08-17 PROCEDURE — 37799 UNLISTED PX VASCULAR SURGERY: CPT

## 2024-08-17 PROCEDURE — 94668 MNPJ CHEST WALL SBSQ: CPT

## 2024-08-17 PROCEDURE — 99291 CRITICAL CARE FIRST HOUR: CPT | Performed by: NURSE PRACTITIONER

## 2024-08-17 PROCEDURE — 83735 ASSAY OF MAGNESIUM: CPT

## 2024-08-17 PROCEDURE — 2500000001 HC RX 250 WO HCPCS SELF ADMINISTERED DRUGS (ALT 637 FOR MEDICARE OP)

## 2024-08-17 PROCEDURE — 85025 COMPLETE CBC W/AUTO DIFF WBC: CPT

## 2024-08-17 PROCEDURE — 2500000005 HC RX 250 GENERAL PHARMACY W/O HCPCS: Performed by: NURSE PRACTITIONER

## 2024-08-17 PROCEDURE — 94762 N-INVAS EAR/PLS OXIMTRY CONT: CPT

## 2024-08-17 PROCEDURE — 2500000004 HC RX 250 GENERAL PHARMACY W/ HCPCS (ALT 636 FOR OP/ED)

## 2024-08-17 PROCEDURE — 94660 CPAP INITIATION&MGMT: CPT

## 2024-08-17 RX ADMIN — ASPIRIN 81 MG 81 MG: 81 TABLET ORAL at 10:13

## 2024-08-17 RX ADMIN — ACETAMINOPHEN 650 MG: 160 SOLUTION ORAL at 04:46

## 2024-08-17 RX ADMIN — MINERAL OIL AND WHITE PETROLATUM: 150; 830 OINTMENT OPHTHALMIC at 22:00

## 2024-08-17 RX ADMIN — Medication 4000 UNITS: at 06:21

## 2024-08-17 RX ADMIN — CEFTAZIDIME, AVIBACTAM 2.5 G: 2; .5 POWDER, FOR SOLUTION INTRAVENOUS at 06:20

## 2024-08-17 RX ADMIN — CARBAMAZEPINE 300 MG: 100 TABLET, CHEWABLE ORAL at 10:10

## 2024-08-17 RX ADMIN — BACLOFEN 30 MG: 20 TABLET ORAL at 10:11

## 2024-08-17 RX ADMIN — Medication 50 PERCENT: at 08:00

## 2024-08-17 RX ADMIN — DEXTROSE MONOHYDRATE 2000 MG: 50 INJECTION, SOLUTION INTRAVENOUS at 12:40

## 2024-08-17 RX ADMIN — CARBAMAZEPINE 300 MG: 100 TABLET, CHEWABLE ORAL at 20:03

## 2024-08-17 RX ADMIN — ACETAMINOPHEN 650 MG: 160 SOLUTION ORAL at 11:33

## 2024-08-17 RX ADMIN — BACLOFEN 20 MG: 20 TABLET ORAL at 15:59

## 2024-08-17 RX ADMIN — CARBOXYMETHYLCELLULOSE SODIUM 1 DROP: 5 SOLUTION/ DROPS OPHTHALMIC at 10:13

## 2024-08-17 RX ADMIN — ENOXAPARIN SODIUM 100 MG: 100 INJECTION SUBCUTANEOUS at 12:45

## 2024-08-17 RX ADMIN — Medication 10 L/MIN: at 23:42

## 2024-08-17 RX ADMIN — ACETAMINOPHEN 650 MG: 160 SOLUTION ORAL at 17:07

## 2024-08-17 RX ADMIN — DEXTROSE MONOHYDRATE 2000 MG: 50 INJECTION, SOLUTION INTRAVENOUS at 01:02

## 2024-08-17 RX ADMIN — PHENYTOIN SODIUM 100 MG: 50 INJECTION INTRAMUSCULAR; INTRAVENOUS at 10:22

## 2024-08-17 RX ADMIN — CARBAMAZEPINE 150 MG: 100 TABLET, CHEWABLE ORAL at 16:00

## 2024-08-17 RX ADMIN — PHENYTOIN SODIUM 100 MG: 50 INJECTION INTRAMUSCULAR; INTRAVENOUS at 18:28

## 2024-08-17 RX ADMIN — CARBOXYMETHYLCELLULOSE SODIUM 1 DROP: 5 SOLUTION/ DROPS OPHTHALMIC at 15:39

## 2024-08-17 RX ADMIN — BACLOFEN 20 MG: 20 TABLET ORAL at 20:02

## 2024-08-17 RX ADMIN — POLYETHYLENE GLYCOL 3350 17 G: 17 POWDER, FOR SOLUTION ORAL at 10:18

## 2024-08-17 RX ADMIN — PHENYTOIN SODIUM 100 MG: 50 INJECTION INTRAMUSCULAR; INTRAVENOUS at 02:33

## 2024-08-17 ASSESSMENT — PAIN - FUNCTIONAL ASSESSMENT
PAIN_FUNCTIONAL_ASSESSMENT: CPOT (CRITICAL CARE PAIN OBSERVATION TOOL)

## 2024-08-17 NOTE — CARE PLAN
The patient's goals for the shift include      The clinical goals for the shift include Pt will reduce need for BiPaP and move to airvo.

## 2024-08-17 NOTE — PROGRESS NOTES
"Medical Intensive Care - Daily Progress Note   Subjective    Israel Edgar is a 43 y.o. year old male patient admitted on 8/10/2024 with following ICU needs: high flow oxygen and noninvasive mechanical ventilation    Interval History:  BIPAP 50% - minimal tolerance of AirVo      Meds    Scheduled medications  acetaminophen, 650 mg, g-tube, q6h  aspirin, 81 mg, g-tube, Daily  baclofen, 20 mg, g-tube, 2 times per day  baclofen, 30 mg, g-tube, Daily  carBAMazepine, 150 mg, g-tube, Daily before evening meal  carBAMazepine, 300 mg, g-tube, BID  cefTAZidime-avibactam, 2.5 g, intravenous, q8h  cholecalciferol, 4,000 Units, g-tube, Daily  enoxaparin, 1.5 mg/kg, subcutaneous, q24h  levETIRAcetam, 2,000 mg, intravenous, q12h  artificial tears, 1 drop, Both Eyes, BID  oxygen, , inhalation, Continuous - Inhalation  phenytoin, 100 mg, intravenous, q8h  polyethylene glycol, 17 g, oral, Daily  selenium sulfide, , Topical, Once per day on Monday Friday  vasopressin, , ,   white petrolatum-mineral oiL, , ophthalmic (eye), Daily      Continuous medications     PRN medications  PRN medications: vasopressin     Objective    Blood pressure 112/73, pulse 100, temperature 36.7 °C (98.1 °F), temperature source Temporal, resp. rate (!) 30, height 1.8 m (5' 10.87\"), weight 63.9 kg (140 lb 14 oz), SpO2 95%.     Physical Exam  Constitutional:       Appearance: He is ill-appearing.   HENT:      Mouth/Throat:      Mouth: Mucous membranes are moist.   Eyes:      Conjunctiva/sclera: Conjunctivae normal.   Cardiovascular:      Rate and Rhythm: Normal rate and regular rhythm.   Pulmonary:      Breath sounds: Decreased breath sounds present.   Abdominal:      Comments: Ileostomy, PEG   Genitourinary:     Comments: Chronic little  Musculoskeletal:      Comments: Spastic quadriplegia   Skin:     General: Skin is warm.   Neurological:      Comments: obtunded            Intake/Output Summary (Last 24 hours) at 8/17/2024 0908  Last data filed at 8/17/2024 " 0500  Gross per 24 hour   Intake 1970 ml   Output 1740 ml   Net 230 ml     Labs:   Results from last 72 hours   Lab Units 08/17/24  0411 08/16/24  0415 08/15/24  0322   SODIUM mmol/L 137 137 136   POTASSIUM mmol/L 4.4 4.3 3.9   CHLORIDE mmol/L 100 99 97*   CO2 mmol/L 31 28 30   BUN mg/dL 9 10 9   CREATININE mg/dL 0.33* 0.34* 0.30*   GLUCOSE mg/dL 136* 134* 104*   CALCIUM mg/dL 8.6 8.8 9.0   ANION GAP mmol/L 10 14 13   EGFR mL/min/1.73m*2 >90 >90 >90   PHOSPHORUS mg/dL 4.5 4.4 4.5      Results from last 72 hours   Lab Units 08/17/24  0411 08/16/24  0415 08/15/24  0322   WBC AUTO x10*3/uL 5.6 5.6 5.1   HEMOGLOBIN g/dL 10.6* 10.7* 10.5*   HEMATOCRIT % 31.9* 32.7* 29.9*   PLATELETS AUTO x10*3/uL 544* 572* 481*   NEUTROS PCT AUTO % 57.3 59.1 62.7   LYMPHS PCT AUTO % 22.3 18.8 16.2   MONOS PCT AUTO % 11.2 13.8 13.3   EOS PCT AUTO % 6.3 6.0 5.8        Micro/ID:     Lab Results   Component Value Date    URINECULTURE >100,000 Proteus mirabilis (A) 04/07/2024    BLOODCULT No growth at 3 days 08/13/2024       Summary of key imaging results from the last 24 hours     NA      Assessment and Plan     Assessment: Israel Edgar is a 43 y.o. year old male patient admitted on 8/10/2024 with AHRF    Mechanical Ventilation: none  Sedation/Analgesia:  none  Restraints: no    Summary for 08/17/24  :  Intermittent BIPAP during the day  Wean high flow  Stop vanco    Plan:  NEUROLOGY/PSYCH:  Dx:  Seizures, advanced MS  epileptogenic structural lesion in the right hemisphere and moderate diffuse encephalopathy   Management:  Cont. Tegretol  Cont. Phenytoin  Cont keppra  Cont baclofen    CARDIOVASCULAR:  Dx:  Hx of HTN  Home meds: Metoprolol Tartrate 25 mg Q6H   Management:  Hold meds for now    PULMONARY:  Dx:  Hx of PE, AHRF 2/2, concerns for hospital acquired PNA 2/2 MDR Pseudomonas versus acute PE.  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  KUB as above on 8/12  Management:  Ostomy care  Consult surgery for staple removal on 8/18  Enteral feeding    ENDOCRINOLOGY:  Dx:  NO issues    HEMATOLOGY:  Dx:  Hx of DVT/PE  H/H stable  Management:  Therapeutic lovenox  Daily CBC    MUSCULOSKELETAL/ SKIN:  Dx:  pressure injury on sacrum, spastic quadriplegia  Wound care following  Management:  Sacral mepilex  Turn Q2h  Home baclofen    INFECTIOUS DISEASE:  Dx:  PNA (PSA, Proteus on previous cx), UTI.  hx of MRSA colonization, MDRO PSA pneumonia   Blood from 8/11 with gram + bacilli - contaminant  Management:  Ceftaz-avibactam until 8/16  Follow up ID recs    ICU Check List       ICU Liberation: Intervention:   Assess, Prevent, Manage Pain CPOT - NA   Both SAT and SBT [] SAT  [] SBT 30-60 min [] Extubate to NC  [] Extubate to NIV  [] Discuss Trach   Choice of analgesia and sedation RASS: NA  none NA   Delirium: Assess, prevent and manage CAM - NA   Early Mobility and Exercise  [x] PT /OT consult   Family Engagement and Empowerment [x]Family updated [x]SW consult     FEN  Fluids: PRN  Electrolytes: replete as needed  Nutrition: enteral feeding  Prophylaxis:  DVT ppx: therapeutic Lovenox  GI ppx: none  Bowel care: miralax  Hardware:  Catheter: chronic little  Access: PICC  Social:  Code: Full Code    HPOA: Vee Corona, (mother), [phone]   Disposition: MICU while requiring intermittent BIPAP and aggressive broncho pulm hygiene.  Consider potential tracheostomy as dependent on positive pressure at this time.      PANKAJ Webster-CNP   08/17/24 at 9:08 AM     Disclaimer:  Documentation completed with the information available at the time of input. The times in the chart may not be reflective of actual patient care times, interventions, or procedures. Documentation occurs after the physical care of the patient.

## 2024-08-17 NOTE — CARE PLAN
The clinical goals for the shift include Pt will reduce need for BiPaP and move to airvo.      Problem: Safety - Adult  Goal: Free from fall injury  Outcome: Progressing     Problem: Skin  Goal: Prevent/manage excess moisture  Outcome: Progressing  Flowsheets (Taken 8/16/2024 2132)  Prevent/manage excess moisture:   Cleanse incontinence/protect with barrier cream   Moisturize dry skin   Monitor for/manage infection if present  Goal: Prevent/minimize sheer/friction injuries  Outcome: Progressing  Flowsheets (Taken 8/16/2024 2132)  Prevent/minimize sheer/friction injuries:   Complete micro-shifts as needed if patient unable. Adjust patient position to relieve pressure points, not a full turn   HOB 30 degrees or less   Turn/reposition every 2 hours/use positioning/transfer devices   Use pull sheet   Utilize specialty bed per algorithm  Goal: Promote/optimize nutrition  Outcome: Progressing  Flowsheets (Taken 8/16/2024 2132)  Promote/optimize nutrition: Discuss with provider if NPO > 2 days  Goal: Promote skin healing  Outcome: Progressing  Flowsheets (Taken 8/16/2024 2132)  Promote skin healing:   Assess skin/pad under line(s)/device(s)   Ensure correct size (line/device) and apply per  instructions   Protective dressings over bony prominences   Rotate device position/do not position patient on device   Turn/reposition every 2 hours/use positioning/transfer devices     Problem: Fall/Injury  Goal: Be free from injury by end of the shift  Outcome: Progressing

## 2024-08-18 VITALS
SYSTOLIC BLOOD PRESSURE: 104 MMHG | TEMPERATURE: 98.2 F | OXYGEN SATURATION: 95 % | BODY MASS INDEX: 19.72 KG/M2 | RESPIRATION RATE: 17 BRPM | DIASTOLIC BLOOD PRESSURE: 59 MMHG | HEIGHT: 71 IN | WEIGHT: 140.87 LBS | HEART RATE: 91 BPM

## 2024-08-18 LAB
ALBUMIN SERPL BCP-MCNC: 3.2 G/DL (ref 3.4–5)
ANION GAP SERPL CALC-SCNC: 14 MMOL/L (ref 10–20)
BASOPHILS # BLD AUTO: 0.11 X10*3/UL (ref 0–0.1)
BASOPHILS NFR BLD AUTO: 1.7 %
BUN SERPL-MCNC: 9 MG/DL (ref 6–23)
CALCIUM SERPL-MCNC: 8.9 MG/DL (ref 8.6–10.6)
CARBAMAZEPINE SERPL-MCNC: 4.3 UG/ML (ref 4–12)
CHLORIDE SERPL-SCNC: 100 MMOL/L (ref 98–107)
CO2 SERPL-SCNC: 28 MMOL/L (ref 21–32)
CREAT SERPL-MCNC: 0.29 MG/DL (ref 0.5–1.3)
EGFRCR SERPLBLD CKD-EPI 2021: >90 ML/MIN/1.73M*2
EOSINOPHIL # BLD AUTO: 0.32 X10*3/UL (ref 0–0.7)
EOSINOPHIL NFR BLD AUTO: 4.9 %
ERYTHROCYTE [DISTWIDTH] IN BLOOD BY AUTOMATED COUNT: 12.9 % (ref 11.5–14.5)
GLUCOSE BLD MANUAL STRIP-MCNC: 114 MG/DL (ref 74–99)
GLUCOSE SERPL-MCNC: 127 MG/DL (ref 74–99)
HCT VFR BLD AUTO: 32.4 % (ref 41–52)
HGB BLD-MCNC: 10.6 G/DL (ref 13.5–17.5)
IMM GRANULOCYTES # BLD AUTO: 0.02 X10*3/UL (ref 0–0.7)
IMM GRANULOCYTES NFR BLD AUTO: 0.3 % (ref 0–0.9)
LEVETIRACETAM SERPL-MCNC: 35 UG/ML (ref 10–40)
LEVETIRACETAM SERPL-MCNC: 39 UG/ML (ref 10–40)
LYMPHOCYTES # BLD AUTO: 1.3 X10*3/UL (ref 1.2–4.8)
LYMPHOCYTES NFR BLD AUTO: 19.9 %
MAGNESIUM SERPL-MCNC: 1.99 MG/DL (ref 1.6–2.4)
MCH RBC QN AUTO: 30.8 PG (ref 26–34)
MCHC RBC AUTO-ENTMCNC: 32.7 G/DL (ref 32–36)
MCV RBC AUTO: 94 FL (ref 80–100)
MONOCYTES # BLD AUTO: 0.66 X10*3/UL (ref 0.1–1)
MONOCYTES NFR BLD AUTO: 10.1 %
NEUTROPHILS # BLD AUTO: 4.11 X10*3/UL (ref 1.2–7.7)
NEUTROPHILS NFR BLD AUTO: 63.1 %
NRBC BLD-RTO: 0 /100 WBCS (ref 0–0)
PHENYTOIN SERPL-MCNC: 7.2 UG/ML (ref 10–20)
PHOSPHATE SERPL-MCNC: 4.2 MG/DL (ref 2.5–4.9)
PLATELET # BLD AUTO: 533 X10*3/UL (ref 150–450)
POTASSIUM SERPL-SCNC: 4 MMOL/L (ref 3.5–5.3)
RBC # BLD AUTO: 3.44 X10*6/UL (ref 4.5–5.9)
SODIUM SERPL-SCNC: 138 MMOL/L (ref 136–145)
WBC # BLD AUTO: 6.5 X10*3/UL (ref 4.4–11.3)

## 2024-08-18 PROCEDURE — 80177 DRUG SCRN QUAN LEVETIRACETAM: CPT

## 2024-08-18 PROCEDURE — 2500000004 HC RX 250 GENERAL PHARMACY W/ HCPCS (ALT 636 FOR OP/ED)

## 2024-08-18 PROCEDURE — 82947 ASSAY GLUCOSE BLOOD QUANT: CPT

## 2024-08-18 PROCEDURE — 37799 UNLISTED PX VASCULAR SURGERY: CPT

## 2024-08-18 PROCEDURE — 2500000005 HC RX 250 GENERAL PHARMACY W/O HCPCS: Performed by: NURSE PRACTITIONER

## 2024-08-18 PROCEDURE — 2020000001 HC ICU ROOM DAILY

## 2024-08-18 PROCEDURE — 80185 ASSAY OF PHENYTOIN TOTAL: CPT | Performed by: NURSE PRACTITIONER

## 2024-08-18 PROCEDURE — 99291 CRITICAL CARE FIRST HOUR: CPT | Performed by: NURSE PRACTITIONER

## 2024-08-18 PROCEDURE — 85025 COMPLETE CBC W/AUTO DIFF WBC: CPT

## 2024-08-18 PROCEDURE — 80069 RENAL FUNCTION PANEL: CPT

## 2024-08-18 PROCEDURE — 2500000001 HC RX 250 WO HCPCS SELF ADMINISTERED DRUGS (ALT 637 FOR MEDICARE OP)

## 2024-08-18 PROCEDURE — 83735 ASSAY OF MAGNESIUM: CPT

## 2024-08-18 PROCEDURE — 94668 MNPJ CHEST WALL SBSQ: CPT

## 2024-08-18 PROCEDURE — 2500000001 HC RX 250 WO HCPCS SELF ADMINISTERED DRUGS (ALT 637 FOR MEDICARE OP): Performed by: NURSE PRACTITIONER

## 2024-08-18 PROCEDURE — 2500000005 HC RX 250 GENERAL PHARMACY W/O HCPCS

## 2024-08-18 PROCEDURE — 80156 ASSAY CARBAMAZEPINE TOTAL: CPT

## 2024-08-18 RX ORDER — LEVETIRACETAM 100 MG/ML
2000 SOLUTION ORAL 2 TIMES DAILY
Status: DISCONTINUED | OUTPATIENT
Start: 2024-08-19 | End: 2024-08-18

## 2024-08-18 RX ORDER — LEVETIRACETAM 100 MG/ML
2000 SOLUTION ORAL 2 TIMES DAILY
Status: DISCONTINUED | OUTPATIENT
Start: 2024-08-18 | End: 2024-08-21

## 2024-08-18 RX ORDER — PHENYTOIN 125 MG/5ML
100 SUSPENSION ORAL 3 TIMES DAILY
Status: DISCONTINUED | OUTPATIENT
Start: 2024-08-18 | End: 2024-08-21

## 2024-08-18 RX ADMIN — DEXTROSE MONOHYDRATE 2000 MG: 50 INJECTION, SOLUTION INTRAVENOUS at 00:31

## 2024-08-18 RX ADMIN — CARBOXYMETHYLCELLULOSE SODIUM 1 DROP: 5 SOLUTION/ DROPS OPHTHALMIC at 15:00

## 2024-08-18 RX ADMIN — ACETAMINOPHEN 650 MG: 160 SOLUTION ORAL at 05:58

## 2024-08-18 RX ADMIN — BACLOFEN 20 MG: 20 TABLET ORAL at 16:01

## 2024-08-18 RX ADMIN — POLYETHYLENE GLYCOL 3350 17 G: 17 POWDER, FOR SOLUTION ORAL at 09:17

## 2024-08-18 RX ADMIN — PHENYTOIN 100 MG: 125 SUSPENSION ORAL at 21:49

## 2024-08-18 RX ADMIN — ENOXAPARIN SODIUM 100 MG: 100 INJECTION SUBCUTANEOUS at 12:50

## 2024-08-18 RX ADMIN — CARBAMAZEPINE 300 MG: 100 TABLET, CHEWABLE ORAL at 21:51

## 2024-08-18 RX ADMIN — CARBOXYMETHYLCELLULOSE SODIUM 1 DROP: 5 SOLUTION/ DROPS OPHTHALMIC at 09:00

## 2024-08-18 RX ADMIN — ACETAMINOPHEN 650 MG: 160 SOLUTION ORAL at 12:01

## 2024-08-18 RX ADMIN — ASPIRIN 81 MG 81 MG: 81 TABLET ORAL at 09:17

## 2024-08-18 RX ADMIN — LEVETIRACETAM 2000 MG: 500 SOLUTION ORAL at 23:52

## 2024-08-18 RX ADMIN — Medication 10 L/MIN: at 20:29

## 2024-08-18 RX ADMIN — Medication 10 L/MIN: at 04:32

## 2024-08-18 RX ADMIN — PHENYTOIN SODIUM 100 MG: 50 INJECTION INTRAMUSCULAR; INTRAVENOUS at 01:59

## 2024-08-18 RX ADMIN — DEXTROSE MONOHYDRATE 2000 MG: 50 INJECTION, SOLUTION INTRAVENOUS at 12:50

## 2024-08-18 RX ADMIN — ACETAMINOPHEN 650 MG: 160 SOLUTION ORAL at 00:31

## 2024-08-18 RX ADMIN — ACETAMINOPHEN 650 MG: 160 SOLUTION ORAL at 23:52

## 2024-08-18 RX ADMIN — BACLOFEN 20 MG: 20 TABLET ORAL at 21:49

## 2024-08-18 RX ADMIN — PHENYTOIN SODIUM 100 MG: 50 INJECTION INTRAMUSCULAR; INTRAVENOUS at 09:56

## 2024-08-18 RX ADMIN — BACLOFEN 30 MG: 20 TABLET ORAL at 09:17

## 2024-08-18 RX ADMIN — CARBAMAZEPINE 150 MG: 100 TABLET, CHEWABLE ORAL at 16:01

## 2024-08-18 RX ADMIN — BACITRACIN ZINC, NEOMYCIN, POLYMYXIN B 10 APPLICATION: 400; 3.5; 5 OINTMENT TOPICAL at 21:51

## 2024-08-18 RX ADMIN — Medication 4000 UNITS: at 05:58

## 2024-08-18 RX ADMIN — MINERAL OIL AND WHITE PETROLATUM: 150; 830 OINTMENT OPHTHALMIC at 21:52

## 2024-08-18 RX ADMIN — ACETAMINOPHEN 650 MG: 160 SOLUTION ORAL at 17:15

## 2024-08-18 RX ADMIN — CARBAMAZEPINE 300 MG: 100 TABLET, CHEWABLE ORAL at 09:17

## 2024-08-18 ASSESSMENT — PAIN - FUNCTIONAL ASSESSMENT
PAIN_FUNCTIONAL_ASSESSMENT: CPOT (CRITICAL CARE PAIN OBSERVATION TOOL)

## 2024-08-18 NOTE — PROGRESS NOTES
"Medical Intensive Care - Daily Progress Note   Subjective    Israel Edgar is a 43 y.o. year old male patient admitted on 8/10/2024 with following ICU needs: high flow oxygen and noninvasive mechanical ventilation    Interval History:  High Flow nasal cannula 12      Meds    Scheduled medications  acetaminophen, 650 mg, g-tube, q6h  aspirin, 81 mg, g-tube, Daily  baclofen, 20 mg, g-tube, 2 times per day  baclofen, 30 mg, g-tube, Daily  carBAMazepine, 150 mg, g-tube, Daily before evening meal  carBAMazepine, 300 mg, g-tube, BID  cholecalciferol, 4,000 Units, g-tube, Daily  enoxaparin, 1.5 mg/kg, subcutaneous, q24h  levETIRAcetam, 2,000 mg, intravenous, q12h  artificial tears, 1 drop, Both Eyes, BID  oxygen, , inhalation, Continuous - Inhalation  phenytoin, 100 mg, intravenous, q8h  polyethylene glycol, 17 g, oral, Daily  selenium sulfide, , Topical, Once per day on Monday Friday  white petrolatum-mineral oiL, , ophthalmic (eye), Daily      Continuous medications     PRN medications       Objective    Blood pressure 117/74, pulse 96, temperature 36.6 °C (97.9 °F), temperature source Temporal, resp. rate 18, height 1.8 m (5' 10.87\"), weight 63.9 kg (140 lb 14 oz), SpO2 97%.     Physical Exam  Constitutional:       Appearance: He is ill-appearing.   HENT:      Mouth/Throat:      Mouth: Mucous membranes are moist.   Eyes:      Conjunctiva/sclera: Conjunctivae normal.   Cardiovascular:      Rate and Rhythm: Normal rate and regular rhythm.   Pulmonary:      Breath sounds: Decreased breath sounds present.   Abdominal:      Comments: Ileostomy, PEG   Genitourinary:     Comments: Chronic little  Musculoskeletal:      Comments: Spastic quadriplegia   Skin:     General: Skin is warm.   Neurological:      Comments: obtunded            Intake/Output Summary (Last 24 hours) at 8/18/2024 1130  Last data filed at 8/18/2024 1000  Gross per 24 hour   Intake 2360 ml   Output 1757 ml   Net 603 ml     Labs:   Results from last 72 hours "   Lab Units 08/18/24 0414 08/17/24 0411 08/16/24 0415   SODIUM mmol/L 138 137 137   POTASSIUM mmol/L 4.0 4.4 4.3   CHLORIDE mmol/L 100 100 99   CO2 mmol/L 28 31 28   BUN mg/dL 9 9 10   CREATININE mg/dL 0.29* 0.33* 0.34*   GLUCOSE mg/dL 127* 136* 134*   CALCIUM mg/dL 8.9 8.6 8.8   ANION GAP mmol/L 14 10 14   EGFR mL/min/1.73m*2 >90 >90 >90   PHOSPHORUS mg/dL 4.2 4.5 4.4      Results from last 72 hours   Lab Units 08/18/24 0414 08/17/24 0411 08/16/24 0415   WBC AUTO x10*3/uL 6.5 5.6 5.6   HEMOGLOBIN g/dL 10.6* 10.6* 10.7*   HEMATOCRIT % 32.4* 31.9* 32.7*   PLATELETS AUTO x10*3/uL 533* 544* 572*   NEUTROS PCT AUTO % 63.1 57.3 59.1   LYMPHS PCT AUTO % 19.9 22.3 18.8   MONOS PCT AUTO % 10.1 11.2 13.8   EOS PCT AUTO % 4.9 6.3 6.0        Micro/ID:     Lab Results   Component Value Date    URINECULTURE >100,000 Proteus mirabilis (A) 04/07/2024    BLOODCULT No growth at 4 days -  FINAL REPORT 08/13/2024       Summary of key imaging results from the last 24 hours     NA      Assessment and Plan     Assessment: Israel Edgar is a 43 y.o. year old male patient admitted on 8/10/2024 with AHRF    Mechanical Ventilation: none  Sedation/Analgesia:  none  Restraints: no    Summary for 08/18/24  :  Wean high flow    Plan:  NEUROLOGY/PSYCH:  Dx:  Seizures, advanced MS  epileptogenic structural lesion in the right hemisphere and moderate diffuse encephalopathy.  Noted to have downbeat nystagmus overnight which is not a new finding.  Epilepsy evaluated.   Management:  Cont. Tegretol  Cont. Phenytoin  Cont keppra  Cont baclofen    CARDIOVASCULAR:  Dx:  Hx of HTN  Home meds: Metoprolol Tartrate 25 mg Q6H   Management:  Hold meds for now    PULMONARY:  Dx:  Hx of PE, AHRF 2/2, concerns for hospital acquired PNA 2/2 MDR Pseudomonas versus acute PE.  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  KUB as above on 8/12  Management:  Ostomy care  Consult surgery for staple removal on 8/18  Enteral feeding    ENDOCRINOLOGY:  Dx:  NO issues    HEMATOLOGY:  Dx:  Hx of DVT/PE  H/H stable  Management:  Therapeutic lovenox  Daily CBC    MUSCULOSKELETAL/ SKIN:  Dx:  pressure injury on sacrum, spastic quadriplegia  Wound care following  Management:  Sacral mepilex  Turn Q2h  Home baclofen    INFECTIOUS DISEASE:  Dx:  PNA (PSA, Proteus on previous cx), UTI.  hx of MRSA colonization, MDRO PSA pneumonia   Blood from 8/11 with gram + bacilli - contaminant  Management:  Ceftaz-avibactam completed on 8/16  Follow up ID recs    ICU Check List       ICU Liberation: Intervention:   Assess, Prevent, Manage Pain CPOT - NA   Both SAT and SBT [] SAT  [] SBT 30-60 min [] Extubate to NC  [] Extubate to NIV  [] Discuss Trach   Choice of analgesia and sedation RASS: NA  none NA   Delirium: Assess, prevent and manage CAM - NA   Early Mobility and Exercise  [x] PT /OT consult   Family Engagement and Empowerment [x]Family updated [x]SW consult     FEN  Fluids: PRN  Electrolytes: replete as needed  Nutrition: enteral feeding  Prophylaxis:  DVT ppx: therapeutic Lovenox  GI ppx: none  Bowel care: miralax  Hardware:  Catheter: chronic little  Access: PICC  Social:  Code: Full Code    HPOA: Vee Corona, (mother), [phone]   Disposition: MICU while requiring intermittent BIPAP and aggressive broncho pulm hygiene.  May be appropriate for SDU in the next couple days.    PATRICE Webster   08/18/24 at 11:30 AM     Disclaimer: Documentation completed with the information available at the time of input. The times in  the chart may not be reflective of actual patient care times, interventions, or procedures. Documentation occurs after the physical care of the patient.

## 2024-08-18 NOTE — CARE PLAN
The clinical goals for the shift include lPt will be placed on airvo when  tolerating      Problem: Safety - Adult  Goal: Free from fall injury  Outcome: Progressing     Problem: Skin  Goal: Prevent/manage excess moisture  Outcome: Progressing  Flowsheets (Taken 8/17/2024 2149)  Prevent/manage excess moisture:   Cleanse incontinence/protect with barrier cream   Monitor for/manage infection if present   Moisturize dry skin  Goal: Prevent/minimize sheer/friction injuries  Outcome: Progressing  Flowsheets (Taken 8/17/2024 2149)  Prevent/minimize sheer/friction injuries:   Complete micro-shifts as needed if patient unable. Adjust patient position to relieve pressure points, not a full turn   HOB 30 degrees or less   Turn/reposition every 2 hours/use positioning/transfer devices   Use pull sheet   Utilize specialty bed per algorithm  Goal: Promote/optimize nutrition  Outcome: Progressing  Flowsheets (Taken 8/17/2024 2149)  Promote/optimize nutrition: Discuss with provider if NPO > 2 days  Goal: Promote skin healing  Outcome: Progressing  Flowsheets (Taken 8/17/2024 2149)  Promote skin healing:   Assess skin/pad under line(s)/device(s)   Rotate device position/do not position patient on device   Turn/reposition every 2 hours/use positioning/transfer devices   Protective dressings over bony prominences   Ensure correct size (line/device) and apply per  instructions     Problem: Fall/Injury  Goal: Be free from injury by end of the shift  Outcome: Progressing

## 2024-08-18 NOTE — SIGNIFICANT EVENT
Epilepsy Updates:    Contacted by primary team regarding concern for downbeat nystagmus on this patient. This is a chronic intermittent finding, which has been recorded on multiple EEG recordings, characterized as non-epileptic events related to his multiple sclerosis. Discussed with epilepsy attending, Dr Ash. Prior EEGs reviewed.     Neuro-epilepsy will sign off. Feel free to reach out with further questions/concerns.    Nasra Yan MD   PGY-3 Neurology  Epilepsy [phone]

## 2024-08-19 ENCOUNTER — APPOINTMENT (OUTPATIENT)
Dept: RADIOLOGY | Facility: HOSPITAL | Age: 44
End: 2024-08-19
Payer: MEDICAID

## 2024-08-19 LAB
ALBUMIN SERPL BCP-MCNC: 3.2 G/DL (ref 3.4–5)
ANION GAP BLDV CALCULATED.4IONS-SCNC: 7 MMOL/L (ref 10–25)
ANION GAP BLDV CALCULATED.4IONS-SCNC: 8 MMOL/L (ref 10–25)
ANION GAP BLDV CALCULATED.4IONS-SCNC: 8 MMOL/L (ref 10–25)
ANION GAP SERPL CALC-SCNC: 12 MMOL/L (ref 10–20)
BASE EXCESS BLDV CALC-SCNC: 3.8 MMOL/L (ref -2–3)
BASE EXCESS BLDV CALC-SCNC: 4.8 MMOL/L (ref -2–3)
BASE EXCESS BLDV CALC-SCNC: 4.9 MMOL/L (ref -2–3)
BASOPHILS # BLD AUTO: 0.11 X10*3/UL (ref 0–0.1)
BASOPHILS NFR BLD AUTO: 1.9 %
BODY TEMPERATURE: 37 DEGREES CELSIUS
BUN SERPL-MCNC: 12 MG/DL (ref 6–23)
CA-I BLDV-SCNC: 1.25 MMOL/L (ref 1.1–1.33)
CA-I BLDV-SCNC: 1.25 MMOL/L (ref 1.1–1.33)
CA-I BLDV-SCNC: 1.28 MMOL/L (ref 1.1–1.33)
CALCIUM SERPL-MCNC: 8.9 MG/DL (ref 8.6–10.6)
CHLORIDE BLDV-SCNC: 100 MMOL/L (ref 98–107)
CHLORIDE SERPL-SCNC: 99 MMOL/L (ref 98–107)
CO2 SERPL-SCNC: 29 MMOL/L (ref 21–32)
CREAT SERPL-MCNC: 0.33 MG/DL (ref 0.5–1.3)
EGFRCR SERPLBLD CKD-EPI 2021: >90 ML/MIN/1.73M*2
EOSINOPHIL # BLD AUTO: 0.37 X10*3/UL (ref 0–0.7)
EOSINOPHIL NFR BLD AUTO: 6.5 %
ERYTHROCYTE [DISTWIDTH] IN BLOOD BY AUTOMATED COUNT: 13 % (ref 11.5–14.5)
GLUCOSE BLDV-MCNC: 133 MG/DL (ref 74–99)
GLUCOSE BLDV-MCNC: 136 MG/DL (ref 74–99)
GLUCOSE BLDV-MCNC: 148 MG/DL (ref 74–99)
GLUCOSE SERPL-MCNC: 130 MG/DL (ref 74–99)
HCO3 BLDV-SCNC: 30.8 MMOL/L (ref 22–26)
HCO3 BLDV-SCNC: 31.2 MMOL/L (ref 22–26)
HCO3 BLDV-SCNC: 31.6 MMOL/L (ref 22–26)
HCT VFR BLD AUTO: 31.1 % (ref 41–52)
HCT VFR BLD EST: 34 % (ref 41–52)
HCT VFR BLD EST: 34 % (ref 41–52)
HCT VFR BLD EST: 35 % (ref 41–52)
HGB BLD-MCNC: 10.5 G/DL (ref 13.5–17.5)
HGB BLDV-MCNC: 11.3 G/DL (ref 13.5–17.5)
HGB BLDV-MCNC: 11.4 G/DL (ref 13.5–17.5)
HGB BLDV-MCNC: 11.6 G/DL (ref 13.5–17.5)
IMM GRANULOCYTES # BLD AUTO: 0.04 X10*3/UL (ref 0–0.7)
IMM GRANULOCYTES NFR BLD AUTO: 0.7 % (ref 0–0.9)
INHALED O2 CONCENTRATION: 28 %
INHALED O2 CONCENTRATION: 40 %
INHALED O2 CONCENTRATION: 50 %
LACTATE BLDV-SCNC: 0.9 MMOL/L (ref 0.4–2)
LACTATE BLDV-SCNC: 1 MMOL/L (ref 0.4–2)
LACTATE BLDV-SCNC: 1.2 MMOL/L (ref 0.4–2)
LYMPHOCYTES # BLD AUTO: 1.29 X10*3/UL (ref 1.2–4.8)
LYMPHOCYTES NFR BLD AUTO: 22.8 %
MAGNESIUM SERPL-MCNC: 1.92 MG/DL (ref 1.6–2.4)
MCH RBC QN AUTO: 30.9 PG (ref 26–34)
MCHC RBC AUTO-ENTMCNC: 33.8 G/DL (ref 32–36)
MCV RBC AUTO: 92 FL (ref 80–100)
MONOCYTES # BLD AUTO: 0.54 X10*3/UL (ref 0.1–1)
MONOCYTES NFR BLD AUTO: 9.6 %
NEUTROPHILS # BLD AUTO: 3.3 X10*3/UL (ref 1.2–7.7)
NEUTROPHILS NFR BLD AUTO: 58.5 %
NRBC BLD-RTO: 0 /100 WBCS (ref 0–0)
OXYHGB MFR BLDV: 66.6 % (ref 45–75)
OXYHGB MFR BLDV: 70.8 % (ref 45–75)
OXYHGB MFR BLDV: 83.8 % (ref 45–75)
PCO2 BLDV: 54 MM HG (ref 41–51)
PCO2 BLDV: 56 MM HG (ref 41–51)
PCO2 BLDV: 57 MM HG (ref 41–51)
PH BLDV: 7.34 PH (ref 7.33–7.43)
PH BLDV: 7.36 PH (ref 7.33–7.43)
PH BLDV: 7.37 PH (ref 7.33–7.43)
PHOSPHATE SERPL-MCNC: 4.5 MG/DL (ref 2.5–4.9)
PLATELET # BLD AUTO: 502 X10*3/UL (ref 150–450)
PO2 BLDV: 45 MM HG (ref 35–45)
PO2 BLDV: 47 MM HG (ref 35–45)
PO2 BLDV: 56 MM HG (ref 35–45)
POTASSIUM BLDV-SCNC: 4.3 MMOL/L (ref 3.5–5.3)
POTASSIUM BLDV-SCNC: 4.5 MMOL/L (ref 3.5–5.3)
POTASSIUM BLDV-SCNC: 4.6 MMOL/L (ref 3.5–5.3)
POTASSIUM SERPL-SCNC: 3.9 MMOL/L (ref 3.5–5.3)
RBC # BLD AUTO: 3.4 X10*6/UL (ref 4.5–5.9)
SAO2 % BLDV: 67 % (ref 45–75)
SAO2 % BLDV: 73 % (ref 45–75)
SAO2 % BLDV: 86 % (ref 45–75)
SODIUM BLDV-SCNC: 134 MMOL/L (ref 136–145)
SODIUM BLDV-SCNC: 134 MMOL/L (ref 136–145)
SODIUM BLDV-SCNC: 135 MMOL/L (ref 136–145)
SODIUM SERPL-SCNC: 136 MMOL/L (ref 136–145)
WBC # BLD AUTO: 5.7 X10*3/UL (ref 4.4–11.3)

## 2024-08-19 PROCEDURE — 71045 X-RAY EXAM CHEST 1 VIEW: CPT

## 2024-08-19 PROCEDURE — 82435 ASSAY OF BLOOD CHLORIDE: CPT

## 2024-08-19 PROCEDURE — 2500000001 HC RX 250 WO HCPCS SELF ADMINISTERED DRUGS (ALT 637 FOR MEDICARE OP): Performed by: NURSE PRACTITIONER

## 2024-08-19 PROCEDURE — 2500000005 HC RX 250 GENERAL PHARMACY W/O HCPCS: Performed by: NURSE PRACTITIONER

## 2024-08-19 PROCEDURE — 37799 UNLISTED PX VASCULAR SURGERY: CPT

## 2024-08-19 PROCEDURE — 99291 CRITICAL CARE FIRST HOUR: CPT

## 2024-08-19 PROCEDURE — 2500000001 HC RX 250 WO HCPCS SELF ADMINISTERED DRUGS (ALT 637 FOR MEDICARE OP)

## 2024-08-19 PROCEDURE — 80069 RENAL FUNCTION PANEL: CPT

## 2024-08-19 PROCEDURE — 2500000005 HC RX 250 GENERAL PHARMACY W/O HCPCS

## 2024-08-19 PROCEDURE — 2500000004 HC RX 250 GENERAL PHARMACY W/ HCPCS (ALT 636 FOR OP/ED)

## 2024-08-19 PROCEDURE — 71045 X-RAY EXAM CHEST 1 VIEW: CPT | Performed by: RADIOLOGY

## 2024-08-19 PROCEDURE — 1200000002 HC GENERAL ROOM WITH TELEMETRY DAILY

## 2024-08-19 PROCEDURE — 85025 COMPLETE CBC W/AUTO DIFF WBC: CPT

## 2024-08-19 PROCEDURE — 83735 ASSAY OF MAGNESIUM: CPT

## 2024-08-19 ASSESSMENT — COGNITIVE AND FUNCTIONAL STATUS - GENERAL
TURNING FROM BACK TO SIDE WHILE IN FLAT BAD: TOTAL
MOBILITY SCORE: 6
DAILY ACTIVITIY SCORE: 6
STANDING UP FROM CHAIR USING ARMS: TOTAL
TOILETING: TOTAL
MOVING TO AND FROM BED TO CHAIR: TOTAL
CLIMB 3 TO 5 STEPS WITH RAILING: TOTAL
EATING MEALS: TOTAL
HELP NEEDED FOR BATHING: TOTAL
MOVING FROM LYING ON BACK TO SITTING ON SIDE OF FLAT BED WITH BEDRAILS: TOTAL
WALKING IN HOSPITAL ROOM: TOTAL
PERSONAL GROOMING: TOTAL
DRESSING REGULAR UPPER BODY CLOTHING: TOTAL
DRESSING REGULAR LOWER BODY CLOTHING: TOTAL

## 2024-08-19 ASSESSMENT — PAIN - FUNCTIONAL ASSESSMENT
PAIN_FUNCTIONAL_ASSESSMENT: CPOT (CRITICAL CARE PAIN OBSERVATION TOOL)

## 2024-08-19 ASSESSMENT — PAIN SCALES - WONG BAKER: WONGBAKER_NUMERICALRESPONSE: HURTS LITTLE BIT

## 2024-08-19 ASSESSMENT — PAIN SCALES - PAIN ASSESSMENT IN ADVANCED DEMENTIA (PAINAD): TOTALSCORE: MEDICATION (SEE MAR)

## 2024-08-19 NOTE — CARE PLAN
Problem: Pain - Adult  Goal: Verbalizes/displays adequate comfort level or baseline comfort level  Outcome: Progressing     Problem: Safety - Adult  Goal: Free from fall injury  Outcome: Progressing     Problem: Skin  Goal: Decreased wound size/increased tissue granulation at next dressing change  Outcome: Progressing  Flowsheets (Taken 8/19/2024 1257)  Decreased wound size/increased tissue granulation at next dressing change:   Protective dressings over bony prominences   Promote sleep for wound healing   Utilize specialty bed per algorithm  Goal: Participates in plan/prevention/treatment measures  Outcome: Progressing  Flowsheets (Taken 8/19/2024 1254)  Participates in plan/prevention/treatment measures:   Elevate heels   Discuss with provider PT/OT consult  Goal: Prevent/manage excess moisture  Outcome: Progressing  Flowsheets (Taken 8/19/2024 1256)  Prevent/manage excess moisture:   Cleanse incontinence/protect with barrier cream   Monitor for/manage infection if present   Moisturize dry skin   Follow provider orders for dressing changes  Goal: Prevent/minimize sheer/friction injuries  Outcome: Progressing  Flowsheets (Taken 8/19/2024 1258)  Prevent/minimize sheer/friction injuries:   Complete micro-shifts as needed if patient unable. Adjust patient position to relieve pressure points, not a full turn   HOB 30 degrees or less   Turn/reposition every 2 hours/use positioning/transfer devices   Utilize specialty bed per algorithm  Goal: Promote/optimize nutrition  Outcome: Progressing  Flowsheets (Taken 8/19/2024 1257)  Promote/optimize nutrition:   Assist with feeding   Monitor/record intake including meals  Goal: Promote skin healing  Outcome: Progressing  Flowsheets (Taken 8/19/2024 1253)  Promote skin healing:   Protective dressings over bony prominences   Assess skin/pad under line(s)/device(s)   Turn/reposition every 2 hours/use positioning/transfer devices     Problem: Fall/Injury  Goal: Not fall by end of  shift  Outcome: Progressing  Goal: Be free from injury by end of the shift  Outcome: Progressing     The clinical goals for the shift include pt will tolerate HFNC throughout shift without an increase in o2 requirements

## 2024-08-19 NOTE — PROGRESS NOTES
MICU to SDU Transfer Summary     I:  ICU Admission Reason & Brief ICU Course:    Israel Edgar is a 43 y.o. male patient with PMH of advanced MS with spasticity at baseline, neurogenic bladder with chronic little, epilepsy with questionable breakthrough seizures, DVT, PE, MRSA colonization, MRDO pneumonia, HTN, quadriplegia, depression, CVA, dysphagia, previous tracheostomy. Admitted from [facility] to the MICU on 08/10/24 for acute hypoxic respiratory failure likely secondary to MRD Pseudomonas versus pulmonary embolism. Of note, patient was recently admitted to [facility] with concerns for SBO, s/p colonic decompression, neostigmine x2, subtotal colectomy with endo ileostomy at OSH. Also previously treated for MDR Pseudomonas and UTI with several antibiotics in succession at [facility] and on discharge to [facility], including meropenem, ceftaroline, flagyl, micafungin, and ceftazidime/avibactam.    Originally requiring HFNC, then transitioned to BiPAP after decompensation at [facility]. Now back on HFNC in MICU. Febrile, tachycardic, and saturating in the low 90s. Covering for MRSA and MDR pseudomonas with vanc and boo with plans to restart Avycaz 2.5mg q8h after approval from ID. Concerns for potential PE despite being on therapeutic Lovenox; however, patient unstable to obtain CT PE. Neurology consulted for epilepsy and concern for seizures after found to have subtherapeutic AED levels, currently on continuous EEG monitoring.    Carbamazepine restarted on 8/14.  ID notified that blood culture grew microbacterium which is likely a contaminant.  Vanco was stopped.    Weaned to 10 L NC on 8/18.      C: Code Status/DPOA Info/Goals of Care/ACP Note    Full Code  DPOA/Contact Number: Vee Corona, (mother), [phone]     U: Unprescribing & Pertinent High-Risk Medications    Changes to home meds: N/a     Anticoagulation: Therapeutic lovenox     Antibiotics:   [x] N/A - no current planned  antimicrobioals    P: Pending Tests at the Time of Transfer   NA      A: Active consultants, including Rehab:   [x]  Subspecialty Consultants:  Epilepsy   [x]  PT  [x]  OT  [x]  SLP  [x]  Wound Care    U: Uncertainty Measure/Diagnostic Pause:    Working diagnosis at the time of transfer acute hypoxic respiratory failure likely secondary to MRD Pseudomonas versus pulmonary embolism, though      Diagnosis Degree of Certainty: 1. High degree of certainty about the clinical diagnosis.     S:To-Dos:     To-do list prior to transfer:  [x]NA        E: Exam, including Lines/Drains/Airways & Data Review:   Constitutional:       Appearance: He is ill-appearing.   Eyes:      Pupils: Pupils are equal, round, and reactive to light.   Cardiovascular:      Rate and Rhythm: Regular rhythm. .   Pulmonary:      Breath sounds: Decreased air movement present.   Abdominal:      General: Abdomen is flat. Bowel sounds are normal.      Palpations: Abdomen is soft.   Musculoskeletal:      Cervical back: Neck supple.   Skin:     General: Skin is warm and dry.   Neurological:      General: No focal deficit present.      Mental Status: Mental status is at baseline.   Difficult airway? N/A  Lines/drains assessed for removal? No    Within 30 minutes of the patient physically leaving the floor, a Floor Readiness Note needs to be placed with updated vitals.            Medical Intensive Care - Daily Progress Note   Subjective    Israel Edgar is a 43 y.o. year old male patient admitted on 8/10/2024 with following ICU needs: NIV (BiPAP) and HFNC need.     Interval History:  Placed pt back on BiPAP this am d/t c/f hypercapnia   No seizure like activity     Meds    Scheduled medications  acetaminophen, 650 mg, g-tube, q6h  aspirin, 81 mg, g-tube, Daily  baclofen, 20 mg, g-tube, 2 times per day  baclofen, 30 mg, g-tube, Daily  carBAMazepine, 150 mg, g-tube, Daily before evening meal  carBAMazepine, 300 mg, g-tube, BID  cholecalciferol, 4,000 Units,  "g-tube, Daily  enoxaparin, 1.5 mg/kg, subcutaneous, q24h  levETIRAcetam, 2,000 mg, oral, BID  artificial tears, 1 drop, Both Eyes, BID  neomycin-bacitracnZn-polymyxnB, , Topical, TID  oxygen, , inhalation, Continuous - Inhalation  phenytoin, 100 mg, oral, TID  polyethylene glycol, 17 g, oral, Daily  selenium sulfide, , Topical, Once per day on Monday Friday  white petrolatum-mineral oiL, , ophthalmic (eye), Daily      Continuous medications     PRN medications       Objective    Blood pressure 108/63, pulse 79, temperature 36.8 °C (98.2 °F), temperature source Temporal, resp. rate 13, height 1.8 m (5' 10.87\"), weight 64.1 kg (141 lb 5 oz), SpO2 95%.     Physical Exam   Constitutional:       Appearance: He is ill-appearing.   Eyes:      Pupils: Pupils are equal, round, and reactive to light.   Cardiovascular:      Rate and Rhythm: Regular rhythm. .   Pulmonary:      Breath sounds: Decreased air movement present.   Abdominal:      General: Abdomen is flat. Bowel sounds are normal.      Palpations: Abdomen is soft.   Musculoskeletal:      Cervical back: Neck supple.   Skin:     General: Skin is warm and dry.   Neurological:      General: No focal deficit present.      Mental Status: Mental status is at baseline.     Intake/Output Summary (Last 24 hours) at 8/19/2024 0718  Last data filed at 8/19/2024 0600  Gross per 24 hour   Intake 1545 ml   Output 1712 ml   Net -167 ml     Labs:   Results from last 72 hours   Lab Units 08/19/24  0510 08/18/24 0414 08/17/24  0411   SODIUM mmol/L 136 138 137   POTASSIUM mmol/L 3.9 4.0 4.4   CHLORIDE mmol/L 99 100 100   CO2 mmol/L 29 28 31   BUN mg/dL 12 9 9   CREATININE mg/dL 0.33* 0.29* 0.33*   GLUCOSE mg/dL 130* 127* 136*   CALCIUM mg/dL 8.9 8.9 8.6   ANION GAP mmol/L 12 14 10   EGFR mL/min/1.73m*2 >90 >90 >90   PHOSPHORUS mg/dL 4.5 4.2 4.5      Results from last 72 hours   Lab Units 08/19/24  0510 08/18/24 0414 08/17/24  0411   WBC AUTO x10*3/uL 5.7 6.5 5.6   HEMOGLOBIN g/dL 10.5* " 10.6* 10.6*   HEMATOCRIT % 31.1* 32.4* 31.9*   PLATELETS AUTO x10*3/uL 502* 533* 544*   NEUTROS PCT AUTO % 58.5 63.1 57.3   LYMPHS PCT AUTO % 22.8 19.9 22.3   MONOS PCT AUTO % 9.6 10.1 11.2   EOS PCT AUTO % 6.5 4.9 6.3                 Micro/ID:     Lab Results   Component Value Date    URINECULTURE >100,000 Proteus mirabilis (A) 04/07/2024    BLOODCULT No growth at 4 days -  FINAL REPORT 08/13/2024       Summary of key imaging results from the last 24 hours      Assessment and Plan     Assessment: Israel Edgar is a 43 y.o. year old male patient admitted on 8/10/2024 with AHRF 2/2 MDR PSA PNA     Mechanical Ventilation: none  Sedation/Analgesia:  none  Restraints: no    Summary for 08/19/24  :  BiPap  vs HFNC (wean as tolerated)  CXR     Plan:  NEUROLOGY/PSYCH:  Dx:  Seizures  Advance MS  Quadriplegia   Management:  vEEG shows right right hemispheric epileptogenic lesion and a mild diffuse encephalopathy.    C/w Keppra, Phenytoin, and Tegretol  C/w baclofen  Epilepsy s//oon 8/18/24; indicated pts downbeat nystamus is a chronic intermittent finding observed on multiple EEG 2/2 MS      CARDIOVASCULAR:  Dx:  PMH of HTN  Management:  Home meds (metop tartrate 25 mg Q6)  Holding all antihypertensive meds ISO acute illness     PULMONARY:  FiO2 (%):  [50 %-60 %] 50 %   Dx:  AHRF 2/2 MDR PSA PNA requiring HFNC vs BIPAP  Management:  C/w aggressive B/P hygiene   NT suction as needed  Transition from BIPAP to Airvo (wean as tolerated)  CXR 8/16; shows slight improvement in left basilar airspace disease     RENAL/GENITOURINARY:  Dx:  Chronic urinary retention  Neurogenic bladder   Management:  S/p little replaced on 8/11  RFP daily     GASTROENTEROLOGY:  Dx:  Hx of decompressive colonoscopy 7/15, subtotal colectomy with end ileostomy & mucus fisulta (7/2024), dysphagia, PEG, Hx Colonic dilatation and Gerardo syndrome   Management:  Wound care consulted; apprec recs  Ostomy care q shift   C/w TF       ENDOCRINOLOGY:  Dx:  AURELIO  Management:  Hypoglycemia protocol     HEMATOLOGY:  Dx:  PMHs of DVT/PE  Management:  CTA PE negative for PE  C/w therapeutic Lovenox   CBD daily      MSK/SKIN:  Dx:  Pressure injury on sacrum, spastic quadriplegia   Management:  Wound care following; appec recs   Mepilex to sacrum  Q2 turns   C/w baclofen (home dose)     INFECTIOUS DISEASE:  Dx:  PNA (PSA, Proteus on previous cx), UTI. hx of MRSA colonization, MDRO PSA pneumonia   Management:  Completed course of Ceftaz-avibactam on 8/16/24  Follow up on ID recs      ICU Check List       ICU Liberation: Intervention:   Assess, Prevent, Manage Pain CPOT - NA   Both SAT and SBT [] SAT  [] SBT 30-60 min [] Extubate to NC  [] Extubate to NIV  [] Discuss Trach   Choice of analgesia and sedation RASS: NA  none NA   Delirium: Assess, prevent and manage CAM - _   Early Mobility and Exercise  [x] PT /OT consult   Family Engagement and Empowerment []Family updated [x]SW consult     FEN  Fluids: PRN   Electrolytes: K<4, Mg >2  Nutrition: TF  Prophylaxis:  DVT ppx: therapeutic Lovenox   GI ppx: None  Bowel care: Miralax   Hardware:         PICC - Adult Double lumen Left Basilic vein (Active)   Earliest Known Present: 08/10/24   Hand Hygiene Completed: Yes  Lumen Type: Double lumen  Orientation: Left  Location: Basilic vein   Number of days: 9       Urethral Catheter Straight-tip 16 Fr. (Active)   Placement Date/Time: 08/10/24 1630   Placed by: BETTY Delgado RN  Catheter Type: Straight-tip  Tube Size (Fr.): 16 Fr.  Catheter Balloon Size: 10 mL  Urine Returned: Yes   Number of days: 8       Gastrostomy/Enterostomy PEG-jejunostomy RLQ (Active)   Earliest Known Present: 08/10/24   Type: PEG-jejunostomy  Location: RLQ   Number of days: 9       Ileostomy Standard (leora Cardona) RLQ (Active)   Earliest Known Present: 08/10/24   Hand Hygiene Completed: Yes  Ileostomy Type: (c) Standard (leora Cardona)  Location: RLQ   Number of days: 9       Social:  Code: Full  Code    HPOA: Vee Corona, (mother), [phone]   Disposition: MICU will requiring intermittent BIPAP w/ aggressive B/P hygiene.      Chan Soria, PANKAJ-CNP   08/19/24 at 7:18 AM     Disclaimer: Documentation completed with the information available at the time of input. The times in the chart may not be reflective of actual patient care times, interventions, or procedures. Documentation occurs after the physical care of the patient.

## 2024-08-19 NOTE — PROGRESS NOTES
Occupational Therapy                 Therapy Communication Note    Patient Name: Israel Edgar  MRN: [id number]  Today's Date: 8/19/2024     Discipline: Occupational Therapy    Missed Visit Reason: Missed Visit Reason: Patient placed on medical hold (new orders placed, per EMR. pt is total care at baseline. PT does not follow commands. Pt is from [facility]. Will d/c OT orders)    Missed Time: Attempt    Comment:

## 2024-08-19 NOTE — CONSULTS
Wound Care Consult     Visit Date: 8/19/2024      Patient Name: Israel Edgar         MRN: [id number]           YOB: 1980     Reason for Consult: sacrum, ileostomy     Wound History: Pt with advanced MS (essentially quadriplegic), s/p ileostomy     Wound Assessment:  Wound 08/10/24 Pressure Injury Coccyx (Active)   Wound Image   08/19/24 0637   Site Assessment [place] 08/19/24 1332   [place]-Wound Assessment Clean;Dry 08/19/24 0800   Pressure Injury Stage O 08/19/24 1332   Wound Length (cm) 8 cm 08/10/24 0916   Wound Width (cm) 3 cm 08/10/24 0916   Wound Surface Area (cm^2) 24 cm^2 08/10/24 0916   State of Healing Closed wound edges 08/19/24 0800   Drainage Amount None 08/19/24 1332   Dressing Barrier film;Silicone border dressing 08/19/24 1332   Dressing Changed Changed 08/19/24 1332   Dressing Status Clean;Dry 08/19/24 1332       Wound 08/13/24 Other (comment) Abdomen Lower;Right (Active)   Site Assessment Dry;Intact 08/19/24 0800   [place]-Wound Assessment Clean;Dry 08/19/24 0800   Drainage Description None 08/19/24 0800   Drainage Amount None 08/19/24 0800   Dressing Foam 08/17/24 2000   Dressing Changed Changed 08/17/24 0400   Dressing Status Clean;Dry;Occlusive 08/18/24 0400       Wound 08/18/24 Pressure Injury Nose (Active)   Wound Image   08/18/24 1742   Site Assessment Pink;White 08/19/24 0800   [place]-Wound Assessment Clean;Intact 08/19/24 0800   Pressure Injury Stage 1 08/18/24 2000   Drainage Description None 08/19/24 0800   Drainage Amount None 08/19/24 0800   Dressing Open to air 08/19/24 0800     Wound Team Summary Assessment: Pt resting in MICU bed with EHOB waffle mattress. RLQ ileostomy stoma pink and pouched using intact appliance with high output pouch to bedside drainage. Loose/watery brown/tan stool in drainage bag. Pt turned to reveal pink, fragile scar tissue to sacrum/coccyx and R ischium. No open wounds or signs of new breakdown. Area cleaned, prepped with barrier film, and redressed  with Mepilex foams for protection. Pt left turned to R using wedges. Heel lift boots in place.      Wound Team Recs: Continue EHOB waffle mattress. Turn pt q2 hours using wedges placed to float sacrum. Mepilex foams to healed wounds/scar tissue at sacrum/R ischium. Change q3 days and PRN if soiled, removing very gently. Use skin prep underneath please. Float heels. Routine ostomy care. Contact [facility] with issues.     Provider, please review.      Laya Rasmussen RN, CWON  8/19/2024  1:33 PM

## 2024-08-19 NOTE — SIGNIFICANT EVENT
MICU to SDU Floor Readiness Note     I, personally, evaluated Israel Edgar prior to transfer to the floor, including reviewing all current laboratory and imaging studies. The patient remains appropriate for transfer to the floor. Bedside nurse and respiratory therapy are also in agreement of patient's readiness for the floor.        I:  Hospital Course   Israel Edgar is a 43 y.o. male patient with PMH of advanced MS with spasticity at baseline, neurogenic bladder with chronic little, epilepsy with questionable breakthrough seizures, DVT, PE, MRSA colonization, MRDO pneumonia, HTN, quadriplegia, depression, CVA, dysphagia, previous tracheostomy. Admitted from [facility] to the MICU on 08/10/24 for acute hypoxic respiratory failure likely secondary to MRD Pseudomonas versus pulmonary embolism. Of note, patient was recently admitted to [facility] with concerns for SBO, s/p colonic decompression, neostigmine x2, subtotal colectomy with endo ileostomy at OSH. Also previously treated for MDR Pseudomonas and UTI with several antibiotics in succession at [facility] and on discharge to [facility], including meropenem, ceftaroline, flagyl, micafungin, and ceftazidime/avibactam.    Originally requiring HFNC, then transitioned to BiPAP after decompensation at [facility]. Now back on HFNC in MICU. Febrile, tachycardic, and saturating in the low 90s. Covering for MRSA and MDR pseudomonas with vanc and boo with plans to restart Avycaz 2.5mg q8h after approval from ID. Concerns for potential PE despite being on therapeutic Lovenox; however, patient unstable to obtain CT PE. Neurology consulted for epilepsy and concern for seizures after found to have subtherapeutic AED levels, currently on continuous EEG monitoring.    Carbamazepine restarted on 8/14.  ID notified that blood culture grew microbacterium which is likely a contaminant.  Vanco was stopped.    Weaned to 10 L NC on 8/18.          E: Exam, including  Lines/Drains/Airways & Data Review:   Constitutional:       Appearance: He is ill-appearing.   Eyes:      Pupils: Pupils are equal, round, and reactive to light.   Cardiovascular:      Rate and Rhythm: Regular rhythm. .   Pulmonary:      Breath sounds: Decreased air movement present.   Abdominal:      General: Abdomen is flat. Bowel sounds are normal.      Palpations: Abdomen is soft.   Musculoskeletal:      Cervical back: Neck supple.   Skin:     General: Skin is warm and dry.   Neurological:      General: No focal deficit present.      Mental Status: Mental status is at baseline.   Difficult airway? N/A  Lines/drains assessed for removal? No    Current Vital Signs:  Heart Rate: 110 (08/19/24 1700 : Mara Pino RN)  BP: 122/74 (08/19/24 1700 : Mara Pino RN)  Temp: 35.9 °C (96.6 °F) (08/19/24 1600 : Elizabeth Anders)  Resp: 20 (08/19/24 1700 : Mara Pino RN)  SpO2: 100 % (08/19/24 1700 : Mara Pino RN)    Accepting team,  SDU , received verbal sign out and the Provider Care team/Attending has been updated. Bedside nurse will now call accepting nurse for report and patient will be transferred to [place] [id number].    PANKAJ Andrews-CNP

## 2024-08-19 NOTE — PROGRESS NOTES
Social Work Transitional Care:  - ICU Treatment Plan: weaning High flow NC, epilepsy is being evaluated, PMH of PE, AHRF    -Additional information:   - Support: Mother, Vee Watson is listed as NOK   -Payer: Medicaid  -Planned Disposition: The patient is from [facility] - Per SW note, patients' mom is leaning toward taking him home with home care   - Barriers to discharge: none at this time. SW will continue to follow   RITO Calderon, LSW

## 2024-08-20 LAB
ALBUMIN SERPL BCP-MCNC: 3.4 G/DL (ref 3.4–5)
ANION GAP SERPL CALC-SCNC: 10 MMOL/L (ref 10–20)
BASE EXCESS BLDV CALC-SCNC: 5.5 MMOL/L (ref -2–3)
BODY TEMPERATURE: 37 DEGREES CELSIUS
BUN SERPL-MCNC: 11 MG/DL (ref 6–23)
CALCIUM SERPL-MCNC: 9 MG/DL (ref 8.6–10.6)
CHLORIDE SERPL-SCNC: 97 MMOL/L (ref 98–107)
CO2 SERPL-SCNC: 31 MMOL/L (ref 21–32)
CREAT SERPL-MCNC: 0.35 MG/DL (ref 0.5–1.3)
EGFRCR SERPLBLD CKD-EPI 2021: >90 ML/MIN/1.73M*2
ERYTHROCYTE [DISTWIDTH] IN BLOOD BY AUTOMATED COUNT: 13 % (ref 11.5–14.5)
GLUCOSE SERPL-MCNC: 125 MG/DL (ref 74–99)
HCO3 BLDV-SCNC: 32.4 MMOL/L (ref 22–26)
HCT VFR BLD AUTO: 34 % (ref 41–52)
HGB BLD-MCNC: 11 G/DL (ref 13.5–17.5)
INHALED O2 CONCENTRATION: 40 %
MAGNESIUM SERPL-MCNC: 2 MG/DL (ref 1.6–2.4)
MCH RBC QN AUTO: 30.1 PG (ref 26–34)
MCHC RBC AUTO-ENTMCNC: 32.4 G/DL (ref 32–36)
MCV RBC AUTO: 93 FL (ref 80–100)
NRBC BLD-RTO: 0 /100 WBCS (ref 0–0)
OXYHGB MFR BLDV: 81.7 % (ref 45–75)
PCO2 BLDV: 56 MM HG (ref 41–51)
PH BLDV: 7.37 PH (ref 7.33–7.43)
PHOSPHATE SERPL-MCNC: 3.9 MG/DL (ref 2.5–4.9)
PLATELET # BLD AUTO: 540 X10*3/UL (ref 150–450)
PO2 BLDV: 55 MM HG (ref 35–45)
POTASSIUM SERPL-SCNC: 3.4 MMOL/L (ref 3.5–5.3)
RBC # BLD AUTO: 3.65 X10*6/UL (ref 4.5–5.9)
SAO2 % BLDV: 83 % (ref 45–75)
SODIUM SERPL-SCNC: 135 MMOL/L (ref 136–145)
WBC # BLD AUTO: 6.7 X10*3/UL (ref 4.4–11.3)

## 2024-08-20 PROCEDURE — 82805 BLOOD GASES W/O2 SATURATION: CPT

## 2024-08-20 PROCEDURE — 80069 RENAL FUNCTION PANEL: CPT | Performed by: NURSE PRACTITIONER

## 2024-08-20 PROCEDURE — 2500000001 HC RX 250 WO HCPCS SELF ADMINISTERED DRUGS (ALT 637 FOR MEDICARE OP): Performed by: NURSE PRACTITIONER

## 2024-08-20 PROCEDURE — 2500000005 HC RX 250 GENERAL PHARMACY W/O HCPCS: Performed by: NURSE PRACTITIONER

## 2024-08-20 PROCEDURE — 94668 MNPJ CHEST WALL SBSQ: CPT

## 2024-08-20 PROCEDURE — 85027 COMPLETE CBC AUTOMATED: CPT | Performed by: NURSE PRACTITIONER

## 2024-08-20 PROCEDURE — 94660 CPAP INITIATION&MGMT: CPT

## 2024-08-20 PROCEDURE — 83735 ASSAY OF MAGNESIUM: CPT | Performed by: NURSE PRACTITIONER

## 2024-08-20 PROCEDURE — 2500000001 HC RX 250 WO HCPCS SELF ADMINISTERED DRUGS (ALT 637 FOR MEDICARE OP)

## 2024-08-20 PROCEDURE — 1200000002 HC GENERAL ROOM WITH TELEMETRY DAILY

## 2024-08-20 PROCEDURE — 2500000004 HC RX 250 GENERAL PHARMACY W/ HCPCS (ALT 636 FOR OP/ED): Performed by: NURSE PRACTITIONER

## 2024-08-20 RX ORDER — POTASSIUM CHLORIDE 1.5 G/1.58G
40 POWDER, FOR SOLUTION ORAL ONCE
Status: COMPLETED | OUTPATIENT
Start: 2024-08-20 | End: 2024-08-20

## 2024-08-20 RX ORDER — CARBAMAZEPINE 100 MG/1
300 TABLET, CHEWABLE ORAL EVERY 24 HOURS
Status: DISCONTINUED | OUTPATIENT
Start: 2024-08-21 | End: 2024-08-21

## 2024-08-20 RX ORDER — CARBAMAZEPINE 100 MG/1
150 TABLET, CHEWABLE ORAL EVERY 24 HOURS
Status: DISCONTINUED | OUTPATIENT
Start: 2024-08-21 | End: 2024-08-27 | Stop reason: HOSPADM

## 2024-08-20 RX ORDER — CARBAMAZEPINE 100 MG/1
300 TABLET, CHEWABLE ORAL EVERY 24 HOURS
Status: DISCONTINUED | OUTPATIENT
Start: 2024-08-20 | End: 2024-08-27 | Stop reason: HOSPADM

## 2024-08-20 ASSESSMENT — COGNITIVE AND FUNCTIONAL STATUS - GENERAL
TURNING FROM BACK TO SIDE WHILE IN FLAT BAD: TOTAL
MOVING FROM LYING ON BACK TO SITTING ON SIDE OF FLAT BED WITH BEDRAILS: TOTAL
MOVING FROM LYING ON BACK TO SITTING ON SIDE OF FLAT BED WITH BEDRAILS: TOTAL
TOILETING: TOTAL
STANDING UP FROM CHAIR USING ARMS: TOTAL
MOBILITY SCORE: 6
DRESSING REGULAR LOWER BODY CLOTHING: TOTAL
TURNING FROM BACK TO SIDE WHILE IN FLAT BAD: TOTAL
DAILY ACTIVITIY SCORE: 6
PERSONAL GROOMING: TOTAL
WALKING IN HOSPITAL ROOM: TOTAL
CLIMB 3 TO 5 STEPS WITH RAILING: TOTAL
CLIMB 3 TO 5 STEPS WITH RAILING: TOTAL
MOVING TO AND FROM BED TO CHAIR: TOTAL
TOILETING: TOTAL
HELP NEEDED FOR BATHING: TOTAL
HELP NEEDED FOR BATHING: TOTAL
PERSONAL GROOMING: TOTAL
MOBILITY SCORE: 6
DRESSING REGULAR LOWER BODY CLOTHING: TOTAL
DRESSING REGULAR UPPER BODY CLOTHING: TOTAL
WALKING IN HOSPITAL ROOM: TOTAL
DAILY ACTIVITIY SCORE: 6
EATING MEALS: TOTAL
STANDING UP FROM CHAIR USING ARMS: TOTAL
EATING MEALS: TOTAL
MOVING TO AND FROM BED TO CHAIR: TOTAL
DRESSING REGULAR UPPER BODY CLOTHING: TOTAL

## 2024-08-20 ASSESSMENT — PAIN SCALES - WONG BAKER: WONGBAKER_NUMERICALRESPONSE: NO HURT

## 2024-08-20 ASSESSMENT — PAIN - FUNCTIONAL ASSESSMENT
PAIN_FUNCTIONAL_ASSESSMENT: CPOT (CRITICAL CARE PAIN OBSERVATION TOOL)

## 2024-08-20 NOTE — NURSING NOTE
Four eyes skin check completed by bedside RN and ____Monse SOLANO_________.     No new findings.         _____Eri Calderon_____ , RN

## 2024-08-20 NOTE — CONSULTS
Wound Care Consult     Visit Date: 8/20/2024      Patient Name: Israel Edgar         MRN: [id number]           YOB: 1980     Reason for Consult: s/p ileostomy         08/20/24 1645   Ileostomy Standard (leora Cardona) RLQ   Earliest Known Present: 08/10/24   Hand Hygiene Completed: Yes  Ileostomy Type: (c) Standard (Dulce, end)  Location: RLQ   Stomal Appliance Leaking;2 piece;Changed   Site/Stoma Assessment Pink;Budded   Stoma Size (cm) 2.9 cm   Peristomal Assessment Clean;Intact   Treatment Pouch change   Output Description Brown;Loose     Ostomy type: s/p ilestomy    size: 1 1/8 in      color: red      protruding: budded  Ander: none  Functioning: brown loose/liquid stool  Mucocutaneous junction: intact  Peristomal skin: intact, no sting skin prep applied   Pouching: barrier ring, 2 piece Pengilly soft convex wafer with high output pouch  Ostomy Education: none at this time  Plan: assess stoma/pouching 2 times a week and as needed.    Roxy Bueno, MSN, RN, CWCN, COCN  08/20/24 8:00 PM

## 2024-08-20 NOTE — PROGRESS NOTES
"Medical Intensive Care - Daily Progress Note   Subjective    Israel Edgar is a 43 y.o. year old male patient admitted on 8/10/2024 with acute hypoxic respiratory failure likely 2/2 MRD PSeudomonas    Interval History:  Transferred to SDU yesterday evening.  No acute events overnight.      Meds    Scheduled medications  acetaminophen, 650 mg, g-tube, q6h  aspirin, 81 mg, g-tube, Daily  baclofen, 20 mg, g-tube, 2 times per day  baclofen, 30 mg, g-tube, Daily  carBAMazepine, 150 mg, g-tube, Daily before evening meal  carBAMazepine, 300 mg, g-tube, BID  cholecalciferol, 4,000 Units, g-tube, Daily  enoxaparin, 1.5 mg/kg, subcutaneous, q24h  levETIRAcetam, 2,000 mg, oral, BID  artificial tears, 1 drop, Both Eyes, BID  neomycin-bacitracnZn-polymyxnB, , Topical, TID  oxygen, , inhalation, Continuous - Inhalation  phenytoin, 100 mg, oral, TID  polyethylene glycol, 17 g, oral, Daily  selenium sulfide, , Topical, Once per day on Monday Friday  white petrolatum-mineral oiL, , ophthalmic (eye), Daily      Continuous medications     PRN medications       Objective    Blood pressure 130/84, pulse 89, temperature 36.4 °C (97.5 °F), temperature source Temporal, resp. rate 24, height 1.8 m (5' 10.87\"), weight 64.1 kg (141 lb 5 oz), SpO2 97%.     Physical Exam:  Constitutional: chronically ill appearing pt in NAD and alert   Eyes: PERRL, no icterus   ENMT: mucous membranes moist, no apparent injury, no lesions seen  Head/Neck: Neck supple, no apparent injury  Respiratory/Thorax: Lungs diminished bilaterally, non-labored breathing, weak cough, on Bipap  Cardiovascular: Regular, rate and rhythm, no murmurs, normal S1 and S2  Gastrointestinal: Nondistended, soft, non-tender, BS present, Ostomy RUQ with loose brown output, PEG LUQ   : Boss catheter in place draining clear yellow urine  Musculoskeletal: ROM intact, no joint swelling, normal strength  Extremities: normal extremities, no edema, contusions or wounds  Neurological: alert, " nonverbal, spastic muscle contractrations, does not follow commands   Skin: Warm and dry, SHAYNA incision with staples well approximated          Intake/Output Summary (Last 24 hours) at 8/20/2024 0622  Last data filed at 8/20/2024 0500  Gross per 24 hour   Intake 1145 ml   Output 1310 ml   Net -165 ml     Labs:   Results from last 72 hours   Lab Units 08/19/24  0510 08/18/24  0414   SODIUM mmol/L 136 138   POTASSIUM mmol/L 3.9 4.0   CHLORIDE mmol/L 99 100   CO2 mmol/L 29 28   BUN mg/dL 12 9   CREATININE mg/dL 0.33* 0.29*   GLUCOSE mg/dL 130* 127*   CALCIUM mg/dL 8.9 8.9   ANION GAP mmol/L 12 14   EGFR mL/min/1.73m*2 >90 >90   PHOSPHORUS mg/dL 4.5 4.2      Results from last 72 hours   Lab Units 08/19/24  0510 08/18/24  0414   WBC AUTO x10*3/uL 5.7 6.5   HEMOGLOBIN g/dL 10.5* 10.6*   HEMATOCRIT % 31.1* 32.4*   PLATELETS AUTO x10*3/uL 502* 533*   NEUTROS PCT AUTO % 58.5 63.1   LYMPHS PCT AUTO % 22.8 19.9   MONOS PCT AUTO % 9.6 10.1   EOS PCT AUTO % 6.5 4.9        Micro/ID:     Lab Results   Component Value Date    URINECULTURE >100,000 Proteus mirabilis (A) 04/07/2024    BLOODCULT No growth at 4 days -  FINAL REPORT 08/13/2024       Summary of key imaging results from the last 24 hours  XR chest 1 view    Result Date: 8/19/2024  Interpreted By:  Juanjose Pham, STUDY: XR CHEST 1 VIEW;  8/19/2024 3:30 pm   INDICATION: Signs/Symptoms:hypoxia.   COMPARISON: Exam dated 08/16/2024   ACCESSION NUMBER(S): [id number]   ORDERING CLINICIAN: BRENNAN WASHINGTON   FINDINGS: AP radiograph of the chest was provided.   Left upper extremity PICC is in place with the tip projecting over the superior cavoatrial junction.   CARDIOMEDIASTINAL SILHOUETTE: Cardiomediastinal silhouette is stable in size and configuration.   LUNGS: Interval increase in right basilar airspace opacities. Left costophrenic angle is excluded from field of view which limits evaluation. Stable retrocardiac airspace opacity. No pneumothorax.   ABDOMEN: No remarkable  upper abdominal findings.   BONES: No acute osseous changes.       1.  Interval increase in medial right basilar opacity may represent atelectasis versus aspiration. Stable retrocardiac atelectasis/consolidation.       MACRO: None   Signed by: Juanjose Pham 8/19/2024 4:37 PM Dictation workstation:   [id number]       Assessment and Plan     Assessment: Israel Edgar is a 43 y.o. year old male patient admitted on 8/10/2024 with acute hypoxic respiratory failure likely 2/2 MRD PSeudomonas    Restraints: no    Summary for 08/20/24  :  Weaned to 4L NC    Plan:  NEUROLOGY/PSYCH:  Dx: Hx Seizures, Advanced MS, and Quadriplegia   Alert, intermittent tracks, does not follow commands, nonverbal.  Mentation at baseline  Management:  vEEG shows right right hemispheric epileptogenic lesion and a mild diffuse encephalopathy.    Continue Keppra, Phenytoin, and Tegretol  Continue baclofen  Epilepsy signed off 8/18/24; indicated pts downbeat nystamus is a chronic intermittent finding observed on multiple EEG 2/2 MS      CARDIOVASCULAR:  Dx: Hx HTN  HDS  TTE 8/12: The left ventricular systolic function is normal, with a visually estimated ejection fraction of 65-70%.   Management:  Home meds (metop tartrate 25 mg Q6)  Holding all antihypertensive meds ISO acute illness, assess daily appropriateness to resume     PULMONARY:  Dx: AHRF 2/2 MDR PSA PNA requiring HFNC vs BIPAP  Stable on Bipap HS and 6L NC during day  Management:  Airway clearance TID with IPV  NT suction PRN  Wean O2 as tolerated.  CXR 8/19: Interval increase in medial right basilar opacity may represent atelectasis versus aspiration. Stable retrocardiac atelectasis/consolidation.  Obtain daily VBGs off bipap during day      RENAL/GENITOURINARY:  Dx: Hx chronic urinary retention 2/2 neurogenic bladder  No acute issues   Management:  Boss replaced on 8/11  RFP/Mag daily     GASTROENTEROLOGY:  Dx: Hx of decompressive colonoscopy 7/15, subtotal colectomy with end  ileostomy & mucus fisulta (7/2024), dysphagia, PEG, Hx Colonic dilatation and [place] syndrome   Management:  Wound care consulted; apprec recs  Ostomy care q shift   Diet: Vital 1.5 60ml/hr  Bowel reg: Miralax daily      ENDOCRINOLOGY:  Dx: No acute issues   Management:  CTM     HEMATOLOGY:  Dx: Hx DVT/PE  H/H stable  Management:  CTA PE negative for PE  Continue therapeutic Lovenox   CBC daily      MSK/SKIN:  Dx: Pressure injury on sacrum, spastic quadriplegia  Management:  Wound care following; appec recs   Mepilex to sacrum  Q2 turns   Continue baclofen (home dose)     INFECTIOUS DISEASE:  Dx: PNA (PSA, Proteus on previous cx), UTI. hx of MRSA colonization, MDRO PSA pneumonia   Tmax 36.4, without leukocytosis   Management:  Completed course of Ceftaz-avibactam on 8/16/24  Monitor off atb     ICU Check List       FEN  Fluids: PRN  Electrolytes: Replace K  Nutrition: Vital 1.5 60ml/hr  Prophylaxis:  DVT ppx: Lovenox  GI ppx: PPI  Bowel care: Miralax daily  Hardware:  Catheter: Boss (8/11)  Access: PEG  Drains: Ileostomy   Lines: PICC  Social:  Code: Full Code    HPOA: Veeoly Cruz [phone]  Disposition: Continue care in SDU for aggressive BPH    PANKAJ Espana-CNP   08/20/24 at 6:22 AM     Pt discussed with Dr. Ferguson, seen and examined. All labs, VS and previous plan of care reviewed.      Disclaimer: Documentation completed with the information available at the time of input. The times in the chart may not be reflective of actual patient care times, interventions, or procedures. Documentation occurs after the physical care of the patient.

## 2024-08-20 NOTE — CARE PLAN
Problem: Skin  Goal: Decreased wound size/increased tissue granulation at next dressing change  Flowsheets (Taken 8/19/2024 2201 by Mihai Art RN)  Decreased wound size/increased tissue granulation at next dressing change:   Promote sleep for wound healing   Protective dressings over bony prominences   Utilize specialty bed per algorithm  Goal: Participates in plan/prevention/treatment measures  Flowsheets (Taken 8/20/2024 1353)  Participates in plan/prevention/treatment measures:   Elevate heels   Discuss with provider PT/OT consult  Goal: Prevent/manage excess moisture  Flowsheets (Taken 8/20/2024 1353)  Prevent/manage excess moisture:   Cleanse incontinence/protect with barrier cream   Follow provider orders for dressing changes   Monitor for/manage infection if present  Goal: Prevent/minimize sheer/friction injuries  Flowsheets (Taken 8/20/2024 1353)  Prevent/minimize sheer/friction injuries:   Complete micro-shifts as needed if patient unable. Adjust patient position to relieve pressure points, not a full turn   HOB 30 degrees or less   Turn/reposition every 2 hours/use positioning/transfer devices   Utilize specialty bed per algorithm   Use pull sheet  Goal: Promote/optimize nutrition  Flowsheets (Taken 8/20/2024 1353)  Promote/optimize nutrition:   Consume > 50% meals/supplements   Monitor/record intake including meals  Goal: Promote skin healing  Flowsheets (Taken 8/20/2024 1353)  Promote skin healing:   Assess skin/pad under line(s)/device(s)   Ensure correct size (line/device) and apply per  instructions   Protective dressings over bony prominences   Rotate device position/do not position patient on device   Turn/reposition every 2 hours/use positioning/transfer devices

## 2024-08-21 LAB
ALBUMIN SERPL BCP-MCNC: 3.6 G/DL (ref 3.4–5)
ANION GAP SERPL CALC-SCNC: 12 MMOL/L (ref 10–20)
BASE EXCESS BLDV CALC-SCNC: 3.9 MMOL/L (ref -2–3)
BODY TEMPERATURE: 37 DEGREES CELSIUS
BUN SERPL-MCNC: 13 MG/DL (ref 6–23)
CALCIUM SERPL-MCNC: 9.4 MG/DL (ref 8.6–10.6)
CHLORIDE SERPL-SCNC: 95 MMOL/L (ref 98–107)
CO2 SERPL-SCNC: 31 MMOL/L (ref 21–32)
CREAT SERPL-MCNC: 0.39 MG/DL (ref 0.5–1.3)
EGFRCR SERPLBLD CKD-EPI 2021: >90 ML/MIN/1.73M*2
ERYTHROCYTE [DISTWIDTH] IN BLOOD BY AUTOMATED COUNT: 13.2 % (ref 11.5–14.5)
GLUCOSE SERPL-MCNC: 114 MG/DL (ref 74–99)
HCO3 BLDV-SCNC: 31.3 MMOL/L (ref 22–26)
HCT VFR BLD AUTO: 37.1 % (ref 41–52)
HGB BLD-MCNC: 12.2 G/DL (ref 13.5–17.5)
INHALED O2 CONCENTRATION: 32 %
MAGNESIUM SERPL-MCNC: 2 MG/DL (ref 1.6–2.4)
MCH RBC QN AUTO: 30.7 PG (ref 26–34)
MCHC RBC AUTO-ENTMCNC: 32.9 G/DL (ref 32–36)
MCV RBC AUTO: 94 FL (ref 80–100)
NRBC BLD-RTO: 0 /100 WBCS (ref 0–0)
OXYHGB MFR BLDV: 72.1 % (ref 45–75)
PCO2 BLDV: 58 MM HG (ref 41–51)
PH BLDV: 7.34 PH (ref 7.33–7.43)
PHOSPHATE SERPL-MCNC: 5.1 MG/DL (ref 2.5–4.9)
PLATELET # BLD AUTO: 626 X10*3/UL (ref 150–450)
PO2 BLDV: 48 MM HG (ref 35–45)
POTASSIUM SERPL-SCNC: 4.2 MMOL/L (ref 3.5–5.3)
RBC # BLD AUTO: 3.97 X10*6/UL (ref 4.5–5.9)
SAO2 % BLDV: 74 % (ref 45–75)
SODIUM SERPL-SCNC: 134 MMOL/L (ref 136–145)
WBC # BLD AUTO: 7.5 X10*3/UL (ref 4.4–11.3)

## 2024-08-21 PROCEDURE — 82805 BLOOD GASES W/O2 SATURATION: CPT

## 2024-08-21 PROCEDURE — 2500000001 HC RX 250 WO HCPCS SELF ADMINISTERED DRUGS (ALT 637 FOR MEDICARE OP): Performed by: NURSE PRACTITIONER

## 2024-08-21 PROCEDURE — 2500000005 HC RX 250 GENERAL PHARMACY W/O HCPCS: Performed by: NURSE PRACTITIONER

## 2024-08-21 PROCEDURE — 94660 CPAP INITIATION&MGMT: CPT

## 2024-08-21 PROCEDURE — 1200000002 HC GENERAL ROOM WITH TELEMETRY DAILY

## 2024-08-21 PROCEDURE — 85027 COMPLETE CBC AUTOMATED: CPT | Performed by: NURSE PRACTITIONER

## 2024-08-21 PROCEDURE — 84100 ASSAY OF PHOSPHORUS: CPT | Performed by: NURSE PRACTITIONER

## 2024-08-21 PROCEDURE — 2500000004 HC RX 250 GENERAL PHARMACY W/ HCPCS (ALT 636 FOR OP/ED): Performed by: NURSE PRACTITIONER

## 2024-08-21 PROCEDURE — 83735 ASSAY OF MAGNESIUM: CPT | Performed by: NURSE PRACTITIONER

## 2024-08-21 PROCEDURE — 94668 MNPJ CHEST WALL SBSQ: CPT

## 2024-08-21 RX ORDER — LEVETIRACETAM 100 MG/ML
2000 SOLUTION ORAL 2 TIMES DAILY
Status: DISCONTINUED | OUTPATIENT
Start: 2024-08-22 | End: 2024-08-27 | Stop reason: HOSPADM

## 2024-08-21 RX ORDER — CARBAMAZEPINE 100 MG/1
300 TABLET, CHEWABLE ORAL EVERY 24 HOURS
Status: DISCONTINUED | OUTPATIENT
Start: 2024-08-22 | End: 2024-08-27 | Stop reason: HOSPADM

## 2024-08-21 RX ORDER — PHENYTOIN 125 MG/5ML
100 SUSPENSION ORAL 3 TIMES DAILY
Status: DISCONTINUED | OUTPATIENT
Start: 2024-08-22 | End: 2024-08-27 | Stop reason: HOSPADM

## 2024-08-21 RX ORDER — POLYETHYLENE GLYCOL 3350 17 G/17G
17 POWDER, FOR SOLUTION ORAL DAILY
Status: DISCONTINUED | OUTPATIENT
Start: 2024-08-22 | End: 2024-08-25

## 2024-08-21 ASSESSMENT — COGNITIVE AND FUNCTIONAL STATUS - GENERAL
PERSONAL GROOMING: TOTAL
MOBILITY SCORE: 6
MOVING TO AND FROM BED TO CHAIR: TOTAL
MOVING FROM LYING ON BACK TO SITTING ON SIDE OF FLAT BED WITH BEDRAILS: TOTAL
CLIMB 3 TO 5 STEPS WITH RAILING: TOTAL
DRESSING REGULAR LOWER BODY CLOTHING: TOTAL
TOILETING: TOTAL
EATING MEALS: TOTAL
STANDING UP FROM CHAIR USING ARMS: TOTAL
HELP NEEDED FOR BATHING: TOTAL
DRESSING REGULAR UPPER BODY CLOTHING: TOTAL
TURNING FROM BACK TO SIDE WHILE IN FLAT BAD: TOTAL
DAILY ACTIVITIY SCORE: 6
WALKING IN HOSPITAL ROOM: TOTAL

## 2024-08-21 ASSESSMENT — PAIN SCALES - GENERAL
PAINLEVEL_OUTOF10: 3
PAINLEVEL_OUTOF10: 0 - NO PAIN

## 2024-08-21 ASSESSMENT — PAIN - FUNCTIONAL ASSESSMENT: PAIN_FUNCTIONAL_ASSESSMENT: FLACC (FACE, LEGS, ACTIVITY, CRY, CONSOLABILITY)

## 2024-08-21 ASSESSMENT — PAIN SCALES - PAIN ASSESSMENT IN ADVANCED DEMENTIA (PAINAD): TOTALSCORE: MEDICATION (SEE MAR)

## 2024-08-21 NOTE — CARE PLAN
The patient's goals for the shift include      The clinical goals for the shift include Patient will maintain oxygen sat throughout the shift      Problem: Pain - Adult  Goal: Verbalizes/displays adequate comfort level or baseline comfort level  Outcome: Progressing     Problem: Safety - Adult  Goal: Free from fall injury  Outcome: Progressing     Problem: Discharge Planning  Goal: Discharge to home or other facility with appropriate resources  Outcome: Progressing     Problem: Chronic Conditions and Co-morbidities  Goal: Patient's chronic conditions and co-morbidity symptoms are monitored and maintained or improved  Outcome: Progressing     Problem: Skin  Goal: Decreased wound size/increased tissue granulation at next dressing change  Outcome: Progressing  Goal: Participates in plan/prevention/treatment measures  Outcome: Progressing  Goal: Prevent/manage excess moisture  Outcome: Progressing  Goal: Prevent/minimize sheer/friction injuries  Outcome: Progressing  Goal: Promote/optimize nutrition  Outcome: Progressing  Goal: Promote skin healing  Outcome: Progressing     Problem: Nutrition  Goal: Less than 5 days NPO/clear liquids  Outcome: Progressing  Goal: Oral intake greater than 50%  Outcome: Progressing  Goal: Oral intake greater 75%  Outcome: Progressing  Goal: Consume prescribed supplement  Outcome: Progressing  Goal: Adequate PO fluid intake  Outcome: Progressing  Goal: Nutrition support goals are met within 48 hrs  Outcome: Progressing  Goal: Nutrition support is meeting 75% of nutrient needs  Outcome: Progressing  Goal: Tube feed tolerance  Outcome: Progressing  Goal: BG  mg/dL  Outcome: Progressing  Goal: Lab values WNL  Outcome: Progressing  Goal: Electrolytes WNL  Outcome: Progressing  Goal: Promote healing  Outcome: Progressing  Goal: Maintain stable weight  Outcome: Progressing  Goal: Reduce weight from edema/fluid  Outcome: Progressing  Goal: Gradual weight gain  Outcome: Progressing  Goal:  Improve ostomy output  Outcome: Progressing     Problem: Fall/Injury  Goal: Not fall by end of shift  Outcome: Progressing  Goal: Be free from injury by end of the shift  Outcome: Progressing  Goal: Verbalize understanding of personal risk factors for fall in the hospital  Outcome: Progressing  Goal: Verbalize understanding of risk factor reduction measures to prevent injury from fall in the home  Outcome: Progressing  Goal: Use assistive devices by end of the shift  Outcome: Progressing  Goal: Pace activities to prevent fatigue by end of the shift  Outcome: Progressing

## 2024-08-21 NOTE — CARE PLAN
Problem: Pain - Adult  Goal: Verbalizes/displays adequate comfort level or baseline comfort level  Outcome: Progressing     Problem: Safety - Adult  Goal: Free from fall injury  Outcome: Progressing     Problem: Discharge Planning  Goal: Discharge to home or other facility with appropriate resources  Outcome: Progressing     Problem: Chronic Conditions and Co-morbidities  Goal: Patient's chronic conditions and co-morbidity symptoms are monitored and maintained or improved  Outcome: Progressing     Problem: Skin  Goal: Decreased wound size/increased tissue granulation at next dressing change  Outcome: Progressing  Goal: Participates in plan/prevention/treatment measures  Outcome: Progressing  Goal: Prevent/manage excess moisture  Outcome: Progressing  Goal: Prevent/minimize sheer/friction injuries  Outcome: Progressing  Goal: Promote/optimize nutrition  Outcome: Progressing  Goal: Promote skin healing  Outcome: Progressing     Problem: Nutrition  Goal: Less than 5 days NPO/clear liquids  Outcome: Progressing  Goal: Oral intake greater than 50%  Outcome: Progressing  Goal: Oral intake greater 75%  Outcome: Progressing  Goal: Consume prescribed supplement  Outcome: Progressing  Goal: Adequate PO fluid intake  Outcome: Progressing  Goal: Nutrition support goals are met within 48 hrs  Outcome: Progressing  Goal: Nutrition support is meeting 75% of nutrient needs  Outcome: Progressing  Goal: Tube feed tolerance  Outcome: Progressing  Goal: BG  mg/dL  Outcome: Progressing  Goal: Lab values WNL  Outcome: Progressing  Goal: Electrolytes WNL  Outcome: Progressing  Goal: Promote healing  Outcome: Progressing  Goal: Maintain stable weight  Outcome: Progressing  Goal: Reduce weight from edema/fluid  Outcome: Progressing  Goal: Gradual weight gain  Outcome: Progressing  Goal: Improve ostomy output  Outcome: Progressing     Problem: Fall/Injury  Goal: Not fall by end of shift  Outcome: Progressing  Goal: Be free from  injury by end of the shift  Outcome: Progressing  Goal: Verbalize understanding of personal risk factors for fall in the hospital  Outcome: Progressing  Goal: Verbalize understanding of risk factor reduction measures to prevent injury from fall in the home  Outcome: Progressing  Goal: Use assistive devices by end of the shift  Outcome: Progressing  Goal: Pace activities to prevent fatigue by end of the shift  Outcome: Progressing   The patient's goals for the shift include      The clinical goals for the shift include Patient will maintain oxygen sat throughout the shift

## 2024-08-22 LAB
ALBUMIN SERPL BCP-MCNC: 3.8 G/DL (ref 3.4–5)
ANION GAP SERPL CALC-SCNC: 13 MMOL/L (ref 10–20)
BASE EXCESS BLDV CALC-SCNC: 0.8 MMOL/L (ref -2–3)
BASOPHILS # BLD AUTO: 0.09 X10*3/UL (ref 0–0.1)
BASOPHILS NFR BLD AUTO: 1 %
BODY TEMPERATURE: 37 DEGREES CELSIUS
BUN SERPL-MCNC: 16 MG/DL (ref 6–23)
CALCIUM SERPL-MCNC: 9.3 MG/DL (ref 8.6–10.6)
CHLORIDE SERPL-SCNC: 97 MMOL/L (ref 98–107)
CO2 SERPL-SCNC: 30 MMOL/L (ref 21–32)
CREAT SERPL-MCNC: 0.34 MG/DL (ref 0.5–1.3)
EGFRCR SERPLBLD CKD-EPI 2021: >90 ML/MIN/1.73M*2
EOSINOPHIL # BLD AUTO: 0.05 X10*3/UL (ref 0–0.7)
EOSINOPHIL NFR BLD AUTO: 0.5 %
ERYTHROCYTE [DISTWIDTH] IN BLOOD BY AUTOMATED COUNT: 13.4 % (ref 11.5–14.5)
GLUCOSE SERPL-MCNC: 147 MG/DL (ref 74–99)
HCO3 BLDV-SCNC: 26.6 MMOL/L (ref 22–26)
HCT VFR BLD AUTO: 39.3 % (ref 41–52)
HGB BLD-MCNC: 13 G/DL (ref 13.5–17.5)
IMM GRANULOCYTES # BLD AUTO: 0.05 X10*3/UL (ref 0–0.7)
IMM GRANULOCYTES NFR BLD AUTO: 0.5 % (ref 0–0.9)
INHALED O2 CONCENTRATION: 21 %
LYMPHOCYTES # BLD AUTO: 0.97 X10*3/UL (ref 1.2–4.8)
LYMPHOCYTES NFR BLD AUTO: 10.3 %
MAGNESIUM SERPL-MCNC: 2.09 MG/DL (ref 1.6–2.4)
MCH RBC QN AUTO: 31 PG (ref 26–34)
MCHC RBC AUTO-ENTMCNC: 33.1 G/DL (ref 32–36)
MCV RBC AUTO: 94 FL (ref 80–100)
MONOCYTES # BLD AUTO: 0.7 X10*3/UL (ref 0.1–1)
MONOCYTES NFR BLD AUTO: 7.5 %
NEUTROPHILS # BLD AUTO: 7.52 X10*3/UL (ref 1.2–7.7)
NEUTROPHILS NFR BLD AUTO: 80.2 %
NRBC BLD-RTO: 0 /100 WBCS (ref 0–0)
OXYHGB MFR BLDV: 93.7 % (ref 45–75)
PCO2 BLDV: 46 MM HG (ref 41–51)
PH BLDV: 7.37 PH (ref 7.33–7.43)
PHOSPHATE SERPL-MCNC: 4.4 MG/DL (ref 2.5–4.9)
PLATELET # BLD AUTO: 659 X10*3/UL (ref 150–450)
PO2 BLDV: 82 MM HG (ref 35–45)
POTASSIUM SERPL-SCNC: 4.8 MMOL/L (ref 3.5–5.3)
RBC # BLD AUTO: 4.2 X10*6/UL (ref 4.5–5.9)
SAO2 % BLDV: 96 % (ref 45–75)
SODIUM SERPL-SCNC: 135 MMOL/L (ref 136–145)
TSH SERPL-ACNC: 1.21 MIU/L (ref 0.44–3.98)
WBC # BLD AUTO: 9.4 X10*3/UL (ref 4.4–11.3)

## 2024-08-22 PROCEDURE — 2500000005 HC RX 250 GENERAL PHARMACY W/O HCPCS: Performed by: NURSE PRACTITIONER

## 2024-08-22 PROCEDURE — 85025 COMPLETE CBC W/AUTO DIFF WBC: CPT | Performed by: NURSE PRACTITIONER

## 2024-08-22 PROCEDURE — 80069 RENAL FUNCTION PANEL: CPT | Performed by: NURSE PRACTITIONER

## 2024-08-22 PROCEDURE — 2500000004 HC RX 250 GENERAL PHARMACY W/ HCPCS (ALT 636 FOR OP/ED): Performed by: NURSE PRACTITIONER

## 2024-08-22 PROCEDURE — 82805 BLOOD GASES W/O2 SATURATION: CPT

## 2024-08-22 PROCEDURE — 83735 ASSAY OF MAGNESIUM: CPT | Performed by: NURSE PRACTITIONER

## 2024-08-22 PROCEDURE — 94668 MNPJ CHEST WALL SBSQ: CPT

## 2024-08-22 PROCEDURE — 2500000001 HC RX 250 WO HCPCS SELF ADMINISTERED DRUGS (ALT 637 FOR MEDICARE OP): Performed by: NURSE PRACTITIONER

## 2024-08-22 PROCEDURE — 84443 ASSAY THYROID STIM HORMONE: CPT | Performed by: NURSE PRACTITIONER

## 2024-08-22 PROCEDURE — 94660 CPAP INITIATION&MGMT: CPT

## 2024-08-22 PROCEDURE — 1200000002 HC GENERAL ROOM WITH TELEMETRY DAILY

## 2024-08-22 RX ORDER — METOPROLOL TARTRATE 25 MG/1
25 TABLET, FILM COATED ORAL 2 TIMES DAILY
Status: DISCONTINUED | OUTPATIENT
Start: 2024-08-22 | End: 2024-08-24

## 2024-08-22 ASSESSMENT — COGNITIVE AND FUNCTIONAL STATUS - GENERAL
CLIMB 3 TO 5 STEPS WITH RAILING: TOTAL
MOVING TO AND FROM BED TO CHAIR: TOTAL
MOBILITY SCORE: 6
TURNING FROM BACK TO SIDE WHILE IN FLAT BAD: TOTAL
MOVING FROM LYING ON BACK TO SITTING ON SIDE OF FLAT BED WITH BEDRAILS: TOTAL
DAILY ACTIVITIY SCORE: 6
TOILETING: TOTAL
EATING MEALS: TOTAL
PERSONAL GROOMING: TOTAL
DRESSING REGULAR UPPER BODY CLOTHING: TOTAL
HELP NEEDED FOR BATHING: TOTAL
WALKING IN HOSPITAL ROOM: TOTAL
DRESSING REGULAR LOWER BODY CLOTHING: TOTAL
STANDING UP FROM CHAIR USING ARMS: TOTAL

## 2024-08-22 ASSESSMENT — PAIN SCALES - PAIN ASSESSMENT IN ADVANCED DEMENTIA (PAINAD)
BREATHING: NORMAL
CONSOLABILITY: NO NEED TO CONSOLE
BODYLANGUAGE: TENSE, DISTRESSED PACING, FIDGETING
FACIALEXPRESSION: SMILING OR INEXPRESSIVE
TOTALSCORE: 1

## 2024-08-22 ASSESSMENT — PAIN SCALES - GENERAL
PAINLEVEL_OUTOF10: 0 - NO PAIN

## 2024-08-22 ASSESSMENT — PAIN SCALES - WONG BAKER
WONGBAKER_NUMERICALRESPONSE: NO HURT
WONGBAKER_NUMERICALRESPONSE: NO HURT

## 2024-08-22 ASSESSMENT — PAIN - FUNCTIONAL ASSESSMENT
PAIN_FUNCTIONAL_ASSESSMENT: 0-10
PAIN_FUNCTIONAL_ASSESSMENT: FLACC (FACE, LEGS, ACTIVITY, CRY, CONSOLABILITY)

## 2024-08-22 NOTE — PROGRESS NOTES
"Nutrition Follow Up Assessment:   Nutrition Assessment         Patient is a 43 y.o. male presenting initially with acute hypoxic respiratory failure from MDR pseudomonas.  He is now in SDU for care.  He is on nocturnal Bipap and nasal canula during the day.  Has a history of advanced multiple sclerosis, quadriplegia and seizures.      Pt has a PEG for enteral access.  He is on Vital 1.5@ 55mls/hr as goal rate feeds.  Appears to be tolerating well so far.  Pt unable to communicate with RDN at visit this morning.     Noted that pt was on Osmolite 1.5, total of 960mls daily at home.  Formula preference     Anthropometrics:  Height: 180 cm (5' 10.87\")   Weight: 64.6 kg (142 lb 6.7 oz)   BMI (Calculated): 19.94  IBW/kg (Dietitian Calculated): 78.2 kg  Percent of IBW: 89 %       Weight History:     8/22/24: 64.6kg  8/12/24: 69.6kg  8/10/24: 65.3kg (admit to [facility])  7/17/24: 75.8kg  7/3/24: 78.5kg  3/16/24: 78kg  2/16/24: 78.6kg  12/5/22: 78.7kg     Based on admit weight to [facility] and weight at F on 7/17. He his down 8% in less than one month. His usual body weight appears to be in the 78kg range.  He would be down 11% from usual body weight (a little more than one month ago).    Weight Change %:       Nutrition Focused Physical Exam Findings:  Defer-- see initial assessment    Subcutaneous Fat Loss:      Muscle Wasting:     Edema:     Physical Findings:       Nutrition Significant Labs:  BMP Trend:   Results from last 7 days   Lab Units 08/22/24  0436 08/21/24  0514 08/20/24  0532 08/19/24  0510   GLUCOSE mg/dL 147* 114* 125* 130*   CALCIUM mg/dL 9.3 9.4 9.0 8.9   SODIUM mmol/L 135* 134* 135* 136   POTASSIUM mmol/L 4.8 4.2 3.4* 3.9   CO2 mmol/L 30 31 31 29   CHLORIDE mmol/L 97* 95* 97* 99   BUN mg/dL 16 13 11 12   CREATININE mg/dL 0.34* 0.39* 0.35* 0.33*    , A1C:  Lab Results   Component Value Date    HGBA1C 5.4 04/07/2024   , BG POCT trend:   Results from last 7 days   Lab Units 08/18/24  1734 08/16/24  0137 " 08/15/24  2056   POCT GLUCOSE mg/dL 114* 192* 147*    , Renal Lab Trend:   Results from last 7 days   Lab Units 08/22/24  0436 08/21/24  0514 08/20/24  0532 08/19/24  0510   POTASSIUM mmol/L 4.8 4.2 3.4* 3.9   PHOSPHORUS mg/dL 4.4 5.1* 3.9 4.5   SODIUM mmol/L 135* 134* 135* 136   MAGNESIUM mg/dL 2.09 2.00 2.00 1.92   EGFR mL/min/1.73m*2 >90 >90 >90 >90   BUN mg/dL 16 13 11 12   CREATININE mg/dL 0.34* 0.39* 0.35* 0.33*    , Vit D:   Lab Results   Component Value Date    VITD25 69 08/12/2024        Nutrition Specific Medications:  Scheduled medications  acetaminophen, 650 mg, g-tube, q6h  aspirin, 81 mg, g-tube, Daily  baclofen, 20 mg, g-tube, 2 times per day  baclofen, 30 mg, g-tube, Daily  carBAMazepine, 150 mg, g-tube, q24h  carBAMazepine, 300 mg, g-tube, q24h  carBAMazepine, 300 mg, g-tube, q24h  cholecalciferol, 4,000 Units, g-tube, Daily  enoxaparin, 1.5 mg/kg, subcutaneous, q24h  levETIRAcetam, 2,000 mg, g-tube, BID  artificial tears, 1 drop, Both Eyes, BID  metoprolol tartrate, 25 mg, oral, BID  neomycin-bacitracnZn-polymyxnB, , Topical, TID  oxygen, , inhalation, Continuous - Inhalation  phenytoin, 100 mg, g-tube, TID  polyethylene glycol, 17 g, g-tube, Daily  selenium sulfide, , Topical, Once per day on Monday Friday  white petrolatum-mineral oiL, , ophthalmic (eye), Daily      Continuous medications     PRN medications       I/O:   Last BM Date: 08/21/24; Stool Appearance: Loose (08/21/24 2000)      Dietary Orders (From admission, onward)       Start     Ordered    08/12/24 1455  Enteral feeding with NPO Vital 1.5; PEG (percutaneous endoscopic gastric); 15; 60; Water; Every 6 hours  Diet effective now        Comments: Increase by 10 ml every 8 hours to goal 55 ml/hr   Question Answer Comment   Tube feeding formula: Vital 1.5    Feeding route: PEG (percutaneous endoscopic gastric)    Tube feeding continuous rate (mL/hr): 15    Tube feeding flush (mL): 60    Flush type: Water    Flush frequency: Every 6 hours         24 1456                     Estimated Needs:   Total Energy Estimated Needs (kCal):  (3480-3045)  Method for Estimating Needs: MSJ= 1566  Total Protein Estimated Needs (g):  (90+)  Method for Estimating Needs: 1.3 x 69.6kg            Nutrition Diagnosis   Malnutrition Diagnosis  Patient has Malnutrition Diagnosis: Yes  Diagnosis Status: New  Malnutrition Diagnosis: Severe malnutrition related to acute disease or injury  As Evidenced by: pt with prolonged time at OSH without full nutrition support (appears about 12 days) gracie noted 8% loss in weight in less than one month and areas of mild to moderate fat and muscle losses on physical exam        Nutrition Interventions/Recommendations         Nutrition Prescription:   Continue current TF regimen as tolerated.  If desire is to try bolus feeds with patient, can do the followinmls Vital 1.5 given 4 times daily.  Flush with 60mls of water before and after each bolus feed.  Additional water flushes in between boluses per team's discretion for hydration.  Pt would need 5.5cans of TF daily for homegoing purposes.  If mom would like to use Osmolite 1.5 at home (if home is the discharge plan), can do same volume of the Osmolite 1.5 at home--> 1320mls daily.          Nutrition Interventions:    1980kcals, 90grams protein       Nutrition Education:   Not appropriate       Nutrition Monitoring and Evaluation   Food/Nutrient Related History Monitoring  Monitoring and Evaluation Plan: Enteral and parenteral nutrition intake  Criteria: TF at goal to meet 100% estimated nutrition needs        Time Spent/Follow-up Reminder:   Time Spent (min): 45 minutes  Last Date of Nutrition Visit: 24  Nutrition Follow-Up Needed?: Dietitian to reassess per policy  Follow up Comment: TF via PEG

## 2024-08-22 NOTE — SIGNIFICANT EVENT
Rapid Response RN Note     08/21/24 2100   Onset Documentation   Rapid Response Initiated By Radar auto page   Location/Room [facility]   Pager Time 2058   Arrival Time 2100   Event End Time 2111   Level II Called No   Primary Reason for Call Radar auto page       Rapid response RN at bedside for RADAR score 6 due to the following VS: T 36.4 °C; ; RR 22; /103; SPO2 97%.     Reviewed above VS with bedside RN.  VS within patient's current trends.  Patient denied pain, shortness of breath, dizziness or lightheadedness.  No interventions by rapid response team indicated at this time.      Staff to page rapid response for any concerns or acute change in condition/VS.

## 2024-08-22 NOTE — CARE PLAN
The patient's goals for the shift include      The clinical goals for the shift include Pt will remain HDS and free of injury    Problem: Skin  Goal: Decreased wound size/increased tissue granulation at next dressing change  Outcome: Progressing  Flowsheets (Taken 8/22/2024 0701)  Decreased wound size/increased tissue granulation at next dressing change:   Promote sleep for wound healing   Protective dressings over bony prominences  Goal: Prevent/manage excess moisture  Flowsheets (Taken 8/22/2024 0701)  Prevent/manage excess moisture:   Moisturize dry skin   Monitor for/manage infection if present  Goal: Prevent/minimize sheer/friction injuries  Flowsheets (Taken 8/22/2024 0701)  Prevent/minimize sheer/friction injuries:   HOB 30 degrees or less   Turn/reposition every 2 hours/use positioning/transfer devices   Use pull sheet

## 2024-08-22 NOTE — PROGRESS NOTES
08/22/24 1200   Discharge Planning   Living Arrangements Parent   Support Systems Parent   Type of Residence Private residence   Home or Post Acute Services Other (Comment)  (Home vs post acute facility)   Expected Discharge Disposition Other  (Home vs post acute facility)   Does the patient need discharge transport arranged? Yes   RoundTrip coordination needed? Yes     SW left message for pt's mother, Vee Cruz [phone], introducing self and role.  SW requested a return call back to confirm discharge plan.  Previous SW notes indicate pt's mother wishes for pt to return home.  Will await call back.  MEAGHAN Awan received call from pt's mother.  Mother wishes for pt to return home when medically stable.  Pt's mother reports she is in need of a new hospital bed.  A hospital bed was ordered in December by neurologist or PCP.  The current bed is over 10 yrs old, not moving in different positions when she is trying to feed and care for pt. Mother reports pt is in need of a new pressure relieving mattress, stating the current mattress is too short and has a deep sunken hole in the middle.  Mother reports [facility] the [facility] from [employer].  Mother states she has been in contact with [employer] for a new mattress with not response.  Mother reports the current hospital bed is from [facility].  Mother is planning to come to the hospital today and asking to speak with the attending MD.  SW will relay above to care team.  MEAGHAN Awan

## 2024-08-22 NOTE — SIGNIFICANT EVENT
Rapid Response RN Note    Rapid response RN at bedside for RADAR score 7 due to the following VS: T 36.4 °C; ; RR 20; BP 96/69; SPO2 94%.     Reviewed above VS with bedside RN.  VS within patient's current trends.  Patient denied pain, shortness of breath, dizziness or lightheadedness.  No interventions by rapid response team indicated at this time.      Staff to page rapid response for any concerns or acute change in condition/VS. Juwan Grant RN.

## 2024-08-22 NOTE — PROGRESS NOTES
"Medical Intensive Care Step down - Daily Progress Note   Subjective    Israel Edgar is a 43 y.o. year old male patient admitted on 8/10/2024 with acute hypoxic respiratory failure likely 2/2 MRD PSeudomonas    Interval History:  Tachycardia over night HR 120s.  Got 250 IVF bolus over night and additional 500 mL this am.  Tolerated nocturnal BIPAP.  On 1L NC during day    Meds    Scheduled medications  acetaminophen, 650 mg, g-tube, q6h  aspirin, 81 mg, g-tube, Daily  baclofen, 20 mg, g-tube, 2 times per day  baclofen, 30 mg, g-tube, Daily  carBAMazepine, 150 mg, g-tube, q24h  carBAMazepine, 300 mg, g-tube, q24h  carBAMazepine, 300 mg, g-tube, q24h  cholecalciferol, 4,000 Units, g-tube, Daily  enoxaparin, 1.5 mg/kg, subcutaneous, q24h  lactated Ringer's, 500 mL, intravenous, Once  levETIRAcetam, 2,000 mg, g-tube, BID  artificial tears, 1 drop, Both Eyes, BID  metoprolol tartrate, 25 mg, oral, BID  neomycin-bacitracnZn-polymyxnB, , Topical, TID  oxygen, , inhalation, Continuous - Inhalation  phenytoin, 100 mg, g-tube, TID  polyethylene glycol, 17 g, g-tube, Daily  selenium sulfide, , Topical, Once per day on Monday Friday  white petrolatum-mineral oiL, , ophthalmic (eye), Daily      Continuous medications     PRN medications       Objective    Blood pressure 122/88, pulse (!) 118, temperature 36.3 °C (97.3 °F), temperature source Temporal, resp. rate 19, height 1.8 m (5' 10.87\"), weight 64.6 kg (142 lb 6.7 oz), SpO2 98%.     Physical Exam:  Constitutional: chronically ill quadriplegic male, in NAD,  alert , non-verbal  Eyes: PERRL, no icterus   ENMT: mucous membranes moist, no apparent injury, no lesions seen  Head/Neck: Neck supple, no apparent injury  Respiratory/Thorax: Lungs diminished bilaterally, non-labored breathing, weak cough, on 4L NC  Cardiovascular: Regular, rate and rhythm, no murmurs, normal S1 and S2  Gastrointestinal: Nondistended, soft, non-tender, BS present, RUQ ostomy with loose brown stool, PEG " tube   : Boss catheter with clear yellow urine  Musculoskeletal: ROM intact, no joint swelling, normal strength  Extremities: normal extremities, no edema, contusions or wounds  Neurological: alert, nonverbal, spastic muscle contractrations, does not follow commands   Skin: Warm and dry, SHAYNA with staples well approximated          Intake/Output Summary (Last 24 hours) at 8/22/2024 0943  Last data filed at 8/22/2024 0940  Gross per 24 hour   Intake 1873.67 ml   Output 1000 ml   Net 873.67 ml     Labs:   Results from last 72 hours   Lab Units 08/22/24  0436 08/21/24  0514 08/20/24  0532   SODIUM mmol/L 135* 134* 135*   POTASSIUM mmol/L 4.8 4.2 3.4*   CHLORIDE mmol/L 97* 95* 97*   CO2 mmol/L 30 31 31   BUN mg/dL 16 13 11   CREATININE mg/dL 0.34* 0.39* 0.35*   GLUCOSE mg/dL 147* 114* 125*   CALCIUM mg/dL 9.3 9.4 9.0   ANION GAP mmol/L 13 12 10   EGFR mL/min/1.73m*2 >90 >90 >90   PHOSPHORUS mg/dL 4.4 5.1* 3.9      Results from last 72 hours   Lab Units 08/22/24  0436 08/21/24  0514 08/20/24  0532   WBC AUTO x10*3/uL 9.4 7.5 6.7   HEMOGLOBIN g/dL 13.0* 12.2* 11.0*   HEMATOCRIT % 39.3* 37.1* 34.0*   PLATELETS AUTO x10*3/uL 659* 626* 540*   NEUTROS PCT AUTO % 80.2  --   --    LYMPHS PCT AUTO % 10.3  --   --    MONOS PCT AUTO % 7.5  --   --    EOS PCT AUTO % 0.5  --   --         Micro/ID:     Lab Results   Component Value Date    URINECULTURE >100,000 Proteus mirabilis (A) 04/07/2024    BLOODCULT No growth at 4 days -  FINAL REPORT 08/13/2024       Summary of key imaging results from the last 24 hours  No results found.      Assessment and Plan     Assessment: Israel Edgar is a 43 y.o. year old male patient admitted on 8/10/2024 with acute hypoxic respiratory failure likely 2/2 MRD PSeudomonas    Restraints: no    Summary for 08/22/24  :  Continue with nocturnal BIPAP, 1L NC during day.    Tachycardia HR 120s over night, suspecting dehydration.  Gave 250+ 500 ml LR bolus.  Added home BB    Plan:  NEUROLOGY/PSYCH:  Dx: Juju  Seizures, Advanced MS, and Quadriplegia   Alert, intermittent tracks, does not follow commands, nonverbal.  Mentation at baseline  Management:  vEEG shows right hemispheric epileptogenic lesion and a mild diffuse encephalopathy.    Continue Keppra, Phenytoin, and Tegretol  Continue baclofen  Epilepsy signed off 8/18/24; indicated pts downbeat nystamus is a chronic intermittent finding observed on multiple EEG 2/2 MS      CARDIOVASCULAR:  Dx: Hx HTN, tachycardia likely 2/2 dehydration   HDS  TTE 8/12: The left ventricular systolic function is normal, with a visually estimated ejection fraction of 65-70%.   Management:  Home meds (Metoprolol tartrate 25 mg Q6)  restarted on 8/22 Metoprolol 25 mg BID  IVF boluses as needed     PULMONARY:  Dx: AHRF 2/2 MDR PSA PNA requiring HFNC vs BIPAP,   Stable on Bipap HS and 1-2L NC during day  Management:  Airway clearance TID with IPV  NT suction PRN  Wean O2 as tolerated for goal Pox >92%  CXR 8/19: Interval increase in medial right basilar opacity may represent atelectasis versus aspiration. Stable retrocardiac atelectasis/consolidation.   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/2024), dysphagia, PEG, Hx Colonic dilatation and [place] syndrome   Management:  Wound care consulted; apprec recs  Ostomy care q shift   Diet: Vital 1.5 60ml/hr via PEG  Bowel reg: Miralax daily      ENDOCRINOLOGY:  Dx: No acute issues   Management:  CTM     HEMATOLOGY:  Dx: Hx DVT/PE  H/H stable  Management:  CTA PE negative for PE  Continue therapeutic Lovenox   CBC daily      MSK/SKIN:  Dx: Pressure injury on sacrum, spastic quadriplegia  Management:  Wound care following; appec recs   Mepilex to sacrum  Q2 turns   Continue baclofen  (home dose)     INFECTIOUS DISEASE:  Dx: PNA (PSA, Proteus on previous cx), UTI. hx of MRSA colonization, MDRO PSA pneumonia   Tmax 36.4, WBC 9.4  Management:  Completed course of Ceftaz-avibactam (8/10- 8/16/24)  Monitor off atb     ICU Check List       FEN  Fluids: PRN  Electrolytes: Replace PRN  Nutrition: Vital 1.5 60ml/hr  Prophylaxis:  DVT ppx: Lovenox  GI ppx: PPI  Bowel care: Miralax daily  Hardware:  Catheter: Boss (8/11)  Access: PEG  Drains: Ileostomy   Lines: PICC  Social:  Code: Full Code    HPOA: Vee Nancy [phone]  Disposition: Continue care in SDU for aggressive BPH    PATRICE Angel   08/22/24 at 9:43 AM     Pt discussed with Dr. Ferguson, seen and examined. All labs, VS and previous plan of care reviewed.      Disclaimer: Documentation completed with the information available at the time of input. The times in the chart may not be reflective of actual patient care times, interventions, or procedures. Documentation occurs after the physical care of the patient.

## 2024-08-23 LAB
ALBUMIN SERPL BCP-MCNC: 3.3 G/DL (ref 3.4–5)
ANION GAP SERPL CALC-SCNC: 17 MMOL/L (ref 10–20)
BASE EXCESS BLDV CALC-SCNC: 3.9 MMOL/L (ref -2–3)
BODY TEMPERATURE: 37 DEGREES CELSIUS
BUN SERPL-MCNC: 18 MG/DL (ref 6–23)
CALCIUM SERPL-MCNC: 8.7 MG/DL (ref 8.6–10.6)
CARBAMAZEPINE SERPL-MCNC: 6.2 UG/ML (ref 4–12)
CHLORIDE SERPL-SCNC: 98 MMOL/L (ref 98–107)
CO2 SERPL-SCNC: 28 MMOL/L (ref 21–32)
CREAT SERPL-MCNC: 0.32 MG/DL (ref 0.5–1.3)
EGFRCR SERPLBLD CKD-EPI 2021: >90 ML/MIN/1.73M*2
ERYTHROCYTE [DISTWIDTH] IN BLOOD BY AUTOMATED COUNT: 13.4 % (ref 11.5–14.5)
GLUCOSE SERPL-MCNC: 109 MG/DL (ref 74–99)
HCO3 BLDV-SCNC: 30.2 MMOL/L (ref 22–26)
HCT VFR BLD AUTO: 33.9 % (ref 41–52)
HGB BLD-MCNC: 11.1 G/DL (ref 13.5–17.5)
INHALED O2 CONCENTRATION: 27 %
LEVETIRACETAM SERPL-MCNC: 34 UG/ML (ref 10–40)
MAGNESIUM SERPL-MCNC: 1.96 MG/DL (ref 1.6–2.4)
MCH RBC QN AUTO: 30.7 PG (ref 26–34)
MCHC RBC AUTO-ENTMCNC: 32.7 G/DL (ref 32–36)
MCV RBC AUTO: 94 FL (ref 80–100)
NRBC BLD-RTO: 0 /100 WBCS (ref 0–0)
OXYHGB MFR BLDV: 79.6 % (ref 45–75)
PCO2 BLDV: 51 MM HG (ref 41–51)
PH BLDV: 7.38 PH (ref 7.33–7.43)
PHOSPHATE SERPL-MCNC: 3.9 MG/DL (ref 2.5–4.9)
PLATELET # BLD AUTO: 471 X10*3/UL (ref 150–450)
PO2 BLDV: 53 MM HG (ref 35–45)
POTASSIUM SERPL-SCNC: 4.5 MMOL/L (ref 3.5–5.3)
RBC # BLD AUTO: 3.61 X10*6/UL (ref 4.5–5.9)
SAO2 % BLDV: 82 % (ref 45–75)
SODIUM SERPL-SCNC: 138 MMOL/L (ref 136–145)
WBC # BLD AUTO: 6.8 X10*3/UL (ref 4.4–11.3)

## 2024-08-23 PROCEDURE — 2500000005 HC RX 250 GENERAL PHARMACY W/O HCPCS: Performed by: NURSE PRACTITIONER

## 2024-08-23 PROCEDURE — 5A09557 ASSISTANCE WITH RESPIRATORY VENTILATION, GREATER THAN 96 CONSECUTIVE HOURS, CONTINUOUS POSITIVE AIRWAY PRESSURE: ICD-10-PCS | Performed by: STUDENT IN AN ORGANIZED HEALTH CARE EDUCATION/TRAINING PROGRAM

## 2024-08-23 PROCEDURE — 94660 CPAP INITIATION&MGMT: CPT

## 2024-08-23 PROCEDURE — 80069 RENAL FUNCTION PANEL: CPT | Performed by: NURSE PRACTITIONER

## 2024-08-23 PROCEDURE — 2500000001 HC RX 250 WO HCPCS SELF ADMINISTERED DRUGS (ALT 637 FOR MEDICARE OP): Performed by: NURSE PRACTITIONER

## 2024-08-23 PROCEDURE — 85027 COMPLETE CBC AUTOMATED: CPT | Performed by: NURSE PRACTITIONER

## 2024-08-23 PROCEDURE — 2500000004 HC RX 250 GENERAL PHARMACY W/ HCPCS (ALT 636 FOR OP/ED): Performed by: NURSE PRACTITIONER

## 2024-08-23 PROCEDURE — 82805 BLOOD GASES W/O2 SATURATION: CPT

## 2024-08-23 PROCEDURE — 1200000002 HC GENERAL ROOM WITH TELEMETRY DAILY

## 2024-08-23 PROCEDURE — 80177 DRUG SCRN QUAN LEVETIRACETAM: CPT | Performed by: NURSE PRACTITIONER

## 2024-08-23 PROCEDURE — 94668 MNPJ CHEST WALL SBSQ: CPT

## 2024-08-23 PROCEDURE — 83735 ASSAY OF MAGNESIUM: CPT | Performed by: NURSE PRACTITIONER

## 2024-08-23 PROCEDURE — 80156 ASSAY CARBAMAZEPINE TOTAL: CPT | Performed by: NURSE PRACTITIONER

## 2024-08-23 ASSESSMENT — PAIN SCALES - GENERAL
PAINLEVEL_OUTOF10: 0 - NO PAIN
PAINLEVEL_OUTOF10: 0 - NO PAIN

## 2024-08-23 ASSESSMENT — COGNITIVE AND FUNCTIONAL STATUS - GENERAL
MOVING TO AND FROM BED TO CHAIR: TOTAL
TURNING FROM BACK TO SIDE WHILE IN FLAT BAD: TOTAL
PERSONAL GROOMING: TOTAL
MOBILITY SCORE: 6
HELP NEEDED FOR BATHING: TOTAL
DRESSING REGULAR UPPER BODY CLOTHING: TOTAL
MOVING FROM LYING ON BACK TO SITTING ON SIDE OF FLAT BED WITH BEDRAILS: TOTAL
STANDING UP FROM CHAIR USING ARMS: TOTAL
DAILY ACTIVITIY SCORE: 6
CLIMB 3 TO 5 STEPS WITH RAILING: TOTAL
DRESSING REGULAR LOWER BODY CLOTHING: TOTAL
WALKING IN HOSPITAL ROOM: TOTAL
TOILETING: TOTAL
EATING MEALS: TOTAL

## 2024-08-23 ASSESSMENT — PAIN SCALES - WONG BAKER: WONGBAKER_NUMERICALRESPONSE: NO HURT

## 2024-08-23 NOTE — CARE PLAN
The patient's goals for the shift include      The clinical goals for the shift include patient wilol remain HDS and injury free    Problem: Skin  Goal: Decreased wound size/increased tissue granulation at next dressing change  Flowsheets (Taken 8/23/2024 0430)  Decreased wound size/increased tissue granulation at next dressing change:   Promote sleep for wound healing   Protective dressings over bony prominences  Goal: Participates in plan/prevention/treatment measures  Flowsheets (Taken 8/23/2024 0430)  Participates in plan/prevention/treatment measures: Elevate heels  Goal: Prevent/manage excess moisture  Flowsheets (Taken 8/23/2024 0430)  Prevent/manage excess moisture:   Cleanse incontinence/protect with barrier cream   Moisturize dry skin  Goal: Prevent/minimize sheer/friction injuries  Flowsheets (Taken 8/23/2024 0430)  Prevent/minimize sheer/friction injuries:   HOB 30 degrees or less   Turn/reposition every 2 hours/use positioning/transfer devices   Use pull sheet

## 2024-08-23 NOTE — PROGRESS NOTES
"Medical Intensive Care - Daily Progress Note   Subjective    Israel Edgar is a 43 y.o. 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   Continues to tolerate nocturnal Bipap.     Meds    Scheduled medications  acetaminophen, 650 mg, g-tube, q6h  aspirin, 81 mg, g-tube, Daily  baclofen, 20 mg, g-tube, 2 times per day  baclofen, 30 mg, g-tube, Daily  carBAMazepine, 150 mg, g-tube, q24h  carBAMazepine, 300 mg, g-tube, q24h  carBAMazepine, 300 mg, g-tube, q24h  cholecalciferol, 4,000 Units, g-tube, Daily  enoxaparin, 1.5 mg/kg, subcutaneous, q24h  levETIRAcetam, 2,000 mg, g-tube, BID  artificial tears, 1 drop, Both Eyes, BID  metoprolol tartrate, 25 mg, oral, BID  neomycin-bacitracnZn-polymyxnB, , Topical, TID  oxygen, , inhalation, Continuous - Inhalation  phenytoin, 100 mg, g-tube, TID  polyethylene glycol, 17 g, g-tube, Daily  selenium sulfide, , Topical, Once per day on Monday Friday  white petrolatum-mineral oiL, , ophthalmic (eye), Daily      Continuous medications     PRN medications       Objective    Blood pressure 105/71, pulse 105, temperature 36 °C (96.8 °F), temperature source Temporal, resp. rate 16, height 1.8 m (5' 10.87\"), weight 64.5 kg (142 lb 3.2 oz), SpO2 97%.     Physical Exam:  Constitutional: chronically ill appearing pt in NAD and alert   Eyes: PERRL, no icterus   ENMT: mucous membranes moist, no apparent injury, no lesions seen  Head/Neck: Neck supple, no apparent injury  Respiratory/Thorax: Lungs diminished bilaterally, non-labored breathing, weak cough, on RA  Cardiovascular: Regular, rate and rhythm, no murmurs, normal S1 and S2  Gastrointestinal: Nondistended, soft, non-tender, BS present, Ostomy RUQ with loose brown output, PEG  : Boss catheter in place draining clear yellow urine  Musculoskeletal: B/L UE contractures   Extremities: normal extremities, no edema, contusions " or wounds  Neurological: alert, nonverbal, spastic muscle contractrations, does not follow commands   Skin: Warm and dry, breakdown on bridge of nose 2/2 Bipap mask, healed SHAYNA         Intake/Output Summary (Last 24 hours) at 8/23/2024 0644  Last data filed at 8/23/2024 0545  Gross per 24 hour   Intake 3366 ml   Output 1650 ml   Net 1716 ml     Labs:   Results from last 72 hours   Lab Units 08/22/24  0436 08/21/24  0514   SODIUM mmol/L 135* 134*   POTASSIUM mmol/L 4.8 4.2   CHLORIDE mmol/L 97* 95*   CO2 mmol/L 30 31   BUN mg/dL 16 13   CREATININE mg/dL 0.34* 0.39*   GLUCOSE mg/dL 147* 114*   CALCIUM mg/dL 9.3 9.4   ANION GAP mmol/L 13 12   EGFR mL/min/1.73m*2 >90 >90   PHOSPHORUS mg/dL 4.4 5.1*      Results from last 72 hours   Lab Units 08/23/24  0428 08/22/24  0436 08/21/24  0514   WBC AUTO x10*3/uL 6.8 9.4 7.5   HEMOGLOBIN g/dL 11.1* 13.0* 12.2*   HEMATOCRIT % 33.9* 39.3* 37.1*   PLATELETS AUTO x10*3/uL 471* 659* 626*   NEUTROS PCT AUTO %  --  80.2  --    LYMPHS PCT AUTO %  --  10.3  --    MONOS PCT AUTO %  --  7.5  --    EOS PCT AUTO %  --  0.5  --         Micro/ID:     Lab Results   Component Value Date    URINECULTURE >100,000 Proteus mirabilis (A) 04/07/2024    BLOODCULT No growth at 4 days -  FINAL REPORT 08/13/2024       Summary of key imaging results from the last 24 hours  No results found.      Assessment and Plan     Assessment: Israel Edgar is a 43 y.o. year old male patient admitted on 8/10/2024 with acute hypoxic respiratory failure likely 2/2 MRD PSeudomonas     Restraints: no    Summary for 08/23/24  :  Weaned to RA  Continue nocturnal Bipap 16/10 and Airway clearance TID with IPV    Plan:  NEUROLOGY/PSYCH:  Dx: Hx Seizures, Advanced MS, and Quadriplegia   Alert, intermittent tracks, does not follow commands, nonverbal.  Mentation at baseline  Management:  vEEG shows right hemispheric epileptogenic lesion and a mild diffuse encephalopathy.    Continue Keppra, Phenytoin, and Tegretol  Keppra and  Carbmazepine serum levels WNL today  Continue baclofen  Epilepsy signed off 8/18/24; indicated pts downbeat nystamus is a chronic intermittent finding observed on multiple EEG 2/2 MS      CARDIOVASCULAR:  Dx: Hx HTN, tachycardia likely 2/2 dehydration   HDS  TTE 8/12: The left ventricular systolic function is normal, with a visually estimated ejection fraction of 65-70%.   Management:  Home meds (Metoprolol tartrate 25 mg Q6)  Continue Metoprolol 25 mg BID (restarted 8/22)  IVF boluses as needed     PULMONARY:  Dx: AHRF 2/2 MDR PSA PNA requiring HFNC vs BIPAP,   Stable on Bipap HS and RA today  Management:  Airway clearance TID with IPV  NT suction PRN  Wean O2 as tolerated for goal Pox >92%  CXR 8/19: Interval increase in medial right basilar opacity may represent atelectasis versus aspiration. Stable retrocardiac atelectasis/consolidation.  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  Consider switching to bolus feeds    Bowel reg: Miralax daily      ENDOCRINOLOGY:  Dx: No acute issues   Management:  CTM     HEMATOLOGY:  Dx: Hx DVT/PE  H/H stable  Management:  CTA PE negative for PE  Continue therapeutic Lovenox   CBC daily      MSK/SKIN:  Dx: Pressure injury on sacrum, spastic quadriplegia  Management:  Wound care following; appec recs   Mepilex to sacrum  Q2 turns   Continue baclofen (home dose)     INFECTIOUS DISEASE:  Dx: PNA (PSA, Proteus on previous cx), UTI. hx of MRSA colonization, MDRO PSA pneumonia   Tmax 36.8, WBC 6.8  Management:  Completed course of Ceftaz-avibactam  (8/10- 8/16/24)  Monitor off atb     ICU Check List       FEN  Fluids: PRN  Electrolytes: PRN  Nutrition: Vital 1.5 @ 60ml/hr  Prophylaxis:  DVT ppx: Lovenox  GI ppx: PPI  Bowel care: Miralax daily  Hardware:  Catheter: Boss (8/11)  Access: PEG  Drains: Ileostomy  Lines: PICC  Social:  Code: Full Code    HPOA: Vee Cruz [phone]   Disposition: Continue care in SDU for aggressive BPH     James Blunt, PANKAJ-CNP   08/23/24 at 6:44 AM     Pt discussed with Dr. Ferguson, seen and examined. All labs, VS and previous plan of care reviewed.    Disclaimer: Documentation completed with the information available at the time of input. The times in the chart may not be reflective of actual patient care times, interventions, or procedures. Documentation occurs after the physical care of the patient.

## 2024-08-23 NOTE — PROGRESS NOTES
08/23/24 1600   Discharge Planning   Living Arrangements Parent   Support Systems Parent   Type of Residence Private residence   Home or Post Acute Services Post acute facilities (Rehab/SNF/etc)   Type of Post Acute Facility Services Other (Comment)  (SNF vs LTACH)   Expected Discharge Disposition Other  (SNF vs LTACH)   Does the patient need discharge transport arranged? Yes   RoundTrip coordination needed? Yes     SW met with pt's mother.  She is considering pt going to SNF vs LTACH when medically stable.  Mother reports she is in the process of moving and may want placement temporarily.  Pt was at [facility] prior to admission.  Pt's mother to let SW know of her final decision.  MEAGHAN Awan

## 2024-08-23 NOTE — CONSULTS
Wound Care Consult     Visit Date: 8/23/2024      Patient Name: Israel Edgar         MRN: [id number]           YOB: 1980     Reason for Consult: Ileostomy assessment and pouch check        Wound History: Patient had ileostomy and mucous fistula created at the beginning of July, 2024 at OSH.      Ostomy type: Ileostomy       Size: not measured     Color: red  Protruding: budded  Functioning: liquid/loose brown stool  INTACT Pouching: Kaila soft convex wafer with high output pouch connected to gravity drainage.   Ostomy Education: Patient not learning. Mother at bedside asking questions about pouching supplies post discharge. Patients mother is confident with pouch change and was able to describe process step by step, she will be completing pouch changes when patient transitions home. Minimal supplies at bedside.   Plan: Assess stoma/pouching twice a week and as needed for pouching assistance.      Ostomy type: Mucous fistula       Color: red with adherent dried eschar cap and sutures  Protruding: slightly budded  Functioning: none  Mucocutaneous junction: Clean, dry, and intact   Peristomal skin: Clean, dry, and intact   Pouching: none, area cleaned and covered with gauze and paper tape     Wound Team Plan: Ostomy/wound team will follow while inpatient for ostomy care.       Mariella Castro RN, CWON  8/23/2024  6:02 PM

## 2024-08-23 NOTE — PROGRESS NOTES
08/23/24 1342   Discharge Planning   Living Arrangements Parent   Support Systems Parent   Type of Residence Private residence   Who is requesting discharge planning? Provider   Home or Post Acute Services None   Expected Discharge Disposition Home     Patient to discharge home early next week, will need home Bipap. Referral sent to [facility]. Management will review order next Monday. Care coordinator will continue to follow for homegoing needs. Kamilah Acevedo RN

## 2024-08-24 ENCOUNTER — APPOINTMENT (OUTPATIENT)
Dept: RADIOLOGY | Facility: HOSPITAL | Age: 44
End: 2024-08-24
Payer: MEDICAID

## 2024-08-24 LAB
ALBUMIN SERPL BCP-MCNC: 3.3 G/DL (ref 3.4–5)
ANION GAP SERPL CALC-SCNC: 11 MMOL/L (ref 10–20)
BUN SERPL-MCNC: 16 MG/DL (ref 6–23)
CALCIUM SERPL-MCNC: 8.7 MG/DL (ref 8.6–10.6)
CHLORIDE SERPL-SCNC: 98 MMOL/L (ref 98–107)
CO2 SERPL-SCNC: 31 MMOL/L (ref 21–32)
CREAT SERPL-MCNC: 0.34 MG/DL (ref 0.5–1.3)
EGFRCR SERPLBLD CKD-EPI 2021: >90 ML/MIN/1.73M*2
ERYTHROCYTE [DISTWIDTH] IN BLOOD BY AUTOMATED COUNT: 13.6 % (ref 11.5–14.5)
GLUCOSE SERPL-MCNC: 92 MG/DL (ref 74–99)
HCT VFR BLD AUTO: 32.3 % (ref 41–52)
HGB BLD-MCNC: 10.5 G/DL (ref 13.5–17.5)
MAGNESIUM SERPL-MCNC: 1.88 MG/DL (ref 1.6–2.4)
MCH RBC QN AUTO: 30.6 PG (ref 26–34)
MCHC RBC AUTO-ENTMCNC: 32.5 G/DL (ref 32–36)
MCV RBC AUTO: 94 FL (ref 80–100)
NRBC BLD-RTO: 0 /100 WBCS (ref 0–0)
PHOSPHATE SERPL-MCNC: 5.1 MG/DL (ref 2.5–4.9)
PLATELET # BLD AUTO: 397 X10*3/UL (ref 150–450)
POTASSIUM SERPL-SCNC: 4.4 MMOL/L (ref 3.5–5.3)
RBC # BLD AUTO: 3.43 X10*6/UL (ref 4.5–5.9)
SODIUM SERPL-SCNC: 136 MMOL/L (ref 136–145)
WBC # BLD AUTO: 4.3 X10*3/UL (ref 4.4–11.3)

## 2024-08-24 PROCEDURE — 2500000001 HC RX 250 WO HCPCS SELF ADMINISTERED DRUGS (ALT 637 FOR MEDICARE OP): Performed by: NURSE PRACTITIONER

## 2024-08-24 PROCEDURE — 94668 MNPJ CHEST WALL SBSQ: CPT

## 2024-08-24 PROCEDURE — 85027 COMPLETE CBC AUTOMATED: CPT | Performed by: NURSE PRACTITIONER

## 2024-08-24 PROCEDURE — 2500000004 HC RX 250 GENERAL PHARMACY W/ HCPCS (ALT 636 FOR OP/ED)

## 2024-08-24 PROCEDURE — 2500000005 HC RX 250 GENERAL PHARMACY W/O HCPCS: Performed by: NURSE PRACTITIONER

## 2024-08-24 PROCEDURE — 71045 X-RAY EXAM CHEST 1 VIEW: CPT

## 2024-08-24 PROCEDURE — 1200000002 HC GENERAL ROOM WITH TELEMETRY DAILY

## 2024-08-24 PROCEDURE — 94660 CPAP INITIATION&MGMT: CPT

## 2024-08-24 PROCEDURE — 80069 RENAL FUNCTION PANEL: CPT | Performed by: NURSE PRACTITIONER

## 2024-08-24 PROCEDURE — 83735 ASSAY OF MAGNESIUM: CPT | Performed by: NURSE PRACTITIONER

## 2024-08-24 PROCEDURE — 71045 X-RAY EXAM CHEST 1 VIEW: CPT | Performed by: RADIOLOGY

## 2024-08-24 PROCEDURE — 2500000004 HC RX 250 GENERAL PHARMACY W/ HCPCS (ALT 636 FOR OP/ED): Performed by: NURSE PRACTITIONER

## 2024-08-24 RX ORDER — NAPROXEN SODIUM 220 MG/1
81 TABLET, FILM COATED ORAL DAILY
Start: 2024-08-25

## 2024-08-24 RX ORDER — MAGNESIUM SULFATE HEPTAHYDRATE 40 MG/ML
2 INJECTION, SOLUTION INTRAVENOUS ONCE
Status: COMPLETED | OUTPATIENT
Start: 2024-08-24 | End: 2024-08-24

## 2024-08-24 RX ORDER — METOPROLOL TARTRATE 25 MG/1
12.5 TABLET, FILM COATED ORAL 2 TIMES DAILY
Status: DISCONTINUED | OUTPATIENT
Start: 2024-08-24 | End: 2024-08-27 | Stop reason: HOSPADM

## 2024-08-24 RX ORDER — METOPROLOL TARTRATE 25 MG/1
12.5 TABLET, FILM COATED ORAL 2 TIMES DAILY
Start: 2024-08-24 | End: 2024-08-26

## 2024-08-24 RX ORDER — MINERAL OIL AND WHITE PETROLATUM 150; 830 MG/G; MG/G
OINTMENT OPHTHALMIC
Start: 2024-08-24

## 2024-08-24 RX ORDER — POLYETHYLENE GLYCOL 3350 17 G/17G
17 POWDER, FOR SOLUTION ORAL DAILY
Start: 2024-08-25

## 2024-08-24 RX ORDER — SELENIUM SULFIDE 2.5 MG/100ML
LOTION TOPICAL 2 TIMES WEEKLY
Start: 2024-08-26

## 2024-08-24 ASSESSMENT — COGNITIVE AND FUNCTIONAL STATUS - GENERAL
HELP NEEDED FOR BATHING: TOTAL
DRESSING REGULAR LOWER BODY CLOTHING: TOTAL
MOBILITY SCORE: 6
DAILY ACTIVITIY SCORE: 6
TOILETING: TOTAL
WALKING IN HOSPITAL ROOM: TOTAL
DRESSING REGULAR UPPER BODY CLOTHING: TOTAL
PERSONAL GROOMING: TOTAL
CLIMB 3 TO 5 STEPS WITH RAILING: TOTAL
EATING MEALS: TOTAL
MOVING FROM LYING ON BACK TO SITTING ON SIDE OF FLAT BED WITH BEDRAILS: TOTAL
MOVING TO AND FROM BED TO CHAIR: TOTAL
STANDING UP FROM CHAIR USING ARMS: TOTAL
TURNING FROM BACK TO SIDE WHILE IN FLAT BAD: TOTAL

## 2024-08-24 ASSESSMENT — PAIN SCALES - PAIN ASSESSMENT IN ADVANCED DEMENTIA (PAINAD)
FACIALEXPRESSION: SMILING OR INEXPRESSIVE
TOTALSCORE: 1
CONSOLABILITY: NO NEED TO CONSOLE
BREATHING: NORMAL
BREATHING: NORMAL
TOTALSCORE: 1
FACIALEXPRESSION: SMILING OR INEXPRESSIVE
BODYLANGUAGE: TENSE, DISTRESSED PACING, FIDGETING
CONSOLABILITY: NO NEED TO CONSOLE
BODYLANGUAGE: TENSE, DISTRESSED PACING, FIDGETING
CONSOLABILITY: NO NEED TO CONSOLE
TOTALSCORE: 1
BODYLANGUAGE: TENSE, DISTRESSED PACING, FIDGETING
CONSOLABILITY: NO NEED TO CONSOLE
FACIALEXPRESSION: SMILING OR INEXPRESSIVE
BREATHING: NORMAL
FACIALEXPRESSION: SMILING OR INEXPRESSIVE
TOTALSCORE: 1
BODYLANGUAGE: TENSE, DISTRESSED PACING, FIDGETING
BREATHING: NORMAL

## 2024-08-24 ASSESSMENT — PAIN - FUNCTIONAL ASSESSMENT
PAIN_FUNCTIONAL_ASSESSMENT: PAINAD (PAIN ASSESSMENT IN ADVANCED DEMENTIA SCALE)

## 2024-08-24 ASSESSMENT — PAIN SCALES - GENERAL: PAINLEVEL_OUTOF10: 0 - NO PAIN

## 2024-08-24 NOTE — PROGRESS NOTES
"Medical Intensive Care - Daily Progress Note   Subjective    Israel Edgar is a 43 y.o. 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   Continues to tolerate nocturnal Bipap.     Meds    Scheduled medications  acetaminophen, 650 mg, g-tube, q6h  aspirin, 81 mg, g-tube, Daily  baclofen, 20 mg, g-tube, 2 times per day  baclofen, 30 mg, g-tube, Daily  carBAMazepine, 150 mg, g-tube, q24h  carBAMazepine, 300 mg, g-tube, q24h  carBAMazepine, 300 mg, g-tube, q24h  cholecalciferol, 4,000 Units, g-tube, Daily  enoxaparin, 1.5 mg/kg, subcutaneous, q24h  levETIRAcetam, 2,000 mg, g-tube, BID  artificial tears, 1 drop, Both Eyes, BID  metoprolol tartrate, 25 mg, oral, BID  neomycin-bacitracnZn-polymyxnB, , Topical, TID  oxygen, , inhalation, Continuous - Inhalation  phenytoin, 100 mg, g-tube, TID  polyethylene glycol, 17 g, g-tube, Daily  selenium sulfide, , Topical, Once per day on Monday Friday  white petrolatum-mineral oiL, , ophthalmic (eye), Daily      Continuous medications     PRN medications       Objective    Blood pressure 105/79, pulse 95, temperature 36.2 °C (97.2 °F), temperature source Temporal, resp. rate 21, height 1.8 m (5' 10.87\"), weight 64.6 kg (142 lb 6.7 oz), SpO2 97%.     Physical Exam:  Constitutional: chronically ill appearing pt in NAD and alert   Eyes: PERRL, no icterus   ENMT: mucous membranes moist, no apparent injury, no lesions seen  Head/Neck: Neck supple, no apparent injury  Respiratory/Thorax: Lungs diminished bilaterally, non-labored breathing, weak cough, on RA  Cardiovascular: Regular, rate and rhythm, no murmurs, normal S1 and S2  Gastrointestinal: Nondistended, soft, non-tender, BS present, Ostomy RUQ with loose brown output, PEG  : Boss catheter in place draining clear yellow urine  Musculoskeletal: B/L UE contractures   Extremities: normal extremities, no edema, contusions " or wounds  Neurological: alert, nonverbal, spastic muscle contractrations, does not follow commands   Skin: Warm and dry, breakdown on bridge of nose 2/2 Bipap mask, healed SHAYNA         Intake/Output Summary (Last 24 hours) at 8/24/2024 0630  Last data filed at 8/24/2024 0616  Gross per 24 hour   Intake 1423 ml   Output 1525 ml   Net -102 ml     Labs:   Results from last 72 hours   Lab Units 08/23/24  0428 08/22/24  0436   SODIUM mmol/L 138 135*   POTASSIUM mmol/L 4.5 4.8   CHLORIDE mmol/L 98 97*   CO2 mmol/L 28 30   BUN mg/dL 18 16   CREATININE mg/dL 0.32* 0.34*   GLUCOSE mg/dL 109* 147*   CALCIUM mg/dL 8.7 9.3   ANION GAP mmol/L 17 13   EGFR mL/min/1.73m*2 >90 >90   PHOSPHORUS mg/dL 3.9 4.4      Results from last 72 hours   Lab Units 08/24/24  0425 08/23/24 0428 08/22/24  0436   WBC AUTO x10*3/uL 4.3* 6.8 9.4   HEMOGLOBIN g/dL 10.5* 11.1* 13.0*   HEMATOCRIT % 32.3* 33.9* 39.3*   PLATELETS AUTO x10*3/uL 397 471* 659*   NEUTROS PCT AUTO %  --   --  80.2   LYMPHS PCT AUTO %  --   --  10.3   MONOS PCT AUTO %  --   --  7.5   EOS PCT AUTO %  --   --  0.5        Micro/ID:     Lab Results   Component Value Date    URINECULTURE >100,000 Proteus mirabilis (A) 04/07/2024    BLOODCULT No growth at 4 days -  FINAL REPORT 08/13/2024       Summary of key imaging results from the last 24 hours  No results found.      Assessment and Plan     Assessment: Israel Edgar is a 43 y.o. 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  Mentation at baseline  Management:  vEEG shows right hemispheric epileptogenic lesion and a mild diffuse encephalopathy.    Continue Keppra, Phenytoin, and Tegretol  Keppra  and Carbmazepine serum levels WNL today  Continue baclofen  Epilepsy signed off 8/18/24; indicated pts downbeat nystamus is a chronic intermittent finding observed on multiple EEG 2/2 MS      CARDIOVASCULAR:  Dx: Hx HTN, tachycardia likely 2/2 dehydration   HDS  TTE 8/12: The left ventricular systolic function is normal, with a visually estimated ejection fraction of 65-70%.   Management:  Home meds (Metoprolol tartrate 25 mg Q6)  Continue Metoprolol 25 mg BID (restarted 8/22)  IVF boluses as needed     PULMONARY:  Dx: AHRF 2/2 MDR PSA PNA requiring HFNC vs BIPAP,   Stable on Bipap HS and RA today  Management:  Airway clearance BID with IPV  Wean O2 as tolerated for goal Pox >92%  CXR 8/19: Interval increase in medial right basilar opacity may represent atelectasis versus aspiration. Stable retrocardiac atelectasis/consolidation.  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 (7/2024), dysphagia, PEG, Hx Colonic dilatation and Gerardo syndrome   Management:  Wound care consulted; apprec recs  Ostomy care q shift   Diet: Vital 1.5 330ml boluses 4x daily with 100ml FWF with each bolus   Bowel reg: Miralax daily      ENDOCRINOLOGY:  Dx: No acute issues   Management:  CTM     HEMATOLOGY:  Dx: Hx DVT/PE  H/H stable  Management:  CTA PE negative for PE  Continue therapeutic Lovenox   CBC daily      MSK/SKIN:  Dx: Pressure injury on sacrum, spastic quadriplegia  Management:  Wound care following; appec recs   Mepilex to sacrum  Q2 turns   Continue baclofen (home dose)     INFECTIOUS DISEASE:  Dx: PNA (PSA, Proteus on previous cx), UTI. hx of MRSA colonization, MDRO PSA pneumonia   Tmax 36.8, WBC 6.8  Management:  Completed course  of Ceftaz-avibactam (8/10- 8/16/24)  Monitor off atb     ICU Check List       FEN  Fluids: PRN  Electrolytes: PRN  Nutrition: Vital 1.5 bolus feeds  Prophylaxis:  DVT ppx: Lovenox  GI ppx: PPI  Bowel care: Miralax daily  Hardware:  Catheter: Boss (8/11)  Access: PEG  Drains: Ileostomy  Lines: PICC  Social:  Code: Full Code    HPOA: Vee Cruz [phone]   Disposition: Continue care in SDU for aggressive BPH   Discharge Planning: Vee would like to take patient home however is in the process of moving.  Vee interested in speaking with SW to discuss finding short term placement in SNF/[facility].    James Blunt, APRN-CNP   08/24/24 at 6:30 AM     Pt discussed with Dr. Ferguson, seen and examined. All labs, VS and previous plan of care reviewed.    Disclaimer: Documentation completed with the information available at the time of input. The times in the chart may not be reflective of actual patient care times, interventions, or procedures. Documentation occurs after the physical care of the patient.

## 2024-08-24 NOTE — SIGNIFICANT EVENT
RADAR Note     08/24/24 1845   Onset Documentation   Rapid Response Initiated By Radar auto page   Location/Room [facility]   Pager Time 1825   Arrival Time 1845   Event End Time 1910   Level II Called No   Primary Reason for Call Radar auto page     Patient with auto-generated RADAR of 7 for VS of 36.9, 91, 21, 93/66, 93%.  Spoke to bedside RN Eri.  Patient receiving IVF bolus.  No current concerns with patient condition.  RN encouraged to page Rapid Response if patient decompensates.

## 2024-08-24 NOTE — PROGRESS NOTES
08/24/24 0900   Discharge Planning   Living Arrangements Parent   Support Systems Parent   Type of Residence Private residence   Home or Post Acute Services Post acute facilities (Rehab/SNF/etc)   Type of Post Acute Facility Services Other (Comment)  ([facility])   [facility]   Does the patient need discharge transport arranged? Yes   RoundTrip coordination needed? Yes   Has discharge transport been arranged? No   What day is the transport expected? 08/26/24     Care team reached out to ASHISH to follow up with pt parent Vee [phone] re whether pt will discharge to SNF or LTACH.   ASHISH called Vee and call went directly to voicemail. SW left a message.  ASHISH will follow. POLO Bales.     08/24/2024 0915  Vee called ASHISH back and reported that she'd like pt to return to [facility].  Vee reported that her phone sends unsaved phone numbers straight to voicemail and she does not return calls if a voicemail is not left.  Per [facility] referral, [facility] is ready to accept pt back.   ASHISH notified [facility] as FOC.  ASHISH will follow. Syd VERAS, POLO.    08/24/2024 1325  [facility] reports that hospital liaison Josh will be in contact on Monday.   Care team updated.   ASHISH will follow. Syd VERAS, POLO.

## 2024-08-24 NOTE — SIGNIFICANT EVENT
RADAR Note     08/24/24 1221   Onset Documentation   Rapid Response Initiated By Radar auto page   Location/Room [facility] [place]   Pager Time 1221   Arrival Time 1221   Event End Time 1235   Level II Called No   Primary Reason for Call Radar auto page     RADAR auto-generated page of 6 received for VS of 35.8, 78, 14, 87/63, 93%.  Upon arrival to bedside, spoke with bedside RN Eri.  VS reviewed with Nurse.  Per Eri, no acute changes in patient condition.  RN encouraged to page Rapid Response Team if concerns arise in patient status.

## 2024-08-24 NOTE — CARE PLAN
Problem: Skin  Goal: Decreased wound size/increased tissue granulation at next dressing change  Flowsheets (Taken 8/24/2024 1708)  Decreased wound size/increased tissue granulation at next dressing change:   Promote sleep for wound healing   Protective dressings over bony prominences  Goal: Participates in plan/prevention/treatment measures  Flowsheets (Taken 8/24/2024 1708)  Participates in plan/prevention/treatment measures: Elevate heels  Goal: Prevent/manage excess moisture  Flowsheets (Taken 8/24/2024 1708)  Prevent/manage excess moisture:   Follow provider orders for dressing changes   Moisturize dry skin   Monitor for/manage infection if present  Goal: Prevent/minimize sheer/friction injuries  Flowsheets (Taken 8/24/2024 1708)  Prevent/minimize sheer/friction injuries:   Complete micro-shifts as needed if patient unable. Adjust patient position to relieve pressure points, not a full turn   Use pull sheet   HOB 30 degrees or less   Turn/reposition every 2 hours/use positioning/transfer devices  Goal: Promote/optimize nutrition  Flowsheets (Taken 8/24/2024 1708)  Promote/optimize nutrition: Monitor/record intake including meals  Goal: Promote skin healing  Flowsheets (Taken 8/24/2024 1708)  Promote skin healing:   Protective dressings over bony prominences   Turn/reposition every 2 hours/use positioning/transfer devices   Rotate device position/do not position patient on device

## 2024-08-24 NOTE — CARE PLAN
The patient's goals for the shift include      The clinical goals for the shift include pt will remain HDS during the shift      Problem: Skin  Goal: Decreased wound size/increased tissue granulation at next dressing change  Outcome: Progressing  Flowsheets (Taken 8/23/2024 2350)  Decreased wound size/increased tissue granulation at next dressing change:   Promote sleep for wound healing   Protective dressings over bony prominences  Goal: Participates in plan/prevention/treatment measures  Flowsheets (Taken 8/23/2024 2350)  Participates in plan/prevention/treatment measures: Elevate heels  Goal: Prevent/manage excess moisture  Flowsheets (Taken 8/23/2024 2350)  Prevent/manage excess moisture: Moisturize dry skin  Goal: Prevent/minimize sheer/friction injuries  Flowsheets (Taken 8/23/2024 2350)  Prevent/minimize sheer/friction injuries:   Turn/reposition every 2 hours/use positioning/transfer devices   Use pull sheet   HOB 30 degrees or less  Goal: Promote skin healing  Flowsheets (Taken 8/23/2024 2350)  Promote skin healing:   Protective dressings over bony prominences   Turn/reposition every 2 hours/use positioning/transfer devices

## 2024-08-25 VITALS
TEMPERATURE: 97.7 F | SYSTOLIC BLOOD PRESSURE: 105 MMHG | WEIGHT: 142.86 LBS | BODY MASS INDEX: 20 KG/M2 | HEART RATE: 92 BPM | OXYGEN SATURATION: 94 % | DIASTOLIC BLOOD PRESSURE: 78 MMHG | RESPIRATION RATE: 25 BRPM | HEIGHT: 71 IN

## 2024-08-25 LAB
ALBUMIN SERPL BCP-MCNC: 3.3 G/DL (ref 3.4–5)
ANION GAP BLDV CALCULATED.4IONS-SCNC: 4 MMOL/L (ref 10–25)
ANION GAP SERPL CALC-SCNC: 12 MMOL/L (ref 10–20)
BASE EXCESS BLDV CALC-SCNC: 5.8 MMOL/L (ref -2–3)
BASOPHILS # BLD AUTO: 0.09 X10*3/UL (ref 0–0.1)
BASOPHILS NFR BLD AUTO: 2.3 %
BODY TEMPERATURE: 37 DEGREES CELSIUS
BUN SERPL-MCNC: 11 MG/DL (ref 6–23)
CA-I BLDV-SCNC: 1.23 MMOL/L (ref 1.1–1.33)
CALCIUM SERPL-MCNC: 8.7 MG/DL (ref 8.6–10.6)
CHLORIDE BLDV-SCNC: 103 MMOL/L (ref 98–107)
CHLORIDE SERPL-SCNC: 99 MMOL/L (ref 98–107)
CO2 SERPL-SCNC: 31 MMOL/L (ref 21–32)
CREAT SERPL-MCNC: 0.3 MG/DL (ref 0.5–1.3)
EGFRCR SERPLBLD CKD-EPI 2021: >90 ML/MIN/1.73M*2
EOSINOPHIL # BLD AUTO: 0.23 X10*3/UL (ref 0–0.7)
EOSINOPHIL NFR BLD AUTO: 5.9 %
ERYTHROCYTE [DISTWIDTH] IN BLOOD BY AUTOMATED COUNT: 13.4 % (ref 11.5–14.5)
GLUCOSE BLD MANUAL STRIP-MCNC: 109 MG/DL (ref 74–99)
GLUCOSE BLDV-MCNC: 93 MG/DL (ref 74–99)
GLUCOSE SERPL-MCNC: 133 MG/DL (ref 74–99)
HCO3 BLDV-SCNC: 31.6 MMOL/L (ref 22–26)
HCT VFR BLD AUTO: 31.2 % (ref 41–52)
HCT VFR BLD EST: 31 % (ref 41–52)
HGB BLD-MCNC: 10.5 G/DL (ref 13.5–17.5)
HGB BLDV-MCNC: 10.3 G/DL (ref 13.5–17.5)
IMM GRANULOCYTES # BLD AUTO: 0.01 X10*3/UL (ref 0–0.7)
IMM GRANULOCYTES NFR BLD AUTO: 0.3 % (ref 0–0.9)
INHALED O2 CONCENTRATION: 21 %
LACTATE BLDV-SCNC: 1.1 MMOL/L (ref 0.4–2)
LYMPHOCYTES # BLD AUTO: 1.03 X10*3/UL (ref 1.2–4.8)
LYMPHOCYTES NFR BLD AUTO: 26.3 %
MAGNESIUM SERPL-MCNC: 1.97 MG/DL (ref 1.6–2.4)
MCH RBC QN AUTO: 31.4 PG (ref 26–34)
MCHC RBC AUTO-ENTMCNC: 33.7 G/DL (ref 32–36)
MCV RBC AUTO: 93 FL (ref 80–100)
MONOCYTES # BLD AUTO: 0.62 X10*3/UL (ref 0.1–1)
MONOCYTES NFR BLD AUTO: 15.9 %
NEUTROPHILS # BLD AUTO: 1.93 X10*3/UL (ref 1.2–7.7)
NEUTROPHILS NFR BLD AUTO: 49.3 %
NRBC BLD-RTO: 0 /100 WBCS (ref 0–0)
OXYHGB MFR BLDV: 77.4 % (ref 45–75)
PCO2 BLDV: 51 MM HG (ref 41–51)
PH BLDV: 7.4 PH (ref 7.33–7.43)
PHOSPHATE SERPL-MCNC: 4.6 MG/DL (ref 2.5–4.9)
PLATELET # BLD AUTO: 388 X10*3/UL (ref 150–450)
PO2 BLDV: 54 MM HG (ref 35–45)
POTASSIUM BLDV-SCNC: 4 MMOL/L (ref 3.5–5.3)
POTASSIUM SERPL-SCNC: 3.8 MMOL/L (ref 3.5–5.3)
RBC # BLD AUTO: 3.34 X10*6/UL (ref 4.5–5.9)
SAO2 % BLDV: 79 % (ref 45–75)
SODIUM BLDV-SCNC: 135 MMOL/L (ref 136–145)
SODIUM SERPL-SCNC: 138 MMOL/L (ref 136–145)
WBC # BLD AUTO: 3.9 X10*3/UL (ref 4.4–11.3)

## 2024-08-25 PROCEDURE — 2500000004 HC RX 250 GENERAL PHARMACY W/ HCPCS (ALT 636 FOR OP/ED)

## 2024-08-25 PROCEDURE — 83735 ASSAY OF MAGNESIUM: CPT | Performed by: NURSE PRACTITIONER

## 2024-08-25 PROCEDURE — 2500000005 HC RX 250 GENERAL PHARMACY W/O HCPCS: Performed by: NURSE PRACTITIONER

## 2024-08-25 PROCEDURE — 2500000004 HC RX 250 GENERAL PHARMACY W/ HCPCS (ALT 636 FOR OP/ED): Performed by: NURSE PRACTITIONER

## 2024-08-25 PROCEDURE — 2500000001 HC RX 250 WO HCPCS SELF ADMINISTERED DRUGS (ALT 637 FOR MEDICARE OP): Performed by: NURSE PRACTITIONER

## 2024-08-25 PROCEDURE — 94660 CPAP INITIATION&MGMT: CPT

## 2024-08-25 PROCEDURE — 85025 COMPLETE CBC W/AUTO DIFF WBC: CPT

## 2024-08-25 PROCEDURE — 82435 ASSAY OF BLOOD CHLORIDE: CPT

## 2024-08-25 PROCEDURE — 80069 RENAL FUNCTION PANEL: CPT | Performed by: NURSE PRACTITIONER

## 2024-08-25 PROCEDURE — 1200000002 HC GENERAL ROOM WITH TELEMETRY DAILY

## 2024-08-25 PROCEDURE — 82947 ASSAY GLUCOSE BLOOD QUANT: CPT

## 2024-08-25 PROCEDURE — 94668 MNPJ CHEST WALL SBSQ: CPT

## 2024-08-25 ASSESSMENT — COGNITIVE AND FUNCTIONAL STATUS - GENERAL
WALKING IN HOSPITAL ROOM: TOTAL
PERSONAL GROOMING: TOTAL
MOVING TO AND FROM BED TO CHAIR: TOTAL
MOVING TO AND FROM BED TO CHAIR: TOTAL
TOILETING: TOTAL
HELP NEEDED FOR BATHING: TOTAL
TURNING FROM BACK TO SIDE WHILE IN FLAT BAD: TOTAL
MOVING FROM LYING ON BACK TO SITTING ON SIDE OF FLAT BED WITH BEDRAILS: TOTAL
TURNING FROM BACK TO SIDE WHILE IN FLAT BAD: TOTAL
MOBILITY SCORE: 6
STANDING UP FROM CHAIR USING ARMS: TOTAL
CLIMB 3 TO 5 STEPS WITH RAILING: TOTAL
DAILY ACTIVITIY SCORE: 6
DRESSING REGULAR LOWER BODY CLOTHING: TOTAL
EATING MEALS: TOTAL
CLIMB 3 TO 5 STEPS WITH RAILING: TOTAL
MOVING FROM LYING ON BACK TO SITTING ON SIDE OF FLAT BED WITH BEDRAILS: TOTAL
WALKING IN HOSPITAL ROOM: TOTAL
PERSONAL GROOMING: TOTAL
STANDING UP FROM CHAIR USING ARMS: TOTAL
DRESSING REGULAR UPPER BODY CLOTHING: TOTAL
EATING MEALS: TOTAL
DAILY ACTIVITIY SCORE: 6
MOBILITY SCORE: 6
HELP NEEDED FOR BATHING: TOTAL
DRESSING REGULAR LOWER BODY CLOTHING: TOTAL
TOILETING: TOTAL
DRESSING REGULAR UPPER BODY CLOTHING: TOTAL

## 2024-08-25 ASSESSMENT — PAIN SCALES - PAIN ASSESSMENT IN ADVANCED DEMENTIA (PAINAD)
TOTALSCORE: 0
BODYLANGUAGE: RELAXED
BREATHING: NORMAL
TOTALSCORE: 0
BREATHING: NORMAL
BODYLANGUAGE: RELAXED
BREATHING: NORMAL
TOTALSCORE: REPOSITIONED
FACIALEXPRESSION: SMILING OR INEXPRESSIVE
TOTALSCORE: 0
TOTALSCORE: 1
FACIALEXPRESSION: SMILING OR INEXPRESSIVE
FACIALEXPRESSION: SMILING OR INEXPRESSIVE
CONSOLABILITY: NO NEED TO CONSOLE
FACIALEXPRESSION: SMILING OR INEXPRESSIVE
BODYLANGUAGE: RELAXED
BREATHING: NORMAL
CONSOLABILITY: NO NEED TO CONSOLE
BODYLANGUAGE: TENSE, DISTRESSED PACING, FIDGETING

## 2024-08-25 ASSESSMENT — PAIN - FUNCTIONAL ASSESSMENT
PAIN_FUNCTIONAL_ASSESSMENT: PAINAD (PAIN ASSESSMENT IN ADVANCED DEMENTIA SCALE)

## 2024-08-25 ASSESSMENT — PAIN SCALES - GENERAL: PAINLEVEL_OUTOF10: 1

## 2024-08-25 NOTE — CARE PLAN
Problem: Pain - Adult  Goal: Verbalizes/displays adequate comfort level or baseline comfort level  Outcome: Progressing     Problem: Safety - Adult  Goal: Free from fall injury  Outcome: Progressing     Problem: Discharge Planning  Goal: Discharge to home or other facility with appropriate resources  Outcome: Progressing     Problem: Chronic Conditions and Co-morbidities  Goal: Patient's chronic conditions and co-morbidity symptoms are monitored and maintained or improved  Outcome: Progressing     Problem: Skin  Goal: Decreased wound size/increased tissue granulation at next dressing change  Outcome: Progressing  Goal: Participates in plan/prevention/treatment measures  Outcome: Progressing  Goal: Prevent/manage excess moisture  Outcome: Progressing  Goal: Prevent/minimize sheer/friction injuries  Outcome: Progressing  Goal: Promote/optimize nutrition  Outcome: Progressing  Goal: Promote skin healing  Outcome: Progressing     Problem: Nutrition  Goal: Less than 5 days NPO/clear liquids  Outcome: Progressing  Goal: Oral intake greater than 50%  Outcome: Progressing  Goal: Oral intake greater 75%  Outcome: Progressing  Goal: Consume prescribed supplement  Outcome: Progressing  Goal: Adequate PO fluid intake  Outcome: Progressing  Goal: Nutrition support goals are met within 48 hrs  Outcome: Progressing  Goal: Nutrition support is meeting 75% of nutrient needs  Outcome: Progressing  Goal: Tube feed tolerance  Outcome: Progressing  Goal: BG  mg/dL  Outcome: Progressing  Goal: Lab values WNL  Outcome: Progressing  Goal: Electrolytes WNL  Outcome: Progressing  Goal: Promote healing  Outcome: Progressing  Goal: Maintain stable weight  Outcome: Progressing  Goal: Reduce weight from edema/fluid  Outcome: Progressing  Goal: Gradual weight gain  Outcome: Progressing  Goal: Improve ostomy output  Outcome: Progressing     Problem: Fall/Injury  Goal: Not fall by end of shift  Outcome: Progressing  Goal: Be free from  injury by end of the shift  Outcome: Progressing  Goal: Verbalize understanding of personal risk factors for fall in the hospital  Outcome: Progressing  Goal: Verbalize understanding of risk factor reduction measures to prevent injury from fall in the home  Outcome: Progressing  Goal: Use assistive devices by end of the shift  Outcome: Progressing  Goal: Pace activities to prevent fatigue by end of the shift  Outcome: Progressing

## 2024-08-25 NOTE — CARE PLAN
The patient's goals for the shift include      The clinical goals for the shift include pt will remain HDS and free of injury    Problem: Skin  Goal: Decreased wound size/increased tissue granulation at next dressing change  Outcome: Progressing  Flowsheets (Taken 8/25/2024 0425)  Decreased wound size/increased tissue granulation at next dressing change:   Promote sleep for wound healing   Protective dressings over bony prominences   Utilize specialty bed per algorithm  Goal: Participates in plan/prevention/treatment measures  Flowsheets (Taken 8/25/2024 0425)  Participates in plan/prevention/treatment measures: Elevate heels  Goal: Prevent/manage excess moisture  Flowsheets (Taken 8/25/2024 0425)  Prevent/manage excess moisture:   Cleanse incontinence/protect with barrier cream   Follow provider orders for dressing changes   Moisturize dry skin  Goal: Prevent/minimize sheer/friction injuries  Flowsheets (Taken 8/25/2024 0425)  Prevent/minimize sheer/friction injuries:   HOB 30 degrees or less   Turn/reposition every 2 hours/use positioning/transfer devices   Use pull sheet

## 2024-08-25 NOTE — CARE PLAN
Problem: Pain - Adult  Goal: Verbalizes/displays adequate comfort level or baseline comfort level  Outcome: Progressing     Problem: Safety - Adult  Goal: Free from fall injury  Outcome: Progressing     Problem: Discharge Planning  Goal: Discharge to home or other facility with appropriate resources  Outcome: Progressing     Problem: Chronic Conditions and Co-morbidities  Goal: Patient's chronic conditions and co-morbidity symptoms are monitored and maintained or improved  Outcome: Progressing     Problem: Skin  Goal: Decreased wound size/increased tissue granulation at next dressing change  8/25/2024 1115 by Miguelina Swanson RN  Flowsheets (Taken 8/25/2024 1115)  Decreased wound size/increased tissue granulation at next dressing change:   Promote sleep for wound healing   Utilize specialty bed per algorithm   Protective dressings over bony prominences  8/25/2024 1115 by Miguelina Swanson RN  Outcome: Progressing  Flowsheets (Taken 8/25/2024 1115)  Decreased wound size/increased tissue granulation at next dressing change:   Promote sleep for wound healing   Utilize specialty bed per algorithm   Protective dressings over bony prominences  Goal: Participates in plan/prevention/treatment measures  8/25/2024 1115 by Miguelina Swanson RN  Flowsheets (Taken 8/25/2024 1115)  Participates in plan/prevention/treatment measures: Elevate heels  8/25/2024 1115 by Miguelina Swansno RN  Outcome: Progressing  Flowsheets (Taken 8/25/2024 1115)  Participates in plan/prevention/treatment measures: Elevate heels  Goal: Prevent/manage excess moisture  8/25/2024 1115 by Miguelina Swanson RN  Flowsheets (Taken 8/25/2024 1115)  Prevent/manage excess moisture:   Cleanse incontinence/protect with barrier cream   Moisturize dry skin   Follow provider orders for dressing changes   Monitor for/manage infection if present  8/25/2024 1115 by Miguelina Swanson RN  Outcome: Progressing  Flowsheets (Taken 8/25/2024 1115)  Prevent/manage excess moisture:    Cleanse incontinence/protect with barrier cream   Moisturize dry skin   Follow provider orders for dressing changes   Monitor for/manage infection if present  Goal: Prevent/minimize sheer/friction injuries  8/25/2024 1115 by Miguelina Swanson RN  Flowsheets (Taken 8/25/2024 1115)  Prevent/minimize sheer/friction injuries:   HOB 30 degrees or less   Turn/reposition every 2 hours/use positioning/transfer devices   Use pull sheet   Utilize specialty bed per algorithm  8/25/2024 1115 by Miguelina Swanson RN  Outcome: Progressing  Flowsheets (Taken 8/25/2024 1115)  Prevent/minimize sheer/friction injuries:   HOB 30 degrees or less   Turn/reposition every 2 hours/use positioning/transfer devices   Use pull sheet   Utilize specialty bed per algorithm  Goal: Promote/optimize nutrition  8/25/2024 1115 by Miguelina Swanson RN  Flowsheets (Taken 8/25/2024 1115)  Promote/optimize nutrition: Monitor/record intake including meals  8/25/2024 1115 by Miguelina Swanson RN  Outcome: Progressing  Flowsheets (Taken 8/25/2024 1115)  Promote/optimize nutrition: Monitor/record intake including meals  Goal: Promote skin healing  8/25/2024 1115 by Miguelina Swanson RN  Flowsheets (Taken 8/25/2024 1115)  Promote skin healing:   Ensure correct size (line/device) and apply per  instructions   Assess skin/pad under line(s)/device(s)   Protective dressings over bony prominences   Turn/reposition every 2 hours/use positioning/transfer devices  8/25/2024 1115 by Miguelina Swanson RN  Outcome: Progressing  Flowsheets (Taken 8/25/2024 1115)  Promote skin healing:   Ensure correct size (line/device) and apply per  instructions   Assess skin/pad under line(s)/device(s)   Protective dressings over bony prominences   Turn/reposition every 2 hours/use positioning/transfer devices     Problem: Nutrition  Goal: Less than 5 days NPO/clear liquids  Outcome: Progressing  Goal: Oral intake greater than 50%  Outcome: Progressing  Goal: Oral intake  greater 75%  Outcome: Progressing  Goal: Consume prescribed supplement  Outcome: Progressing  Goal: Adequate PO fluid intake  Outcome: Progressing  Goal: Nutrition support goals are met within 48 hrs  Outcome: Progressing  Goal: Nutrition support is meeting 75% of nutrient needs  Outcome: Progressing  Goal: Tube feed tolerance  Outcome: Progressing  Goal: BG  mg/dL  Outcome: Progressing  Goal: Lab values WNL  Outcome: Progressing  Goal: Electrolytes WNL  Outcome: Progressing  Goal: Promote healing  Outcome: Progressing  Goal: Maintain stable weight  Outcome: Progressing  Goal: Reduce weight from edema/fluid  Outcome: Progressing  Goal: Gradual weight gain  Outcome: Progressing  Goal: Improve ostomy output  Outcome: Progressing     Problem: Fall/Injury  Goal: Not fall by end of shift  Outcome: Progressing  Goal: Be free from injury by end of the shift  Outcome: Progressing  Goal: Verbalize understanding of personal risk factors for fall in the hospital  Outcome: Progressing  Goal: Verbalize understanding of risk factor reduction measures to prevent injury from fall in the home  Outcome: Progressing  Goal: Use assistive devices by end of the shift  Outcome: Progressing  Goal: Pace activities to prevent fatigue by end of the shift  Outcome: Progressing

## 2024-08-25 NOTE — PROGRESS NOTES
"Medical Intensive Care - Daily Progress Note   Subjective    Israel Edgar is a 43 y.o. 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 Soft blood pressures yesterday evening, improved following 500ml bolus.    Continues to tolerate nocturnal Bipap.     Meds    Scheduled medications  acetaminophen, 650 mg, g-tube, q6h  aspirin, 81 mg, g-tube, Daily  baclofen, 20 mg, g-tube, 2 times per day  baclofen, 30 mg, g-tube, Daily  carBAMazepine, 150 mg, g-tube, q24h  carBAMazepine, 300 mg, g-tube, q24h  carBAMazepine, 300 mg, g-tube, q24h  cholecalciferol, 4,000 Units, g-tube, Daily  enoxaparin, 1.5 mg/kg, subcutaneous, q24h  levETIRAcetam, 2,000 mg, g-tube, BID  artificial tears, 1 drop, Both Eyes, BID  [Held by provider] metoprolol tartrate, 12.5 mg, oral, BID  neomycin-bacitracnZn-polymyxnB, , Topical, TID  oxygen, , inhalation, Continuous - Inhalation  phenytoin, 100 mg, g-tube, TID  polyethylene glycol, 17 g, g-tube, Daily  selenium sulfide, , Topical, Once per day on Monday Friday  white petrolatum-mineral oiL, , ophthalmic (eye), Daily      Continuous medications     PRN medications       Objective    Blood pressure 123/84, pulse 94, temperature 36 °C (96.8 °F), resp. rate 14, height 1.8 m (5' 10.87\"), weight 64.8 kg (142 lb 13.7 oz), SpO2 94%.     Physical Exam:  Constitutional: chronically ill appearing pt in NAD and alert   Eyes: PERRL, no icterus   ENMT: mucous membranes moist, no apparent injury, no lesions seen  Head/Neck: Neck supple, no apparent injury  Respiratory/Thorax: Lungs diminished bilaterally, non-labored breathing, no cough, on RA  Cardiovascular: Regular, rate and rhythm, no murmurs, normal S1 and S2  Gastrointestinal: Nondistended, soft, non-tender, BS present, Ostomy RUQ with loose brown output, PEG  : Boss catheter in place draining clear yellow urine  Musculoskeletal: B/L UE " contractures   Extremities: normal extremities, no edema, contusions or wounds  Neurological: alert, nonverbal, spastic muscle contractrations, does not follow commands   Skin: Warm and dry, breakdown on bridge of nose 2/2 Bipap mask, healed SHAYNA         Intake/Output Summary (Last 24 hours) at 8/25/2024 0641  Last data filed at 8/25/2024 0622  Gross per 24 hour   Intake 3050 ml   Output 2955 ml   Net 95 ml     Labs:   Results from last 72 hours   Lab Units 08/24/24  0425 08/23/24  0428   SODIUM mmol/L 136 138   POTASSIUM mmol/L 4.4 4.5   CHLORIDE mmol/L 98 98   CO2 mmol/L 31 28   BUN mg/dL 16 18   CREATININE mg/dL 0.34* 0.32*   GLUCOSE mg/dL 92 109*   CALCIUM mg/dL 8.7 8.7   ANION GAP mmol/L 11 17   EGFR mL/min/1.73m*2 >90 >90   PHOSPHORUS mg/dL 5.1* 3.9      Results from last 72 hours   Lab Units 08/24/24  0425 08/23/24  0428   WBC AUTO x10*3/uL 4.3* 6.8   HEMOGLOBIN g/dL 10.5* 11.1*   HEMATOCRIT % 32.3* 33.9*   PLATELETS AUTO x10*3/uL 397 471*        Micro/ID:     Lab Results   Component Value Date    URINECULTURE >100,000 Proteus mirabilis (A) 04/07/2024    BLOODCULT No growth at 4 days -  FINAL REPORT 08/13/2024       Summary of key imaging results from the last 24 hours  XR chest 1 view    Result Date: 8/24/2024  Interpreted By:  Kaushal Aviles, STUDY: XR CHEST 1 VIEW; 8/24/2024 8:03 am   INDICATION: Signs/Symptoms:hypoxia.   COMPARISON: 08/19/2024.   ACCESSION NUMBER(S): [id number]   ORDERING CLINICIAN: TRISHA BLANCHARD   FINDINGS:     CARDIOMEDIASTINAL SILHOUETTE: Cardiomediastinal silhouette is normal in size and configuration.   LUNGS: Slight interval improvement in left retrocardiac atelectasis with residual bibasilar infiltrate/atelectasis. Air-filled loops of nondistended colon.   ABDOMEN: No remarkable upper abdominal findings.   BONES: No acute osseous changes.       1.  Interval improvement in basilar atelectasis/lung aeration. 2. Continued follow-up with PA and lateral x-ray as clinically  indicated.     Signed by: Kaushal Aviles 8/24/2024 3:31 PM Dictation workstation:   [id number]       Assessment and Plan     Assessment: Israel Edgar is a 43 y.o. year old male patient admitted on 8/10/2024 with acute hypoxic respiratory failure likely 2/2 MRD PSeudomonas     Restraints: no    Summary for 08/25/24  :  Remains stable on RA  Continue nocturnal Bipap 16/10 and Airway clearance BID with IPV  Metoprolol held d/t soft Bps.  HR controlled    Plan:  NEUROLOGY/PSYCH:  Dx: Hx Seizures, Advanced MS, and Quadriplegia   Alert, intermittent tracks, does not follow commands, nonverbal.  Mentation at baseline  Management:  vEEG shows right hemispheric epileptogenic lesion and a mild diffuse encephalopathy.    Continue Keppra, Phenytoin, and Tegretol  Keppra and Carbmazepine serum levels WNL today  Continue baclofen  Epilepsy signed off 8/18/24; indicated pts downbeat nystamus is a chronic intermittent finding observed on multiple EEG 2/2 MS      CARDIOVASCULAR:  Dx: Hx HTN, tachycardia likely 2/2 dehydration   HDS  TTE 8/12: The left ventricular systolic function is normal, with a visually estimated ejection fraction of 65-70%.   Management:  Home meds (Metoprolol tartrate 25 mg Q6). Unclear indication for home metoprolol  Hold Metoprolol, dose decreased to 12.5mg BID 8/24 I/s/o hypotension.  IVF boluses as needed, last bolused 500ml 8/24      PULMONARY:  Dx: AHRF 2/2 MDR PSA PNA requiring HFNC vs BIPAP,   Stable on Bipap HS and RA during day  Management:  Airway clearance BID with IPV  Wean O2 as tolerated for goal Pox >92%  CXR 8/19: Interval increase in medial right basilar opacity may represent atelectasis versus aspiration. Stable retrocardiac atelectasis/consolidation.  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 (7/2024), dysphagia, PEG, Hx Colonic dilatation and Wayne syndrome   Management:  Wound care consulted; apprec recs  Ostomy care q shift   Diet: Vital 1.5 330ml boluses 4x daily with 150ml FWF with each bolus   Bowel reg: Miralax daily      ENDOCRINOLOGY:  Dx: No acute issues   Management:  CTM     HEMATOLOGY:  Dx: Hx DVT/PE  H/H stable  Management:  CTA PE negative for PE  Continue therapeutic Lovenox   CBC daily      MSK/SKIN:  Dx: Pressure injury on sacrum, spastic quadriplegia  Management:  Wound care following; appec recs   Mepilex to sacrum  Q2 turns   Continue baclofen (home dose)     INFECTIOUS DISEASE:  Dx: PNA (PSA, Proteus on previous cx), UTI. hx of MRSA colonization, MDRO PSA pneumonia   Tmax 36.9, no leukocytosis  Management:  Completed course of Ceftaz-avibactam (8/10- 8/16/24)  Monitor off atb     ICU Check List       FEN  Fluids: PRN  Electrolytes: PRN  Nutrition: Vital 1.5 bolus feeds  Prophylaxis:  DVT ppx: Lovenox  GI ppx: PPI  Bowel care: Miralax daily  Hardware:  Catheter: Boss (8/11)  Access: PEG  Drains: Ileostomy  Lines: PICC  Social:  Code: Full Code    HPOA: Vee Cruz [phone]   Disposition: Continue care in SDU for aggressive BPH   Discharge Planning: Vee would like for Israel to return to [facility].  [facility] Liaison will be in contact Monday.     James Blunt, PANKAJ-CNP   08/25/24 at 6:41 AM     Pt discussed with Dr. Ferguson, seen and examined. All labs, VS and previous plan of care reviewed.    Disclaimer: Documentation completed with the information available at the time of input. The times in the chart may not be reflective of actual patient care times, interventions, or procedures. Documentation occurs after the physical care of the patient.

## 2024-08-26 LAB
ALBUMIN SERPL BCP-MCNC: 3.2 G/DL (ref 3.4–5)
ANION GAP SERPL CALC-SCNC: 12 MMOL/L (ref 10–20)
BUN SERPL-MCNC: 11 MG/DL (ref 6–23)
CALCIUM SERPL-MCNC: 8.6 MG/DL (ref 8.6–10.6)
CHLORIDE SERPL-SCNC: 101 MMOL/L (ref 98–107)
CO2 SERPL-SCNC: 29 MMOL/L (ref 21–32)
CREAT SERPL-MCNC: 0.3 MG/DL (ref 0.5–1.3)
EGFRCR SERPLBLD CKD-EPI 2021: >90 ML/MIN/1.73M*2
ERYTHROCYTE [DISTWIDTH] IN BLOOD BY AUTOMATED COUNT: 13.4 % (ref 11.5–14.5)
GLUCOSE SERPL-MCNC: 88 MG/DL (ref 74–99)
HCT VFR BLD AUTO: 30.5 % (ref 41–52)
HGB BLD-MCNC: 10 G/DL (ref 13.5–17.5)
MAGNESIUM SERPL-MCNC: 1.92 MG/DL (ref 1.6–2.4)
MCH RBC QN AUTO: 31.1 PG (ref 26–34)
MCHC RBC AUTO-ENTMCNC: 32.8 G/DL (ref 32–36)
MCV RBC AUTO: 95 FL (ref 80–100)
NRBC BLD-RTO: 0 /100 WBCS (ref 0–0)
PHOSPHATE SERPL-MCNC: 4.7 MG/DL (ref 2.5–4.9)
PLATELET # BLD AUTO: 333 X10*3/UL (ref 150–450)
POTASSIUM SERPL-SCNC: 4.4 MMOL/L (ref 3.5–5.3)
RBC # BLD AUTO: 3.22 X10*6/UL (ref 4.5–5.9)
SODIUM SERPL-SCNC: 138 MMOL/L (ref 136–145)
WBC # BLD AUTO: 4 X10*3/UL (ref 4.4–11.3)

## 2024-08-26 PROCEDURE — 2500000004 HC RX 250 GENERAL PHARMACY W/ HCPCS (ALT 636 FOR OP/ED): Performed by: NURSE PRACTITIONER

## 2024-08-26 PROCEDURE — 84100 ASSAY OF PHOSPHORUS: CPT | Performed by: NURSE PRACTITIONER

## 2024-08-26 PROCEDURE — 2500000001 HC RX 250 WO HCPCS SELF ADMINISTERED DRUGS (ALT 637 FOR MEDICARE OP): Performed by: NURSE PRACTITIONER

## 2024-08-26 PROCEDURE — 2500000005 HC RX 250 GENERAL PHARMACY W/O HCPCS: Performed by: NURSE PRACTITIONER

## 2024-08-26 PROCEDURE — 2060000001 HC INTERMEDIATE ICU ROOM DAILY

## 2024-08-26 PROCEDURE — 94660 CPAP INITIATION&MGMT: CPT

## 2024-08-26 PROCEDURE — 83735 ASSAY OF MAGNESIUM: CPT | Performed by: NURSE PRACTITIONER

## 2024-08-26 PROCEDURE — 85027 COMPLETE CBC AUTOMATED: CPT | Performed by: NURSE PRACTITIONER

## 2024-08-26 RX ORDER — CARBAMAZEPINE 100 MG/1
300 TABLET, CHEWABLE ORAL EVERY 24 HOURS
Start: 2024-08-27

## 2024-08-26 RX ORDER — METOPROLOL TARTRATE 25 MG/1
12.5 TABLET, FILM COATED ORAL 2 TIMES DAILY
Start: 2024-08-26

## 2024-08-26 RX ORDER — CARBAMAZEPINE 100 MG/1
150 TABLET, CHEWABLE ORAL EVERY 24 HOURS
Start: 2024-08-26

## 2024-08-26 RX ORDER — CARBAMAZEPINE 100 MG/1
300 TABLET, CHEWABLE ORAL EVERY 24 HOURS
Start: 2024-08-26

## 2024-08-26 ASSESSMENT — COGNITIVE AND FUNCTIONAL STATUS - GENERAL
TOILETING: TOTAL
DRESSING REGULAR LOWER BODY CLOTHING: TOTAL
MOVING TO AND FROM BED TO CHAIR: TOTAL
MOVING FROM LYING ON BACK TO SITTING ON SIDE OF FLAT BED WITH BEDRAILS: TOTAL
TOILETING: TOTAL
MOVING FROM LYING ON BACK TO SITTING ON SIDE OF FLAT BED WITH BEDRAILS: TOTAL
WALKING IN HOSPITAL ROOM: TOTAL
STANDING UP FROM CHAIR USING ARMS: TOTAL
PERSONAL GROOMING: TOTAL
HELP NEEDED FOR BATHING: TOTAL
TURNING FROM BACK TO SIDE WHILE IN FLAT BAD: TOTAL
DAILY ACTIVITIY SCORE: 6
HELP NEEDED FOR BATHING: TOTAL
DAILY ACTIVITIY SCORE: 6
EATING MEALS: TOTAL
DRESSING REGULAR LOWER BODY CLOTHING: TOTAL
MOBILITY SCORE: 6
CLIMB 3 TO 5 STEPS WITH RAILING: TOTAL
PERSONAL GROOMING: TOTAL
MOVING TO AND FROM BED TO CHAIR: TOTAL
DRESSING REGULAR UPPER BODY CLOTHING: TOTAL
EATING MEALS: TOTAL
CLIMB 3 TO 5 STEPS WITH RAILING: TOTAL
MOBILITY SCORE: 6
STANDING UP FROM CHAIR USING ARMS: TOTAL
TURNING FROM BACK TO SIDE WHILE IN FLAT BAD: TOTAL
WALKING IN HOSPITAL ROOM: TOTAL
DRESSING REGULAR UPPER BODY CLOTHING: TOTAL

## 2024-08-26 ASSESSMENT — PAIN SCALES - PAIN ASSESSMENT IN ADVANCED DEMENTIA (PAINAD)
TOTALSCORE: 1
CONSOLABILITY: NO NEED TO CONSOLE
BREATHING: NORMAL
BODYLANGUAGE: TENSE, DISTRESSED PACING, FIDGETING
FACIALEXPRESSION: SMILING OR INEXPRESSIVE
TOTALSCORE: REPOSITIONED

## 2024-08-26 ASSESSMENT — PAIN SCALES - WONG BAKER: WONGBAKER_NUMERICALRESPONSE: NO HURT

## 2024-08-26 NOTE — NURSING NOTE
Patient report called to mario SANTIAGO of [facility] ([phone]). Patient to be picked up by ambulance at 6pm.

## 2024-08-26 NOTE — SIGNIFICANT EVENT
Rapid Response RN Note    Rapid response RN for RADAR score 6 due to the recent VS listed below:     Vitals:    08/25/24 1701 08/25/24 1800 08/25/24 2000 08/25/24 2018   BP: 98/65 85/52 105/78    BP Location:       Patient Position:       Pulse: 83 84 92    Resp: 16 14 25    Temp:    36.7 °C (98.1 °F)   TempSrc:       SpO2: 96% 94% 94%    Weight:       Height:            Reviewed above VS with bedside RN via phone.  VS within patient's current trends.  No interventions by rapid response team indicated at this time.  No concerns from RN at this time.    Pt remains in stepdown. Staff to page rapid response for any concerns or acute change in condition/VS.

## 2024-08-26 NOTE — DISCHARGE SUMMARY
Discharge Diagnosis  Shock (Multi)    Issues Requiring Follow-Up      Test Results Pending At Discharge  Pending Labs       Order Current Status    Blood Culture Collected (08/10/24 1250)            Hospital Course  Israel Edgar is a 43 y.o. male patient with PMH of advanced MS with spasticity at baseline, neurogenic bladder with chronic little, epilepsy with questionable breakthrough seizures, DVT, PE, MRSA colonization, MRDO pneumonia, HTN, quadriplegia, depression, CVA, dysphagia, previous tracheostomy. Admitted from [facility] to the MICU on 08/10/24 for acute hypoxic respiratory failure likely secondary to MRD Pseudomonas versus pulmonary embolism. Of note, patient was recently admitted to [facility] with concerns for SBO, s/p colonic decompression, neostigmine x2, subtotal colectomy with endo ileostomy at OSH. Also previously treated for MDR Pseudomonas and UTI with several antibiotics in succession at [facility] and on discharge to [facility], including meropenem, ceftaroline, flagyl, micafungin, and ceftazidime/avibactam.  He developed a fever, tachycardia and hypoxia.  He had episode of status epilepticus and was gien versed (allergy to lorazepam) and was transferred to [facility] for further care.    Originally requiring HFNC, then transitioned to BiPAP after decompensation at [facility]. He was transferred to [facility] MICU on 8/10 and was initially on bipap but was tachypneic with desaturations so placed on HFNC.  He remained febrile, tachycardic, and saturating in the low 90s; vancomycin was started  (8/10-14) as well as ceftazidime/avibactam (8/10-16) as ID approval. restarted for MRSA and MDR pseudomonas .    Concerns for potential PE despite being on therapeutic Lovenox (per report a right side PE was found at [facility]; On Ct a/p on 7/29 previous seen RLL pulmonary emboli were no longer seen and improvement in RML infiltrate and bilateral lower lobe infiltrates and GGO improved.  On arrival here however,  patient unstable to obtain CT PE initially but CT PE study on 8/14 did not show PE in main pulm artery or its branches. But concern for aspiration pneumonitis vs multifocal infection of lower lobes left > right.   There was concern for seizures after found to have subtherapeutic AED levels, currently on continuous EEG monitoring without epileptiform discharges seen. It did show a right  frontal epileptogenic lesion.  On arrival his dilantin level was low, dilantin and keppra restarted.     Carbamazepine restarted on 8/14.    ID notified that blood culture grew microbacterium which is likely a contaminant .  Vanco was stopped.    He was weaned from airvo to 10 L NC on 8/18 then down to 3-4L  with bipap at night.   On 8/19 he was transferred to SDU for continuation of care.  He is continuing to get aggressive bronchial hygiene.  He was weaned to room air and is stable on.  His BP decreased on 8/25 so 750 ml IVF given with improvement in blood pressure.   Patient remains HDS on RA- 1-2 L NC with nocturnal bipap.  Discharged to [facility] in stable condition.  PICC removed prior to transfer    Pertinent Physical Exam At Time of Discharge  Constitutional: awake, nonverbal, grimaces to pain, withdrawals to pain on LLE, BUE contractures  Eyes: PERRL,  no icterus   ENMT: mucous membranes moist, no apparent injury, no lesions seen  Head/Neck: Neck supple, no apparent injury  Respiratory/Thorax: Lungs CTA bilaterally, non-labored breathing, no cough, on RA  Cardiovascular: Regular, rate and rhythm, no murmurs, normal S1 and S2  Gastrointestinal: Nondistended, soft, non-tender, BS present x 4, PEG, Ileostomy with brown soft/liquid output  : little clear yellow  Musculoskeletal: no joint swelling  Extremities: normal extremities, no edema, contusions or wounds  Neurological: awake, nonverbal, does not follow commands  Skin: Warm and dry       Home Medications     Medication List      START taking these medications     *  carBAMazepine 100 mg chewable tablet; Commonly known as: TEGretol; 1.5   tablets (150 mg) by g-tube route once every 24 hours.; Replaces: TegretoL   100 mg/5 mL suspension   * carBAMazepine 100 mg chewable tablet; Commonly known as: TEGretol; 3   tablets (300 mg) by g-tube route once every 24 hours.   * carBAMazepine 100 mg chewable tablet; Commonly known as: TEGretol; 3   tablets (300 mg) by g-tube route once every 24 hours.; Start taking on:   August 27, 2024   lubricating eye drops ophthalmic solution; Administer 1 drop into both   eyes 2 times a day.   Lubrifresh PM 83-15 % ophthalmic ointment; Generic drug: white   petrolatum-mineral oiL; Apply to affected eye(s) once daily.   metoprolol tartrate 25 mg tablet; Commonly known as: Lopressor; Take 0.5   tablets (12.5 mg) by mouth 2 times a day.   neomycin-bacitracnZn-polymyxnB 3.5-400-5,000 mg-unit-unit ointment in   packet ointment; Commonly known as: Neosporin; Apply topically 3 times a   day.   polyethylene glycol 17 gram packet; Commonly known as: Glycolax,   Miralax; 17 g by g-tube route once daily.  * This list has 3 medication(s) that are the same as other medications   prescribed for you. Read the directions carefully, and ask your doctor or   other care provider to review them with you.     CHANGE how you take these medications     aspirin 81 mg chewable tablet; 1 tablet (81 mg) by g-tube route once   daily.; What changed: additional instructions   selenium sulfide 2.5 % shampoo; Commonly known as: Selsun; Apply   topically 2 times a week.; What changed: how much to take, when to take   this, additional instructions     CONTINUE taking these medications     acetaminophen 160 mg/5 mL (5 mL) suspension; Commonly known as: Tylenol;   Take 20.5 mL (650 mg) by mouth every 6 hours. Pt takes med at :   9a/3p/10p/3a   * baclofen 20 mg tablet; Commonly known as: Lioresal; 1 tablet (20 mg)   by g-tube route 3 times a day. With food at 10am,- 4pm- 10pm   * baclofen  10 mg tablet; Commonly known as: Lioresal; TAKE 1 TABLET   DAILY  With food at 10am   cholecalciferol 10 mcg/mL (400 unit/mL) drops; Commonly known as:   Vitamin D-3; 10 mL (4,000 Units) by g-tube route once daily. AT 9am   hydrocortisone 2.5 % ointment; Apply topically 2 times a day as needed   for rash. Apply to scalp and / or face area for seborrheic dermatitis   (scalp flaking and reddended facial areas) 2 times a day, As Needed   levETIRAcetam 100 mg/mL solution; Commonly known as: Keppra; 20 mL   (2,000 mg) by g-tube route 2 times a day. Pt takes this medication at 0900   and 2100.   MISCELLANEOUS MEDICAL SUPPLY MISC   nasal spray midazolam 5 mg/spray (0.1 mL) spray,non-aerosol; Commonly   known as: Versed; Use one spray in one nostril for convulsive seizure   lasting longer than 5 minutes. May repeat a in the other nostril after 5   minutes if convulsive seizures still ongoing CALL 911   phenytoin 25 mg/mL suspension; Commonly known as: Dilantin; Take 4ml by   PEG tube 3 times a day AT 9AM, 3PM AND 9PM  * This list has 2 medication(s) that are the same as other medications   prescribed for you. Read the directions carefully, and ask your doctor or   other care provider to review them with you.     STOP taking these medications     ALPRAZolam 0.5 mg tablet; Commonly known as: Xanax   artificial tears (dextran-hypomel-glycerin) 0.1-0.3-0.2 % ophthalmic   solution   diazePAM 5 mg/mL injection; Commonly known as: Valium   docusate sodium 50 mg/5 mL oral liquid; Commonly known as: Colace   mineral oil-hydrophilic petrolatum ointment; Commonly known as: Aquaphor   Osmolite 1.5 Oli 0.06 gram-1.5 kcal/mL liquid; Generic drug: nutritional   supplements   senna 8.8 mg/5 mL syrup; Commonly known as: Senokot   simethicone 40 mg/0.6 mL drops; Commonly known as: Mylicon   TegretoL 100 mg/5 mL suspension; Generic drug: carBAMazepine; Replaced   by: carBAMazepine 100 mg chewable tablet       Outpatient Follow-Up  No future  appointments.    Xochitl Glez, APRN-CNP

## 2024-08-26 NOTE — CONSULTS
Wound Care Consult     Visit Date: 8/26/2024      Patient Name: Israel Edgar         MRN: [id number]           YOB: 1980     Reason for Consult: Ostomy care              Pertinent Labs:   Albumin   Date Value Ref Range Status   08/26/2024 3.2 (L) 3.4 - 5.0 g/dL Final       Wound Assessment:  Wound 08/10/24 Pressure Injury Coccyx (Active)   Wound Image      08/20/24 1510   Site Assessment Clean;Dry;Intact 08/26/24 0800   Niya-Wound Assessment Intact 08/26/24 0800   Pressure Injury Stage 3 08/26/24 0800   Wound Length (cm) 3 cm 08/20/24 1510   Wound Width (cm) 1 cm 08/20/24 1510   Wound Surface Area (cm^2) 3 cm^2 08/20/24 1510   Wound Depth (cm) 0 cm 08/20/24 1510   Wound Volume (cm^3) 0 cm^3 08/20/24 1510   State of Healing Closed wound edges 08/26/24 0800   Drainage Description Serosanguineous 08/26/24 0800   Drainage Amount Scant 08/26/24 0800   Dressing Silicone border dressing 08/26/24 0800   Dressing Changed Changed 08/26/24 0800   Dressing Status Clean;Dry 08/26/24 0800       Wound 08/13/24 Other (comment) Abdomen Lower;Right (Active)   Wound Image    08/20/24 0901   Site Assessment Dry;Intact 08/26/24 0800   Niya-Wound Assessment Dry 08/25/24 2100   Wound Length (cm) 1 cm 08/20/24 0901   Wound Width (cm) 2 cm 08/20/24 0901   Wound Surface Area (cm^2) 2 cm^2 08/20/24 0901   Wound Depth (cm) 0.5 cm 08/20/24 0901   Wound Volume (cm^3) 1 cm^3 08/20/24 0901   State of Healing Eschar 08/20/24 0901   Drainage Description None 08/26/24 0800   Drainage Amount None 08/26/24 0800   Dressing Dry dressing 08/26/24 0800   Dressing Changed Reinforced 08/25/24 2100   Dressing Status Clean;Dry 08/26/24 0800       Wound 08/18/24 Pressure Injury Nose (Active)   Wound Image    08/20/24 0914   Site Assessment Sloughing 08/26/24 0800   Niya-Wound Assessment Intact 08/26/24 0101   Pressure Injury Stage 1 08/26/24 0800   Wound Length (cm) 1 cm 08/20/24 0914   Wound Width (cm) 1.5 cm 08/20/24 0914   Wound Surface Area  (cm^2) 1.5 cm^2 08/20/24 0914   Wound Depth (cm) 0 cm 08/20/24 0914   Wound Volume (cm^3) 0 cm^3 08/20/24 0914   State of Healing Early/partial granulation 08/26/24 0800   Drainage Description None 08/25/24 0800   Drainage Amount None 08/26/24 0800   Dressing Open to air 08/26/24 0800   Dressing Status Clean;Dry 08/26/24 0101       Wound 08/20/24 Skin Tear Buttocks Right (Active)   Wound Image     08/20/24 0934   Site Assessment Clean;Dry 08/26/24 0800   Niya-Wound Assessment Intact 08/26/24 0800   Wound Length (cm) 2 cm 08/20/24 0934   Wound Width (cm) 0.3 cm 08/20/24 0934   Wound Surface Area (cm^2) 0.6 cm^2 08/20/24 0934   Wound Depth (cm) 0 cm 08/20/24 0934   Wound Volume (cm^3) 0 cm^3 08/20/24 0934   Drainage Description None 08/26/24 0101   Drainage Amount None 08/26/24 0101   Dressing Foam 08/26/24 0800   Dressing Changed Changed 08/26/24 0800   Dressing Status Clean;Dry 08/26/24 0800      08/26/24 1000   Ileostomy Standard (leora Cardona) RLQ   Earliest Known Present: 08/10/24   Hand Hygiene Completed: Yes  Ileostomy Type: (c) Standard (leora Cardona)  Location: RLQ   Stomal Appliance 2 piece   Site/Stoma Assessment Red   Peristomal Assessment Intact   Treatment Pouch change   Output Description Liquid;Brown       Wound Team Summary Assessment:   Ostomy type: s/p ilestomy    size: 1 1/8 in      color: red      protruding: budded  Ander: none  Functioning: brown loose/liquid stool  Mucocutaneous junction: intact  Peristomal skin: intact, no sting skin prep applied   Pouching: barrier ring, 2 piece Kaila soft convex wafer with high output pouch  Ostomy Education: none at this time     Wound Team Plan: Ostomy Team will follow     Christiane BALES   8/26/2024  7:39 PM

## 2024-08-26 NOTE — CARE PLAN
The patient's goals for the shift include      The clinical goals for the shift include patient will remain safe and free from any injuries during this shift    Problem: Discharge Planning  Goal: Discharge to home or other facility with appropriate resources  Outcome: Met     Problem: Safety - Adult  Goal: Free from fall injury  Outcome: Met     Problem: Skin  Goal: Decreased wound size/increased tissue granulation at next dressing change  Outcome: Met  Flowsheets (Taken 8/25/2024 1115 by Miguelina Swanson RN)  Decreased wound size/increased tissue granulation at next dressing change:   Promote sleep for wound healing   Utilize specialty bed per algorithm   Protective dressings over bony prominences  Goal: Participates in plan/prevention/treatment measures  Outcome: Met  Flowsheets (Taken 8/26/2024 1714)  Participates in plan/prevention/treatment measures: Elevate heels  Goal: Prevent/manage excess moisture  Outcome: Met  Flowsheets (Taken 8/26/2024 1714)  Prevent/manage excess moisture:   Cleanse incontinence/protect with barrier cream   Follow provider orders for dressing changes  Goal: Prevent/minimize sheer/friction injuries  Outcome: Met  Flowsheets (Taken 8/26/2024 1714)  Prevent/minimize sheer/friction injuries:   HOB 30 degrees or less   Turn/reposition every 2 hours/use positioning/transfer devices  Goal: Promote/optimize nutrition  Outcome: Met  Flowsheets (Taken 8/26/2024 1714)  Promote/optimize nutrition: Consume > 50% meals/supplements  Goal: Promote skin healing  Outcome: Met  Flowsheets (Taken 8/26/2024 1714)  Promote skin healing:   Assess skin/pad under line(s)/device(s)   Protective dressings over bony prominences

## 2024-08-26 NOTE — PROGRESS NOTES
"Medical Intensive Care Stepdown Unit - Daily Progress Note   Subjective    Israel Edgar is a 43 y.o. year old male patient admitted on 8/10/2024 with septic shock    Interval History:  No events overnight  Wore Bipap (16/10, rate 20) ~5 hours    Meds    Scheduled medications  acetaminophen, 650 mg, g-tube, q6h  aspirin, 81 mg, g-tube, Daily  baclofen, 20 mg, g-tube, 2 times per day  baclofen, 30 mg, g-tube, Daily  carBAMazepine, 150 mg, g-tube, q24h  carBAMazepine, 300 mg, g-tube, q24h  carBAMazepine, 300 mg, g-tube, q24h  cholecalciferol, 4,000 Units, g-tube, Daily  enoxaparin, 1.5 mg/kg, subcutaneous, q24h  levETIRAcetam, 2,000 mg, g-tube, BID  artificial tears, 1 drop, Both Eyes, BID  [Held by provider] metoprolol tartrate, 12.5 mg, oral, BID  neomycin-bacitracnZn-polymyxnB, , Topical, TID  oxygen, , inhalation, Continuous - Inhalation  phenytoin, 100 mg, g-tube, TID  selenium sulfide, , Topical, Once per day on Monday Friday  white petrolatum-mineral oiL, , ophthalmic (eye), Daily      Continuous medications     PRN medications       Objective    Blood pressure 102/73, pulse 90, temperature 36.4 °C (97.5 °F), resp. rate 14, height 1.8 m (5' 10.87\"), weight 71.3 kg (157 lb 3 oz), SpO2 100%.     Constitutional: awake, nonverbal, grimaces to pain, withdrawals to pain on LLE, BUE contractures  Eyes: PERRL,  no icterus   ENMT: mucous membranes moist, no apparent injury, no lesions seen  Head/Neck: Neck supple, no apparent injury  Respiratory/Thorax: Lungs CTA bilaterally, non-labored breathing, no cough, on RA  Cardiovascular: Regular, rate and rhythm, no murmurs, normal S1 and S2  Gastrointestinal: Nondistended, soft, non-tender, BS present x 4, PEG, Ileostomy with brown soft/liquid output  : little clear yellow  Musculoskeletal: no joint swelling  Extremities: normal extremities, no edema, contusions or wounds  Neurological: awake, nonverbal, does not follow commands  Skin: Warm and dry      Intake/Output Summary " (Last 24 hours) at 8/26/2024 0735  Last data filed at 8/26/2024 0600  Gross per 24 hour   Intake 2541.67 ml   Output 2275 ml   Net 266.67 ml     Labs:   Results from last 72 hours   Lab Units 08/26/24 0358 08/25/24 0422 08/24/24  0425   SODIUM mmol/L 138 138 136   POTASSIUM mmol/L 4.4 3.8 4.4   CHLORIDE mmol/L 101 99 98   CO2 mmol/L 29 31 31   BUN mg/dL 11 11 16   CREATININE mg/dL 0.30* 0.30* 0.34*   GLUCOSE mg/dL 88 133* 92   CALCIUM mg/dL 8.6 8.7 8.7   ANION GAP mmol/L 12 12 11   EGFR mL/min/1.73m*2 >90 >90 >90   PHOSPHORUS mg/dL 4.7 4.6 5.1*      Results from last 72 hours   Lab Units 08/26/24 0358 08/25/24 0422 08/24/24 0425   WBC AUTO x10*3/uL 4.0* 3.9* 4.3*   HEMOGLOBIN g/dL 10.0* 10.5* 10.5*   HEMATOCRIT % 30.5* 31.2* 32.3*   PLATELETS AUTO x10*3/uL 333 388 397   NEUTROS PCT AUTO %  --  49.3  --    LYMPHS PCT AUTO %  --  26.3  --    MONOS PCT AUTO %  --  15.9  --    EOS PCT AUTO %  --  5.9  --         Micro/ID:     Lab Results   Component Value Date    URINECULTURE >100,000 Proteus mirabilis (A) 04/07/2024    BLOODCULT No growth at 4 days -  FINAL REPORT 08/13/2024       Summary of key imaging results from the last 24 hours  N/A    Assessment and Plan     Assessment: Israel Edgar is a 43 y.o. year old male patient with medical history of   admitted on 8/10/2024 with acute hypoxic respiratory failure     Restraints: no    Summary for 08/26/24  :  Accepted to [facility]    Plan:  NEUROLOGY/PSYCH:  Dx:Hx Seizures, Advanced MS (dx 2008) with spasticity at baseline, and Quadriplegia   Alert, does not follow commands, nonverbal.  Mentation at baseline  Management:  vEEG shows right hemispheric epileptogenic lesion and a mild diffuse encephalopathy.    Continue Keppra, Phenytoin, and Tegretol  Continue baclofen  Epilepsy signed off 8/18/24; indicated pts downbeat nystamus is a chronic intermittent finding observed on multiple EEG 2/2 MS     CARDIOVASCULAR:  Dx:Hx HTN   HDS  Management:  Home meds  (Metoprolol tartrate 25 mg Q6)  Continue Metoprolol 25 mg BID (restarted 8/22)  IVF boluses as needed    PULMONARY:  Dx: AHRF 2/2 MDR PSA PNA   Weaned to RA with nocturnal bipap  Management:  Airway clearance BID with IPV  Wean O2 as tolerated for goal Pox >92%    RENAL/GENITOURINARY:  Dx: Hx chronic urinary retention 2/2 neurogenic bladder   Management:  Little replaced on 8/11    GASTROENTEROLOGY:  Dx: Hx of decompressive colonoscopy 7/15, subtotal colectomy with end ileostomy & mucus fisulta (7/2024), dysphagia, PEG, Hx Colonic dilatation and Gerardo syndrome   +output via ileostomy  Management:  Diet: Vital 1.5 330ml boluses 4x daily with 100ml FWF with each bolus   Bowel Regimen: Miralax daily     ENDOCRINOLOGY:  Dx: No acute issues   Management:  A1C: 5.4 (4/2024)     HEMATOLOGY:  Dx: Hx DVT/PE   Management:  CTA PE negative for PE  Continue therapeutic Lovenox   CBC daily     MUSCULOSKELETAL/ SKIN:  Dx: Pressure injury on sacrum, spastic quadriplegia   Management:  Wound care following; appec recs   Mepilex to sacrum  Q2 turns   Continue baclofen (home dose)    INFECTIOUS DISEASE:  Dx: PNA (PSA, Proteus on previous cx), UTI. hx of MRSA colonization, MDRO PSA pneumonia   Management:  Tmax: 36.7 C, WBC 4.0  Completed course of Ceftaz-avibactam (8/10- 8/16/24)  Monitor off atb     ICU/SDU Check List                  FEN  Fluids: PRN  Electrolytes: PRN  Nutrition: Vital 1.5 bolus feeds  Prophylaxis:  DVT ppx: therapeutic lovenox  GI ppx: N/A  Hardware:  Catheter: little  Drains: ileostomy  Lines: PICC  Social:  Code: Full Code    HPOA: Veeada Landry    Disposition: continue SDU for bronchial hygiene  Discharge Planning:discharge to [facility].  Remove PICC prior to discharge.    Vee (Mother) updated on plan of care and plan to transfer to [facility] this evening. She was agreeable with plan and appreciative of the care provided while he was here.    Xochitl Glez, APRN-CNP   08/26/24 at 7:35 AM     Pt  discussed with Dr. Sin seen and examined. All labs, VS and previous plan of care reviewed.  I spent 45 minutes in the professional and overall care of this patient.      Disclaimer: Documentation completed with the information available at the time of input. The times in the chart may not be reflective of actual patient care times, interventions, or procedures. Documentation occurs after the physical care of the patient.

## 2024-08-26 NOTE — PROGRESS NOTES
08/26/24 1500   Discharge Planning   Living Arrangements Parent   Support Systems Parent   Type of Residence Private residence   Home or Post Acute Services Post acute facilities (Rehab/SNF/etc)   Type of Post Acute Facility Services Other (Comment)  ([facility])   Expected Discharge Disposition [facility]  ([facility])   Does the patient need discharge transport arranged? Yes   [employer] coordination needed? Yes   Has discharge transport been arranged? Yes   What day is the transport expected? 08/26/24   What time is the transport expected? 1800     Discharge Transfer to Facility  Pt will discharge today to:  [facility]  Facility name:  [facility]  Facility phone number:  [phone]  Unit secretary aware:  Yes  Bedside nurse (Cara) aware to call report:  Yes  Phone number for report:  [phone]  Ambulance transport has been arranged:  Yes  Ambulance company:  [facility]  Ambulance phone number:  [phone]   time:  6pm  MEAGHAN Awan

## 2024-08-27 VITALS
TEMPERATURE: 96.8 F | SYSTOLIC BLOOD PRESSURE: 99 MMHG | RESPIRATION RATE: 18 BRPM | HEART RATE: 84 BPM | WEIGHT: 151.24 LBS | HEIGHT: 71 IN | BODY MASS INDEX: 21.17 KG/M2 | DIASTOLIC BLOOD PRESSURE: 67 MMHG | OXYGEN SATURATION: 100 %

## 2024-08-27 PROCEDURE — 2500000001 HC RX 250 WO HCPCS SELF ADMINISTERED DRUGS (ALT 637 FOR MEDICARE OP): Performed by: NURSE PRACTITIONER

## 2024-08-27 PROCEDURE — 2500000005 HC RX 250 GENERAL PHARMACY W/O HCPCS: Performed by: NURSE PRACTITIONER

## 2024-08-27 ASSESSMENT — COGNITIVE AND FUNCTIONAL STATUS - GENERAL
DRESSING REGULAR LOWER BODY CLOTHING: TOTAL
MOVING FROM LYING ON BACK TO SITTING ON SIDE OF FLAT BED WITH BEDRAILS: TOTAL
DAILY ACTIVITIY SCORE: 6
EATING MEALS: TOTAL
HELP NEEDED FOR BATHING: TOTAL
TOILETING: TOTAL
TURNING FROM BACK TO SIDE WHILE IN FLAT BAD: TOTAL
DRESSING REGULAR UPPER BODY CLOTHING: TOTAL
STANDING UP FROM CHAIR USING ARMS: TOTAL
PERSONAL GROOMING: TOTAL
CLIMB 3 TO 5 STEPS WITH RAILING: TOTAL
WALKING IN HOSPITAL ROOM: TOTAL
MOVING TO AND FROM BED TO CHAIR: TOTAL
MOBILITY SCORE: 6

## 2024-08-27 ASSESSMENT — PAIN SCALES - GENERAL: PAINLEVEL_OUTOF10: 0 - NO PAIN

## 2024-08-27 ASSESSMENT — PAIN - FUNCTIONAL ASSESSMENT: PAIN_FUNCTIONAL_ASSESSMENT: WONG-BAKER FACES

## 2024-08-27 NOTE — PROGRESS NOTES
08/27/24 0800   Discharge Planning   Living Arrangements Parent   Support Systems Parent   Type of Residence Private residence   Home or Post Acute Services Post acute facilities (Rehab/SNF/etc)   Type of Post Acute Facility Services Other (Comment)  ([facility])   Expected Discharge Disposition [facility]  ([facility])   Does the patient need discharge transport arranged? Yes   [facility] coordination needed? Yes   Has discharge transport been arranged? Yes   What day is the transport expected? 08/27/24   What time is the transport expected? 1230     ASHISH spoke with [facility] re: missed .  [facility] report trip was cancelled by [employer].  ASHISH arranged ambulance transport for 12:30pm with [facility].  SW left message for pt's mother [phone].  SW will notify care team.  MEAGHAN Awan

## 2024-08-27 NOTE — SIGNIFICANT EVENT
Patient to be discharged to [facility] In stable condition today.  Initially patient was set to be discharged yesterday 8/26 however transportation was inadvertently canceled.  Transportation now scheduled for 1230 today. Please see discharge summary from 8/26 for complete hospital course.      Physical Exam:  Constitutional: chronically ill appearing pt in NAD and alert   Eyes: PERRL, no icterus   ENMT: mucous membranes moist, no apparent injury, no lesions seen  Head/Neck: Neck supple, no apparent injury  Respiratory/Thorax: Lungs diminished bilaterally, non-labored breathing, no cough, on RA  Cardiovascular: Regular, rate and rhythm, no murmurs, normal S1 and S2  Gastrointestinal: Nondistended, soft, non-tender, BS present, Ostomy RUQ with loose brown output, PEG  : Little catheter in place draining clear yellow urine  Musculoskeletal: B/L UE contractures   Extremities: normal extremities, no edema, contusions or wounds  Neurological: alert, nonverbal, spastic muscle contractrations, does not follow commands   Skin: Warm and dry, breakdown on bridge of nose 2/2 Bipap mask, healed SHAYNA      Hospital Course:    Israel Edgar is a 43 y.o. male patient with PMH of advanced MS with spasticity at baseline, neurogenic bladder with chronic little, epilepsy with questionable breakthrough seizures, DVT, PE, MRSA colonization, MRDO pneumonia, HTN, quadriplegia, depression, CVA, dysphagia, previous tracheostomy. Admitted from [facility] to the MICU on 08/10/24 for acute hypoxic respiratory failure likely secondary to MRD Pseudomonas versus pulmonary embolism. Of note, patient was recently admitted to [facility] with concerns for SBO, s/p colonic decompression, neostigmine x2, subtotal colectomy with endo ileostomy at OSH. Also previously treated for MDR Pseudomonas and UTI with several antibiotics in succession at [facility] and on discharge to [facility], including meropenem, ceftaroline, flagyl, micafungin, and  ceftazidime/avibactam.  He developed a fever, tachycardia and hypoxia.  He had episode of status epilepticus and was gien versed (allergy to lorazepam) and was transferred to [facility] for further care.     Originally requiring HFNC, then transitioned to BiPAP after decompensation at LT[facility]. He was transferred to [facility] MICU on 8/10 and was initially on bipap but was tachypneic with desaturations so placed on HFNC.  He remained febrile, tachycardic, and saturating in the low 90s; vancomycin was started  (8/10-14) as well as ceftazidime/avibactam (8/10-16) as ID approval. restarted for MRSA and MDR pseudomonas .    Concerns for potential PE despite being on therapeutic Lovenox (per report a right side PE was found at [facility]; On Ct a/p on 7/29 previous seen RLL pulmonary emboli were no longer seen and improvement in RML infiltrate and bilateral lower lobe infiltrates and GGO improved.  On arrival here however, patient unstable to obtain CT PE initially but CT PE study on 8/14 did not show PE in main pulm artery or its branches. But concern for aspiration pneumonitis vs multifocal infection of lower lobes left > right.   There was concern for seizures after found to have subtherapeutic AED levels, currently on continuous EEG monitoring without epileptiform discharges seen. It did show a right  frontal epileptogenic lesion.  On arrival his dilantin level was low, dilantin and keppra restarted.     Carbamazepine restarted on 8/14.    ID notified that blood culture grew microbacterium which is likely a contaminant .  Vanco was stopped.     He was weaned from airvo to 10 L NC on 8/18 then down to 3-4L  with bipap at night.   On 8/19 he was transferred to SDU for continuation of care.  He is continuing to get aggressive bronchial hygiene.  He was weaned to room air and is stable on.  His BP decreased on 8/25 so 750 ml IVF given with improvement in blood pressure.   Patient remains HDS on RA with nocturnal bipap.  Discharged to  [facility] in stable condition.  PICC removed prior to transfer.  Boss catheter exchanged 8/27 prior to transfer

## 2024-08-27 NOTE — SIGNIFICANT EVENT
Cancellation of Transport    Pt was scheduled for ambulance transport to [facility] at 6pm but transport did not arrive.  When RN called transport company to inquire about ETA company said had been cancelled.  Company is unclear is pt could be transported tonight if reordered.  I spoke with [facility] and they have limited staff overnight and discussed with patient's mother.  Will hold pt overnight and have SW set up again in am.  I spoke with Jayashree at [facility] who will get CNO know that pt will be arriving tomorrow instead of tonight, cancelled discharge.

## 2024-09-24 ENCOUNTER — PATIENT OUTREACH (OUTPATIENT)
Dept: PRIMARY CARE | Facility: CLINIC | Age: 44
End: 2024-09-24
Payer: MEDICAID

## 2024-09-24 DIAGNOSIS — L89.102 PRESSURE INJURY OF BACK, STAGE 2 (MULTI): ICD-10-CM

## 2024-09-24 DIAGNOSIS — Z09 HOSPITAL DISCHARGE FOLLOW-UP: ICD-10-CM

## 2024-09-24 DIAGNOSIS — R57.9 SHOCK (MULTI): ICD-10-CM

## 2024-09-24 NOTE — PROGRESS NOTES
TCM completed 9/24/24  Discharge Facility: [facility] ([facility])  Discharge Diagnosis: Shock, acute on chronic respiratory failure    Admission Date: 8/27/24  Discharge Date: 9/20/24  PCP Appointment Date: 10/1/24  Specialist Appointment Date: Colorectal Surgeon- 10/28/24  Hospital Encounter and Summary Linked: Yes  --See discharge assessment below for further details--    Engagement  Call Start Time: 1551 (9/24/2024  4:13 PM)    Medications  Medications reviewed with patient/caregiver?: Yes (9/24/2024  4:13 PM)  Is the patient having any side effects they believe may be caused by any medication additions or changes?: No (9/24/2024  4:13 PM)  Does the patient have all medications ordered at discharge?: Yes (9/24/2024  4:13 PM)  Care Management Interventions: No intervention needed; Provided patient education (9/24/2024  4:13 PM)  Prescription Comments: START taking:  CarBAMazepine (TEGretol)   This replaces a similar medication. See the full medication  list for instructions.  Lubricating eye drops  Lubrifresh PM (white petrolatum-mineral oiL),   Metoprolol tartrate (Lopressor),   Neomycin-bacitracnZn-polymyxnB (Neosporin),   Polyethylene glycol (Glycolax, Miralax) .              CHANGE how you take:  Aspirin  selenium sulfide (Selsun) .             STOP taking:  ALPRAZolam 0.5 mg tablet (Xanax),   Artificial tears (dextran-hypomel-glycerin) 0.1-0.3-0.2  % ophthalmic solution,   DiazePAM 5 mg/mL injection (Valium),   Docusate sodium 50 mg/5 mL oral liquid (Colace),   Mineral oil-hydrophilic petrolatum ointment ([place]ho r),   Osmolite 1.5 Oli 0.06 gram-1.5 kcal/mL  liquid (nutritional supplements),   Senna 8.8 mg/5 mL syrup (Senokot,)   sSmethicone 40 mg/0.6 mL drops (Mylicon) and  TegretoL 100 mg/5 mL suspension (carBAMazepine) . (9/24/2024  4:13 PM)  Is the patient taking all medications as directed (includes completed medication regime)?: Yes (9/24/2024  4:13 PM)  Care Management Interventions:  Provided patient education (9/24/2024  4:13 PM)  Medication Comments: See medication list (9/24/2024  4:13 PM)    Appointments  Does the patient have a primary care provider?: Yes (9/24/2024  4:13 PM)  Care Management Interventions: Verified appointment date/time/provider (9/24/2024  4:13 PM)  Has the patient kept scheduled appointments due by today?: Not applicable (9/24/2024  4:13 PM)  Care Management Interventions: Advised patient to keep appointment; Educated on importance of keeping appointment (9/24/2024  4:13 PM)    Self Management  What is the home health agency?: n/a declined (9/24/2024  4:13 PM)  Has home health visited the patient within 72 hours of discharge?: Not applicable (9/24/2024  4:13 PM)  What Durable Medical Equipment (DME) was ordered?: n/a (9/24/2024  4:13 PM)    Patient Teaching  Does the patient have access to their discharge instructions?: Yes (9/24/2024  4:13 PM)  Care Management Interventions: Reviewed instructions with patient; Educated on MyChart (9/24/2024  4:13 PM)  What is the patient's perception of their health status since discharge?: Same (9/24/2024  4:13 PM)  Is the patient/caregiver able to teach back the hierarchy of who to call/visit for symptoms/problems? PCP, Specialist, Home Health nurse, Urgent Care, ED, 911: Yes (9/24/2024  4:13 PM)  Patient/Caregiver Education Comments: TCM initial outreach post discharge completed successfully. Spoke to the patients mother/guardian Vee. Vee reports the patient is home and stable at this time. Vee denied any acute changes or concerns at time of outreach. Vee is requesting an order for Ileostomy supplies sent to [email or web address]. I encouraged her to reach out to the patients Gastroenterologist, but Vee declined stating she was told by someone in that office, a follow up was not needed. Patient is scheduled to see his PCP 10/1/24. Eve declined to wait to discuss with PCP at that time. Request sent to PCP per Vee's request.  "Per hospital summary notes: \" APS referral has been made to ensure patient is safe at home.\"  Vee confirmed she has the patients discharge summary and all medications needed in the home. Vee denied the need for DME, HHC or assistance obtaining transportation on behalf of the patient. Vee declined needing any assistance in the home. Educated on the importance of following up with all physicians as recommended. Vee verbalized her understanding. TCM phone number was provided to the patient/caregiver with the patient/caregiver encouraged to call with any needs or questions that may arise to help prevent another hospitalization. Vee verbalized her understanding and stated she had no further questions or concerns at this time, but would call back if needed. (9/24/2024  4:13 PM)    Wrap Up  Wrap Up Additional Comments: Taken from D/C summary- Hospital Course:43 y.o. male patient with PMH of advanced MS with spasticity at baseline, neurogenic bladder with chronic little, epilepsy with questionable breakthrough seizures, DVT, PE, MRSA colonization, MRDO pneumonia, HTN, quadriplegia, depression, CVA, dysphagia, previous tracheostomy. Admitted from [facility] to the MICU on 08/10/24 for acute hypoxic respiratory failure likely secondary to MRD Pseudomonas versus pulmonary embolism. Of note, patient was recently admitted to [facility] with concerns for SBO, s/p colonic decompression, neostigmine x2, subtotal colectomy with endo ileostomy at OSH. Also previously treated for MDR Pseudomonas and UTI with several antibiotics in succession at [facility] and on discharge to LT[facility], including meropenem, ceftaroline, flagyl, micafungin, and ceftazidime/avibactam.  He developed a fever, tachycardia and hypoxia.  He had episode of status epilepticus and was gien versed (allergy to lorazepam) and was transferred to [facility] for further care. (9/24/2024  4:13 PM)  Call End Time: 1555 (9/24/2024  4:13 " PM)      Referral made to [facility] 9/25/24

## 2024-09-26 ENCOUNTER — TELEPHONE (OUTPATIENT)
Dept: PRIMARY CARE | Facility: CLINIC | Age: 44
End: 2024-09-26

## 2024-10-01 ENCOUNTER — APPOINTMENT (OUTPATIENT)
Dept: PRIMARY CARE | Facility: CLINIC | Age: 44
End: 2024-10-01
Payer: MEDICAID

## 2024-10-01 VITALS — DIASTOLIC BLOOD PRESSURE: 66 MMHG | SYSTOLIC BLOOD PRESSURE: 97 MMHG

## 2024-10-01 DIAGNOSIS — G40.909 NONINTRACTABLE EPILEPSY WITHOUT STATUS EPILEPTICUS, UNSPECIFIED EPILEPSY TYPE (MULTI): ICD-10-CM

## 2024-10-01 DIAGNOSIS — Z93.2 ILEOSTOMY IN PLACE (MULTI): ICD-10-CM

## 2024-10-01 DIAGNOSIS — I27.82 OTHER CHRONIC PULMONARY EMBOLISM, UNSPECIFIED WHETHER ACUTE COR PULMONALE PRESENT (MULTI): Primary | ICD-10-CM

## 2024-10-01 DIAGNOSIS — Z43.2 ILEOSTOMY CARE: ICD-10-CM

## 2024-10-01 PROCEDURE — 99495 TRANSJ CARE MGMT MOD F2F 14D: CPT | Performed by: STUDENT IN AN ORGANIZED HEALTH CARE EDUCATION/TRAINING PROGRAM

## 2024-10-01 RX ORDER — PHENYTOIN 125 MG/5ML
SUSPENSION ORAL
Qty: 360 ML | Refills: 11 | Status: SHIPPED | OUTPATIENT
Start: 2024-10-01 | End: 2024-10-03

## 2024-10-01 RX ORDER — BACLOFEN 20 MG/1
20 TABLET ORAL 3 TIMES DAILY
Qty: 270 TABLET | Refills: 3 | Status: SHIPPED | OUTPATIENT
Start: 2024-10-01 | End: 2025-09-26

## 2024-10-01 RX ORDER — ENOXAPARIN SODIUM 100 MG/ML
100 INJECTION SUBCUTANEOUS DAILY
Qty: 30 EACH | Refills: 1 | Status: SHIPPED | OUTPATIENT
Start: 2024-10-01 | End: 2024-11-30

## 2024-10-01 RX ORDER — CHOLESTYRAMINE 4 G/9G
1 POWDER, FOR SUSPENSION ORAL 2 TIMES DAILY
Qty: 60 PACKET | Refills: 11 | Status: SHIPPED | OUTPATIENT
Start: 2024-10-01 | End: 2025-10-01

## 2024-10-01 RX ORDER — ISOPROPYL ALCOHOL 70 ML/100ML
SWAB TOPICAL
Qty: 400 EACH | Refills: 11 | Status: SHIPPED | OUTPATIENT
Start: 2024-10-01

## 2024-10-01 RX ORDER — BACLOFEN 10 MG/1
TABLET ORAL
Qty: 90 TABLET | Refills: 11 | Status: SHIPPED | OUTPATIENT
Start: 2024-10-01

## 2024-10-01 RX ORDER — ALBUTEROL SULFATE 0.83 MG/ML
2.5 SOLUTION RESPIRATORY (INHALATION)
COMMUNITY
End: 2024-10-08 | Stop reason: DRUGHIGH

## 2024-10-01 ASSESSMENT — ENCOUNTER SYMPTOMS
DEPRESSION: 0
LOSS OF SENSATION IN FEET: 0
OCCASIONAL FEELINGS OF UNSTEADINESS: 0

## 2024-10-01 NOTE — PROGRESS NOTES
Subjective   Patient ID: Israel Edgar is a 44 y.o. male who presents for Follow-up.  MILLIE Leblanc is brought in by mother Ms. Vee Edgar for a follow-up.  Hospital discharge summary noted.  Patient's mom reports patient now on ileostomy.    Past Medical History:   Diagnosis Date    Abnormal findings on diagnostic imaging of other specified body structures 10/11/2017    Nonspecific abnormal findings on radiological and examination of intrathoracic organs    Acute embolism and thrombosis of deep veins of unspecified upper extremity (Multi)     Acute deep vein thrombosis of arm    Acute upper respiratory infection, unspecified 12/05/2014    Acute upper respiratory infection    Impacted cerumen, bilateral 09/01/2017    Impacted cerumen of both ears    Nausea with vomiting, unspecified 09/09/2016    Nausea and vomiting in adult    Other conditions influencing health status     Acute Hypoxic Encephalopathy    Other conditions influencing health status     Schizophrenia    Other conditions influencing health status     Lumbar Puncture Traumatic Tap    Other muscle spasm     Muscle spasm    Paraplegia, unspecified     Paraparesis of both lower limbs    Personal history of other diseases of urinary system 03/21/2017    History of bladder stone    Personal history of other specified conditions 01/13/2017    History of urinary incontinence    Personal history of other specified conditions     History of headache    Personal history of urinary (tract) infections 01/31/2017    History of urinary tract infection    Personal history of urinary calculi 09/08/2015    History of renal calculi    Personal history of urinary calculi 03/07/2016    History of renal calculi    Unspecified visual loss     Vision problems      Past Surgical History:   Procedure Laterality Date    ILEOSTOMY  08/05/2015    Ileostomy    OTHER SURGICAL HISTORY  08/05/2015    Tracheostomy    OTHER SURGICAL HISTORY  09/16/2016    Feeding Tube      No family  history on file.   Allergies   Allergen Reactions    Keflex [Cephalexin] Seizure     Diarrhea; seizure activity, rash, and redness    Levaquin [Levofloxacin] Seizure    Lorazepam Seizure     Increased agitation and complex seizures (tonic clonic)    Piperacillin-Tazobactam Other     erythema multiforme    Adhesive Tape-Silicones Other and Unknown     Adhesive Bandages MISC    Per NH record    Albuterol Unknown    Lacosamide Other     Mother states lethargy, and non responsiveness to stimuli; will not allow this medication    Latex Other     Increased urinary infections    Pantoprazole Diarrhea     Diarrhea, hypokalemia, hypomagnesium    Suppository Adult [Glycerin (Adult)] Other     Mom doesnot want; says previous GI said no suppository     Budesonide Rash     pulmicort nebulizer; Mother states facial rash and tachycardia    Famotidine Diarrhea     Diarrhea    Ipratropium Bromide Rash     Mother states facial rash and tachycardia    Nitrofurantoin Rash     Facial rash and reddness    Ranitidine Rash     Facial rash and redness    Sulfa (Sulfonamide Antibiotics) Hives and Rash    Sulfamethoxazole-Trimethoprim Hives and Rash          Occupation:     Review of Systems   Unable to perform ROS: Patient nonverbal       Objective   Visit Vitals  BP 97/66   Smoking Status Never      Physical Exam  HENT:      Nose: Nose normal.      Mouth/Throat:      Mouth: Mucous membranes are moist.   Eyes:      Conjunctiva/sclera: Conjunctivae normal.      Pupils: Pupils are equal, round, and reactive to light.   Cardiovascular:      Rate and Rhythm: Normal rate and regular rhythm.      Pulses: Normal pulses.      Heart sounds: Normal heart sounds.   Pulmonary:      Effort: Pulmonary effort is normal.      Breath sounds: Normal breath sounds.   Musculoskeletal:      Cervical back: Neck supple.   Skin:     General: Skin is warm.   Neurological:      General: No focal deficit present.         Assessment/Plan   Diagnoses and all orders for  this visit:  Other chronic pulmonary embolism, unspecified whether acute cor pulmonale present (Multi)  -     enoxaparin (Lovenox) 100 mg/mL syringe; Inject 1 mL (100 mg) under the skin once daily.  -     alcohol swabs (Alcohol Pads) pads, medicated; Use to give lovenox daily  -     nebulizer and compressor device; Uses as instructed  -     nebulizer accessories misc; Use as instructed  -     ostomy supplies misc; Pouch: [place]: New image two 1/4 beige lock and roll, drainable pouch #[id number] and new image to 1/4 inches high output, Ultracare pouch # 1/8/2001 330-day use-2 boxes; wafer: [place]: New image Cera plus 2 1/4 inches convex, with tape #[id number], new image Cera plus to 1/4 inch flat with tape #26068 30-day use-2 boxes, adhesive remover's: Kaila adapt wipes #7760 30-day use-2 boxes, drainage bag/systems: Covidien/Tres urine drainage bag # [id number] 30-day use-2 units, moldable ring: [place] Cera ring slim #8815 30 days use-2 boxes  Nonintractable epilepsy without status epilepticus, unspecified epilepsy type (Multi)  -     baclofen (Lioresal) 10 mg tablet; TAKE 1 TABLET DAILY  With food at 10am  -     baclofen (Lioresal) 20 mg tablet; 1 tablet (20 mg) by g-tube route 3 times a day. With food at 10am,- 4pm- 10pm  -     nebulizer and compressor device; Uses as instructed  -     nebulizer accessories misc; Use as instructed  -     ostomy supplies misc; Pouch: [place]: New image two 1/4 beige lock and roll, drainable pouch #[id number] and new image to 1/4 inches high output, Ultracare pouch # 1/8/2001 330-day use-2 boxes; wafer: [place]: New image Cera plus 2 1/4 inches convex, with tape #80186, new image Cera plus to 1/4 inch flat with tape #15544 30-day use-2 boxes, adhesive remover's: [place] adapt wipes #7760 30-day use-2 boxes, drainage bag/systems: Covidien/[place] urine drainage bag # [id number] 30-day use-2 units, moldable ring: [place] Cera ring slim #5815 30 days use-2 boxes  Ileostomy care  -      cholestyramine (Questran) 4 gram packet; Take 1 packet (4 g) by mouth 2 times a day.  -     nutritional supplements (Osmolite 1.5 Oli) 0.06 gram-1.5 kcal/mL liquid; Use 3 times daily  Ileostomy in place (Multi)  -     nebulizer and compressor device; Uses as instructed  -     nebulizer accessories misc; Use as instructed  -     ostomy supplies misc; Pouch: Kaila: New image two 1/4 beige lock and roll, drainable pouch #[id number] and new image to 1/4 inches high output, Ultracare pouch # 1/8/2001 330-day use-2 boxes; wafer: Kaila: New image Cera plus 2 1/4 inches convex, with tape #18687, new image Cera plus to 1/4 inch flat with tape #18839 30-day use-2 boxes, adhesive remover's: [place] adapt wipes #7560 30-day use-2 boxes, drainage bag/systems: Covidien/Tres urine drainage bag # [id number] 30-day use-2 units, moldable ring: [place] Cera ring slim #8815 30 days use-2 boxes     I patient was placed on both Lovenox and aspirin for possible?  PE.  However most recent CAT scan shows no evidence of PE.  Mom does report bleeding episodes while she therefore stopped aspirin.  Advised to continue Lovenox at least for 3 months and then restart aspirin.  [Eliquis-interactions with other medication patients take]

## 2024-10-03 RX ORDER — NEBULIZER AND COMPRESSOR
EACH MISCELLANEOUS
Qty: 1 EACH | Refills: 2 | Status: SHIPPED | OUTPATIENT
Start: 2024-10-03

## 2024-10-03 RX ORDER — ELECTROLYTES/DEXTROSE
SOLUTION, ORAL ORAL
Qty: 237 ML | Refills: 11 | Status: SHIPPED | OUTPATIENT
Start: 2024-10-03 | End: 2024-10-04 | Stop reason: SDUPTHER

## 2024-10-03 RX ORDER — PHENYTOIN 125 MG/5ML
SUSPENSION ORAL
Qty: 1080 ML | Refills: 2 | Status: SHIPPED | OUTPATIENT
Start: 2024-10-03

## 2024-10-03 RX ORDER — GAUZE BANDAGE 2" X 2"
BANDAGE TOPICAL
Qty: 3000 EACH | Refills: 0 | Status: SHIPPED | OUTPATIENT
Start: 2024-10-03 | End: 2024-10-04 | Stop reason: SDUPTHER

## 2024-10-03 RX ORDER — GAUZE BANDAGE 4" X 4"
BANDAGE TOPICAL
Qty: 1000 EACH | Refills: 0 | Status: SHIPPED | OUTPATIENT
Start: 2024-10-03 | End: 2024-10-04 | Stop reason: SDUPTHER

## 2024-10-04 ENCOUNTER — TELEPHONE (OUTPATIENT)
Dept: PRIMARY CARE | Facility: CLINIC | Age: 44
End: 2024-10-04

## 2024-10-04 DIAGNOSIS — Z93.2 ILEOSTOMY IN PLACE (MULTI): ICD-10-CM

## 2024-10-04 DIAGNOSIS — Z43.2 ILEOSTOMY CARE: ICD-10-CM

## 2024-10-04 DIAGNOSIS — G35 MULTIPLE SCLEROSIS (MULTI): ICD-10-CM

## 2024-10-04 DIAGNOSIS — G40.909 NONINTRACTABLE EPILEPSY WITHOUT STATUS EPILEPTICUS, UNSPECIFIED EPILEPSY TYPE (MULTI): Primary | ICD-10-CM

## 2024-10-04 RX ORDER — ELECTROLYTES/DEXTROSE
SOLUTION, ORAL ORAL
Qty: 237 ML | Refills: 11 | Status: CANCELLED | OUTPATIENT
Start: 2024-10-04

## 2024-10-04 RX ORDER — GAUZE BANDAGE 4" X 4"
BANDAGE TOPICAL
Qty: 1000 EACH | Refills: 0 | Status: SHIPPED | OUTPATIENT
Start: 2024-10-04 | End: 2024-10-07 | Stop reason: SDUPTHER

## 2024-10-04 RX ORDER — GAUZE BANDAGE 2" X 2"
BANDAGE TOPICAL
Qty: 3000 EACH | Refills: 0 | Status: SHIPPED | OUTPATIENT
Start: 2024-10-04 | End: 2024-10-07 | Stop reason: SDUPTHER

## 2024-10-04 RX ORDER — ELECTROLYTES/DEXTROSE
237 SOLUTION, ORAL ORAL 3 TIMES DAILY
Qty: 237 ML | Refills: 11 | Status: SHIPPED | OUTPATIENT
Start: 2024-10-04 | End: 2024-10-07 | Stop reason: SDUPTHER

## 2024-10-04 RX ORDER — ELECTROLYTES/DEXTROSE
SOLUTION, ORAL ORAL
Qty: 237 ML | Refills: 11 | Status: SHIPPED | OUTPATIENT
Start: 2024-10-04

## 2024-10-07 RX ORDER — GAUZE BANDAGE 2" X 2"
BANDAGE TOPICAL
Qty: 3000 EACH | Refills: 0 | Status: SHIPPED | OUTPATIENT
Start: 2024-10-07

## 2024-10-07 RX ORDER — SYRINGE, DISPOSABLE, 60 ML
SYRINGE, EMPTY DISPOSABLE MISCELLANEOUS
Qty: 5 EACH | Refills: 11 | Status: SHIPPED | OUTPATIENT
Start: 2024-10-07 | End: 2024-10-11 | Stop reason: SDUPTHER

## 2024-10-07 RX ORDER — GAUZE BANDAGE 4" X 4"
BANDAGE TOPICAL
Qty: 1000 EACH | Refills: 0 | Status: SHIPPED | OUTPATIENT
Start: 2024-10-07 | End: 2024-10-11 | Stop reason: SDUPTHER

## 2024-10-07 RX ORDER — SYRINGE, DISPOSABLE, 60 ML
SYRINGE, EMPTY DISPOSABLE MISCELLANEOUS
Qty: 5 EACH | Refills: 11 | Status: SHIPPED | OUTPATIENT
Start: 2024-10-07 | End: 2024-10-07

## 2024-10-07 RX ORDER — ELECTROLYTES/DEXTROSE
237 SOLUTION, ORAL ORAL 3 TIMES DAILY
Qty: 237 ML | Refills: 11 | Status: SHIPPED | OUTPATIENT
Start: 2024-10-07

## 2024-10-08 RX ORDER — CARBOXYMETHYLCELLULOSE SODIUM 5 MG/ML
2 SOLUTION/ DROPS OPHTHALMIC 2 TIMES DAILY
COMMUNITY

## 2024-10-08 RX ORDER — ALBUTEROL SULFATE 0.83 MG/ML
2.5 SOLUTION RESPIRATORY (INHALATION) EVERY 6 HOURS PRN
Qty: 75 ML | Refills: 2 | Status: SHIPPED | OUTPATIENT
Start: 2024-10-08

## 2024-10-09 ENCOUNTER — PATIENT OUTREACH (OUTPATIENT)
Dept: PRIMARY CARE | Facility: CLINIC | Age: 44
End: 2024-10-09
Payer: MEDICAID

## 2024-10-09 DIAGNOSIS — Z09 HOSPITAL DISCHARGE FOLLOW-UP: ICD-10-CM

## 2024-10-09 NOTE — PROGRESS NOTES
Unable to reach patient for call back 14 days after recent hospitalization.   If no voicemail available call attempts x 2 were made to contact the patient to assist with any questions or concerns patient may have. TCM to reassess patient needs again in 2 weeks.

## 2024-10-11 RX ORDER — SYRINGE, DISPOSABLE, 60 ML
SYRINGE, EMPTY DISPOSABLE MISCELLANEOUS
Qty: 5 EACH | Refills: 11 | Status: SHIPPED | OUTPATIENT
Start: 2024-10-11

## 2024-10-11 RX ORDER — GAUZE BANDAGE 4" X 4"
BANDAGE TOPICAL
Qty: 1000 EACH | Refills: 0 | Status: SHIPPED | OUTPATIENT
Start: 2024-10-11

## 2024-10-17 ENCOUNTER — TELEPHONE (OUTPATIENT)
Dept: PULMONOLOGY | Facility: HOSPITAL | Age: 44
End: 2024-10-17
Payer: MEDICAID

## 2024-10-17 NOTE — TELEPHONE ENCOUNTER
Spoke with pt's mother regarding a FUV with Dr. Crawford for both the pt and herself. She stated that she last saw Dr. Crawford in July 2024 and the pt last saw Dr. Crawford in February 2019. Pt's mother is requesting appointment for both herself and her son to be on the same day as they use [employer]rans[employer] for transportation. She was informed that Dr. Crawford's first available to accommodate both herself and the pt was in January 2025. Pt's mother stated that she needs a sooner visit since the pt was recently discharged from the hospital. She stated that the visit is needed for the pt to keep his bipap machine. She was offered same day appointments in [facility] next week, but pt's mother was adamant about seeing Dr. Crawford.  She and the pt were scheduled for 1/21/24 with Dr. Crawford. Placed both pt's mother and pt on jump list and notified Dr. Crawford for possibility of sooner visit.

## 2024-10-21 ENCOUNTER — TELEPHONE (OUTPATIENT)
Dept: PRIMARY CARE | Facility: CLINIC | Age: 44
End: 2024-10-21

## 2024-10-23 DIAGNOSIS — T83.511A URINARY TRACT INFECTION ASSOCIATED WITH INDWELLING URETHRAL CATHETER, INITIAL ENCOUNTER (CMS-HCC): ICD-10-CM

## 2024-10-23 DIAGNOSIS — L21.9 SEBORRHEIC DERMATITIS: ICD-10-CM

## 2024-10-23 DIAGNOSIS — R13.10 DYSPHAGIA, UNSPECIFIED TYPE: ICD-10-CM

## 2024-10-23 DIAGNOSIS — R60.0 LOCALIZED EDEMA: ICD-10-CM

## 2024-10-23 DIAGNOSIS — G35 MULTIPLE SCLEROSIS (MULTI): ICD-10-CM

## 2024-10-23 DIAGNOSIS — J96.01 ACUTE HYPOXIC RESPIRATORY FAILURE (MULTI): ICD-10-CM

## 2024-10-23 DIAGNOSIS — L89.102 PRESSURE INJURY OF BACK, STAGE 2 (MULTI): ICD-10-CM

## 2024-10-23 DIAGNOSIS — G82.50 QUADRIPLEGIA, UNSPECIFIED (MULTI): ICD-10-CM

## 2024-10-23 DIAGNOSIS — Z97.8 CHRONIC INDWELLING FOLEY CATHETER: ICD-10-CM

## 2024-10-23 DIAGNOSIS — N31.9 NEUROGENIC BLADDER: ICD-10-CM

## 2024-10-23 DIAGNOSIS — G40.909 NONINTRACTABLE EPILEPSY WITHOUT STATUS EPILEPTICUS, UNSPECIFIED EPILEPSY TYPE (MULTI): ICD-10-CM

## 2024-10-23 DIAGNOSIS — G40.919 REFRACTORY EPILEPSY (MULTI): ICD-10-CM

## 2024-10-23 DIAGNOSIS — R57.9 SHOCK (MULTI): ICD-10-CM

## 2024-10-23 DIAGNOSIS — N39.0 URINARY TRACT INFECTION ASSOCIATED WITH INDWELLING URETHRAL CATHETER, INITIAL ENCOUNTER (CMS-HCC): ICD-10-CM

## 2024-10-23 DIAGNOSIS — Z93.1 GASTROSTOMY STATUS (MULTI): ICD-10-CM

## 2024-10-23 DIAGNOSIS — R15.9 INCONTINENCE OF FECES, UNSPECIFIED FECAL INCONTINENCE TYPE: ICD-10-CM

## 2024-10-24 ENCOUNTER — PATIENT OUTREACH (OUTPATIENT)
Dept: PRIMARY CARE | Facility: CLINIC | Age: 44
End: 2024-10-24
Payer: MEDICAID

## 2024-10-24 DIAGNOSIS — Z09 HOSPITAL DISCHARGE FOLLOW-UP: ICD-10-CM

## 2024-10-24 NOTE — PROGRESS NOTES
Unable to reach patient for 30 day post discharge follow up call.    If no voicemail available call attempts x 2 were made to contact the patient to assist with any questions or concerns patient may have. Patient referred to CCM program- CCM nurse notified via staff message. TCM program closed.

## 2024-10-29 RX ORDER — NAPROXEN SODIUM 220 MG/1
TABLET, FILM COATED ORAL
Qty: 30 TABLET | Refills: 2 | Status: SHIPPED | OUTPATIENT
Start: 2024-10-29

## 2024-11-04 DIAGNOSIS — N39.0 URINARY TRACT INFECTION ASSOCIATED WITH INDWELLING URETHRAL CATHETER, INITIAL ENCOUNTER (CMS-HCC): ICD-10-CM

## 2024-11-04 DIAGNOSIS — T83.511A URINARY TRACT INFECTION ASSOCIATED WITH INDWELLING URETHRAL CATHETER, INITIAL ENCOUNTER (CMS-HCC): ICD-10-CM

## 2024-11-04 DIAGNOSIS — R15.9 INCONTINENCE OF FECES, UNSPECIFIED FECAL INCONTINENCE TYPE: ICD-10-CM

## 2024-11-04 DIAGNOSIS — Z93.1 GASTROSTOMY STATUS (MULTI): ICD-10-CM

## 2024-11-04 DIAGNOSIS — L21.9 SEBORRHEIC DERMATITIS: ICD-10-CM

## 2024-11-04 DIAGNOSIS — N31.9 NEUROGENIC BLADDER: ICD-10-CM

## 2024-11-04 DIAGNOSIS — G82.50 QUADRIPLEGIA, UNSPECIFIED (MULTI): ICD-10-CM

## 2024-11-04 DIAGNOSIS — R13.10 DYSPHAGIA, UNSPECIFIED TYPE: ICD-10-CM

## 2024-11-04 DIAGNOSIS — R60.0 LOCALIZED EDEMA: ICD-10-CM

## 2024-11-04 DIAGNOSIS — G40.909 NONINTRACTABLE EPILEPSY WITHOUT STATUS EPILEPTICUS, UNSPECIFIED EPILEPSY TYPE (MULTI): ICD-10-CM

## 2024-11-04 DIAGNOSIS — G40.919 REFRACTORY EPILEPSY (MULTI): ICD-10-CM

## 2024-11-04 DIAGNOSIS — L89.102 PRESSURE INJURY OF BACK, STAGE 2 (MULTI): ICD-10-CM

## 2024-11-04 DIAGNOSIS — G35 MULTIPLE SCLEROSIS (MULTI): ICD-10-CM

## 2024-11-04 DIAGNOSIS — Z97.8 CHRONIC INDWELLING FOLEY CATHETER: ICD-10-CM

## 2024-11-04 DIAGNOSIS — R57.9 SHOCK (MULTI): ICD-10-CM

## 2024-11-04 DIAGNOSIS — J96.01 ACUTE HYPOXIC RESPIRATORY FAILURE (MULTI): ICD-10-CM

## 2024-11-05 RX ORDER — NAPROXEN SODIUM 220 MG/1
TABLET, FILM COATED ORAL
Qty: 30 TABLET | Refills: 2 | OUTPATIENT
Start: 2024-11-05

## 2024-11-06 RX ORDER — NAPROXEN SODIUM 220 MG/1
81 TABLET, FILM COATED ORAL DAILY
Qty: 30 TABLET | Refills: 2 | Status: SHIPPED | OUTPATIENT
Start: 2024-11-06 | End: 2025-02-04

## 2024-11-07 DIAGNOSIS — G82.50 QUADRIPLEGIA, UNSPECIFIED (MULTI): ICD-10-CM

## 2024-11-07 DIAGNOSIS — Z93.2 ILEOSTOMY IN PLACE (MULTI): Primary | ICD-10-CM

## 2024-11-07 DIAGNOSIS — Z93.1 GASTROSTOMY STATUS (MULTI): ICD-10-CM

## 2024-11-07 RX ORDER — GAUZE BANDAGE 4" X 4"
BANDAGE TOPICAL
Qty: 30 EACH | Refills: 11 | Status: SHIPPED | OUTPATIENT
Start: 2024-11-07 | End: 2024-11-08 | Stop reason: SDUPTHER

## 2024-11-08 RX ORDER — GAUZE BANDAGE 4" X 4"
BANDAGE TOPICAL
Qty: 30 EACH | Refills: 11 | Status: SHIPPED | OUTPATIENT
Start: 2024-11-08

## 2024-12-02 ENCOUNTER — TELEPHONE (OUTPATIENT)
Dept: GASTROENTEROLOGY | Facility: HOSPITAL | Age: 44
End: 2024-12-02
Payer: MEDICAID

## 2024-12-02 DIAGNOSIS — R13.12 OROPHARYNGEAL DYSPHAGIA: Primary | ICD-10-CM

## 2024-12-02 NOTE — TELEPHONE ENCOUNTER
Patient's mother called in stating that son's peg tube was cut and needs to be replaced. Following up with Dr. Landry.

## 2024-12-03 DIAGNOSIS — G40.909 NONINTRACTABLE EPILEPSY WITHOUT STATUS EPILEPTICUS, UNSPECIFIED EPILEPSY TYPE (MULTI): ICD-10-CM

## 2024-12-03 NOTE — TELEPHONE ENCOUNTER
Patient's mom , Vee, would like for you to give her a call at [phone]. She would like to give you updates on how he is feeling.      Thanks

## 2024-12-04 DIAGNOSIS — G40.909 NONINTRACTABLE EPILEPSY WITHOUT STATUS EPILEPTICUS, UNSPECIFIED EPILEPSY TYPE (MULTI): ICD-10-CM

## 2024-12-04 RX ORDER — BACLOFEN 10 MG/1
TABLET ORAL
Qty: 90 TABLET | Refills: 11 | Status: SHIPPED | OUTPATIENT
Start: 2024-12-04 | End: 2024-12-04 | Stop reason: SDUPTHER

## 2024-12-04 RX ORDER — BACLOFEN 10 MG/1
TABLET ORAL
Qty: 90 TABLET | Refills: 11 | Status: SHIPPED | OUTPATIENT
Start: 2024-12-04

## 2024-12-05 NOTE — H&P
History Of Present Illness  Israel Edgar is a 44 y.o. male presenting with dysphagia.     Past Medical History  Past Medical History:   Diagnosis Date    Acute deep vein thrombosis of arm (Multi)     Acute encephalopathy     Acute Hypoxic Encephalopathy    Acute upper respiratory infection, unspecified     Depression     Dysphagia     Epilepsy     History of MRSA infection     Hypertension     Ileus (Multi)     Impacted cerumen, bilateral     Nausea with vomiting, unspecified     Neurogenic bladder     Other conditions influencing health status     Lumbar Puncture Traumatic Tap    Personal history of urinary (tract) infections     Primary chronic progressive multiple sclerosis (Multi)     Pulmonary embolism 07/21/2024    Remote history of stroke     Respirator dependent (Multi)     Schizophrenia     Spastic quadriplegia (Multi)     Unspecified visual loss      Surgical History  Past Surgical History:   Procedure Laterality Date    GASTROSTOMY      ILEOSTOMY  08/05/2015    Ileostomy    OTHER SURGICAL HISTORY  09/16/2016    Feeding Tube    TRACHEOSTOMY TUBE PLACEMENT       Social History  He reports that he has never smoked. He has never used smokeless tobacco. No history on file for alcohol use and drug use.    Family History  No family history on file.     Allergies  Allergies   Allergen Reactions    Keflex [Cephalexin] Seizure     Diarrhea; seizure activity, rash, and redness    Levaquin [Levofloxacin] Seizure    Lorazepam Seizure     Increased agitation and complex seizures (tonic clonic)    Piperacillin-Tazobactam Other     erythema multiforme    Adhesive Tape-Silicones Other and Unknown     Adhesive Bandages MISC    Per NH record    Albuterol Unknown    Lacosamide Other     Mother states lethargy, and non responsiveness to stimuli; will not allow this medication    Latex Other     Increased urinary infections    Pantoprazole Diarrhea     Diarrhea, hypokalemia, hypomagnesium    Suppository Adult [Glycerin  (Adult)] Other     Mom doesnot want; says previous GI said no suppository     Budesonide Rash     pulmicort nebulizer; Mother states facial rash and tachycardia    Famotidine Diarrhea     Diarrhea    Ipratropium Bromide Rash     Mother states facial rash and tachycardia    Nitrofurantoin Rash     Facial rash and reddness    Questran [Cholestyramine (With Sugar)] Rash    Ranitidine Rash     Facial rash and redness    Sulfa (Sulfonamide Antibiotics) Hives and Rash    Sulfamethoxazole-Trimethoprim Hives and Rash     Review of Systems     Physical Exam  Vitals and nursing note reviewed.   Constitutional:       Appearance: Normal appearance.   Cardiovascular:      Rate and Rhythm: Normal rate and regular rhythm.      Pulses: Normal pulses.      Heart sounds: Normal heart sounds.   Pulmonary:      Effort: Pulmonary effort is normal.      Breath sounds: Normal breath sounds.   Abdominal:      General: Abdomen is flat.      Palpations: Abdomen is soft.      Comments: PEG tube in place   Neurological:      Mental Status: He is alert.          Last Recorded Vitals  There were no vitals taken for this visit.    Assessment/Plan   Dysphagia and PEG tube malfunction    Repeat EGD and PEG tube     Denver Narvaez MD

## 2024-12-06 ENCOUNTER — ANESTHESIA EVENT (OUTPATIENT)
Dept: GASTROENTEROLOGY | Facility: HOSPITAL | Age: 44
End: 2024-12-06
Payer: MEDICAID

## 2024-12-06 ENCOUNTER — HOSPITAL ENCOUNTER (OUTPATIENT)
Dept: GASTROENTEROLOGY | Facility: HOSPITAL | Age: 44
Discharge: HOME | End: 2024-12-06
Payer: MEDICAID

## 2024-12-06 ENCOUNTER — ANESTHESIA (OUTPATIENT)
Dept: GASTROENTEROLOGY | Facility: HOSPITAL | Age: 44
End: 2024-12-06
Payer: MEDICAID

## 2024-12-06 VITALS
RESPIRATION RATE: 18 BRPM | SYSTOLIC BLOOD PRESSURE: 105 MMHG | HEART RATE: 59 BPM | HEIGHT: 70 IN | WEIGHT: 151.24 LBS | DIASTOLIC BLOOD PRESSURE: 72 MMHG | OXYGEN SATURATION: 98 % | TEMPERATURE: 96.8 F | BODY MASS INDEX: 21.65 KG/M2

## 2024-12-06 DIAGNOSIS — R13.12 OROPHARYNGEAL DYSPHAGIA: ICD-10-CM

## 2024-12-06 PROCEDURE — 2500000004 HC RX 250 GENERAL PHARMACY W/ HCPCS (ALT 636 FOR OP/ED): Performed by: ANESTHESIOLOGIST ASSISTANT

## 2024-12-06 PROCEDURE — 2780000003 HC OR 278 NO HCPCS

## 2024-12-06 PROCEDURE — 43235 EGD DIAGNOSTIC BRUSH WASH: CPT | Performed by: INTERNAL MEDICINE

## 2024-12-06 PROCEDURE — 7100000009 HC PHASE TWO TIME - INITIAL BASE CHARGE

## 2024-12-06 PROCEDURE — 3700000001 HC GENERAL ANESTHESIA TIME - INITIAL BASE CHARGE

## 2024-12-06 PROCEDURE — 7100000010 HC PHASE TWO TIME - EACH INCREMENTAL 1 MINUTE

## 2024-12-06 PROCEDURE — 3700000002 HC GENERAL ANESTHESIA TIME - EACH INCREMENTAL 1 MINUTE

## 2024-12-06 RX ORDER — OXYCODONE HYDROCHLORIDE 5 MG/1
5 TABLET ORAL EVERY 4 HOURS PRN
Status: CANCELLED | OUTPATIENT
Start: 2024-12-06

## 2024-12-06 RX ORDER — MEPERIDINE HYDROCHLORIDE 25 MG/ML
12.5 INJECTION INTRAMUSCULAR; INTRAVENOUS; SUBCUTANEOUS EVERY 10 MIN PRN
Status: CANCELLED | OUTPATIENT
Start: 2024-12-06

## 2024-12-06 RX ORDER — CARBAMAZEPINE 200 MG/10ML
200 SUSPENSION ORAL 4 TIMES DAILY
COMMUNITY

## 2024-12-06 RX ORDER — ONDANSETRON HYDROCHLORIDE 2 MG/ML
4 INJECTION, SOLUTION INTRAVENOUS ONCE AS NEEDED
Status: CANCELLED | OUTPATIENT
Start: 2024-12-06

## 2024-12-06 RX ORDER — MIDAZOLAM HYDROCHLORIDE 1 MG/ML
INJECTION INTRAMUSCULAR; INTRAVENOUS CONTINUOUS PRN
Status: DISCONTINUED | OUTPATIENT
Start: 2024-12-06 | End: 2024-12-06

## 2024-12-06 RX ORDER — DROPERIDOL 2.5 MG/ML
0.62 INJECTION, SOLUTION INTRAMUSCULAR; INTRAVENOUS ONCE AS NEEDED
Status: CANCELLED | OUTPATIENT
Start: 2024-12-06

## 2024-12-06 RX ORDER — ALBUTEROL SULFATE 0.83 MG/ML
2.5 SOLUTION RESPIRATORY (INHALATION) ONCE AS NEEDED
Status: CANCELLED | OUTPATIENT
Start: 2024-12-06

## 2024-12-06 RX ORDER — LIDOCAINE HYDROCHLORIDE 10 MG/ML
0.1 INJECTION, SOLUTION EPIDURAL; INFILTRATION; INTRACAUDAL; PERINEURAL ONCE
Status: CANCELLED | OUTPATIENT
Start: 2024-12-06 | End: 2024-12-06

## 2024-12-06 RX ORDER — SODIUM CHLORIDE, SODIUM LACTATE, POTASSIUM CHLORIDE, CALCIUM CHLORIDE 600; 310; 30; 20 MG/100ML; MG/100ML; MG/100ML; MG/100ML
50 INJECTION, SOLUTION INTRAVENOUS CONTINUOUS
Status: CANCELLED | OUTPATIENT
Start: 2024-12-06 | End: 2024-12-06

## 2024-12-06 RX ORDER — PROPOFOL 10 MG/ML
INJECTION, EMULSION INTRAVENOUS AS NEEDED
Status: DISCONTINUED | OUTPATIENT
Start: 2024-12-06 | End: 2024-12-06

## 2024-12-06 ASSESSMENT — PAIN - FUNCTIONAL ASSESSMENT
PAIN_FUNCTIONAL_ASSESSMENT: 0-10

## 2024-12-06 ASSESSMENT — PAIN SCALES - GENERAL
PAINLEVEL_OUTOF10: 0 - NO PAIN

## 2024-12-06 NOTE — ANESTHESIA PREPROCEDURE EVALUATION
Patient: Israel Edgar    Procedure Information       Date/Time: 12/06/24 0730    Scheduled providers: Denver Narvaez MD; Salvador Becerra MD; Cheyenne Ryder RN; Delma Coffey RN; FEDERICO Kwok    Procedure: EGD    [place] [facility] [place]            Relevant Problems   Neuro   (+) Epilepsy   (+) Multiple sclerosis (Multi)   (+) Quadriplegia, unspecified (Multi)   (+) Refractory epilepsy (Multi)      GI   (+) Dysphagia      /Renal   (+) Urinary tract infection associated with indwelling urethral catheter, initial encounter (CMS-[facility])      ID   (+) Urinary tract infection associated with indwelling urethral catheter, initial encounter (CMS-H[facility])       Clinical information reviewed:   Tobacco  Allergies  Meds   Med Hx  Surg Hx   Fam Hx  Soc Hx        NPO Detail:  NPO/Void Status  Carbohydrate Drink Given Prior to Surgery? : N  Date of Last Liquid: 12/06/24  Time of Last Liquid: 0100  Date of Last Solid:  (PEG tube)  Last Intake Type: Clear fluids  Time of Last Void: 0715       Advanced multiple sclerosis; quadriparesis; prolonged hospitalisations; sacral ulcer healing; non-verbal; echo EF 65%; difficult IV access    Physical Exam    Airway  Mallampati: unable to assess     Cardiovascular   Rhythm: regular  Rate: normal     Dental        Pulmonary   Breath sounds clear to auscultation     Abdominal            Anesthesia Plan    History of general anesthesia?: yes  History of complications of general anesthesia?: no    ASA 4     MAC     intravenous induction   Anesthetic plan and risks discussed with patient.    Plan discussed with CRNA.

## 2024-12-06 NOTE — ANESTHESIA POSTPROCEDURE EVALUATION
Patient: Israel Edgar    Procedure Summary       Date: 12/06/24 Room / Location: [facility]    Anesthesia Start: 0733 Anesthesia Stop: 0754    Procedure: EGD Diagnosis: Oropharyngeal dysphagia    Scheduled Providers: Denver Narvaez MD; Salvador Becerra MD; Cheyenne Ryder RN; Delma Coffey RN; FEDERICO Kwok Responsible Provider: Salvador Becerra MD    Anesthesia Type: MAC ASA Status: 4            Anesthesia Type: MAC    Vitals Value Taken Time   /65 12/06/24 0815   Temp 36 °C (96.8 °F) 12/06/24 0800   Pulse 65 12/06/24 0815   Resp 16 12/06/24 0815   SpO2 97 % 12/06/24 0815       Anesthesia Post Evaluation    Patient participation: complete - patient cannot participate  Level of consciousness: obtunded/minimal responses  Pain management: adequate  Airway patency: patent  Cardiovascular status: acceptable  Respiratory status: acceptable  Hydration status: acceptable  Postoperative Nausea and Vomiting: none        No notable events documented.

## 2024-12-06 NOTE — DISCHARGE INSTRUCTIONS

## 2024-12-18 DIAGNOSIS — G35 MULTIPLE SCLEROSIS (MULTI): ICD-10-CM

## 2024-12-18 RX ORDER — CHOLECALCIFEROL (VITAMIN D3) 10(400)/ML
DROPS ORAL
Qty: 300 ML | Refills: 3 | Status: SHIPPED | OUTPATIENT
Start: 2024-12-18

## 2025-01-02 DIAGNOSIS — L89.102 PRESSURE INJURY OF BACK, STAGE 2 (MULTI): Primary | ICD-10-CM

## 2025-01-02 DIAGNOSIS — L89.102 PRESSURE INJURY OF BACK, STAGE 2 (MULTI): ICD-10-CM

## 2025-01-02 RX ORDER — ACETAMINOPHEN 160 MG/5ML
640 SUSPENSION ORAL EVERY 6 HOURS
Qty: 118 ML | Refills: 0 | Status: SHIPPED | OUTPATIENT
Start: 2025-01-02

## 2025-01-06 DIAGNOSIS — G40.919 REFRACTORY EPILEPSY (MULTI): ICD-10-CM

## 2025-01-06 DIAGNOSIS — N31.9 NEUROGENIC BLADDER: ICD-10-CM

## 2025-01-06 DIAGNOSIS — G40.909 NONINTRACTABLE EPILEPSY WITHOUT STATUS EPILEPTICUS, UNSPECIFIED EPILEPSY TYPE (MULTI): ICD-10-CM

## 2025-01-06 DIAGNOSIS — L89.102 PRESSURE INJURY OF BACK, STAGE 2 (MULTI): ICD-10-CM

## 2025-01-06 DIAGNOSIS — R15.9 INCONTINENCE OF FECES, UNSPECIFIED FECAL INCONTINENCE TYPE: ICD-10-CM

## 2025-01-06 DIAGNOSIS — L21.9 SEBORRHEIC DERMATITIS: ICD-10-CM

## 2025-01-06 DIAGNOSIS — G35 MULTIPLE SCLEROSIS (MULTI): ICD-10-CM

## 2025-01-06 DIAGNOSIS — N39.0 URINARY TRACT INFECTION ASSOCIATED WITH INDWELLING URETHRAL CATHETER, INITIAL ENCOUNTER (CMS-HCC): ICD-10-CM

## 2025-01-06 DIAGNOSIS — Z93.1 GASTROSTOMY STATUS (MULTI): ICD-10-CM

## 2025-01-06 DIAGNOSIS — J96.01 ACUTE HYPOXIC RESPIRATORY FAILURE (MULTI): ICD-10-CM

## 2025-01-06 DIAGNOSIS — G82.50 QUADRIPLEGIA, UNSPECIFIED (MULTI): ICD-10-CM

## 2025-01-06 DIAGNOSIS — T83.511A URINARY TRACT INFECTION ASSOCIATED WITH INDWELLING URETHRAL CATHETER, INITIAL ENCOUNTER (CMS-HCC): ICD-10-CM

## 2025-01-06 DIAGNOSIS — R57.9 SHOCK (MULTI): ICD-10-CM

## 2025-01-06 DIAGNOSIS — Z97.8 CHRONIC INDWELLING FOLEY CATHETER: ICD-10-CM

## 2025-01-06 DIAGNOSIS — R13.10 DYSPHAGIA, UNSPECIFIED TYPE: ICD-10-CM

## 2025-01-06 DIAGNOSIS — R60.0 LOCALIZED EDEMA: ICD-10-CM

## 2025-01-06 RX ORDER — ACETAMINOPHEN 160 MG/5ML
640 SUSPENSION ORAL EVERY 6 HOURS
Qty: 2400 ML | Refills: 0 | OUTPATIENT
Start: 2025-01-06 | End: 2025-02-05

## 2025-01-06 RX ORDER — NAPROXEN SODIUM 220 MG/1
81 TABLET, FILM COATED ORAL DAILY
Qty: 30 TABLET | Refills: 2 | Status: SHIPPED | OUTPATIENT
Start: 2025-01-06 | End: 2025-01-07 | Stop reason: SDUPTHER

## 2025-01-07 DIAGNOSIS — G35 MULTIPLE SCLEROSIS (MULTI): ICD-10-CM

## 2025-01-07 DIAGNOSIS — J96.01 ACUTE HYPOXIC RESPIRATORY FAILURE (MULTI): ICD-10-CM

## 2025-01-07 DIAGNOSIS — R15.9 INCONTINENCE OF FECES, UNSPECIFIED FECAL INCONTINENCE TYPE: ICD-10-CM

## 2025-01-07 DIAGNOSIS — N39.0 URINARY TRACT INFECTION ASSOCIATED WITH INDWELLING URETHRAL CATHETER, INITIAL ENCOUNTER (CMS-HCC): ICD-10-CM

## 2025-01-07 DIAGNOSIS — T83.511A URINARY TRACT INFECTION ASSOCIATED WITH INDWELLING URETHRAL CATHETER, INITIAL ENCOUNTER (CMS-HCC): ICD-10-CM

## 2025-01-07 DIAGNOSIS — L21.9 SEBORRHEIC DERMATITIS: ICD-10-CM

## 2025-01-07 DIAGNOSIS — L89.102 PRESSURE INJURY OF BACK, STAGE 2 (MULTI): ICD-10-CM

## 2025-01-07 DIAGNOSIS — N31.9 NEUROGENIC BLADDER: ICD-10-CM

## 2025-01-07 DIAGNOSIS — Z97.8 CHRONIC INDWELLING FOLEY CATHETER: ICD-10-CM

## 2025-01-07 DIAGNOSIS — R57.9 SHOCK (MULTI): ICD-10-CM

## 2025-01-07 DIAGNOSIS — G82.50 QUADRIPLEGIA, UNSPECIFIED (MULTI): ICD-10-CM

## 2025-01-07 DIAGNOSIS — G40.909 NONINTRACTABLE EPILEPSY WITHOUT STATUS EPILEPTICUS, UNSPECIFIED EPILEPSY TYPE (MULTI): ICD-10-CM

## 2025-01-07 DIAGNOSIS — R13.10 DYSPHAGIA, UNSPECIFIED TYPE: ICD-10-CM

## 2025-01-07 DIAGNOSIS — Z93.1 GASTROSTOMY STATUS (MULTI): ICD-10-CM

## 2025-01-07 DIAGNOSIS — G40.919 REFRACTORY EPILEPSY (MULTI): ICD-10-CM

## 2025-01-07 DIAGNOSIS — R60.0 LOCALIZED EDEMA: ICD-10-CM

## 2025-01-07 RX ORDER — NAPROXEN SODIUM 220 MG/1
81 TABLET, FILM COATED ORAL DAILY
Qty: 30 TABLET | Refills: 11 | Status: ON HOLD | OUTPATIENT
Start: 2025-01-07 | End: 2026-01-02

## 2025-01-07 RX ORDER — ACETAMINOPHEN 160 MG/5ML
640 SUSPENSION ORAL EVERY 6 HOURS
Qty: 2400 ML | Refills: 11 | Status: ON HOLD | OUTPATIENT
Start: 2025-01-07 | End: 2025-02-06

## 2025-01-11 ENCOUNTER — APPOINTMENT (OUTPATIENT)
Dept: RADIOLOGY | Facility: HOSPITAL | Age: 45
End: 2025-01-11
Payer: MEDICAID

## 2025-01-11 ENCOUNTER — HOSPITAL ENCOUNTER (INPATIENT)
Facility: HOSPITAL | Age: 45
End: 2025-01-11
Attending: EMERGENCY MEDICINE | Admitting: EMERGENCY MEDICINE
Payer: MEDICAID

## 2025-01-11 DIAGNOSIS — Z93.2 ILEOSTOMY IN PLACE (MULTI): ICD-10-CM

## 2025-01-11 DIAGNOSIS — G82.50 QUADRIPLEGIA, UNSPECIFIED (MULTI): ICD-10-CM

## 2025-01-11 DIAGNOSIS — G35 MULTIPLE SCLEROSIS (MULTI): ICD-10-CM

## 2025-01-11 DIAGNOSIS — Z43.2 ILEOSTOMY CARE: ICD-10-CM

## 2025-01-11 DIAGNOSIS — J18.9 PNEUMONIA OF BOTH LOWER LOBES DUE TO INFECTIOUS ORGANISM: Primary | ICD-10-CM

## 2025-01-11 DIAGNOSIS — Z93.1 GASTROSTOMY STATUS (MULTI): ICD-10-CM

## 2025-01-11 DIAGNOSIS — G40.909 NONINTRACTABLE EPILEPSY WITHOUT STATUS EPILEPTICUS, UNSPECIFIED EPILEPSY TYPE (MULTI): ICD-10-CM

## 2025-01-11 DIAGNOSIS — I27.82 OTHER CHRONIC PULMONARY EMBOLISM, UNSPECIFIED WHETHER ACUTE COR PULMONALE PRESENT (MULTI): ICD-10-CM

## 2025-01-11 LAB
ALBUMIN SERPL BCP-MCNC: 3.8 G/DL (ref 3.4–5)
ALP SERPL-CCNC: 72 U/L (ref 33–120)
ALT SERPL W P-5'-P-CCNC: 16 U/L (ref 10–52)
ANION GAP SERPL CALC-SCNC: 10 MMOL/L (ref 10–20)
APPEARANCE UR: CLEAR
AST SERPL W P-5'-P-CCNC: 27 U/L (ref 9–39)
BASOPHILS # BLD AUTO: 0.04 X10*3/UL (ref 0–0.1)
BASOPHILS NFR BLD AUTO: 0.4 %
BILIRUB SERPL-MCNC: 0.5 MG/DL (ref 0–1.2)
BILIRUB UR STRIP.AUTO-MCNC: NEGATIVE MG/DL
BUN SERPL-MCNC: 7 MG/DL (ref 6–23)
CALCIUM SERPL-MCNC: 9 MG/DL (ref 8.6–10.3)
CAOX CRY #/AREA UR COMP ASSIST: NORMAL /HPF
CHLORIDE SERPL-SCNC: 100 MMOL/L (ref 98–107)
CO2 SERPL-SCNC: 28 MMOL/L (ref 21–32)
COLOR UR: ABNORMAL
CREAT SERPL-MCNC: 0.46 MG/DL (ref 0.5–1.3)
EGFRCR SERPLBLD CKD-EPI 2021: >90 ML/MIN/1.73M*2
EOSINOPHIL # BLD AUTO: 0.01 X10*3/UL (ref 0–0.7)
EOSINOPHIL NFR BLD AUTO: 0.1 %
ERYTHROCYTE [DISTWIDTH] IN BLOOD BY AUTOMATED COUNT: 15.7 % (ref 11.5–14.5)
FLUAV RNA RESP QL NAA+PROBE: NOT DETECTED
FLUBV RNA RESP QL NAA+PROBE: NOT DETECTED
GLUCOSE SERPL-MCNC: 143 MG/DL (ref 74–99)
GLUCOSE UR STRIP.AUTO-MCNC: NORMAL MG/DL
HCT VFR BLD AUTO: 41.6 % (ref 41–52)
HGB BLD-MCNC: 13.9 G/DL (ref 13.5–17.5)
IMM GRANULOCYTES # BLD AUTO: 0.04 X10*3/UL (ref 0–0.7)
IMM GRANULOCYTES NFR BLD AUTO: 0.4 % (ref 0–0.9)
KETONES UR STRIP.AUTO-MCNC: NEGATIVE MG/DL
LACTATE SERPL-SCNC: 1.9 MMOL/L (ref 0.4–2)
LEUKOCYTE ESTERASE UR QL STRIP.AUTO: ABNORMAL
LYMPHOCYTES # BLD AUTO: 1.01 X10*3/UL (ref 1.2–4.8)
LYMPHOCYTES NFR BLD AUTO: 10.5 %
MCH RBC QN AUTO: 30.3 PG (ref 26–34)
MCHC RBC AUTO-ENTMCNC: 33.4 G/DL (ref 32–36)
MCV RBC AUTO: 91 FL (ref 80–100)
MONOCYTES # BLD AUTO: 1.12 X10*3/UL (ref 0.1–1)
MONOCYTES NFR BLD AUTO: 11.7 %
NEUTROPHILS # BLD AUTO: 7.37 X10*3/UL (ref 1.2–7.7)
NEUTROPHILS NFR BLD AUTO: 76.9 %
NITRITE UR QL STRIP.AUTO: NEGATIVE
NRBC BLD-RTO: 0 /100 WBCS (ref 0–0)
PH UR STRIP.AUTO: 6 [PH]
PLATELET # BLD AUTO: 197 X10*3/UL (ref 150–450)
POTASSIUM SERPL-SCNC: 3.4 MMOL/L (ref 3.5–5.3)
PROT SERPL-MCNC: 7.7 G/DL (ref 6.4–8.2)
PROT UR STRIP.AUTO-MCNC: NEGATIVE MG/DL
RBC # BLD AUTO: 4.59 X10*6/UL (ref 4.5–5.9)
RBC # UR STRIP.AUTO: ABNORMAL /UL
RBC #/AREA URNS AUTO: NORMAL /HPF
SARS-COV-2 RNA RESP QL NAA+PROBE: NOT DETECTED
SODIUM SERPL-SCNC: 135 MMOL/L (ref 136–145)
SP GR UR STRIP.AUTO: 1.01
SQUAMOUS #/AREA URNS AUTO: NORMAL /HPF
UROBILINOGEN UR STRIP.AUTO-MCNC: NORMAL MG/DL
WBC # BLD AUTO: 9.6 X10*3/UL (ref 4.4–11.3)
WBC #/AREA URNS AUTO: NORMAL /HPF

## 2025-01-11 PROCEDURE — 81003 URINALYSIS AUTO W/O SCOPE: CPT

## 2025-01-11 PROCEDURE — 71045 X-RAY EXAM CHEST 1 VIEW: CPT | Performed by: RADIOLOGY

## 2025-01-11 PROCEDURE — 71045 X-RAY EXAM CHEST 1 VIEW: CPT

## 2025-01-11 PROCEDURE — 71275 CT ANGIOGRAPHY CHEST: CPT | Performed by: RADIOLOGY

## 2025-01-11 PROCEDURE — 83605 ASSAY OF LACTIC ACID: CPT | Performed by: EMERGENCY MEDICINE

## 2025-01-11 PROCEDURE — 2550000001 HC RX 255 CONTRASTS: Performed by: EMERGENCY MEDICINE

## 2025-01-11 PROCEDURE — 36415 COLL VENOUS BLD VENIPUNCTURE: CPT

## 2025-01-11 PROCEDURE — 87899 AGENT NOS ASSAY W/OPTIC: CPT | Mod: AHULAB | Performed by: EMERGENCY MEDICINE

## 2025-01-11 PROCEDURE — 87636 SARSCOV2 & INF A&B AMP PRB: CPT | Performed by: EMERGENCY MEDICINE

## 2025-01-11 PROCEDURE — 1210000001 HC SEMI-PRIVATE ROOM DAILY

## 2025-01-11 PROCEDURE — 85025 COMPLETE CBC W/AUTO DIFF WBC: CPT

## 2025-01-11 PROCEDURE — 80053 COMPREHEN METABOLIC PANEL: CPT

## 2025-01-11 PROCEDURE — 87449 NOS EACH ORGANISM AG IA: CPT | Mod: AHULAB | Performed by: EMERGENCY MEDICINE

## 2025-01-11 PROCEDURE — 87086 URINE CULTURE/COLONY COUNT: CPT | Mod: AHULAB

## 2025-01-11 PROCEDURE — 36415 COLL VENOUS BLD VENIPUNCTURE: CPT | Performed by: EMERGENCY MEDICINE

## 2025-01-11 PROCEDURE — 71275 CT ANGIOGRAPHY CHEST: CPT

## 2025-01-11 PROCEDURE — 87040 BLOOD CULTURE FOR BACTERIA: CPT | Mod: AHULAB | Performed by: EMERGENCY MEDICINE

## 2025-01-11 PROCEDURE — 99285 EMERGENCY DEPT VISIT HI MDM: CPT | Mod: 25 | Performed by: EMERGENCY MEDICINE

## 2025-01-11 RX ORDER — MEROPENEM 1 G/1
1 INJECTION, POWDER, FOR SOLUTION INTRAVENOUS EVERY 8 HOURS
Status: DISCONTINUED | OUTPATIENT
Start: 2025-01-12 | End: 2025-01-20

## 2025-01-11 RX ORDER — MEROPENEM 1 G/1
1 INJECTION, POWDER, FOR SOLUTION INTRAVENOUS ONCE
Status: COMPLETED | OUTPATIENT
Start: 2025-01-11 | End: 2025-01-12

## 2025-01-11 RX ADMIN — IOHEXOL 80 ML: 350 INJECTION, SOLUTION INTRAVENOUS at 21:41

## 2025-01-11 NOTE — ED PROVIDER NOTES
Emergency Department Provider Note        History of Present Illness     History provided by: Patient  Limitations to History: None  External Records Reviewed with Brief Summary: Outpatient progress note from 9/19/2024 which showed LTAC progress note with admission for acute hypoxic respiratory failure, managed with HFNC and BiPAP, treated with Vanco and Jazzy Avycaz    HPI:  Israel Edgar is a 44 y.o. male with PMHx MS, neurogenic bladder with chronic Boss, epilepsy, DVT/PE on subcutaneous Lovenox, prior MDRO pneumonia, HTN, quadriplegia, dysphagia who presents to ED for fever since yesterday.  Patient is nonverbal at baseline, accompanied to ED by his mother.  Mother reports yesterday she noticed the patient was sweaty and felt warm to the touch, at that time had a fever of 102F.  Patient is chronically on Tylenol every 6 hours, no additional Tylenol given at that time.  This morning, patient's mother noticed when she went to get him dressed that he had some leakage around his Boss with an unpleasant odor that is not typical, has also noticed that his urine output looks more orange.  No vomiting, has had approximately normal output through his ostomy.  No increased cough or sputum production.        Physical Exam   Triage vitals:  T 36.9 °C (98.4 °F)  HR 92  /76  RR (!) 26  O2 (!) 93 % None (Room air)    Physical exam:   Triage vitals reviewed.  Constitutional: Well developed adult in no acute distress, non toxic in appearance  Head: Normocephalic, atraumatic  Skin: Intact, dry. No rashes or lesions.  Eyes: Pupils are equal. No conjunctival injection.  Neck: Supple. Trachea is midline.  Pulmonary: Normal work of breathing with no accessory muscle use noted.  Clear to auscultation bilaterally without wheezes, rhonchi, or rales.  Cardiovascular: Normal rate, regular rhythm. No murmurs/gallops/rubs appreciated. 2+ radial and DP pulses bilaterally.   Abdomen: Soft, nondistended. Nontender to  palpation.  : Performed with chaperone.  No penile lesions or rash, no bleeding, indwelling Boss in place, draining pale orange urine.  No leakage around the Boss.  Extremities: No gross deformities.    Neuro: Wakes to touch. Face is symmetric.  Nonverbal at baseline. Spastic quadriplegia at baseline, does not respond to commands.    Medical Decision Making & ED Course   Medical Decision Makin y.o. male with PMHx MS, neurogenic bladder with chronic Boss, epilepsy, DVT/PE on subcutaneous Lovenox, prior MDRO pneumonia, HTN, quadriplegia, dysphagia who presents to ED for fever since yesterday.  On arrival, patient was afebrile, normotensive, HR 92, RR 26, SpO2 93% on room air.  He was placed on 2 L nasal cannula and SpO2 improved to 96-98%.    Differential diagnosis includes UTI, pneumonia, viral illness.  Will obtain CBC, CMP, UA, lactate, viral swabs, CXR.    CBC without leukocytosis or anemia, CMP significant for mild hypokalemia, otherwise unremarkable.  Lactate within normal limits so lower concern for sepsis.  UA with trace blood and positive leukocyte esterase, no increased WBC, no bacteriuria.    CXR with persistent left retrocardiac opacity, concern for atelectasis versus recurrent pneumonia.  Given new oxygen requirement and no clear etiology on XR or viral swabs, will obtain CT PE to rule out pulmonary embolus as a cause for his hypoxia.    Signed out to attending physician Dr. Ponce pending completion of workup.    ----      Differential diagnoses considered include but are not limited to: UTI, pneumonia, viral illness     Social Determinants of Health which Significantly Impact Care: None identified     Independent Result Review and Interpretation: Relevant laboratory and radiographic results were reviewed and independently interpreted by myself.  As necessary, they are commented on in the ED Course.    Chronic conditions affecting the patient's care: As documented above in MDM    The patient was  discussed with the following consultants/services: None    Care Considerations: As documented above in [facility]    ED Course:  ED Course as of 01/11/25 1823   Sat Jan 11, 2025   1739 XR chest 1 view  CXR reviewed by me without evidence of PTX, PNA, or wide mediastinum.  Appears similar to most recent previous CXR [HH]      ED Course User Index  [HH] Amanda Rivas MD     Disposition   Patient was signed out to Dr. Ponce at 2000 pending completion of their work-up.  Please see the next provider's transition of care note for the remainder of the patient's care.       Patient seen and discussed with ED attending physician.    Amanda Rivas MD  Emergency Medicine     Amanda Rivas MD  Resident  01/11/25 9055

## 2025-01-11 NOTE — ED TRIAGE NOTES
PT TO ED FROM HOME FOR FEVER SINCE LAST NIGHT. MOM STATES PT TAKES TYLNEOL Q6. PT HAS HX: SEIZURE DISORDER, NONVERBAL, CHRONIC KENYN, PARAPLEGIC, MS

## 2025-01-12 LAB
HOLD SPECIMEN: NORMAL
LEGIONELLA AG UR QL: NEGATIVE
MRSA DNA SPEC QL NAA+PROBE: DETECTED
S PNEUM AG UR QL: NEGATIVE

## 2025-01-12 PROCEDURE — 99223 1ST HOSP IP/OBS HIGH 75: CPT | Performed by: INTERNAL MEDICINE

## 2025-01-12 PROCEDURE — 2500000004 HC RX 250 GENERAL PHARMACY W/ HCPCS (ALT 636 FOR OP/ED): Performed by: INTERNAL MEDICINE

## 2025-01-12 PROCEDURE — 94664 DEMO&/EVAL PT USE INHALER: CPT

## 2025-01-12 PROCEDURE — 94640 AIRWAY INHALATION TREATMENT: CPT

## 2025-01-12 PROCEDURE — 3E0G76Z INTRODUCTION OF NUTRITIONAL SUBSTANCE INTO UPPER GI, VIA NATURAL OR ARTIFICIAL OPENING: ICD-10-PCS | Performed by: INTERNAL MEDICINE

## 2025-01-12 PROCEDURE — 87641 MR-STAPH DNA AMP PROBE: CPT | Performed by: EMERGENCY MEDICINE

## 2025-01-12 PROCEDURE — 2500000001 HC RX 250 WO HCPCS SELF ADMINISTERED DRUGS (ALT 637 FOR MEDICARE OP): Performed by: INTERNAL MEDICINE

## 2025-01-12 PROCEDURE — 2500000004 HC RX 250 GENERAL PHARMACY W/ HCPCS (ALT 636 FOR OP/ED): Performed by: EMERGENCY MEDICINE

## 2025-01-12 PROCEDURE — 2500000002 HC RX 250 W HCPCS SELF ADMINISTERED DRUGS (ALT 637 FOR MEDICARE OP, ALT 636 FOR OP/ED): Performed by: INTERNAL MEDICINE

## 2025-01-12 PROCEDURE — 2500000005 HC RX 250 GENERAL PHARMACY W/O HCPCS: Performed by: PHARMACIST

## 2025-01-12 PROCEDURE — 51700 IRRIGATION OF BLADDER: CPT

## 2025-01-12 PROCEDURE — 1200000002 HC GENERAL ROOM WITH TELEMETRY DAILY

## 2025-01-12 PROCEDURE — 2500000005 HC RX 250 GENERAL PHARMACY W/O HCPCS: Performed by: INTERNAL MEDICINE

## 2025-01-12 PROCEDURE — 2500000004 HC RX 250 GENERAL PHARMACY W/ HCPCS (ALT 636 FOR OP/ED): Performed by: PHARMACIST

## 2025-01-12 RX ORDER — ALBUTEROL SULFATE 0.83 MG/ML
2.5 SOLUTION RESPIRATORY (INHALATION) EVERY 2 HOUR PRN
Status: DISCONTINUED | OUTPATIENT
Start: 2025-01-12 | End: 2025-01-24 | Stop reason: HOSPADM

## 2025-01-12 RX ORDER — LEVETIRACETAM 100 MG/ML
2000 SOLUTION ORAL 2 TIMES DAILY
Status: DISCONTINUED | OUTPATIENT
Start: 2025-01-12 | End: 2025-01-20

## 2025-01-12 RX ORDER — NAPROXEN SODIUM 220 MG/1
81 TABLET, FILM COATED ORAL DAILY
Status: DISCONTINUED | OUTPATIENT
Start: 2025-01-12 | End: 2025-01-24 | Stop reason: HOSPADM

## 2025-01-12 RX ORDER — ONDANSETRON 4 MG/1
4 TABLET, FILM COATED ORAL EVERY 8 HOURS PRN
Status: DISCONTINUED | OUTPATIENT
Start: 2025-01-12 | End: 2025-01-24 | Stop reason: HOSPADM

## 2025-01-12 RX ORDER — ALBUTEROL SULFATE 0.83 MG/ML
2.5 SOLUTION RESPIRATORY (INHALATION) EVERY 6 HOURS PRN
Status: DISCONTINUED | OUTPATIENT
Start: 2025-01-12 | End: 2025-01-12

## 2025-01-12 RX ORDER — AMOXICILLIN 250 MG
2 CAPSULE ORAL 2 TIMES DAILY
Status: DISCONTINUED | OUTPATIENT
Start: 2025-01-12 | End: 2025-01-13

## 2025-01-12 RX ORDER — SELENIUM SULFIDE 2.5 MG/100ML
LOTION TOPICAL 2 TIMES WEEKLY
Status: DISCONTINUED | OUTPATIENT
Start: 2025-01-13 | End: 2025-01-12 | Stop reason: WASHOUT

## 2025-01-12 RX ORDER — BACLOFEN 10 MG/1
10 TABLET ORAL EVERY MORNING
Status: DISCONTINUED | OUTPATIENT
Start: 2025-01-13 | End: 2025-01-14 | Stop reason: WASHOUT

## 2025-01-12 RX ORDER — CARBAMAZEPINE 200 MG/1
200 TABLET ORAL 4 TIMES DAILY
Status: DISCONTINUED | OUTPATIENT
Start: 2025-01-12 | End: 2025-01-24 | Stop reason: HOSPADM

## 2025-01-12 RX ORDER — ALBUTEROL SULFATE 0.83 MG/ML
2.5 SOLUTION RESPIRATORY (INHALATION)
Status: DISCONTINUED | OUTPATIENT
Start: 2025-01-12 | End: 2025-01-15

## 2025-01-12 RX ORDER — BACLOFEN 10 MG/1
20 TABLET ORAL 3 TIMES DAILY
Status: DISCONTINUED | OUTPATIENT
Start: 2025-01-12 | End: 2025-01-14

## 2025-01-12 RX ORDER — CYANOCOBALAMIN (VITAMIN B-12) 500 MCG
400 TABLET ORAL DAILY
Status: DISCONTINUED | OUTPATIENT
Start: 2025-01-12 | End: 2025-01-24 | Stop reason: HOSPADM

## 2025-01-12 RX ORDER — ONDANSETRON HYDROCHLORIDE 2 MG/ML
4 INJECTION, SOLUTION INTRAVENOUS EVERY 8 HOURS PRN
Status: DISCONTINUED | OUTPATIENT
Start: 2025-01-12 | End: 2025-01-24 | Stop reason: HOSPADM

## 2025-01-12 RX ORDER — CARBOXYMETHYLCELLULOSE SODIUM 10 MG/ML
2 GEL OPHTHALMIC 2 TIMES DAILY
Status: DISCONTINUED | OUTPATIENT
Start: 2025-01-12 | End: 2025-01-12 | Stop reason: SDUPTHER

## 2025-01-12 RX ORDER — CARBAMAZEPINE 100 MG/1
300 TABLET, CHEWABLE ORAL ONCE
Status: DISCONTINUED | OUTPATIENT
Start: 2025-01-12 | End: 2025-01-12

## 2025-01-12 RX ORDER — ZINC OXIDE 20 G/100G
1 OINTMENT TOPICAL
Status: DISCONTINUED | OUTPATIENT
Start: 2025-01-12 | End: 2025-01-24 | Stop reason: HOSPADM

## 2025-01-12 RX ORDER — PHENYTOIN 50 MG/1
100 TABLET, CHEWABLE ORAL 3 TIMES DAILY
Status: DISCONTINUED | OUTPATIENT
Start: 2025-01-12 | End: 2025-01-20

## 2025-01-12 RX ORDER — ACETAMINOPHEN 160 MG/5ML
640 SUSPENSION ORAL EVERY 6 HOURS
Status: DISCONTINUED | OUTPATIENT
Start: 2025-01-12 | End: 2025-01-18

## 2025-01-12 RX ADMIN — ALBUTEROL SULFATE 2.5 MG: 2.5 SOLUTION RESPIRATORY (INHALATION) at 21:53

## 2025-01-12 RX ADMIN — MEROPENEM 1 G: 1 INJECTION, POWDER, FOR SOLUTION INTRAVENOUS at 16:06

## 2025-01-12 RX ADMIN — ACETAMINOPHEN 650 MG: 160 SUSPENSION ORAL at 23:12

## 2025-01-12 RX ADMIN — SENNOSIDES AND DOCUSATE SODIUM 2 TABLET: 8.6; 5 TABLET ORAL at 23:14

## 2025-01-12 RX ADMIN — VANCOMYCIN HYDROCHLORIDE 1500 MG: 5 INJECTION, POWDER, LYOPHILIZED, FOR SOLUTION INTRAVENOUS at 02:53

## 2025-01-12 RX ADMIN — MEROPENEM 1 G: 1 INJECTION, POWDER, FOR SOLUTION INTRAVENOUS at 23:13

## 2025-01-12 RX ADMIN — BACLOFEN 20 MG: 10 TABLET ORAL at 23:13

## 2025-01-12 RX ADMIN — CARBOXYMETHYLCELLULOSE SODIUM 1 DROP: 0.5 SOLUTION/ DROPS OPHTHALMIC at 23:13

## 2025-01-12 RX ADMIN — AZITHROMYCIN MONOHYDRATE 490.2 MG: 500 INJECTION, POWDER, LYOPHILIZED, FOR SOLUTION INTRAVENOUS at 01:45

## 2025-01-12 RX ADMIN — PHENYTOIN 100 MG: 50 TABLET, CHEWABLE ORAL at 23:12

## 2025-01-12 RX ADMIN — CARBAMAZEPINE 200 MG: 200 TABLET ORAL at 23:13

## 2025-01-12 RX ADMIN — LEVETIRACETAM 2000 MG: 100 SOLUTION ORAL at 23:12

## 2025-01-12 RX ADMIN — MEROPENEM 1 G: 1 INJECTION, POWDER, FOR SOLUTION INTRAVENOUS at 08:03

## 2025-01-12 RX ADMIN — WHITE PETROLATUM 57.7 %-MINERAL OIL 31.9 % EYE OINTMENT: at 23:14

## 2025-01-12 RX ADMIN — MEROPENEM 1 G: 1 INJECTION, POWDER, FOR SOLUTION INTRAVENOUS at 00:11

## 2025-01-12 RX ADMIN — ASPIRIN 81 MG: 81 TABLET, CHEWABLE ORAL at 23:13

## 2025-01-12 ASSESSMENT — COGNITIVE AND FUNCTIONAL STATUS - GENERAL
MOBILITY SCORE: 6
DAILY ACTIVITIY SCORE: 6
CLIMB 3 TO 5 STEPS WITH RAILING: TOTAL
HELP NEEDED FOR BATHING: TOTAL
STANDING UP FROM CHAIR USING ARMS: TOTAL
EATING MEALS: TOTAL
DAILY ACTIVITIY SCORE: 6
CLIMB 3 TO 5 STEPS WITH RAILING: TOTAL
STANDING UP FROM CHAIR USING ARMS: TOTAL
DRESSING REGULAR LOWER BODY CLOTHING: TOTAL
DRESSING REGULAR UPPER BODY CLOTHING: TOTAL
DRESSING REGULAR LOWER BODY CLOTHING: TOTAL
EATING MEALS: TOTAL
WALKING IN HOSPITAL ROOM: TOTAL
MOVING TO AND FROM BED TO CHAIR: TOTAL
TURNING FROM BACK TO SIDE WHILE IN FLAT BAD: TOTAL
PERSONAL GROOMING: TOTAL
TURNING FROM BACK TO SIDE WHILE IN FLAT BAD: TOTAL
HELP NEEDED FOR BATHING: TOTAL
DRESSING REGULAR UPPER BODY CLOTHING: TOTAL
MOVING FROM LYING ON BACK TO SITTING ON SIDE OF FLAT BED WITH BEDRAILS: TOTAL
MOVING FROM LYING ON BACK TO SITTING ON SIDE OF FLAT BED WITH BEDRAILS: TOTAL
PERSONAL GROOMING: TOTAL
TOILETING: TOTAL
MOVING TO AND FROM BED TO CHAIR: TOTAL
MOBILITY SCORE: 6
WALKING IN HOSPITAL ROOM: TOTAL
TOILETING: TOTAL

## 2025-01-12 ASSESSMENT — PAIN - FUNCTIONAL ASSESSMENT
PAIN_FUNCTIONAL_ASSESSMENT: 0-10

## 2025-01-12 ASSESSMENT — PAIN SCALES - GENERAL
PAINLEVEL_OUTOF10: 0 - NO PAIN

## 2025-01-12 NOTE — CARE PLAN
The patient's goals for the shift include pt remains safe and free of falls    The clinical goals for the shift include pt remains free of respiratory distress    Problem: Skin  Goal: Decreased wound size/increased tissue granulation at next dressing change  Outcome: Progressing  Flowsheets (Taken 1/12/2025 0953)  Decreased wound size/increased tissue granulation at next dressing change:   Promote sleep for wound healing   Protective dressings over bony prominences  Goal: Participates in plan/prevention/treatment measures  Outcome: Progressing  Flowsheets (Taken 1/12/2025 0953)  Participates in plan/prevention/treatment measures: Elevate heels  Goal: Prevent/manage excess moisture  Outcome: Progressing  Flowsheets (Taken 1/12/2025 0953)  Prevent/manage excess moisture:   Cleanse incontinence/protect with barrier cream   Moisturize dry skin  Goal: Prevent/minimize sheer/friction injuries  Outcome: Progressing  Flowsheets (Taken 1/12/2025 0953)  Prevent/minimize sheer/friction injuries: Use pull sheet  Goal: Promote/optimize nutrition  Outcome: Progressing  Flowsheets (Taken 1/12/2025 0953)  Promote/optimize nutrition: Consume > 50% meals/supplements  Goal: Promote skin healing  Outcome: Progressing  Flowsheets (Taken 1/12/2025 0953)  Promote skin healing: Protective dressings over bony prominences     Problem: Fall/Injury  Goal: Not fall by end of shift  Outcome: Progressing  Goal: Be free from injury by end of the shift  Outcome: Progressing  Goal: Verbalize understanding of personal risk factors for fall in the hospital  Outcome: Progressing  Goal: Verbalize understanding of risk factor reduction measures to prevent injury from fall in the home  Outcome: Progressing  Goal: Use assistive devices by end of the shift  Outcome: Progressing  Goal: Pace activities to prevent fatigue by end of the shift  Outcome: Progressing     Problem: Nutrition  Goal: Nutrition support goals are met within 48 hrs  Outcome:  Progressing  Goal: Nutrition support is meeting 75% of nutrient needs  Outcome: Progressing  Goal: Tube feed tolerance  Outcome: Progressing  Goal: BG  mg/dL  Outcome: Progressing  Goal: Lab values WNL  Outcome: Progressing  Goal: Electrolytes WNL  Outcome: Progressing  Goal: Promote healing  Outcome: Progressing  Goal: Maintain stable weight  Outcome: Progressing  Goal: Improve ostomy output  Outcome: Progressing     Problem: Pain - Adult  Goal: Verbalizes/displays adequate comfort level or baseline comfort level  Outcome: Progressing     Problem: Safety - Adult  Goal: Free from fall injury  Outcome: Progressing     Problem: Discharge Planning  Goal: Discharge to home or other facility with appropriate resources  Outcome: Progressing     Problem: Chronic Conditions and Co-morbidities  Goal: Patient's chronic conditions and co-morbidity symptoms are monitored and maintained or improved  Outcome: Progressing

## 2025-01-12 NOTE — SIGNIFICANT EVENT
Discussed with nurse and advised to get another attending physician as per mothers request. I have not seen patient

## 2025-01-12 NOTE — CONSULTS
"Nutrition Assessment Note  Nutrition Assessment      Reason for Assessment  Reason for Assessment: Initiate and manage tube feeding, Provider consult order, Enteral assessment/recommendation (TF)    MD contacted RDN for enteral assessment and recommendations.   Pt in bed, appears comfortable, in NAD.     H&P pending.     Pt admitted 2/2 fever.   PMH includes MS, neurogenic bladder, DVT, pneumonia, HTN, dysphagia, spastic quadriplegia, epilepsy, chronic constipation requiring decompressive colonoscopy, subtotal colectomy with end ileostomy and mucous fistula formation 7/2024 .     Noted PEG replaced 12/6/24 per chart review 2/2 malfunction.       History:  Food and Nutrient History  Food and Nutrient History: npo , per RN pt was receiving Boost via PEG at home, however RN also reported pt was supposed to be on Osomolite  1.5. Per previous RDN notes, pt was on Vital 1.5 @ 55 ml/hr at [facility] this past August, and note pt was receiving Osmolite 1.5 bolus 330 ml every six hours this past Sept at [facility].       Dietary Orders (From admission, onward)       Start     Ordered    01/11/25 2342  Adult diet Regular  Diet effective now        Question:  Diet type  Answer:  Regular    01/11/25 8062                      Anthropometrics:  Height: 177.8 cm (5' 10\")  Weight: 71.5 kg (157 lb 10.1 oz)  BMI (Calculated): 22.62    Weight Change  Weight History / % Weight Change: 69.4 kg 9/16/24, 64.6 kg 8/22/24, 77.1 kg 4/9/24  Significant Weight Loss: No (+ wt increase)       IBW/kg (Dietitian Calculated): 75.5 kg  Percent of IBW: 95 %     Wt Readings from Last 25 Encounters:   01/12/25 71.5 kg (157 lb 10.1 oz)   12/06/24 68.6 kg (151 lb 3.8 oz)   08/27/24 68.6 kg (151 lb 3.8 oz)   08/10/24 65.3 kg (144 lb)   04/09/24 77.1 kg (170 lb)   04/03/24 78.5 kg (173 lb)   03/16/24 78 kg (171 lb 15.3 oz)   02/28/24 78.5 kg (173 lb)   02/16/24 78.6 kg (173 lb 4.5 oz)   12/05/22 78.7 kg (173 lb 8 oz)   09/12/22 78.7 kg (173 lb 8 oz)   08/29/22 78.7 " "kg (173 lb 8 oz)   08/22/22 78.7 kg (173 lb 8 oz)     Scheduled medications  carBAMazepine, 300 mg, g-tube, Once  meropenem, 1 g, intravenous, q8h      Continuous medications     PRN medications       Latest Reference Range & Units 01/11/25 18:09   GLUCOSE 74 - 99 mg/dL 143 (H)   SODIUM 136 - 145 mmol/L 135 (L)   POTASSIUM 3.5 - 5.3 mmol/L 3.4 (L)   CHLORIDE 98 - 107 mmol/L 100   Bicarbonate 21 - 32 mmol/L 28   Anion Gap 10 - 20 mmol/L 10   Blood Urea Nitrogen 6 - 23 mg/dL 7   Creatinine 0.50 - 1.30 mg/dL 0.46 (L)   EGFR >60 mL/min/1.73m*2 >90   Calcium 8.6 - 10.3 mg/dL 9.0   Albumin 3.4 - 5.0 g/dL 3.8   Alkaline Phosphatase 33 - 120 U/L 72   ALT 10 - 52 U/L 16   AST 9 - 39 U/L 27   Bilirubin Total 0.0 - 1.2 mg/dL 0.5   Total Protein 6.4 - 8.2 g/dL 7.7   Lactate 0.4 - 2.0 mmol/L 1.9   (H): Data is abnormally high  (L): Data is abnormally low    I/O last 3 completed shifts:  In: 500 (7 mL/kg) [IV Piggyback:500]  Out: 600 (8.4 mL/kg) [Urine:400 (0.2 mL/kg/hr); Stool:200]  Weight: 71.5 kg   I/O this shift:  In: 100 [IV Piggyback:100]  Out: 650 [Urine:500; Stool:150]          Energy Needs:  Calculated Energy Needs Using Equations  Height: 177.8 cm (5' 10\")  Temp: 37 °C (98.6 °F)    Estimated Energy Needs  Method for Estimating Needs: 1418-5940 kcal/day (25-30 kcal/kg)    Estimated Protein Needs  Method for Estimating Needs:  g/day (1.2-1.5 g/kg)    Estimated Fluid Needs  Total Fluid Estimated Needs (mL): 1800 mL  Total Fluid Estimated Needs (mL/kg): 25 mL/kg  Method for Estimating Needs: or as per MD or ~ 1 ml/kcal/day         Nutrition Focused Physical Findings:  Subcutaneous Fat Loss  Orbital Fat Pads: Mild-Moderate (slight dark circles and slight hollowing) (NFPE per visualization only)  Buccal Fat Pads: Mild-Moderate (flat cheeks, minimal bounce)  Triceps: Defer  Ribs: Defer    Muscle Wasting  Temporalis: Mild-Moderate (slight depression)  Pectoralis (Clavicular Region): Mild-Moderate (some protrusion of " clavicle)  Deltoid/Trapezius: Mild-Moderate (slight protrusion of acromion process)    Edema  Edema: none         Physical Findings (Nutrition Deficiency/Toxicity)  Skin: Positive (coccyx (? stage) , PEG, ileostomy)       Nutrition Diagnosis   Malnutrition Diagnosis  Patient has Malnutrition Diagnosis: No (unable to accurately determine at this time, unknown home regimen)    Patient has Nutrition Diagnosis: Yes  Nutrition Diagnosis 1: Swallowing difficulity  Diagnosis Status (1): New  Related to (1): physiological cause  As Evidenced by (1): s/p PEG placement , pt requires long term enteral support  Additional Assessment Information (1): unknown amount of Boost pt was receiving PTA , however wt has increased since previous admission this past August at [facility]         Nutrition Interventions/Recommendations   Nutrition Prescription  Individualized Nutrition Prescription Provided for : Vital 1.5 continuous rate via PEG, starting @ 15 ml/hr. Advance by 10 ml every six hours as tolerated to goal rate 55 ml/hr (1980 kcal/day, 89 g/d protein, 1008 ml free water).\  - water flushes 200 ml every six hours   - NPO status   - RFP, Mg daily; replete prn   - strict I/O and monitor weight trend   - change to bolus TF administration if indicated/as medically appropriate:   Vital 1.5 (Osmolite can be used at home, if mom desires, same volume)  - bolus 330 ml four times per day   - water flushes per MD, or 200 ml four times daily (100 ml pre and 100 ml post TF bolus)     Food and/or Nutrient Delivery Interventions      Enteral Intake: Other (Comment)  Goal: Vital 1.5 continuous rate via PEG, starting @ 15 ml/hr. Advance by 10 ml every six hours as tolerated to goal rate 55 ml/hr (1980 kcal/day, 89 g/d protein, 1008 ml free water).    Additional Interventions: NPO status recommended, noted NPO previous admissions per RDN documentation    Coordination of Nutrition Care by a Nutrition Professional  Collaboration and Referral of Nutrition  Care: Collaboration by nutrition professional with other providers    Education Documentation  No documentation found.           Nutrition Monitoring and Evaluation   Food and Nutrient Related History         Enteral and Parenteral Nutrition Intake: Enteral nutrition formula/solution, Enteral nutrition intake  Criteria: TF tolerance at goal rate, TF providing approximately 100% of estimated energy and protein requirements        Anthropometrics: Body Composition/Growth/Weight History  Weight: Weight change  Criteria: daily wts, monitor trend       Biochemical Data, Medical Tests and Procedures  Electrolyte and Renal Panel: BUN, Sodium, Calcium, serum, Chloride, Creatinine, Magnesium, Phosphorus, Potassium  Criteria: daily    Gastrointestinal Profile: Alanine aminotransferase (ALT), Alkaline phosphatase, Bilirubin, total, Aspartate aminotransferase (AST)  Criteria: as per MD    Glucose/Endocrine Profile: Glucose, casual  Criteria: daily, or as per MD    Nutritional Anemia Profile: Hematocrit, Hemoglobin, Iron, serum  Criteria: as per MD    Vitamin Profile: Vitamin D, 25 hydroxy  Criteria: as indicated    Nutrition Focused Physical Findings  Adipose: Other (Comment)  Criteria: monitor NFPE    Bones: Other (Comment)  Criteria: monitor skeletal integrity    Digestive System: Abdominal distension, Increased appetite, Nausea, Vomiting, Anorexia, Ascites, Constipation, Decrease in appetite, Diarrhea, Early satiety, Other (Comment)  Criteria: monitor GI function closely    Muscles: Muscle atrophy  Criteria: monitor NFPE    Skin: Other (Comment)  Criteria: monitor skin integrity closely         Follow Up  Time Spent (min): 45 minutes  Last Date of Nutrition Visit: 01/12/25  Nutrition Follow-Up Needed?: Dietitian to reassess per policy  Follow up Comment: SUMEET VARGAS

## 2025-01-12 NOTE — CARE PLAN
Problem: Skin  Goal: Decreased wound size/increased tissue granulation at next dressing change  Outcome: Progressing  Flowsheets (Taken 1/12/2025 0258)  Decreased wound size/increased tissue granulation at next dressing change:   Promote sleep for wound healing   Utilize specialty bed per algorithm   Protective dressings over bony prominences  Goal: Participates in plan/prevention/treatment measures  Outcome: Progressing  Flowsheets (Taken 1/12/2025 0258)  Participates in plan/prevention/treatment measures:   Discuss with provider PT/OT consult   Increase activity/out of bed for meals   Elevate heels  Goal: Prevent/manage excess moisture  Outcome: Progressing  Flowsheets (Taken 1/12/2025 0258)  Prevent/manage excess moisture:   Cleanse incontinence/protect with barrier cream   Moisturize dry skin   Use wicking fabric (obtain order)   Follow provider orders for dressing changes   Monitor for/manage infection if present  Goal: Prevent/minimize sheer/friction injuries  Outcome: Progressing  Flowsheets (Taken 1/12/2025 0258)  Prevent/minimize sheer/friction injuries:   Complete micro-shifts as needed if patient unable. Adjust patient position to relieve pressure points, not a full turn   HOB 30 degrees or less   Increase activity/out of bed for meals   Use pull sheet   Utilize specialty bed per algorithm   Turn/reposition every 2 hours/use positioning/transfer devices  Goal: Promote/optimize nutrition  Outcome: Progressing  Flowsheets (Taken 1/12/2025 0258)  Promote/optimize nutrition:   Assist with feeding   Discuss with provider if NPO > 2 days   Offer water/supplements/favorite foods   Consume > 50% meals/supplements   Monitor/record intake including meals   Reassess MST if dietician not consulted  Goal: Promote skin healing  Outcome: Progressing     Problem: Fall/Injury  Goal: Not fall by end of shift  Outcome: Progressing  Goal: Be free from injury by end of the shift  Outcome: Progressing  Goal: Verbalize  understanding of personal risk factors for fall in the hospital  Outcome: Progressing  Goal: Verbalize understanding of risk factor reduction measures to prevent injury from fall in the home  Outcome: Progressing  Goal: Use assistive devices by end of the shift  Outcome: Progressing  Goal: Pace activities to prevent fatigue by end of the shift  Outcome: Progressing     Problem: Nutrition  Goal: Nutrition support goals are met within 48 hrs  Outcome: Progressing  Goal: Nutrition support is meeting 75% of nutrient needs  Outcome: Progressing  Goal: Tube feed tolerance  Outcome: Progressing  Goal: BG  mg/dL  Outcome: Progressing  Goal: Lab values WNL  Outcome: Progressing  Goal: Electrolytes WNL  Outcome: Progressing  Goal: Promote healing  Outcome: Progressing  Goal: Maintain stable weight  Outcome: Progressing  Goal: Improve ostomy output  Outcome: Progressing     Problem: Pain - Adult  Goal: Verbalizes/displays adequate comfort level or baseline comfort level  Outcome: Progressing     Problem: Safety - Adult  Goal: Free from fall injury  Outcome: Progressing     Problem: Discharge Planning  Goal: Discharge to home or other facility with appropriate resources  Outcome: Progressing     Problem: Chronic Conditions and Co-morbidities  Goal: Patient's chronic conditions and co-morbidity symptoms are monitored and maintained or improved  Outcome: Progressing   The patient's goals for the shift include      The clinical goals for the shift include pt will tolerate ATB    Over the shift, the patient did not make progress toward the following goals. Barriers to progression include . Recommendations to address these barriers include .

## 2025-01-12 NOTE — NURSING NOTE
Patient arrived to the floor and the mother Vee has been very demanding,combative and verbally abusive to staff.  She state I been self medicated patient his seizure medication. She also states that she wants another Attending physician. This nurse ask her to write down his current list of meds and feeding tube orders and she refused at this time.  CN and house supervisor made aware.  Call made to current attending to explain the situation and to get admission orders.

## 2025-01-12 NOTE — NURSING NOTE
"Provided education to patient regarding finding another Attending.  Explain that attending needs to access patient for orders to be placed.  Mother states then Luciakatie will be responsible.  I ask her a third time would she accept a original attending and she said absolutely not \"her  drop my son from his practice and I want nothing to do with any of them.\"  CN made aware  "

## 2025-01-12 NOTE — NURSING NOTE
This am patient mother has been very disrespectful dietary staff, aide and myself.. she has refused meds, turns and any care at this time.  Both CN had to speak with her at this time.

## 2025-01-13 ENCOUNTER — APPOINTMENT (OUTPATIENT)
Dept: RADIOLOGY | Facility: HOSPITAL | Age: 45
End: 2025-01-13
Payer: MEDICAID

## 2025-01-13 ENCOUNTER — APPOINTMENT (OUTPATIENT)
Dept: CARDIOLOGY | Facility: HOSPITAL | Age: 45
End: 2025-01-13
Payer: MEDICAID

## 2025-01-13 LAB
ALBUMIN SERPL BCP-MCNC: 3.7 G/DL (ref 3.4–5)
ANION GAP BLDA CALCULATED.4IONS-SCNC: 12 MMO/L (ref 10–25)
ANION GAP SERPL CALC-SCNC: 10 MMOL/L (ref 10–20)
ATRIAL RATE: 97 BPM
BACTERIA UR CULT: NORMAL
BASE EXCESS BLDA CALC-SCNC: 3.9 MMOL/L (ref -2–3)
BODY TEMPERATURE: 37 DEGREES CELSIUS
BUN SERPL-MCNC: 12 MG/DL (ref 6–23)
CA-I BLDA-SCNC: 1.22 MMOL/L (ref 1.1–1.33)
CALCIUM SERPL-MCNC: 9 MG/DL (ref 8.6–10.3)
CHLORIDE BLDA-SCNC: 100 MMOL/L (ref 98–107)
CHLORIDE SERPL-SCNC: 103 MMOL/L (ref 98–107)
CO2 SERPL-SCNC: 28 MMOL/L (ref 21–32)
CREAT SERPL-MCNC: 0.35 MG/DL (ref 0.5–1.3)
EGFRCR SERPLBLD CKD-EPI 2021: >90 ML/MIN/1.73M*2
ERYTHROCYTE [DISTWIDTH] IN BLOOD BY AUTOMATED COUNT: 15.7 % (ref 11.5–14.5)
GLUCOSE BLDA-MCNC: 137 MG/DL (ref 74–99)
GLUCOSE SERPL-MCNC: 126 MG/DL (ref 74–99)
HCO3 BLDA-SCNC: 27.7 MMOL/L (ref 22–26)
HCT VFR BLD AUTO: 39 % (ref 41–52)
HCT VFR BLD EST: 42 % (ref 41–52)
HGB BLD-MCNC: 13.2 G/DL (ref 13.5–17.5)
HGB BLDA-MCNC: 14 G/DL (ref 13.5–17.5)
INHALED O2 CONCENTRATION: 45 %
LACTATE BLDA-SCNC: 1.4 MMOL/L (ref 0.4–2)
MCH RBC QN AUTO: 30.8 PG (ref 26–34)
MCHC RBC AUTO-ENTMCNC: 33.8 G/DL (ref 32–36)
MCV RBC AUTO: 91 FL (ref 80–100)
NRBC BLD-RTO: 0 /100 WBCS (ref 0–0)
OXYHGB MFR BLDA: 84.4 % (ref 94–98)
P AXIS: 63 DEGREES
P OFFSET: 194 MS
P ONSET: 121 MS
PCO2 BLDA: 38 MM HG (ref 38–42)
PH BLDA: 7.47 PH (ref 7.38–7.42)
PHOSPHATE SERPL-MCNC: 3.8 MG/DL (ref 2.5–4.9)
PLATELET # BLD AUTO: 189 X10*3/UL (ref 150–450)
PO2 BLDA: 53 MM HG (ref 85–95)
POTASSIUM BLDA-SCNC: 4.3 MMOL/L (ref 3.5–5.3)
POTASSIUM SERPL-SCNC: 3.7 MMOL/L (ref 3.5–5.3)
PR INTERVAL: 204 MS
Q ONSET: 223 MS
QRS COUNT: 16 BEATS
QRS DURATION: 110 MS
QT INTERVAL: 350 MS
QTC CALCULATION(BAZETT): 444 MS
QTC FREDERICIA: 410 MS
R AXIS: 4 DEGREES
RBC # BLD AUTO: 4.29 X10*6/UL (ref 4.5–5.9)
SAO2 % BLDA: 87 % (ref 94–100)
SODIUM BLDA-SCNC: 135 MMOL/L (ref 136–145)
SODIUM SERPL-SCNC: 137 MMOL/L (ref 136–145)
SPECIMEN DRAWN FROM PATIENT: ABNORMAL
T AXIS: 58 DEGREES
T OFFSET: 398 MS
VENTRICULAR RATE: 97 BPM
WBC # BLD AUTO: 5.6 X10*3/UL (ref 4.4–11.3)

## 2025-01-13 PROCEDURE — 85027 COMPLETE CBC AUTOMATED: CPT | Performed by: INTERNAL MEDICINE

## 2025-01-13 PROCEDURE — 1200000002 HC GENERAL ROOM WITH TELEMETRY DAILY

## 2025-01-13 PROCEDURE — 2500000004 HC RX 250 GENERAL PHARMACY W/ HCPCS (ALT 636 FOR OP/ED): Performed by: INTERNAL MEDICINE

## 2025-01-13 PROCEDURE — 94664 DEMO&/EVAL PT USE INHALER: CPT

## 2025-01-13 PROCEDURE — 36415 COLL VENOUS BLD VENIPUNCTURE: CPT | Performed by: INTERNAL MEDICINE

## 2025-01-13 PROCEDURE — 84132 ASSAY OF SERUM POTASSIUM: CPT | Performed by: HOSPITALIST

## 2025-01-13 PROCEDURE — 93005 ELECTROCARDIOGRAM TRACING: CPT

## 2025-01-13 PROCEDURE — 2500000002 HC RX 250 W HCPCS SELF ADMINISTERED DRUGS (ALT 637 FOR MEDICARE OP, ALT 636 FOR OP/ED): Performed by: INTERNAL MEDICINE

## 2025-01-13 PROCEDURE — 99232 SBSQ HOSP IP/OBS MODERATE 35: CPT | Performed by: INTERNAL MEDICINE

## 2025-01-13 PROCEDURE — 80069 RENAL FUNCTION PANEL: CPT | Performed by: INTERNAL MEDICINE

## 2025-01-13 PROCEDURE — 2500000005 HC RX 250 GENERAL PHARMACY W/O HCPCS: Performed by: INTERNAL MEDICINE

## 2025-01-13 PROCEDURE — 71045 X-RAY EXAM CHEST 1 VIEW: CPT | Performed by: RADIOLOGY

## 2025-01-13 PROCEDURE — 71045 X-RAY EXAM CHEST 1 VIEW: CPT

## 2025-01-13 PROCEDURE — 31720 CLEARANCE OF AIRWAYS: CPT

## 2025-01-13 PROCEDURE — 2500000001 HC RX 250 WO HCPCS SELF ADMINISTERED DRUGS (ALT 637 FOR MEDICARE OP): Performed by: INTERNAL MEDICINE

## 2025-01-13 PROCEDURE — 94640 AIRWAY INHALATION TREATMENT: CPT

## 2025-01-13 PROCEDURE — 36600 WITHDRAWAL OF ARTERIAL BLOOD: CPT

## 2025-01-13 PROCEDURE — 2500000004 HC RX 250 GENERAL PHARMACY W/ HCPCS (ALT 636 FOR OP/ED): Performed by: PHARMACIST

## 2025-01-13 RX ORDER — VANCOMYCIN HYDROCHLORIDE 1 G/20ML
INJECTION, POWDER, LYOPHILIZED, FOR SOLUTION INTRAVENOUS DAILY PRN
Status: DISCONTINUED | OUTPATIENT
Start: 2025-01-13 | End: 2025-01-20

## 2025-01-13 RX ORDER — SODIUM CHLORIDE FOR INHALATION 3 %
3 VIAL, NEBULIZER (ML) INHALATION EVERY 6 HOURS SCHEDULED
Status: DISCONTINUED | OUTPATIENT
Start: 2025-01-14 | End: 2025-01-14

## 2025-01-13 RX ADMIN — ALBUTEROL SULFATE 2.5 MG: 2.5 SOLUTION RESPIRATORY (INHALATION) at 12:25

## 2025-01-13 RX ADMIN — ACETAMINOPHEN 650 MG: 160 SUSPENSION ORAL at 17:17

## 2025-01-13 RX ADMIN — VANCOMYCIN HYDROCHLORIDE 1250 MG: 1.25 INJECTION, POWDER, LYOPHILIZED, FOR SOLUTION INTRAVENOUS at 01:20

## 2025-01-13 RX ADMIN — CARBOXYMETHYLCELLULOSE SODIUM 1 DROP: 0.5 SOLUTION/ DROPS OPHTHALMIC at 11:03

## 2025-01-13 RX ADMIN — MEROPENEM 1 G: 1 INJECTION, POWDER, FOR SOLUTION INTRAVENOUS at 15:16

## 2025-01-13 RX ADMIN — BACLOFEN 20 MG: 10 TABLET ORAL at 22:12

## 2025-01-13 RX ADMIN — Medication 45 PERCENT: at 21:25

## 2025-01-13 RX ADMIN — CHOLECALCIFEROL (VITAMIN D3) 10 MCG (400 UNIT) TABLET 10 MCG: at 11:03

## 2025-01-13 RX ADMIN — MEROPENEM 1 G: 1 INJECTION, POWDER, FOR SOLUTION INTRAVENOUS at 08:17

## 2025-01-13 RX ADMIN — ALBUTEROL SULFATE 2.5 MG: 2.5 SOLUTION RESPIRATORY (INHALATION) at 07:50

## 2025-01-13 RX ADMIN — BACLOFEN 20 MG: 10 TABLET ORAL at 09:00

## 2025-01-13 RX ADMIN — BACLOFEN 10 MG: 10 TABLET ORAL at 11:02

## 2025-01-13 RX ADMIN — BACITRACIN ZINC, NEOMYCIN SULFATE , POLYMYXIN B SULFATE. 10 APPLICATION: 400; 3.5; 5 OINTMENT TOPICAL at 11:05

## 2025-01-13 RX ADMIN — CARBAMAZEPINE 200 MG: 200 TABLET ORAL at 17:16

## 2025-01-13 RX ADMIN — ALBUTEROL SULFATE 2.5 MG: 2.5 SOLUTION RESPIRATORY (INHALATION) at 20:14

## 2025-01-13 RX ADMIN — LEVETIRACETAM 2000 MG: 100 SOLUTION ORAL at 11:02

## 2025-01-13 RX ADMIN — ASPIRIN 81 MG: 81 TABLET, CHEWABLE ORAL at 11:03

## 2025-01-13 RX ADMIN — CARBAMAZEPINE 200 MG: 200 TABLET ORAL at 06:00

## 2025-01-13 RX ADMIN — CARBAMAZEPINE 200 MG: 200 TABLET ORAL at 22:12

## 2025-01-13 RX ADMIN — BACITRACIN ZINC, NEOMYCIN SULFATE , POLYMYXIN B SULFATE. 10 APPLICATION: 400; 3.5; 5 OINTMENT TOPICAL at 14:51

## 2025-01-13 RX ADMIN — PHENYTOIN 100 MG: 50 TABLET, CHEWABLE ORAL at 11:03

## 2025-01-13 RX ADMIN — PHENYTOIN 100 MG: 50 TABLET, CHEWABLE ORAL at 14:51

## 2025-01-13 RX ADMIN — BACLOFEN 20 MG: 10 TABLET ORAL at 14:57

## 2025-01-13 RX ADMIN — BACITRACIN ZINC, NEOMYCIN SULFATE , POLYMYXIN B SULFATE. 10 APPLICATION: 400; 3.5; 5 OINTMENT TOPICAL at 22:22

## 2025-01-13 RX ADMIN — ACETAMINOPHEN 650 MG: 160 SUSPENSION ORAL at 06:00

## 2025-01-13 RX ADMIN — LEVETIRACETAM 2000 MG: 100 SOLUTION ORAL at 22:13

## 2025-01-13 RX ADMIN — ACETAMINOPHEN 650 MG: 160 SUSPENSION ORAL at 22:21

## 2025-01-13 RX ADMIN — WHITE PETROLATUM 57.7 %-MINERAL OIL 31.9 % EYE OINTMENT: at 09:00

## 2025-01-13 RX ADMIN — VANCOMYCIN HYDROCHLORIDE 1250 MG: 1.25 INJECTION, POWDER, LYOPHILIZED, FOR SOLUTION INTRAVENOUS at 11:38

## 2025-01-13 RX ADMIN — ACETAMINOPHEN 650 MG: 160 SUSPENSION ORAL at 11:08

## 2025-01-13 SDOH — SOCIAL STABILITY: SOCIAL NETWORK: HOW OFTEN DO YOU ATTEND CHURCH OR RELIGIOUS SERVICES?: PATIENT UNABLE TO ANSWER

## 2025-01-13 SDOH — HEALTH STABILITY: PHYSICAL HEALTH
HOW OFTEN DO YOU NEED TO HAVE SOMEONE HELP YOU WHEN YOU READ INSTRUCTIONS, PAMPHLETS, OR OTHER WRITTEN MATERIAL FROM YOUR DOCTOR OR PHARMACY?: PATIENT UNABLE TO RESPOND

## 2025-01-13 SDOH — HEALTH STABILITY: PHYSICAL HEALTH
ON AVERAGE, HOW MANY DAYS PER WEEK DO YOU ENGAGE IN MODERATE TO STRENUOUS EXERCISE (LIKE A BRISK WALK)?: PATIENT UNABLE TO ANSWER

## 2025-01-13 SDOH — ECONOMIC STABILITY: HOUSING INSECURITY: AT ANY TIME IN THE PAST 12 MONTHS, WERE YOU HOMELESS OR LIVING IN A SHELTER (INCLUDING NOW)?: NO

## 2025-01-13 SDOH — ECONOMIC STABILITY: FOOD INSECURITY
WITHIN THE PAST 12 MONTHS, YOU WORRIED THAT YOUR FOOD WOULD RUN OUT BEFORE YOU GOT THE MONEY TO BUY MORE.: PATIENT UNABLE TO ANSWER

## 2025-01-13 SDOH — HEALTH STABILITY: MENTAL HEALTH: HOW OFTEN DO YOU HAVE SIX OR MORE DRINKS ON ONE OCCASION?: NEVER

## 2025-01-13 SDOH — SOCIAL STABILITY: SOCIAL INSECURITY: HAVE YOU HAD THOUGHTS OF HARMING ANYONE ELSE?: UNABLE TO ASSESS

## 2025-01-13 SDOH — SOCIAL STABILITY: SOCIAL NETWORK: IN A TYPICAL WEEK, HOW MANY TIMES DO YOU TALK ON THE PHONE WITH FAMILY, FRIENDS, OR NEIGHBORS?: PATIENT UNABLE TO ANSWER

## 2025-01-13 SDOH — SOCIAL STABILITY: SOCIAL INSECURITY
WITHIN THE LAST YEAR, HAVE YOU BEEN HUMILIATED OR EMOTIONALLY ABUSED IN OTHER WAYS BY YOUR PARTNER OR EX-PARTNER?: PATIENT UNABLE TO ANSWER

## 2025-01-13 SDOH — SOCIAL STABILITY: SOCIAL INSECURITY: HAS ANYONE EVER THREATENED TO HURT YOUR FAMILY OR YOUR PETS?: UNABLE TO ASSESS

## 2025-01-13 SDOH — SOCIAL STABILITY: SOCIAL INSECURITY: HAVE YOU HAD ANY THOUGHTS OF HARMING ANYONE ELSE?: UNABLE TO ASSESS

## 2025-01-13 SDOH — SOCIAL STABILITY: SOCIAL NETWORK: HOW OFTEN DO YOU GET TOGETHER WITH FRIENDS OR RELATIVES?: PATIENT UNABLE TO ANSWER

## 2025-01-13 SDOH — HEALTH STABILITY: PHYSICAL HEALTH: ON AVERAGE, HOW MANY MINUTES DO YOU ENGAGE IN EXERCISE AT THIS LEVEL?: PATIENT UNABLE TO ANSWER

## 2025-01-13 SDOH — HEALTH STABILITY: MENTAL HEALTH: HOW OFTEN DO YOU HAVE A DRINK CONTAINING ALCOHOL?: NEVER

## 2025-01-13 SDOH — SOCIAL STABILITY: SOCIAL INSECURITY: DO YOU FEEL ANYONE HAS EXPLOITED OR TAKEN ADVANTAGE OF YOU FINANCIALLY OR OF YOUR PERSONAL PROPERTY?: UNABLE TO ASSESS

## 2025-01-13 SDOH — SOCIAL STABILITY: SOCIAL INSECURITY: DO YOU FEEL UNSAFE GOING BACK TO THE PLACE WHERE YOU ARE LIVING?: UNABLE TO ASSESS

## 2025-01-13 SDOH — ECONOMIC STABILITY: INCOME INSECURITY
IN THE PAST 12 MONTHS HAS THE ELECTRIC, GAS, OIL, OR WATER COMPANY THREATENED TO SHUT OFF SERVICES IN YOUR HOME?: PATIENT UNABLE TO ANSWER

## 2025-01-13 SDOH — ECONOMIC STABILITY: FOOD INSECURITY
HOW HARD IS IT FOR YOU TO PAY FOR THE VERY BASICS LIKE FOOD, HOUSING, MEDICAL CARE, AND HEATING?: PATIENT UNABLE TO ANSWER

## 2025-01-13 SDOH — SOCIAL STABILITY: SOCIAL INSECURITY
WITHIN THE LAST YEAR, HAVE YOU BEEN KICKED, HIT, SLAPPED, OR OTHERWISE PHYSICALLY HURT BY YOUR PARTNER OR EX-PARTNER?: PATIENT UNABLE TO ANSWER

## 2025-01-13 SDOH — SOCIAL STABILITY: SOCIAL NETWORK
DO YOU BELONG TO ANY CLUBS OR ORGANIZATIONS SUCH AS CHURCH GROUPS, UNIONS, FRATERNAL OR ATHLETIC GROUPS, OR SCHOOL GROUPS?: PATIENT UNABLE TO ANSWER

## 2025-01-13 SDOH — SOCIAL STABILITY: SOCIAL INSECURITY: DOES ANYONE TRY TO KEEP YOU FROM HAVING/CONTACTING OTHER FRIENDS OR DOING THINGS OUTSIDE YOUR HOME?: UNABLE TO ASSESS

## 2025-01-13 SDOH — SOCIAL STABILITY: SOCIAL INSECURITY: ARE YOU OR HAVE YOU BEEN THREATENED OR ABUSED PHYSICALLY, EMOTIONALLY, OR SEXUALLY BY ANYONE?: UNABLE TO ASSESS

## 2025-01-13 SDOH — SOCIAL STABILITY: SOCIAL INSECURITY: ARE YOU MARRIED, WIDOWED, DIVORCED, SEPARATED, NEVER MARRIED, OR LIVING WITH A PARTNER?: PATIENT UNABLE TO ANSWER

## 2025-01-13 SDOH — ECONOMIC STABILITY: FOOD INSECURITY
WITHIN THE PAST 12 MONTHS, THE FOOD YOU BOUGHT JUST DIDN'T LAST AND YOU DIDN'T HAVE MONEY TO GET MORE.: PATIENT UNABLE TO ANSWER

## 2025-01-13 SDOH — SOCIAL STABILITY: SOCIAL INSECURITY: ARE THERE ANY APPARENT SIGNS OF INJURIES/BEHAVIORS THAT COULD BE RELATED TO ABUSE/NEGLECT?: UNABLE TO ASSESS

## 2025-01-13 SDOH — SOCIAL STABILITY: SOCIAL INSECURITY: WITHIN THE LAST YEAR, HAVE YOU BEEN AFRAID OF YOUR PARTNER OR EX-PARTNER?: PATIENT UNABLE TO ANSWER

## 2025-01-13 SDOH — HEALTH STABILITY: MENTAL HEALTH: HOW MANY DRINKS CONTAINING ALCOHOL DO YOU HAVE ON A TYPICAL DAY WHEN YOU ARE DRINKING?: PATIENT DOES NOT DRINK

## 2025-01-13 SDOH — SOCIAL STABILITY: SOCIAL INSECURITY: WERE YOU ABLE TO COMPLETE ALL THE BEHAVIORAL HEALTH SCREENINGS?: NO

## 2025-01-13 SDOH — ECONOMIC STABILITY: HOUSING INSECURITY
IN THE LAST 12 MONTHS, WAS THERE A TIME WHEN YOU WERE NOT ABLE TO PAY THE MORTGAGE OR RENT ON TIME?: PATIENT UNABLE TO ANSWER

## 2025-01-13 SDOH — ECONOMIC STABILITY: TRANSPORTATION INSECURITY
IN THE PAST 12 MONTHS, HAS LACK OF TRANSPORTATION KEPT YOU FROM MEDICAL APPOINTMENTS OR FROM GETTING MEDICATIONS?: PATIENT UNABLE TO ANSWER

## 2025-01-13 SDOH — HEALTH STABILITY: MENTAL HEALTH
DO YOU FEEL STRESS - TENSE, RESTLESS, NERVOUS, OR ANXIOUS, OR UNABLE TO SLEEP AT NIGHT BECAUSE YOUR MIND IS TROUBLED ALL THE TIME - THESE DAYS?: PATIENT UNABLE TO ANSWER

## 2025-01-13 SDOH — SOCIAL STABILITY: SOCIAL INSECURITY
WITHIN THE LAST YEAR, HAVE YOU BEEN RAPED OR FORCED TO HAVE ANY KIND OF SEXUAL ACTIVITY BY YOUR PARTNER OR EX-PARTNER?: PATIENT UNABLE TO ANSWER

## 2025-01-13 SDOH — SOCIAL STABILITY: SOCIAL INSECURITY: ABUSE: ADULT

## 2025-01-13 SDOH — SOCIAL STABILITY: SOCIAL NETWORK: HOW OFTEN DO YOU ATTEND MEETINGS OF THE CLUBS OR ORGANIZATIONS YOU BELONG TO?: PATIENT UNABLE TO ANSWER

## 2025-01-13 ASSESSMENT — ACTIVITIES OF DAILY LIVING (ADL)
ADEQUATE_TO_COMPLETE_ADL: UNABLE TO ASSESS
PATIENT'S MEMORY ADEQUATE TO SAFELY COMPLETE DAILY ACTIVITIES?: UNABLE TO ASSESS
ASSISTIVE_DEVICE: OXYGEN
FEEDING YOURSELF: DEPENDENT
BATHING: DEPENDENT
LACK_OF_TRANSPORTATION: PATIENT UNABLE TO ANSWER
LACK_OF_TRANSPORTATION: PATIENT UNABLE TO ANSWER
HEARING - LEFT EAR: UNABLE TO ASSESS
WALKS IN HOME: DEPENDENT
GROOMING: DEPENDENT
TOILETING: DEPENDENT
JUDGMENT_ADEQUATE_SAFELY_COMPLETE_DAILY_ACTIVITIES: UNABLE TO ASSESS
HEARING - RIGHT EAR: UNABLE TO ASSESS

## 2025-01-13 ASSESSMENT — COGNITIVE AND FUNCTIONAL STATUS - GENERAL
STANDING UP FROM CHAIR USING ARMS: TOTAL
MOVING FROM LYING ON BACK TO SITTING ON SIDE OF FLAT BED WITH BEDRAILS: TOTAL
CLIMB 3 TO 5 STEPS WITH RAILING: TOTAL
DRESSING REGULAR UPPER BODY CLOTHING: TOTAL
DRESSING REGULAR LOWER BODY CLOTHING: TOTAL
DAILY ACTIVITIY SCORE: 6
HELP NEEDED FOR BATHING: TOTAL
WALKING IN HOSPITAL ROOM: TOTAL
MOBILITY SCORE: 6
MOVING TO AND FROM BED TO CHAIR: TOTAL
HELP NEEDED FOR BATHING: TOTAL
DRESSING REGULAR LOWER BODY CLOTHING: TOTAL
TURNING FROM BACK TO SIDE WHILE IN FLAT BAD: TOTAL
PERSONAL GROOMING: TOTAL
DAILY ACTIVITIY SCORE: 6
TOILETING: TOTAL
WALKING IN HOSPITAL ROOM: TOTAL
MOVING FROM LYING ON BACK TO SITTING ON SIDE OF FLAT BED WITH BEDRAILS: TOTAL
EATING MEALS: TOTAL
PERSONAL GROOMING: TOTAL
PATIENT BASELINE BEDBOUND: YES
STANDING UP FROM CHAIR USING ARMS: TOTAL
MOBILITY SCORE: 6
TOILETING: TOTAL
TURNING FROM BACK TO SIDE WHILE IN FLAT BAD: TOTAL
EATING MEALS: TOTAL
CLIMB 3 TO 5 STEPS WITH RAILING: TOTAL
DRESSING REGULAR UPPER BODY CLOTHING: TOTAL
MOVING TO AND FROM BED TO CHAIR: TOTAL

## 2025-01-13 ASSESSMENT — LIFESTYLE VARIABLES
AUDIT-C TOTAL SCORE: 0
HOW MANY STANDARD DRINKS CONTAINING ALCOHOL DO YOU HAVE ON A TYPICAL DAY: PATIENT DOES NOT DRINK
HOW OFTEN DO YOU HAVE 6 OR MORE DRINKS ON ONE OCCASION: NEVER
SKIP TO QUESTIONS 9-10: 1
SUBSTANCE_ABUSE_PAST_12_MONTHS: NO
HOW OFTEN DO YOU HAVE A DRINK CONTAINING ALCOHOL: NEVER
AUDIT-C TOTAL SCORE: 0
SKIP TO QUESTIONS 9-10: 1
AUDIT-C TOTAL SCORE: 0
PRESCIPTION_ABUSE_PAST_12_MONTHS: NO

## 2025-01-13 ASSESSMENT — PAIN - FUNCTIONAL ASSESSMENT
PAIN_FUNCTIONAL_ASSESSMENT: 0-10
PAIN_FUNCTIONAL_ASSESSMENT: 0-10

## 2025-01-13 ASSESSMENT — PAIN SCALES - GENERAL
PAINLEVEL_OUTOF10: 0 - NO PAIN
PAINLEVEL_OUTOF10: 0 - NO PAIN

## 2025-01-13 ASSESSMENT — PAIN SCALES - WONG BAKER: WONGBAKER_NUMERICALRESPONSE: NO HURT

## 2025-01-13 NOTE — CONSULTS
Consults  Reason for Consult:  Evaluation and management of MRSA positive    Patient is seen at the request of Dr. Austen Haley    Subjective   History of Present Illness:  Israel Edgar is a 44 y.o. male who presented with fevers to 102 degrees.  He is sleeping and would not wake up to answer questions.  He apparently developed fevers on the day of admission and was brought into the emergency department.  He was noted at that time to have some leakage around his Boss catheter.  On arrival his temperature was 36.9 and he soon spiked a fever to 38.6.  His white blood cell count is 9.6 his creatinine of 0.4.  COVID-19 test is negative.  An MRSA swab is positive.  Urinalysis showed some pyuria and urine culture shows mixed growth.  He had blood cultures collected.  He went for chest x-ray which showed some left lower lobe infiltrate.  This was followed by CT chest which shows bilateral lower lobe consolidation suggestive of aspiration.  He received a dose of azithromycin.  Has been placed on he has been placed on meropenem and vancomycin.    Past Medical History:   has a past medical history of Acute deep vein thrombosis of arm (Multi), Acute encephalopathy, Acute upper respiratory infection, unspecified, Depression, Dysphagia, Epilepsy, History of MRSA infection, Hypertension, Ileus (Multi), Impacted cerumen, bilateral, Nausea with vomiting, unspecified, Neurogenic bladder, Other conditions influencing health status, Personal history of urinary (tract) infections, Primary chronic progressive multiple sclerosis (Multi), Pulmonary embolism (07/21/2024), Remote history of stroke, Respirator dependent (Multi), Schizophrenia, Spastic quadriplegia (Multi), and Unspecified visual loss.    has a past surgical history that includes Tracheostomy tube placement; Ileostomy (08/05/2015); Other surgical history (09/16/2016); and Gastrostomy.     Social History:   reports that he has never smoked. He has never used smokeless  tobacco. He reports that he does not drink alcohol and does not use drugs.     Family History:  No sick contacts    Review of Systems:  Fevers    Allergies:  Keflex [cephalexin], Levaquin [levofloxacin], Lorazepam, Piperacillin-tazobactam, Adhesive tape-silicones, Lacosamide, Latex, Pantoprazole, Suppository adult [glycerin (adult)], Budesonide, Famotidine, Ipratropium bromide, Lovenox [enoxaparin], Nitrofurantoin, Questran [cholestyramine (with sugar)], Ranitidine, Sulfa (sulfonamide antibiotics), and Sulfamethoxazole-trimethoprim      Objective   Current Facility-Administered Medications   Medication Dose Route Frequency Provider Last Rate Last Admin    acetaminophen (Tylenol) suspension 650 mg  650 mg oral q6h Isrrael Goudiaby, DO   650 mg at 01/13/25 1108    albuterol 2.5 mg /3 mL (0.083 %) nebulizer solution 2.5 mg  2.5 mg nebulization q2h PRN Isrrael Goudiaby, DO        albuterol 2.5 mg /3 mL (0.083 %) nebulizer solution 2.5 mg  2.5 mg nebulization TID Isrrael Goudiaby, DO   2.5 mg at 01/13/25 1225    aspirin chewable tablet 81 mg  81 mg oral Daily Isrrael Goudiaby, DO   81 mg at 01/13/25 1103    baclofen (Lioresal) tablet 10 mg  10 mg oral q AM Isrrael Goudiaby, DO   10 mg at 01/13/25 1102    baclofen (Lioresal) tablet 20 mg  20 mg g-tube TID Isrrael Goudiaby, DO   20 mg at 01/13/25 1457    carBAMazepine (TEGretol) tablet 200 mg  200 mg g-tube 4x daily Isrrael Goudiaby, DO   200 mg at 01/13/25 0600    cholecalciferol (Vitamin D-3) tablet 10 mcg  400 Units oral Daily Isrrael Goudiaby, DO   10 mcg at 01/13/25 1103    levETIRAcetam (Keppra) 100 mg/mL solution 2,000 mg  2,000 mg g-tube BID Isrrael Goudiaby, DO   2,000 mg at 01/13/25 1102    lubricating eye drops ophthalmic solution 1 drop  1 drop Both Eyes BID Isrraelkimberly Tejeda, DO   1 drop at 01/13/25 1103    meropenem (Merrem) 1 g in sodium chloride 0.9%  mL  1 g intravenous q8h Roula Manuel  mL/hr at 01/13/25 1516 1 g at  01/13/25 1516    neomycin-bacitracin-polymyxin (Neosporin) ointment foil packet   Topical TID Isrrael Goudiaby, DO   10 Application at 01/13/25 1451    ondansetron (Zofran) tablet 4 mg  4 mg oral q8h PRN Isrrael Goudiaby, DO        Or    ondansetron (Zofran) injection 4 mg  4 mg intravenous q8h PRN Isrrael Goudiaby, DO        phenytoin (Dilantin) chewable tablet 100 mg  100 mg g-tube TID Isrrael Goudiaby, DO   100 mg at 01/13/25 1451    sennosides-docusate sodium (Niya-Colace) 8.6-50 mg per tablet 2 tablet  2 tablet g-tube BID Isrrael Goudiaby, DO   2 tablet at 01/12/25 2314    vancomycin (Vancocin) 1,250 mg in sodium chloride 0.9%  mL  1,250 mg intravenous q12h Terry Wray PharmD 200 mL/hr at 01/13/25 1138 1,250 mg at 01/13/25 1138    vancomycin (Vancocin) pharmacy to dose - pharmacy monitoring   miscellaneous Daily PRN Austen G Salomone, DO        white petrolatum-mineral oiL (Tears Naturale PM) ophthalmic ointment   ophthalmic (eye) Daily Isrrael Goudiaby, DO   Given at 01/12/25 2314    zinc oxide 20 % ointment 1 Application  1 Application Topical q1h PRN Isrrael Goudiaby, DO           Physical Exam:  /73 (BP Location: Right arm, Patient Position: Lying)   Pulse (!) 111   Temp (!) 37.9 °C (100.3 °F) (Temporal)   Resp 17   Wt 71.5 kg (157 lb 10.1 oz)   SpO2 90%    General: no acute distress, lying in bed, sleeping he would not wake up to answer questions  HEENT: Pharynx only partly examined because his mouth is only partly open  CVS: RRR  Resp: decreased breath sounds in bases, wearing nasal cannula oxygen at 4 L/min  ABD: soft, NT, ND, PEG, ostomy with liquid stool  : Boss  EXT: Wearing heel guards  Skin: no rash; I did not examine his sacrum today so I do not yet know the status of any ulcerations there    Relevant Results:    Labs:  Results from last 72 hours   Lab Units 01/13/25  0715 01/11/25  1809   SODIUM mmol/L 137 135*   POTASSIUM mmol/L 3.7 3.4*   CHLORIDE mmol/L 103 100    BUN mg/dL 12 7   CREATININE mg/dL 0.35* 0.46*   PHOSPHORUS mg/dL 3.8  --      Results from last 72 hours   Lab Units 01/13/25  0715 01/11/25  1809   WBC AUTO x10*3/uL 5.6 9.6   HEMOGLOBIN g/dL 13.2* 13.9   HEMATOCRIT % 39.0* 41.6   PLATELETS AUTO x10*3/uL 189 197     1/11/2025 urinalysis shows no nitrite; 500 leuk esterase    Microbiology data: I have personally and independently reviewed and intrepreted the lab results  1/11/2025 MRSA swab positive  1/11/2025 influenza swab negative; COVID-19 test negative  1/11/2025 urine Legionella antigen negative; urine pneumococcal antigen negative  1/11/2025 blood cultures are pending  1/11/2025 urine culture shows mixed growth    Imaging data: I have personally and independently reviewed and interpreted the imaging studies  1/11/2025 chest x-ray shows left lower lobe infiltrate/atelectasis  1/11/2025 chest CT shows bilateral lower lobe consolidation suggestive of aspiration pneumonia         Assessment/Plan     MY IMPRESSION & RECOMMENDATIONS:  Fevers, which could be due to a variety of causes.  I have personally and independently reviewed and interpreted the laboratory tests, imaging studies, and the documentation from other healthcare providers.  His imaging shows bilateral infiltrates and aspiration pneumonia is a possibility.  He is being managed for acute hypoxic respiratory failure with oxygen supplementation.  His urinalysis shows pyuria so a UTI could be responsible but his urine culture so far only shows mixed growth.  I would recommend that we continue him on meropenem.  I will also keep him on vancomycin since his MRSA swab is positive.  I will monitor for side effects from these antimicrobials which can include rash, diarrhea, bone marrow suppression, and nephrotoxicity.  His prognosis is uncertain due to his multiple comorbidities.    -Continue vancomycin for now and monitor levels  -Continue meropenem  -Await blood culture results  -Await further urine  culture results  -Monitor fever curve    Other issues:  #Hypertension  #Advanced multiple sclerosis  #Neurogenic bladder with chronic Boss  #Dysphagia status post PEG  #Seizure disorder  #History of DVT and PE  #History of tracheostomy  #History of end ileostomy and mucous fistula in 2024         Vic Soria MD

## 2025-01-13 NOTE — H&P
History Of Present Illness  Israel Edgar is a 44 y.o. male with PMH of advanced MS with spasticity at baseline, neurogenic bladder with chronic little, epilepsy with questionable breakthrough seizures, DVT, PE, MRSA colonization, MRDO pneumonia, HTN, quadriplegia, depression, CVA, dysphagia, previous tracheostomy who is presenting to [facility] ED with complaint of increased somnolence x 1 day by proxy.  Patient is intellectually disabled, nonverbal, and get full care by mother without any other caregiver assistance.  As per mother she noted that patient was increasingly somnolent, and on assessment noted a fever of 102.2, she was concerned about UTI, and decided to call EMS who brought the patient to the ED for further evaluation.  Mother stated the symptom was sudden onset on January 10 when she was cleaning patient and finding also ID feeling hot at the same time and thus when she noticed temperature mentioned above, and rest of the vitals showed heart rate of 102, saturation of 99 on room air.  She also noticed some wheezing x 1, has not heard any recurrent wheezing after treatment with nebulized.  Mother stated that she has always been caring for patient, and only put him on a nursing home for respite to undergo TKA for herself, but unfortunately patient became sick, and was transferred to [facility] for SBO, we do lengthy hospital stay that included MICU, as patient subsequently had surgery with ileostomy and diverting ostomy.  She stated that patient has a regimen of 6 medication and feed boluses daily, with food boluses given 3 times a day with liquid Tylenol, baclofen, and Tegretol.  Free water flushes of 150 mL albumin before and after each infusion through PEG tube.  In the ED O2 sat was 93% RA, placed on 2 L O2 via nasal cannula with improvement to 96%, workup with labs and imaging revealed pneumonia with suspicion of UTI as well  Decision made to admit for further management, and was  placed under internal medicine on-call service.    Prior to admission orders been placed for patient being seen, mother requested to switch to another physician attending, with patient being transferred to the hospital service.     Past Medical History  Past Medical History:   Diagnosis Date    Acute deep vein thrombosis of arm (Multi)     Acute encephalopathy     Acute Hypoxic Encephalopathy    Acute upper respiratory infection, unspecified     Depression     Dysphagia     Epilepsy     History of MRSA infection     Hypertension     Ileus (Multi)     Impacted cerumen, bilateral     Nausea with vomiting, unspecified     Neurogenic bladder     Other conditions influencing health status     Lumbar Puncture Traumatic Tap    Personal history of urinary (tract) infections     Primary chronic progressive multiple sclerosis (Multi)     Pulmonary embolism 07/21/2024    Remote history of stroke     Respirator dependent (Multi)     Schizophrenia     Spastic quadriplegia (Multi)     Unspecified visual loss        Surgical History  Past Surgical History:   Procedure Laterality Date    GASTROSTOMY      ILEOSTOMY  08/05/2015    Ileostomy    OTHER SURGICAL HISTORY  09/16/2016    Feeding Tube    TRACHEOSTOMY TUBE PLACEMENT          Social History  He reports that he has never smoked. He has never used smokeless tobacco. He reports that he does not drink alcohol and does not use drugs.    Family History  No family history on file.     Allergies  Keflex [cephalexin], Levaquin [levofloxacin], Lorazepam, Piperacillin-tazobactam, Adhesive tape-silicones, Lacosamide, Latex, Pantoprazole, Suppository adult [glycerin (adult)], Budesonide, Famotidine, Ipratropium bromide, Lovenox [enoxaparin], Nitrofurantoin, Questran [cholestyramine (with sugar)], Ranitidine, Sulfa (sulfonamide antibiotics), and Sulfamethoxazole-trimethoprim    Review of Systems   ROS per mother with symptoms as mentioned in HPI above with a 10 pt review  Physical Exam    "Constitutional: Nonverbal, intellectually disabled  Eyes: PERRLA, clear sclera  ENMT: Not examined  Head / Neck: Atraumatic, normocephalic, supple neck, JVP not visualized  Lungs: Patent airways, CTABL  Heart: RRR, S1S2, no murmurs appreciated, palpable pulses in all extremities  GI: Soft, NT, ND, bowel sounds present in all quadrants, ostomy in place with liquid stool flowing in reservoir connected to ostomy  MSK: Unable to assess range of motion, due to spasticity and nonverbal state with inability to follow command  Extremities: Contractures in all extremities at different level, notable in hands bilaterally  : Indwelling Little catheter in place  Breast: Deferred  Neurological: AAO x 0 unable to complete exam, nonverbal, with inability to follow command  Psychological: Appropriate mood and behavior    Last Recorded Vitals  Blood pressure (!) 114/93, pulse 104, temperature 37 °C (98.6 °F), temperature source Axillary, resp. rate 25, height 1.778 m (5' 10\"), weight 71.5 kg (157 lb 10.1 oz), SpO2 94%.    Relevant Results      Scheduled medications  acetaminophen, 650 mg, oral, q6h  albuterol, 2.5 mg, nebulization, TID  aspirin, 81 mg, oral, Daily  baclofen, 10 mg, oral, q AM  baclofen, 20 mg, g-tube, TID  carBAMazepine, 200 mg, g-tube, 4x daily  cholecalciferol, 400 Units, oral, Daily  levETIRAcetam, 2,000 mg, g-tube, BID  lubricating eye drops, 1 drop, Both Eyes, BID  meropenem, 1 g, intravenous, q8h  neomycin-bacitracnZn-polymyxnB, , Topical, TID  phenytoin, 100 mg, g-tube, TID  sennosides-docusate sodium, 2 tablet, g-tube, BID  vancomycin, 1,250 mg, intravenous, q12h  white petrolatum-mineral oiL, , ophthalmic (eye), Daily      Continuous medications     PRN medications  PRN medications: albuterol, ondansetron **OR** ondansetron, vancomycin, zinc oxide         Assessment/Plan   44 y.o. male with PMH of advanced MS with spasticity at baseline, neurogenic bladder with chronic little, epilepsy with questionable " breakthrough seizures, DVT, PE, MRSA colonization, MRDO pneumonia, HTN, quadriplegia, depression, CVA, dysphagia, previous tracheostomy who is presenting to [facility] ED with complaint of increased somnolence x 1 day by proxy and found with CAP    Community-acquired pneumonia: History of multi resistant drug organism pneumonia  -Meropenem 1 g IV piggyback every 8 hours  -Vancomycin 1250 mg IV piggyback twice daily  -Albuterol via nebulizer as needed shortness of breath or wheezing  -Blood cultures collected in the ED  -Procalcitonin level  -Urine antigen for pneumococcus and Legionella negative  -O2 support as needed to maintain saturation of 94% or greater  -ID consulted: Appreciate evaluation and recommendations    Concern with UTI  -Antibiotic as above  -Urine culture with mixed trisha indicative of contamination    Epilepsy  -Tegretol 200 mg daily given during tube feed bolus, together with baclofen and Tylenol  -Levetiracetam 2000 mg 3 times daily  -Phenytoin 100 mg 3 times daily    Spasticity  -On baclofen    Neurogenic bladder  -Indwelling catheter in place    Hypertension  -Not on any medication, will monitor with vitals    Dysphagia  -N.p.o. with tube feed  -Diverting ostomy in place, connected to bag due to high output    Diet  -Tube feed    DVT prophylaxis  -EMR shows allergies to above heparin and Lovenox    Disposition: Presenting with increased somnolence, found with pneumonia and possible UTI, need further management, discharge pending clinical improvement    Anticipated length of stay greater than 2 midnights    ADOD in 3 days       I spent 65 minutes in the professional and overall care of this patient.      Isrrael Tejeda DO

## 2025-01-13 NOTE — PROGRESS NOTES
Israel Edgar is a 44 y.o. male on day 2 of admission presenting with Pneumonia of both lower lobes due to infectious organism.      Subjective   Patient was seen and examined at bedside this morning, nonverbal, open eyes to voice command.   Nurse reported that patient has a rash on the forehead    Objective     Last Recorded Vitals  /73 (BP Location: Right arm, Patient Position: Lying)   Pulse (!) 111   Temp (!) 37.9 °C (100.3 °F) (Temporal)   Resp 17   Wt 71.5 kg (157 lb 10.1 oz)   SpO2 90%   Intake/Output last 3 Shifts:    Intake/Output Summary (Last 24 hours) at 1/13/2025 1746  Last data filed at 1/13/2025 0439  Gross per 24 hour   Intake 894 ml   Output 650 ml   Net 244 ml       Admission Weight  Weight: 71.5 kg (157 lb 9.6 oz) (01/12/25 0008)    Daily Weight  01/12/25 : 71.5 kg (157 lb 10.1 oz)    Image Results  Electrocardiogram, 12-lead PRN ACS symptoms  Normal sinus rhythm  Incomplete right bundle branch block  Borderline ECG  When compared with ECG of 10-AUG-2024 12:35,  Incomplete right bundle branch block is now Present      Physical Exam  Constitutional: Nonverbal, intellectually disabled  Eyes: PERRLA, clear sclera  ENMT: Not examined  Head / Neck: Atraumatic, normocephalic, supple neck, JVP not visualized  Lungs: Patent airways, CTABL  Heart: RRR, S1S2, no murmurs appreciated, palpable pulses in all extremities  GI: Soft, NT, ND, bowel sounds present in all quadrants, ostomy in place with liquid stool flowing in reservoir connected to ostomy  MSK: Unable to assess range of motion, due to spasticity and nonverbal state with inability to follow command  Extremities: Contractures in all extremities at different level, notable in hands bilaterally  : Indwelling Boss catheter in place  Breast: Deferred  Neurological: AAO x 0 unable to complete exam, nonverbal, with inability to follow command  Psychological: Appropriate mood and behavior     Relevant Results           Scheduled  medications  acetaminophen, 650 mg, oral, q6h  albuterol, 2.5 mg, nebulization, TID  aspirin, 81 mg, oral, Daily  baclofen, 10 mg, oral, q AM  baclofen, 20 mg, g-tube, TID  carBAMazepine, 200 mg, g-tube, 4x daily  cholecalciferol, 400 Units, oral, Daily  levETIRAcetam, 2,000 mg, g-tube, BID  lubricating eye drops, 1 drop, Both Eyes, BID  meropenem, 1 g, intravenous, q8h  neomycin-bacitracnZn-polymyxnB, , Topical, TID  phenytoin, 100 mg, g-tube, TID  sennosides-docusate sodium, 2 tablet, g-tube, BID  vancomycin, 1,250 mg, intravenous, q12h  white petrolatum-mineral oiL, , ophthalmic (eye), Daily      Continuous medications     PRN medications  PRN medications: albuterol, diphenhydramine-zinc acetate, ondansetron **OR** ondansetron, vancomycin, zinc oxide    Assessment/Plan          This patient has a urinary catheter   Reason for the urinary catheter remaining today? neurogenic bladder      44 y.o. male with PMH of advanced MS with spasticity at baseline, neurogenic bladder with chronic little, epilepsy with questionable breakthrough seizures, DVT, PE, MRSA colonization, MRDO pneumonia, HTN, quadriplegia, depression, CVA, dysphagia, previous tracheostomy who is presenting to [facility] ED with complaint of increased somnolence x 1 day by proxy and found with CAP     Community-acquired pneumonia: History of multi resistant drug organism pneumonia  -Meropenem 1 g IV piggyback every 8 hours  -Vancomycin 1250 mg IV piggyback twice daily  -Albuterol via nebulizer as needed shortness of breath or wheezing  -Blood cultures collected in the ED  -Procalcitonin level  -Urine antigen for pneumococcus and Legionella negative  -O2 support as needed to maintain saturation of 94% or greater  -ID consulted: Appreciate evaluation and recommendations as below  Continue vancomycin for now and monitor levels  -Continue meropenem  -Await blood culture results  -Await further urine culture results  -Monitor fever curve      Concern with UTI  -Antibiotic as above  -Urine culture with mixed trisha indicative of contamination     Epilepsy  -Tegretol 200 mg daily given during tube feed bolus, together with baclofen and Tylenol  -Levetiracetam 2000 mg 3 times daily  -Phenytoin 100 mg 3 times daily     Spasticity  -On baclofen     Neurogenic bladder  -Indwelling catheter in place     Hypertension  -Not on any medication, will monitor with vitals     Dysphagia  -N.p.o. with tube feed  -Diverting ostomy in place, connected to bag due to high output     Diet  -Tube feed     DVT prophylaxis  -EMR shows allergies to above heparin and Lovenox     Disposition: Presenting with increased somnolence, found with pneumonia and possible UTI, need further management, discharge pending clinical improvement     Anticipated length of stay greater than 2 midnights     ADOD in 2 days          Isrrael Tejeda DO

## 2025-01-13 NOTE — PROGRESS NOTES
01/13/25 1450   Discharge Planning   Expected Discharge Disposition Home     [facility] will not accept pt for tube feed supplies. Referral sent to [facility]. [facility] able [facility] pt. Pt mom is aware.

## 2025-01-13 NOTE — PROGRESS NOTES
Pharmacy Medication History     Source of Information: Per Pt. Mother over the phone also spoke with Pt. Pharmacy for last fills Per. Mother Pt. Took all scheduled meds 01/10/25 before coming to the ER except baclofen and tegretol.     Additional concerns with the patient's PTA list.   N/A  The following updates were made to the Prior to Admission medication list:     Medications ADDED:   N/A  Medications CHANGED:  N/A  Medications REMOVED:   N/A  Medications NOT TAKING:   Cholestyramine 4 gram packet   Selenium Sulfide 2.5 % Shampoo      Allergy reviewed : Yes    Meds 2 Beds : No    Outpatient pharmacy confirmed and updated in chart : Yes    Pharmacy name: [facility] ( [facility] )    The list below reflectives the updated PTA list. Please review each medication in order reconciliation for additional clarification and justification.    Prior to Admission Medications   Prescriptions Last Dose Informant Patient Reported? Taking?   acetaminophen 160 mg/5 mL (5 mL) suspension 1/10/2025  No Yes   Sig: Take 20.5 mL (650 mg) by mouth every 6 hours. Pt takes med at : 9a/3p/10p/3a   albuterol 2.5 mg /3 mL (0.083 %) nebulizer solution 1/10/2025  No Yes   Sig: Take 3 mL (2.5 mg) by nebulization every 6 hours if needed for wheezing.   alcohol swabs (Alcohol Pads) pads, medicated   No No   Sig: Use to give lovenox daily   aspirin 81 mg chewable tablet 2025 Morning  No Yes   Sig: Chew 1 tablet (81 mg) once daily.   baclofen (Lioresal) 10 mg tablet 2025  No Yes   Sig: TAKE 1 TABLET DAILY  With food at 10am   baclofen (Lioresal) 20 mg tablet 2025  No Yes   Si tablet (20 mg) by g-tube route 3 times a day. With food at 10am,- 4pm- 10pm   carBAMazepine (TEGretol) 200 mg/10 mL suspension 1/10/2025 Morning  Yes Yes   Sig: Take 10 mL (200 mg) by mouth 4 times a day.   carboxymethylcellulose (Refresh Plus) 0.5 % ophthalmic solution 1/10/2025  Yes Yes   Sig: Administer 2 drops into both eyes 2 times a day.  "  cholecalciferol (Vitamin D-3) 10 mcg/mL (400 unit/mL) drops 1/10/2025  No Yes   Sig: TAKE 10ML VIA G-TUBE ONCE DAILY   cholestyramine (Questran) 4 gram packet   No No   Sig: Take 1 packet (4 g) by mouth 2 times a day.   Patient not taking: Reported on 2024   gauze bandage (Band-Aid Gauze Pads) 2 X 2 \" bandage   No No   Sig: As needed   gauze bandage (Gauze Pad) 4 X 4 \" bandage   No No   Sig: Use as needed   gauze bandage 4 X 4 \" bandage   No No   Sig: Use split 4 X 4 guaze to cover the ileostomy/ PEG tube as needed   levETIRAcetam 100 mg/mL solution 1/10/2025  No No   Si mL (2,000 mg) by g-tube route 2 times a day. Pt takes this medication at 0900 and 2100.   lubricating eye drops ophthalmic solution   No No   Sig: Administer 1 drop into both eyes 2 times a day.   medical supply, miscellaneous (MISCELLANEOUS MEDICAL SUPPLY MISC)  Mother Yes No   Sig: Apply topically. Water flushes-give 300 mL with each med Pass [150 mL before and 150 mL after]   miscellaneous medical supply kit   No No   SiML SUCTION CANISTERS x3 VENT CONTROL yANKERS x4   [facility]   No No   Sig: Use split 4 X 4 guaze to cover the ileostomy/ PEG tube as needed   nasal spray midazolam (Versed) 5 mg/spray (0.1 mL) spray,non-aerosol   No No   Sig: Use one spray in one nostril for convulsive seizure lasting longer than 5 minutes. May repeat a in the other nostril after 5 minutes if convulsive seizures still ongoing CALL 911   nebulizer accessories misc   No No   Sig: Use as instructed   nebulizer and compressor device   No No   Sig: Uses as instructed   neomycin-bacitracnZn-polymyxnB (Neosporin) 3.5-400-5,000 mg-unit-unit ointment in packet ointment   No No   Sig: Apply topically 3 times a day.   nutritional supplements (Osmolite 1.5 Oli) 0.06 gram-1.5 kcal/mL liquid   No No   Sig: Use 3 times daily   nutritional supplements (Osmolite 1.5 Oli) 0.06 gram-1.5 kcal/mL liquid   No No   Si mL by per ostomy " "route 3 times a day.   ostomy supplies misc   No No   Sig: Pouch: [place]: New image two 1/4 beige lock and roll, drainable pouch #[id number] and new image to 1/4 inches high output, [employer] pouch # 1/8/2001 330-day use-2 boxes; wafer: [place]: New image Cera plus 2 1/4 inches convex, with tape #[id number], new image Cera plus to 1/4 inch flat with tape #[id number] 30-day use-2 boxes, adhesive remover's: [place] adapt wipes #7760 30-day use-2 boxes, drainage bag/systems: Covidien/[place] urine drainage bag # [id number] 30-day use-2 units, moldable ring: [place] Cera ring slim #8815 30 days use-2 boxes   phenytoin 25 mg/mL suspension 1/10/2025  No Yes   Sig: GIVE \"BRIGHT\" 4 ML BY PEG TUBE THREE TIMES DAILY AT 9:00 AM, 3:00 PM AND AT 9:00 PM   selenium sulfide (Selsun) 2.5 % shampoo Not Taking  No No   Sig: Apply topically 2 times a week.   Patient not taking: Reported on 1/13/2025   syringe, disposable, (Easy Falcon Catheter Tip Syring) 60 mL syringe   No No   Sig: Use as needed .   white petrolatum-mineral oiL (Lubrifresh PM) 94-3 % ophthalmic ointment   No No   Sig: Apply to affected eye(s) once daily.      Facility-Administered Medications: None       The list below reflectives the updated allergy list. Please review each documented allergy for additional clarification and justification.    Allergies   Allergen Reactions    Keflex [Cephalexin] Seizure     Diarrhea; seizure activity, rash, and redness    Levaquin [Levofloxacin] Seizure    Lorazepam Seizure     Increased agitation and complex seizures (tonic clonic)    Piperacillin-Tazobactam Other     erythema multiforme    Adhesive Tape-Silicones Hives    Lacosamide Other     Mother states lethargy, and non responsiveness to stimuli; will not allow this medication    Latex Other     Increased urinary infections    Pantoprazole Diarrhea    Suppository Adult [Glycerin (Adult)] Other     Mom doesnot want; says previous GI said no suppository     Budesonide Rash    Famotidine " Diarrhea    Ipratropium Bromide Rash     Mother states facial rash and tachycardia    Lovenox [Enoxaparin] Rash    Nitrofurantoin Rash    Questran [Cholestyramine (With Sugar)] Rash    Ranitidine Rash    Sulfa (Sulfonamide Antibiotics) Hives and Rash    Sulfamethoxazole-Trimethoprim Hives and Rash          01/13/25 at 12:35 PM - Elsi Ash

## 2025-01-13 NOTE — NURSING NOTE
Rapid Response RN Note    The following patient has a RADAR score of 2. Unit: UA5, Room: [address] [place], [place] [address]A, T: (!) 38.6 °C (101.5 °F) (Axillary), P: 80, R: 22, BP: 115/77, PulseOx: 95%      This RN Reviewed above VS with bedside charge RN.

## 2025-01-13 NOTE — PROGRESS NOTES
01/13/25 1005   Discharge Planning   Living Arrangements Family members;Other (Comment)   Support Systems Parent   Assistance Needed total   Type of Residence Private residence   Expected Discharge Disposition Home   Does the patient need discharge transport arranged? Yes   RoundTrip coordination needed? Yes   Has discharge transport been arranged? No   Financial Resource Strain   How hard is it for you to pay for the very basics like food, housing, medical care, and heating? Pt Unable   Housing Stability   In the last 12 months, was there a time when you were not able to pay the mortgage or rent on time? Pt Unable   At any time in the past 12 months, were you homeless or living in a shelter (including now)? Pt Unable   Transportation Needs   In the past 12 months, has lack of transportation kept you from medical appointments or from getting medications? Pt Unable   In the past 12 months, has lack of transportation kept you from meetings, work, or from getting things needed for daily living? Pt Unable   Intensity of Service   Intensity of Service 0-30 min     TCC met with pt vilma Baxter on the phone. Explained my role as discharge planner Pt mom stated she is having trouble getting tube feed and supplies. T[facility] sent referral to [facility]. Mom stated no other supplies are needed.

## 2025-01-13 NOTE — CARE PLAN
The patient's goals for the shift include pt remains safe and free of falls    The clinical goals for the shift include Maintain pt. comfort    Over the shift, the patient did not make progress toward the following goals. Barriers to progression include Patient's mental status and limited mobility. Recommendations to address these barriers include frequent rounding and repositioning.

## 2025-01-13 NOTE — PROGRESS NOTES
Vancomycin Dosing by Pharmacy- INITIAL    Israel Edgar is a 44 y.o. year old male who Pharmacy has been consulted for vancomycin dosing for pneumonia, MRSA positive. Based on the patient's indication and renal status this patient will be dosed based on a goal AUC of 400-600.     Renal function is currently stable. Likely falsely elevated due to history of MS with Quadraplegia    Visit Vitals  /77 (BP Location: Left arm, Patient Position: Lying)   Pulse 80   Temp (!) 38.6 °C (101.5 °F) (Axillary)   Resp 22        Lab Results   Component Value Date    CREATININE 0.46 (L) 2025    CREATININE 0.30 (L) 2024    CREATININE 0.30 (L) 2024    CREATININE 0.34 (L) 2024        Patient weight is as follows:   Vitals:    25 1725   Weight: 71.5 kg (157 lb 10.1 oz)       Cultures:  No results found for the encounter in last 14 days.        I/O last 3 completed shifts:  In: 600 (8.4 mL/kg) [IV Piggyback:600]  Out: 1250 (17.5 mL/kg) [Urine:900 (0.3 mL/kg/hr); Stool:350]  Weight: 71.5 kg   I/O during current shift:  I/O this shift:  In: 420 [NG/GT:420]  Out: 150 [Urine:150]    Temp (24hrs), Av.3 °C (99.1 °F), Min:36.7 °C (98.1 °F), Max:38.6 °C (101.5 °F)         Assessment/Plan     Patient has already been given a loading dose of 1500 mg on  @ 0300  Will initiate vancomycin maintenance,  1250 mg every 12 hours.     This dosing regimen is predicted by [employer] to result in the following pharmacokinetic parameters:  AUC24,ss: 462 mg/L.hr  Probability of AUC24 > 400: 65 %  Ctrough,ss: 12.7 mg/L  Probability of Ctrough,ss > 20: 20 %    Follow-up level will be ordered on  at 0500 unless clinically indicated sooner.  Will continue to monitor renal function daily while on vancomycin and order serum creatinine at least every 48 hours if not already ordered.  Follow for continued vancomycin needs, clinical response, and signs/symptoms of toxicity.       Terry Wray, PharmD

## 2025-01-14 LAB
ALBUMIN SERPL BCP-MCNC: 4 G/DL (ref 3.4–5)
ANION GAP SERPL CALC-SCNC: 14 MMOL/L (ref 10–20)
ANION GAP SERPL CALC-SCNC: 14 MMOL/L (ref 10–20)
BASOPHILS # BLD AUTO: 0.03 X10*3/UL (ref 0–0.1)
BASOPHILS NFR BLD AUTO: 0.4 %
BUN SERPL-MCNC: 10 MG/DL (ref 6–23)
BUN SERPL-MCNC: 9 MG/DL (ref 6–23)
CALCIUM SERPL-MCNC: 9.1 MG/DL (ref 8.6–10.3)
CALCIUM SERPL-MCNC: 9.5 MG/DL (ref 8.6–10.3)
CARDIAC TROPONIN I PNL SERPL HS: 3 NG/L (ref 0–20)
CARDIAC TROPONIN I PNL SERPL HS: 4 NG/L (ref 0–20)
CHLORIDE SERPL-SCNC: 97 MMOL/L (ref 98–107)
CHLORIDE SERPL-SCNC: 99 MMOL/L (ref 98–107)
CO2 SERPL-SCNC: 29 MMOL/L (ref 21–32)
CO2 SERPL-SCNC: 29 MMOL/L (ref 21–32)
CREAT SERPL-MCNC: 0.39 MG/DL (ref 0.5–1.3)
CREAT SERPL-MCNC: 0.42 MG/DL (ref 0.5–1.3)
EGFRCR SERPLBLD CKD-EPI 2021: >90 ML/MIN/1.73M*2
EGFRCR SERPLBLD CKD-EPI 2021: >90 ML/MIN/1.73M*2
EOSINOPHIL # BLD AUTO: 0.02 X10*3/UL (ref 0–0.7)
EOSINOPHIL NFR BLD AUTO: 0.3 %
ERYTHROCYTE [DISTWIDTH] IN BLOOD BY AUTOMATED COUNT: 15.8 % (ref 11.5–14.5)
ERYTHROCYTE [DISTWIDTH] IN BLOOD BY AUTOMATED COUNT: 16 % (ref 11.5–14.5)
GLUCOSE SERPL-MCNC: 109 MG/DL (ref 74–99)
GLUCOSE SERPL-MCNC: 133 MG/DL (ref 74–99)
HCT VFR BLD AUTO: 41.5 % (ref 41–52)
HCT VFR BLD AUTO: 43 % (ref 41–52)
HGB BLD-MCNC: 14 G/DL (ref 13.5–17.5)
HGB BLD-MCNC: 14.4 G/DL (ref 13.5–17.5)
IMM GRANULOCYTES # BLD AUTO: 0.04 X10*3/UL (ref 0–0.7)
IMM GRANULOCYTES NFR BLD AUTO: 0.6 % (ref 0–0.9)
LYMPHOCYTES # BLD AUTO: 0.88 X10*3/UL (ref 1.2–4.8)
LYMPHOCYTES NFR BLD AUTO: 12.4 %
MCH RBC QN AUTO: 30.4 PG (ref 26–34)
MCH RBC QN AUTO: 31 PG (ref 26–34)
MCHC RBC AUTO-ENTMCNC: 33.5 G/DL (ref 32–36)
MCHC RBC AUTO-ENTMCNC: 33.7 G/DL (ref 32–36)
MCV RBC AUTO: 91 FL (ref 80–100)
MCV RBC AUTO: 92 FL (ref 80–100)
MONOCYTES # BLD AUTO: 0.81 X10*3/UL (ref 0.1–1)
MONOCYTES NFR BLD AUTO: 11.4 %
NEUTROPHILS # BLD AUTO: 5.3 X10*3/UL (ref 1.2–7.7)
NEUTROPHILS NFR BLD AUTO: 74.9 %
NRBC BLD-RTO: 0 /100 WBCS (ref 0–0)
NRBC BLD-RTO: 0 /100 WBCS (ref 0–0)
PHOSPHATE SERPL-MCNC: 3.1 MG/DL (ref 2.5–4.9)
PLATELET # BLD AUTO: 219 X10*3/UL (ref 150–450)
PLATELET # BLD AUTO: 228 X10*3/UL (ref 150–450)
POTASSIUM SERPL-SCNC: 4.1 MMOL/L (ref 3.5–5.3)
POTASSIUM SERPL-SCNC: 4.5 MMOL/L (ref 3.5–5.3)
PROCALCITONIN SERPL-MCNC: 1.02 NG/ML
RBC # BLD AUTO: 4.51 X10*6/UL (ref 4.5–5.9)
RBC # BLD AUTO: 4.74 X10*6/UL (ref 4.5–5.9)
SODIUM SERPL-SCNC: 136 MMOL/L (ref 136–145)
SODIUM SERPL-SCNC: 137 MMOL/L (ref 136–145)
VANCOMYCIN SERPL-MCNC: 14.8 UG/ML (ref 5–20)
WBC # BLD AUTO: 7.1 X10*3/UL (ref 4.4–11.3)
WBC # BLD AUTO: 7.6 X10*3/UL (ref 4.4–11.3)

## 2025-01-14 PROCEDURE — 84484 ASSAY OF TROPONIN QUANT: CPT | Performed by: HOSPITALIST

## 2025-01-14 PROCEDURE — 2500000005 HC RX 250 GENERAL PHARMACY W/O HCPCS: Performed by: INTERNAL MEDICINE

## 2025-01-14 PROCEDURE — 2500000001 HC RX 250 WO HCPCS SELF ADMINISTERED DRUGS (ALT 637 FOR MEDICARE OP): Performed by: INTERNAL MEDICINE

## 2025-01-14 PROCEDURE — 36415 COLL VENOUS BLD VENIPUNCTURE: CPT | Performed by: HOSPITALIST

## 2025-01-14 PROCEDURE — 2500000004 HC RX 250 GENERAL PHARMACY W/ HCPCS (ALT 636 FOR OP/ED): Performed by: INTERNAL MEDICINE

## 2025-01-14 PROCEDURE — 2500000004 HC RX 250 GENERAL PHARMACY W/ HCPCS (ALT 636 FOR OP/ED): Mod: JZ | Performed by: PHARMACIST

## 2025-01-14 PROCEDURE — 80202 ASSAY OF VANCOMYCIN: CPT | Performed by: PHARMACIST

## 2025-01-14 PROCEDURE — 99233 SBSQ HOSP IP/OBS HIGH 50: CPT | Performed by: INTERNAL MEDICINE

## 2025-01-14 PROCEDURE — 94669 MECHANICAL CHEST WALL OSCILL: CPT

## 2025-01-14 PROCEDURE — 85027 COMPLETE CBC AUTOMATED: CPT | Performed by: INTERNAL MEDICINE

## 2025-01-14 PROCEDURE — 31720 CLEARANCE OF AIRWAYS: CPT

## 2025-01-14 PROCEDURE — 94664 DEMO&/EVAL PT USE INHALER: CPT

## 2025-01-14 PROCEDURE — 84145 PROCALCITONIN (PCT): CPT | Mod: AHULAB | Performed by: INTERNAL MEDICINE

## 2025-01-14 PROCEDURE — 80048 BASIC METABOLIC PNL TOTAL CA: CPT | Performed by: HOSPITALIST

## 2025-01-14 PROCEDURE — 94640 AIRWAY INHALATION TREATMENT: CPT

## 2025-01-14 PROCEDURE — 5A0945A ASSISTANCE WITH RESPIRATORY VENTILATION, 24-96 CONSECUTIVE HOURS, HIGH NASAL FLOW/VELOCITY: ICD-10-PCS | Performed by: INTERNAL MEDICINE

## 2025-01-14 PROCEDURE — 1200000002 HC GENERAL ROOM WITH TELEMETRY DAILY

## 2025-01-14 PROCEDURE — 80048 BASIC METABOLIC PNL TOTAL CA: CPT | Mod: CCI | Performed by: INTERNAL MEDICINE

## 2025-01-14 PROCEDURE — 2500000002 HC RX 250 W HCPCS SELF ADMINISTERED DRUGS (ALT 637 FOR MEDICARE OP, ALT 636 FOR OP/ED): Performed by: INTERNAL MEDICINE

## 2025-01-14 PROCEDURE — 85025 COMPLETE CBC W/AUTO DIFF WBC: CPT | Performed by: HOSPITALIST

## 2025-01-14 RX ORDER — BACLOFEN 10 MG/1
30 TABLET ORAL 3 TIMES DAILY
Status: DISCONTINUED | OUTPATIENT
Start: 2025-01-14 | End: 2025-01-17

## 2025-01-14 RX ORDER — SODIUM CHLORIDE FOR INHALATION 3 %
3 VIAL, NEBULIZER (ML) INHALATION
Status: DISCONTINUED | OUTPATIENT
Start: 2025-01-14 | End: 2025-01-17

## 2025-01-14 RX ADMIN — PHENYTOIN 100 MG: 50 TABLET, CHEWABLE ORAL at 21:46

## 2025-01-14 RX ADMIN — BACITRACIN ZINC, NEOMYCIN SULFATE , POLYMYXIN B SULFATE. 10 APPLICATION: 400; 3.5; 5 OINTMENT TOPICAL at 17:24

## 2025-01-14 RX ADMIN — Medication 45 L/MIN: at 00:08

## 2025-01-14 RX ADMIN — CARBOXYMETHYLCELLULOSE SODIUM 1 DROP: 0.5 SOLUTION/ DROPS OPHTHALMIC at 15:11

## 2025-01-14 RX ADMIN — LEVETIRACETAM 2000 MG: 100 SOLUTION ORAL at 09:18

## 2025-01-14 RX ADMIN — PHENYTOIN 100 MG: 50 TABLET, CHEWABLE ORAL at 01:57

## 2025-01-14 RX ADMIN — Medication 45 L/MIN: at 03:27

## 2025-01-14 RX ADMIN — CARBAMAZEPINE 200 MG: 200 TABLET ORAL at 12:12

## 2025-01-14 RX ADMIN — WHITE PETROLATUM 57.7 %-MINERAL OIL 31.9 % EYE OINTMENT: at 09:38

## 2025-01-14 RX ADMIN — Medication 456 L/MIN: at 07:42

## 2025-01-14 RX ADMIN — CARBOXYMETHYLCELLULOSE SODIUM 1 DROP: 0.5 SOLUTION/ DROPS OPHTHALMIC at 09:16

## 2025-01-14 RX ADMIN — SODIUM CHLORIDE SOLN NEBU 3% 3 ML: 3 NEBU SOLN at 07:52

## 2025-01-14 RX ADMIN — BACITRACIN ZINC, NEOMYCIN SULFATE , POLYMYXIN B SULFATE.: 400; 3.5; 5 OINTMENT TOPICAL at 21:56

## 2025-01-14 RX ADMIN — SODIUM CHLORIDE SOLN NEBU 3% 3 ML: 3 NEBU SOLN at 00:08

## 2025-01-14 RX ADMIN — ACETAMINOPHEN 650 MG: 160 SUSPENSION ORAL at 11:43

## 2025-01-14 RX ADMIN — CARBAMAZEPINE 200 MG: 200 TABLET ORAL at 06:48

## 2025-01-14 RX ADMIN — CHOLECALCIFEROL (VITAMIN D3) 10 MCG (400 UNIT) TABLET 10 MCG: at 09:16

## 2025-01-14 RX ADMIN — ALBUTEROL SULFATE 2.5 MG: 2.5 SOLUTION RESPIRATORY (INHALATION) at 07:42

## 2025-01-14 RX ADMIN — MEROPENEM 1 G: 1 INJECTION, POWDER, FOR SOLUTION INTRAVENOUS at 09:19

## 2025-01-14 RX ADMIN — BACLOFEN 30 MG: 10 TABLET ORAL at 21:46

## 2025-01-14 RX ADMIN — MEROPENEM 1 G: 1 INJECTION, POWDER, FOR SOLUTION INTRAVENOUS at 01:29

## 2025-01-14 RX ADMIN — PHENYTOIN 100 MG: 50 TABLET, CHEWABLE ORAL at 09:17

## 2025-01-14 RX ADMIN — BACLOFEN 10 MG: 10 TABLET ORAL at 09:38

## 2025-01-14 RX ADMIN — ACETAMINOPHEN 650 MG: 160 SUSPENSION ORAL at 22:10

## 2025-01-14 RX ADMIN — SODIUM CHLORIDE SOLN NEBU 3% 3 ML: 3 NEBU SOLN at 20:41

## 2025-01-14 RX ADMIN — MEROPENEM 1 G: 1 INJECTION, POWDER, FOR SOLUTION INTRAVENOUS at 17:23

## 2025-01-14 RX ADMIN — CARBAMAZEPINE 200 MG: 200 TABLET ORAL at 21:46

## 2025-01-14 RX ADMIN — PHENYTOIN 100 MG: 50 TABLET, CHEWABLE ORAL at 15:05

## 2025-01-14 RX ADMIN — BACLOFEN 20 MG: 10 TABLET ORAL at 09:17

## 2025-01-14 RX ADMIN — ASPIRIN 81 MG: 81 TABLET, CHEWABLE ORAL at 09:17

## 2025-01-14 RX ADMIN — VANCOMYCIN HYDROCHLORIDE 1250 MG: 1.25 INJECTION, POWDER, LYOPHILIZED, FOR SOLUTION INTRAVENOUS at 02:02

## 2025-01-14 RX ADMIN — ACETAMINOPHEN 650 MG: 160 SUSPENSION ORAL at 17:36

## 2025-01-14 RX ADMIN — BACITRACIN ZINC, NEOMYCIN SULFATE , POLYMYXIN B SULFATE. 10 APPLICATION: 400; 3.5; 5 OINTMENT TOPICAL at 09:15

## 2025-01-14 RX ADMIN — ALBUTEROL SULFATE 2.5 MG: 2.5 SOLUTION RESPIRATORY (INHALATION) at 11:41

## 2025-01-14 RX ADMIN — ACETAMINOPHEN 650 MG: 160 SUSPENSION ORAL at 06:48

## 2025-01-14 RX ADMIN — LEVETIRACETAM 2000 MG: 100 SOLUTION ORAL at 21:46

## 2025-01-14 RX ADMIN — ALBUTEROL SULFATE 2.5 MG: 2.5 SOLUTION RESPIRATORY (INHALATION) at 20:29

## 2025-01-14 RX ADMIN — VANCOMYCIN HYDROCHLORIDE 1750 MG: 5 INJECTION, POWDER, LYOPHILIZED, FOR SOLUTION INTRAVENOUS at 14:56

## 2025-01-14 RX ADMIN — SODIUM CHLORIDE SOLN NEBU 3% 3 ML: 3 NEBU SOLN at 11:51

## 2025-01-14 RX ADMIN — CARBAMAZEPINE 200 MG: 200 TABLET ORAL at 17:22

## 2025-01-14 SDOH — ECONOMIC STABILITY: HOUSING INSECURITY: AT ANY TIME IN THE PAST 12 MONTHS, WERE YOU HOMELESS OR LIVING IN A SHELTER (INCLUDING NOW)?: PATIENT UNABLE TO ANSWER

## 2025-01-14 SDOH — SOCIAL STABILITY: SOCIAL INSECURITY: WITHIN THE LAST YEAR, HAVE YOU BEEN AFRAID OF YOUR PARTNER OR EX-PARTNER?: PATIENT UNABLE TO ANSWER

## 2025-01-14 SDOH — ECONOMIC STABILITY: HOUSING INSECURITY: IN THE PAST 12 MONTHS, HOW MANY TIMES HAVE YOU MOVED WHERE YOU WERE LIVING?: 1

## 2025-01-14 ASSESSMENT — PAIN SCALES - GENERAL: PAINLEVEL_OUTOF10: 0 - NO PAIN

## 2025-01-14 ASSESSMENT — COGNITIVE AND FUNCTIONAL STATUS - GENERAL
DRESSING REGULAR UPPER BODY CLOTHING: TOTAL
EATING MEALS: TOTAL
MOVING TO AND FROM BED TO CHAIR: TOTAL
TOILETING: TOTAL
STANDING UP FROM CHAIR USING ARMS: TOTAL
DRESSING REGULAR LOWER BODY CLOTHING: TOTAL
MOVING FROM LYING ON BACK TO SITTING ON SIDE OF FLAT BED WITH BEDRAILS: TOTAL
DAILY ACTIVITIY SCORE: 6
MOBILITY SCORE: 6
PERSONAL GROOMING: TOTAL
HELP NEEDED FOR BATHING: TOTAL
WALKING IN HOSPITAL ROOM: TOTAL
CLIMB 3 TO 5 STEPS WITH RAILING: TOTAL
TURNING FROM BACK TO SIDE WHILE IN FLAT BAD: TOTAL

## 2025-01-14 ASSESSMENT — ACTIVITIES OF DAILY LIVING (ADL)
PATIENT'S MEMORY ADEQUATE TO SAFELY COMPLETE DAILY ACTIVITIES?: UNABLE TO ASSESS
JUDGMENT_ADEQUATE_SAFELY_COMPLETE_DAILY_ACTIVITIES: UNABLE TO ASSESS
HEARING - LEFT EAR: UNABLE TO ASSESS
BATHING: DEPENDENT
FEEDING YOURSELF: DEPENDENT
WALKS IN HOME: DEPENDENT
GROOMING: DEPENDENT
HEARING - RIGHT EAR: UNABLE TO ASSESS
TOILETING: DEPENDENT
LACK_OF_TRANSPORTATION: PATIENT UNABLE TO ANSWER
ASSISTIVE_DEVICE: OXYGEN;WHEELCHAIR
DRESSING YOURSELF: DEPENDENT
ADEQUATE_TO_COMPLETE_ADL: UNABLE TO ASSESS

## 2025-01-14 NOTE — PROGRESS NOTES
Vancomycin Dosing by Pharmacy- FOLLOW UP    Israel Edgar is a 44 y.o. year old male who Pharmacy has been consulted for vancomycin dosing for pneumonia. Based on the patient's indication and renal status this patient is being dosed based on a goal AUC of 400-600.     Renal function is currently stable.    Current vancomycin dose: 1250 mg given every 12 hours    Estimated vancomycin AUC on current dose: 370 mg/L.hr     Visit Vitals  /71   Pulse (!) 129   Temp 36.8 °C (98.2 °F) (Temporal)   Resp 19        Lab Results   Component Value Date    CREATININE 0.42 (L) 2025    CREATININE 0.39 (L) 2025    CREATININE 0.35 (L) 2025    CREATININE 0.46 (L) 2025        Patient weight is as follows:   Vitals:    25 1725   Weight: 71.5 kg (157 lb 10.1 oz)       Cultures:  No results found for the encounter in last 14 days.       I/O last 3 completed shifts:  In: 894 (12.5 mL/kg) [NG/GT:894]  Out: 1700 (23.8 mL/kg) [Urine:650 (0.3 mL/kg/hr); Stool:1050]  Weight: 71.5 kg   I/O during current shift:  No intake/output data recorded.    Temp (24hrs), Av °C (98.6 °F), Min:36.2 °C (97.2 °F), Max:37.9 °C (100.3 °F)      Assessment/Plan    Below goal AUC. Orders placed for new vancomcyin regimen of 1750mg every 12 hours to begin at 1400.     This dosing regimen is predicted by [employer] to result in the following pharmacokinetic parameters:  Regimen: 1750 mg IV every 12 hours.  Start time: 14:02 on 2025  Exposure target: AUC24 (range)400-600 mg/L.hr   [id number]: 496 mg/L.hr  AUC24,ss: 517 mg/L.hr  Probability of AUC24 > 400: 90 %  Ctrough,ss: 14.1 mg/L  Probability of Ctrough,ss > 20: 20 %    The next level will be obtained on 1/15 at 0500. May be obtained sooner if clinically indicated.   Will continue to monitor renal function daily while on vancomycin and order serum creatinine at least every 48 hours if not already ordered.  Follow for continued vancomycin needs, clinical response, and  signs/symptoms of toxicity.       Marisel Alford, PharmD

## 2025-01-14 NOTE — CARE PLAN
The patient's goals for the shift include pt remains safe and free of falls    The clinical goals for the shift include Maintain comfort    Over the shift, the patient did make progress toward the following goals.   Problem: Skin  Goal: Decreased wound size/increased tissue granulation at next dressing change  Outcome: Progressing  Flowsheets (Taken 1/14/2025 0426)  Decreased wound size/increased tissue granulation at next dressing change: Promote sleep for wound healing  Goal: Participates in plan/prevention/treatment measures  Outcome: Progressing  Flowsheets (Taken 1/14/2025 0426)  Participates in plan/prevention/treatment measures: Elevate heels  Goal: Prevent/manage excess moisture  Outcome: Progressing  Flowsheets (Taken 1/14/2025 0426)  Prevent/manage excess moisture: Cleanse incontinence/protect with barrier cream  Goal: Prevent/minimize sheer/friction injuries  Outcome: Progressing  Flowsheets (Taken 1/14/2025 0426)  Prevent/minimize sheer/friction injuries: Use pull sheet  Goal: Promote/optimize nutrition  Outcome: Progressing  Flowsheets (Taken 1/14/2025 0426)  Promote/optimize nutrition: Monitor/record intake including meals  Goal: Promote skin healing  Outcome: Progressing  Flowsheets (Taken 1/14/2025 0426)  Promote skin healing: Turn/reposition every 2 hours/use positioning/transfer devices     Problem: Fall/Injury  Goal: Not fall by end of shift  Outcome: Progressing  Goal: Be free from injury by end of the shift  Outcome: Progressing  Goal: Verbalize understanding of personal risk factors for fall in the hospital  Outcome: Progressing  Goal: Verbalize understanding of risk factor reduction measures to prevent injury from fall in the home  Outcome: Progressing  Goal: Use assistive devices by end of the shift  Outcome: Progressing  Goal: Pace activities to prevent fatigue by end of the shift  Outcome: Progressing     Problem: Nutrition  Goal: Nutrition support goals are met within 48 hrs  Outcome:  Progressing  Goal: Nutrition support is meeting 75% of nutrient needs  Outcome: Progressing  Goal: Tube feed tolerance  Outcome: Progressing  Goal: BG  mg/dL  Outcome: Progressing  Goal: Lab values WNL  Outcome: Progressing  Goal: Electrolytes WNL  Outcome: Progressing  Goal: Promote healing  Outcome: Progressing  Goal: Maintain stable weight  Outcome: Progressing  Goal: Improve ostomy output  Outcome: Progressing     Problem: Pain - Adult  Goal: Verbalizes/displays adequate comfort level or baseline comfort level  Outcome: Progressing     Problem: Safety - Adult  Goal: Free from fall injury  Outcome: Progressing     Problem: Discharge Planning  Goal: Discharge to home or other facility with appropriate resources  Outcome: Progressing     Problem: Chronic Conditions and Co-morbidities  Goal: Patient's chronic conditions and co-morbidity symptoms are monitored and maintained or improved  Outcome: Progressing

## 2025-01-14 NOTE — SIGNIFICANT EVENT
Saw the patient at the request of the hospitalist.  Benji has advanced MS with spasticity and is a functional quad, non verbal at baseline with sz disorder.  He was admitted to the hospital with bilateral pneumonia and concern for UTI.  The hospitalist asked me to come and look at the patient as they are concerned that he was having some seizure activity and mother is refusing the use of Ativan as he has not responded well to it in the past.  He is also hypoxic, flushed, tachypneic, diaphoretic.  Upon evaluation he is diaphoretic and feels warm although his temperature is reading at 96 degrees.  He has some tachypnea and decreased breath sounds bilaterally.  There is concern for mucous plugging on his CAT scan and he also has a very poor cough reflex.  His ABG was consistent with hypoxic respiratory failure.  At this time I recommend transitioning him to high flow nasal cannula as although he is tachypneic he has very shallow respirations and likely is having some atelectasis on top of his pneumonia.  I also recommend to mucociliary clearance assistance such as as needed nasotracheal suctioning, using a vest with postural drainage and scheduled hypertonic saline nebs to help thin his secretions.  There is no indication of seizure activity while I was in the room.  The mother states he responds well to intranasal Versed or IM Valium.  At this time we will see how he responds to the above mentioned interventions.  The mother stated that he has done well on BiPAP in the past.  Hopefully we will not need to progress beyond high flow nasal cannula as with his baseline mental status he is not a good BiPAP candidate.  I discussed the plan with the patient's mother and the hospitalist.  If he does not respond and he continues to decline from a respiratory status he will need to be transferred to the ICU.    Tammie Allen DO    35 minutes Non procedural Critical Care time spent

## 2025-01-14 NOTE — CARE PLAN
The patient's goals for the shift include pt remains safe and free of falls    The clinical goals for the shift include Maintain comfort

## 2025-01-14 NOTE — NURSING NOTE
Patient appeared to have seizure like activity, MD Gill at bedside. Jairo suggested ativan patient mother at bedside refused ativan. Mother of patient requested versed, MD Gill explained that we can not give versed on this med surg floor.     MD Gill ordered chest x- ray, CBC, BMP and Troponin. Patient stable, all needs met at this time.

## 2025-01-14 NOTE — PROGRESS NOTES
For follow-up of:  Aspiration pneumonia    Subjective   Interval History:  He is sleeping.  I am not able to wake him up.  He is wearing high flow nasal cannula.  He has no fevers this morning.       Objective     Current Facility-Administered Medications   Medication Dose Route Frequency Provider Last Rate Last Admin    acetaminophen (Tylenol) suspension 650 mg  650 mg oral q6h Isrrael Goudiaby, DO   650 mg at 01/14/25 0648    albuterol 2.5 mg /3 mL (0.083 %) nebulizer solution 2.5 mg  2.5 mg nebulization q2h PRN Isrrael Goudiaby, DO        albuterol 2.5 mg /3 mL (0.083 %) nebulizer solution 2.5 mg  2.5 mg nebulization TID Isrrael Goudiaby, DO   2.5 mg at 01/14/25 0742    aspirin chewable tablet 81 mg  81 mg oral Daily Isrrael Goudiaby, DO   81 mg at 01/14/25 0917    baclofen (Lioresal) tablet 10 mg  10 mg oral q AM Isrrael Goudiaby, DO   10 mg at 01/13/25 1102    baclofen (Lioresal) tablet 20 mg  20 mg g-tube TID Isrrael Goudiaby, DO   20 mg at 01/14/25 0917    carBAMazepine (TEGretol) tablet 200 mg  200 mg g-tube 4x daily Isrrael Goudiaby, DO   200 mg at 01/14/25 0648    cholecalciferol (Vitamin D-3) tablet 10 mcg  400 Units oral Daily Isrrael Goudiaby, DO   10 mcg at 01/14/25 0916    diphenhydramine-zinc acetate cream   Topical TID PRN Isrrael Goudiaby, DO        levETIRAcetam (Keppra) 100 mg/mL solution 2,000 mg  2,000 mg g-tube BID Isrrael Goudiaby, DO   2,000 mg at 01/14/25 0918    lubricating eye drops ophthalmic solution 1 drop  1 drop Both Eyes BID Isrrael Goudiaby, DO   1 drop at 01/14/25 0916    meropenem (Merrem) 1 g in sodium chloride 0.9%  mL  1 g intravenous q8h Roula Manuel  mL/hr at 01/14/25 0919 1 g at 01/14/25 0919    neomycin-bacitracin-polymyxin (Neosporin) ointment foil packet   Topical TID Isrrael Tejeda DO   10 Application at 01/14/25 0915    ondansetron (Zofran) tablet 4 mg  4 mg oral q8h PRN Isrrael Tejeda DO        Or    ondansetron (Zofran)  injection 4 mg  4 mg intravenous q8h PRN Isrrael Goudiaby, DO        oxygen (O2) therapy   inhalation Continuous PRN - O2/gases Isrraelbuck Moralesaby, DO   456 L/min at 01/14/25 0742    phenytoin (Dilantin) chewable tablet 100 mg  100 mg g-tube TID Isrraelbuck Moralesaby, DO   100 mg at 01/14/25 0917    sodium chloride 3 % nebulizer solution 3 mL  3 mL nebulization q6h [facility] Tammie Allen, DO   3 mL at 01/14/25 0752    vancomycin (Vancocin) pharmacy to dose - pharmacy monitoring   miscellaneous Daily PRN Austen G Salomone, DO        vancomycin 1,750 mg in dextrose 5% 500 mL IV  1,750 mg intravenous q12h Marisel Alford PharmD        white petrolatum-mineral oiL (Tears Naturale PM) ophthalmic ointment   ophthalmic (eye) Daily Isrraelbuck Moralesaby, DO   Given at 01/13/25 0900    zinc oxide 20 % ointment 1 Application  1 Application Topical q1h PRN Isrrael Goudiaby, DO            Last Recorded Vitals  /71   Pulse (!) 129   Temp 36.8 °C (98.2 °F) (Temporal)   Resp 19   Wt 71.5 kg (157 lb 10.1 oz)   SpO2 90%   General: no acute distress, lying in bed, sleeping, I am not able to wake him up to answer questions  HEENT: pharynx not examined  CVS: Tachycardic  Resp: decreased breath sounds in bases, wearing high flow nasal cannula  ABD: soft, NT, ND, PEG, ostomy  : Boss  EXT: Heel guards  Skin: no rash     Relevant Results:    Labs:   Results from last 72 hours   Lab Units 01/14/25  0609 01/14/25  0105 01/13/25  0715   SODIUM mmol/L 136 137 137   POTASSIUM mmol/L 4.1 4.5 3.7   CHLORIDE mmol/L 97* 99 103   BUN mg/dL 9 10 12   CREATININE mg/dL 0.42* 0.39* 0.35*   PHOSPHORUS mg/dL 3.1  --  3.8     Results from last 72 hours   Lab Units 01/14/25  0609 01/14/25  0105 01/13/25  0715   WBC AUTO x10*3/uL 7.6 7.1 5.6   HEMOGLOBIN g/dL 14.4 14.0 13.2*   HEMATOCRIT % 43.0 41.5 39.0*   PLATELETS AUTO x10*3/uL 228 219 189       Microbiology data: I have personally and independently reviewed and intrepreted the lab results  1/11/2025  urine Legionella antigen negative; urine pneumococcal antigen negative  1/11/2025 blood culture show no growth  1/11/2025 urine culture shows mixed growth  1/12/2025 MRSA swab positive    Imaging data: I have personally and independently reviewed and interpreted the imaging studies  1/11/2025 chest x-ray shows left lower lobe infiltrate/atelectasis  1/11/2025 chest CT shows bilateral lower lobe consolidation suggestive of aspiration pneumonia    Assessment/Plan     MY IMPRESSION & RECOMMENDATIONS:  Fevers, which seem to be resolving.  I have personally and independently reviewed and interpreted the laboratory tests, imaging studies, and the documentation from other healthcare providers.  We are treating for aspiration pneumonia.  He is receiving high flow nasal cannula for his acute hypoxic respiratory failure.  I am monitoring for side effects from meropenem and vancomycin as outlined in a previous note.  He is demonstrating some hemodynamic instability with tachycardia, so we will need to monitor his condition closely.  His prognosis is guarded right now due to his multiple comorbidities.     -Continue vancomycin and meropenem for now  -Await further culture results  -Monitor fever curve     Other issues:  #Hypertension  #Advanced multiple sclerosis  #Neurogenic bladder with chronic Boss  #Dysphagia status post PEG  #Seizure disorder  #History of DVT and PE  #History of tracheostomy  #History of end ileostomy and mucous fistula in 2024         Vic Soria MD

## 2025-01-14 NOTE — PROGRESS NOTES
Israel Edgar is a 44 y.o. male on day 3 of admission presenting with Pneumonia of both lower lobes due to infectious organism.      Subjective   Patient was seen and examined     Objective     Last Recorded Vitals  /73   Pulse (!) 119   Temp 36.1 °C (97 °F) (Temporal)   Resp 14   Wt 71.5 kg (157 lb 10.1 oz)   SpO2 92%   Intake/Output last 3 Shifts:    Intake/Output Summary (Last 24 hours) at 1/14/2025 1227  Last data filed at 1/14/2025 1100  Gross per 24 hour   Intake --   Output 2850 ml   Net -2850 ml       Admission Weight  Weight: 71.5 kg (157 lb 9.6 oz) (01/12/25 0008)    Daily Weight  01/12/25 : 71.5 kg (157 lb 10.1 oz)    Image Results  XR chest 1 view  Narrative: Interpreted By:  Juwan Mcneill,   STUDY:  XR CHEST 1 VIEW;  1/13/2025 11:18 pm      INDICATION:  Signs/Symptoms:Hypoxia, dec breath sounds left.      COMPARISON:  1/11/2025      ACCESSION NUMBER(S):  [id number]      ORDERING CLINICIAN:  CONCHITA OLIVAS      FINDINGS:  The cardiac silhouette is stable in size. Bibasilar airspace  opacities. Small of pleural effusion also not excluded. No  pneumothorax.      Impression: Bibasilar airspace opacities correspond to patient's known pneumonia.      MACRO:  None      Signed by: Juwan Mcneill 1/14/2025 3:33 AM  Dictation workstation:   [id number]      Physical Exam  Constitutional: Nonverbal, intellectually disabled  Eyes: PERRLA, clear sclera  ENMT: Not examined  Head / Neck: Atraumatic, normocephalic, supple neck, JVP not visualized  Lungs: Patent airways, CTABL  Heart: RRR, S1S2, no murmurs appreciated, palpable pulses in all extremities  GI: Soft, NT, ND, bowel sounds present in all quadrants, ostomy in place with liquid stool flowing in reservoir connected to ostomy  MSK: Unable to assess range of motion, due to spasticity and nonverbal state with inability to follow command  Extremities: Contractures in all extremities at different level, notable in hands bilaterally  : Indwelling Boss  catheter in place  Breast: Deferred  Neurological: AAO x 0 unable to complete exam, nonverbal, with inability to follow command  Psychological: Appropriate mood and behavior     Relevant Results           Scheduled medications  acetaminophen, 650 mg, oral, q6h  albuterol, 2.5 mg, nebulization, TID  aspirin, 81 mg, oral, Daily  baclofen, 10 mg, oral, q AM  baclofen, 20 mg, g-tube, TID  carBAMazepine, 200 mg, g-tube, 4x daily  cholecalciferol, 400 Units, oral, Daily  levETIRAcetam, 2,000 mg, g-tube, BID  lubricating eye drops, 1 drop, Both Eyes, BID  meropenem, 1 g, intravenous, q8h  neomycin-bacitracnZn-polymyxnB, , Topical, TID  phenytoin, 100 mg, g-tube, TID  sodium chloride, 3 mL, nebulization, q6h JAZ  vancomycin (Vancocin), 1,750 mg, intravenous, q12h  white petrolatum-mineral oiL, , ophthalmic (eye), Daily      Continuous medications     PRN medications  PRN medications: albuterol, diphenhydramine-zinc acetate, ondansetron **OR** ondansetron, oxygen, vancomycin, zinc oxide    Assessment/Plan          This patient has a urinary catheter   Reason for the urinary catheter remaining today? neurogenic bladder      44 y.o. male with PMH of advanced MS with spasticity at baseline, neurogenic bladder with chronic little, epilepsy with questionable breakthrough seizures, DVT, PE, MRSA colonization, MRDO pneumonia, HTN, quadriplegia, depression, CVA, dysphagia, previous tracheostomy who is presenting to [facility] ED with complaint of increased somnolence x 1 day by proxy and found with CAP     Community-acquired pneumonia: History of multi resistant drug organism pneumonia  -Meropenem 1 g IV piggyback every 8 hours  -Vancomycin 1250 mg IV piggyback twice daily  -Albuterol via nebulizer as needed shortness of breath or wheezing  -Blood cultures collected in the ED  -Procalcitonin level  -Urine antigen for pneumococcus and Legionella negative  -O2 support as needed to maintain saturation of 94% or greater  -ID  consulted: Appreciate evaluation and recommendations as below  Continue vancomycin for now and monitor levels  -Continue meropenem  -Await blood culture results  -Await further urine culture results  -Monitor fever curve     Concern with UTI  -Antibiotic as above  -Urine culture with mixed trisha indicative of contamination     Epilepsy  -Tegretol 200 mg daily given during tube feed bolus, together with baclofen and Tylenol  -Levetiracetam 2000 mg 3 times daily  -Phenytoin 100 mg 3 times daily     Spasticity  -On baclofen     Neurogenic bladder  -Indwelling catheter in place     Hypertension  -Not on any medication, will monitor with vitals     Dysphagia  -N.p.o. with tube feed  -Diverting ostomy in place, connected to bag due to high output     Diet  -Tube feed     DVT prophylaxis  -EMR shows allergies to above heparin and Lovenox     Disposition: Presenting with increased somnolence, found with pneumonia and possible UTI, need further management, discharge pending clinical improvement     Anticipated length of stay greater than 2 midnights     ADOD in 2 days          Anahi Hall MD

## 2025-01-15 ENCOUNTER — APPOINTMENT (OUTPATIENT)
Dept: CARDIOLOGY | Facility: HOSPITAL | Age: 45
End: 2025-01-15
Payer: MEDICAID

## 2025-01-15 LAB
ALBUMIN SERPL BCP-MCNC: 3.6 G/DL (ref 3.4–5)
ANION GAP SERPL CALC-SCNC: 14 MMOL/L (ref 10–20)
BUN SERPL-MCNC: 9 MG/DL (ref 6–23)
CALCIUM SERPL-MCNC: 9 MG/DL (ref 8.6–10.3)
CHLORIDE SERPL-SCNC: 99 MMOL/L (ref 98–107)
CO2 SERPL-SCNC: 30 MMOL/L (ref 21–32)
CREAT SERPL-MCNC: 0.39 MG/DL (ref 0.5–1.3)
EGFRCR SERPLBLD CKD-EPI 2021: >90 ML/MIN/1.73M*2
ERYTHROCYTE [DISTWIDTH] IN BLOOD BY AUTOMATED COUNT: 15.8 % (ref 11.5–14.5)
GLUCOSE BLD MANUAL STRIP-MCNC: 140 MG/DL (ref 74–99)
GLUCOSE SERPL-MCNC: 127 MG/DL (ref 74–99)
HCT VFR BLD AUTO: 43.9 % (ref 41–52)
HGB BLD-MCNC: 14.2 G/DL (ref 13.5–17.5)
MCH RBC QN AUTO: 30.4 PG (ref 26–34)
MCHC RBC AUTO-ENTMCNC: 32.3 G/DL (ref 32–36)
MCV RBC AUTO: 94 FL (ref 80–100)
NRBC BLD-RTO: 0 /100 WBCS (ref 0–0)
PHOSPHATE SERPL-MCNC: 3 MG/DL (ref 2.5–4.9)
PLATELET # BLD AUTO: 209 X10*3/UL (ref 150–450)
POTASSIUM SERPL-SCNC: 4.8 MMOL/L (ref 3.5–5.3)
RBC # BLD AUTO: 4.67 X10*6/UL (ref 4.5–5.9)
SODIUM SERPL-SCNC: 138 MMOL/L (ref 136–145)
VANCOMYCIN SERPL-MCNC: 38.5 UG/ML (ref 5–20)
WBC # BLD AUTO: 10.3 X10*3/UL (ref 4.4–11.3)

## 2025-01-15 PROCEDURE — 36415 COLL VENOUS BLD VENIPUNCTURE: CPT | Performed by: INTERNAL MEDICINE

## 2025-01-15 PROCEDURE — 94640 AIRWAY INHALATION TREATMENT: CPT

## 2025-01-15 PROCEDURE — 85027 COMPLETE CBC AUTOMATED: CPT | Performed by: INTERNAL MEDICINE

## 2025-01-15 PROCEDURE — 82947 ASSAY GLUCOSE BLOOD QUANT: CPT

## 2025-01-15 PROCEDURE — 2500000004 HC RX 250 GENERAL PHARMACY W/ HCPCS (ALT 636 FOR OP/ED): Performed by: INTERNAL MEDICINE

## 2025-01-15 PROCEDURE — 93010 ELECTROCARDIOGRAM REPORT: CPT | Performed by: STUDENT IN AN ORGANIZED HEALTH CARE EDUCATION/TRAINING PROGRAM

## 2025-01-15 PROCEDURE — 2500000002 HC RX 250 W HCPCS SELF ADMINISTERED DRUGS (ALT 637 FOR MEDICARE OP, ALT 636 FOR OP/ED): Performed by: INTERNAL MEDICINE

## 2025-01-15 PROCEDURE — 1200000002 HC GENERAL ROOM WITH TELEMETRY DAILY

## 2025-01-15 PROCEDURE — 87040 BLOOD CULTURE FOR BACTERIA: CPT | Mod: AHULAB | Performed by: INTERNAL MEDICINE

## 2025-01-15 PROCEDURE — 2500000005 HC RX 250 GENERAL PHARMACY W/O HCPCS: Performed by: INTERNAL MEDICINE

## 2025-01-15 PROCEDURE — 94669 MECHANICAL CHEST WALL OSCILL: CPT

## 2025-01-15 PROCEDURE — 99233 SBSQ HOSP IP/OBS HIGH 50: CPT | Performed by: INTERNAL MEDICINE

## 2025-01-15 PROCEDURE — 31720 CLEARANCE OF AIRWAYS: CPT

## 2025-01-15 PROCEDURE — 84100 ASSAY OF PHOSPHORUS: CPT | Performed by: INTERNAL MEDICINE

## 2025-01-15 PROCEDURE — 93005 ELECTROCARDIOGRAM TRACING: CPT

## 2025-01-15 PROCEDURE — 80202 ASSAY OF VANCOMYCIN: CPT

## 2025-01-15 PROCEDURE — 2500000001 HC RX 250 WO HCPCS SELF ADMINISTERED DRUGS (ALT 637 FOR MEDICARE OP): Performed by: INTERNAL MEDICINE

## 2025-01-15 RX ORDER — NEOMYCIN AND POLYMYXIN B SULFATES AND BACITRACIN ZINC 400; 3.5; 1 [USP'U]/G; MG/G; [USP'U]/G
0.5 OINTMENT OPHTHALMIC EVERY 6 HOURS SCHEDULED
Status: DISCONTINUED | OUTPATIENT
Start: 2025-01-15 | End: 2025-01-24 | Stop reason: HOSPADM

## 2025-01-15 RX ORDER — BACITRACIN ZINC AND POLYMYXIN B SULFATE 500; 10000 [USP'U]/G; [USP'U]/G
OINTMENT OPHTHALMIC 2 TIMES DAILY
Status: DISCONTINUED | OUTPATIENT
Start: 2025-01-15 | End: 2025-01-15

## 2025-01-15 RX ORDER — SODIUM CHLORIDE, SODIUM LACTATE, POTASSIUM CHLORIDE, CALCIUM CHLORIDE 600; 310; 30; 20 MG/100ML; MG/100ML; MG/100ML; MG/100ML
1000 INJECTION, SOLUTION INTRAVENOUS ONCE
Status: COMPLETED | OUTPATIENT
Start: 2025-01-15 | End: 2025-01-15

## 2025-01-15 RX ORDER — ALBUTEROL SULFATE 0.83 MG/ML
2.5 SOLUTION RESPIRATORY (INHALATION)
Status: DISCONTINUED | OUTPATIENT
Start: 2025-01-15 | End: 2025-01-17

## 2025-01-15 RX ADMIN — CARBAMAZEPINE 200 MG: 200 TABLET ORAL at 10:12

## 2025-01-15 RX ADMIN — MEROPENEM 1 G: 1 INJECTION, POWDER, FOR SOLUTION INTRAVENOUS at 10:19

## 2025-01-15 RX ADMIN — CARBAMAZEPINE 200 MG: 200 TABLET ORAL at 06:32

## 2025-01-15 RX ADMIN — ALBUTEROL SULFATE 2.5 MG: 2.5 SOLUTION RESPIRATORY (INHALATION) at 12:57

## 2025-01-15 RX ADMIN — ACETAMINOPHEN 650 MG: 160 SUSPENSION ORAL at 06:31

## 2025-01-15 RX ADMIN — SODIUM CHLORIDE, POTASSIUM CHLORIDE, SODIUM LACTATE AND CALCIUM CHLORIDE 1000 ML/HR: 600; 310; 30; 20 INJECTION, SOLUTION INTRAVENOUS at 08:35

## 2025-01-15 RX ADMIN — MEROPENEM 1 G: 1 INJECTION, POWDER, FOR SOLUTION INTRAVENOUS at 00:33

## 2025-01-15 RX ADMIN — MEROPENEM 1 G: 1 INJECTION, POWDER, FOR SOLUTION INTRAVENOUS at 17:35

## 2025-01-15 RX ADMIN — SODIUM CHLORIDE SOLN NEBU 3% 3 ML: 3 NEBU SOLN at 20:35

## 2025-01-15 RX ADMIN — PHENYTOIN 100 MG: 50 TABLET, CHEWABLE ORAL at 10:11

## 2025-01-15 RX ADMIN — SODIUM CHLORIDE SOLN NEBU 3% 3 ML: 3 NEBU SOLN at 13:05

## 2025-01-15 RX ADMIN — ACETAMINOPHEN 650 MG: 160 SUSPENSION ORAL at 23:09

## 2025-01-15 RX ADMIN — BACLOFEN 30 MG: 10 TABLET ORAL at 23:08

## 2025-01-15 RX ADMIN — VANCOMYCIN HYDROCHLORIDE 1750 MG: 5 INJECTION, POWDER, LYOPHILIZED, FOR SOLUTION INTRAVENOUS at 03:09

## 2025-01-15 RX ADMIN — ACETAMINOPHEN 650 MG: 160 SUSPENSION ORAL at 10:12

## 2025-01-15 RX ADMIN — CARBOXYMETHYLCELLULOSE SODIUM 1 DROP: 0.5 SOLUTION/ DROPS OPHTHALMIC at 10:12

## 2025-01-15 RX ADMIN — Medication 45 L/MIN: at 13:00

## 2025-01-15 RX ADMIN — ACETAMINOPHEN 650 MG: 160 SUSPENSION ORAL at 17:34

## 2025-01-15 RX ADMIN — ALBUTEROL SULFATE 2.5 MG: 2.5 SOLUTION RESPIRATORY (INHALATION) at 20:21

## 2025-01-15 RX ADMIN — BACLOFEN 30 MG: 10 TABLET ORAL at 14:26

## 2025-01-15 RX ADMIN — ZINC OXIDE 1 APPLICATION: 200 OINTMENT TOPICAL at 10:13

## 2025-01-15 RX ADMIN — Medication 45 L/MIN: at 03:32

## 2025-01-15 RX ADMIN — CARBAMAZEPINE 200 MG: 200 TABLET ORAL at 23:08

## 2025-01-15 RX ADMIN — LEVETIRACETAM 2000 MG: 100 SOLUTION ORAL at 10:11

## 2025-01-15 RX ADMIN — CARBOXYMETHYLCELLULOSE SODIUM 1 DROP: 0.5 SOLUTION/ DROPS OPHTHALMIC at 14:26

## 2025-01-15 RX ADMIN — BACITRACIN ZINC, NEOMYCIN SULFATE , POLYMYXIN B SULFATE. 10 APPLICATION: 400; 3.5; 5 OINTMENT TOPICAL at 10:13

## 2025-01-15 RX ADMIN — PHENYTOIN 100 MG: 50 TABLET, CHEWABLE ORAL at 23:08

## 2025-01-15 RX ADMIN — LEVETIRACETAM 2000 MG: 100 SOLUTION ORAL at 23:08

## 2025-01-15 RX ADMIN — NEOMYCIN SULFATE, POLYMYXIN B SULFATE AND BACITRACIN ZINC 0.5 INCH: 3.5; 10000; 4 OINTMENT OPHTHALMIC at 23:25

## 2025-01-15 RX ADMIN — ASPIRIN 81 MG: 81 TABLET, CHEWABLE ORAL at 10:11

## 2025-01-15 RX ADMIN — BACLOFEN 30 MG: 10 TABLET ORAL at 10:11

## 2025-01-15 RX ADMIN — CARBAMAZEPINE 200 MG: 200 TABLET ORAL at 14:29

## 2025-01-15 RX ADMIN — Medication 45 L/MIN: at 08:26

## 2025-01-15 RX ADMIN — CARBAMAZEPINE 200 MG: 200 TABLET ORAL at 17:33

## 2025-01-15 RX ADMIN — PHENYTOIN 100 MG: 50 TABLET, CHEWABLE ORAL at 14:26

## 2025-01-15 RX ADMIN — CHOLECALCIFEROL (VITAMIN D3) 10 MCG (400 UNIT) TABLET 10 MCG: at 10:11

## 2025-01-15 RX ADMIN — WHITE PETROLATUM 57.7 %-MINERAL OIL 31.9 % EYE OINTMENT: at 23:25

## 2025-01-15 RX ADMIN — VANCOMYCIN HYDROCHLORIDE 1750 MG: 5 INJECTION, POWDER, LYOPHILIZED, FOR SOLUTION INTRAVENOUS at 14:26

## 2025-01-15 ASSESSMENT — PAIN - FUNCTIONAL ASSESSMENT
PAIN_FUNCTIONAL_ASSESSMENT: PAINAD (PAIN ASSESSMENT IN ADVANCED DEMENTIA SCALE)
PAIN_FUNCTIONAL_ASSESSMENT: PAINAD (PAIN ASSESSMENT IN ADVANCED DEMENTIA SCALE)
PAIN_FUNCTIONAL_ASSESSMENT: WONG-BAKER FACES
PAIN_FUNCTIONAL_ASSESSMENT: PAINAD (PAIN ASSESSMENT IN ADVANCED DEMENTIA SCALE)

## 2025-01-15 ASSESSMENT — COGNITIVE AND FUNCTIONAL STATUS - GENERAL
MOVING TO AND FROM BED TO CHAIR: TOTAL
HELP NEEDED FOR BATHING: TOTAL
STANDING UP FROM CHAIR USING ARMS: TOTAL
DAILY ACTIVITIY SCORE: 6
EATING MEALS: TOTAL
MOVING FROM LYING ON BACK TO SITTING ON SIDE OF FLAT BED WITH BEDRAILS: TOTAL
TOILETING: TOTAL
CLIMB 3 TO 5 STEPS WITH RAILING: TOTAL
DRESSING REGULAR UPPER BODY CLOTHING: TOTAL
MOBILITY SCORE: 6
DRESSING REGULAR LOWER BODY CLOTHING: TOTAL
WALKING IN HOSPITAL ROOM: TOTAL
TURNING FROM BACK TO SIDE WHILE IN FLAT BAD: TOTAL
PERSONAL GROOMING: TOTAL

## 2025-01-15 ASSESSMENT — PAIN SCALES - GENERAL
PAINLEVEL_OUTOF10: 0 - NO PAIN

## 2025-01-15 ASSESSMENT — PAIN SCALES - WONG BAKER
WONGBAKER_NUMERICALRESPONSE: NO HURT

## 2025-01-15 NOTE — NURSING NOTE
A FlosonMuchasa Device was used to determine this patient's fluid responsiveness. A value of +7 ms indicates a patient is fluid responsive, whereas any reading less than +7 ms indicates fluid to not be an effective intervention.     Vitals Value Taken Time   BP 86/53 01/15/25 0859   Temp 38.7 °C (101.7 °F) 01/15/25 0859   Pulse 111 01/15/25 0859   Resp 20 01/15/25 0859   SpO2 97 01/15/25 0909       The device reading of +9 indicates this patient is fluid responsive.

## 2025-01-15 NOTE — PROGRESS NOTES
Israel Edgar is a 44 y.o. male on day 4 of admission presenting with Pneumonia of both lower lobes due to infectious organism.      Subjective   Patient was seen and examined     Objective     Last Recorded Vitals  /76 (BP Location: Left arm, Patient Position: Lying)   Pulse 100   Temp 37.4 °C (99.3 °F) (Axillary)   Resp 19   Wt 71.5 kg (157 lb 10.1 oz)   SpO2 97%   Intake/Output last 3 Shifts:    Intake/Output Summary (Last 24 hours) at 1/15/2025 1140  Last data filed at 1/15/2025 0728  Gross per 24 hour   Intake 535 ml   Output --   Net 535 ml       Admission Weight  Weight: 71.5 kg (157 lb 9.6 oz) (01/12/25 0008)    Daily Weight  01/12/25 : 71.5 kg (157 lb 10.1 oz)    Image Results  ECG 12 lead  Supraventricular tachycardia  Otherwise normal ECG  When compared with ECG of 13-JAN-2025 08:10, (unconfirmed)  Incomplete right bundle branch block is no longer Present      Physical Exam  Constitutional: Nonverbal, intellectually disabled  Eyes: PERRLA, clear sclera  ENMT: Not examined  Head / Neck: Atraumatic, normocephalic, supple neck, JVP not visualized  Lungs: Patent airways, CTABL  Heart: RRR, S1S2, no murmurs appreciated, palpable pulses in all extremities  GI: Soft, NT, ND, bowel sounds present in all quadrants, ostomy in place with liquid stool flowing in reservoir connected to ostomy  MSK: Unable to assess range of motion, due to spasticity and nonverbal state with inability to follow command  Extremities: Contractures in all extremities at different level, notable in hands bilaterally  : Indwelling Boss catheter in place  Breast: Deferred  Neurological: AAO x 0 unable to complete exam, nonverbal, with inability to follow command  Psychological: Appropriate mood and behavior     Relevant Results           Scheduled medications  acetaminophen, 650 mg, oral, q6h  albuterol, 2.5 mg, nebulization, TID  aspirin, 81 mg, oral, Daily  baclofen, 30 mg, g-tube, TID  carBAMazepine, 200 mg, g-tube, 4x  daily  cholecalciferol, 400 Units, oral, Daily  levETIRAcetam, 2,000 mg, g-tube, BID  lubricating eye drops, 1 drop, Both Eyes, BID  meropenem, 1 g, intravenous, q8h  neomycin-bacitracnZn-polymyxnB, , Topical, TID  phenytoin, 100 mg, g-tube, TID  sodium chloride, 3 mL, nebulization, TID  vancomycin (Vancocin), 1,750 mg, intravenous, q12h  white petrolatum-mineral oiL, , ophthalmic (eye), Daily      Continuous medications     PRN medications  PRN medications: albuterol, diphenhydramine-zinc acetate, ondansetron **OR** ondansetron, oxygen, vancomycin, zinc oxide    Assessment/Plan          This patient has a urinary catheter   Reason for the urinary catheter remaining today? neurogenic bladder      44 y.o. male with PMH of advanced MS with spasticity at baseline, neurogenic bladder with chronic little, epilepsy with questionable breakthrough seizures, DVT, PE, MRSA colonization, MRDO pneumonia, HTN, quadriplegia, depression, CVA, dysphagia, previous tracheostomy who is presenting to [facility] ED with complaint of increased somnolence x 1 day by proxy and found with CAP     Community-acquired pneumonia: History of multi resistant drug organism pneumonia  -Meropenem 1 g IV piggyback every 8 hours  -Vancomycin 1250 mg IV piggyback twice daily  -Albuterol via nebulizer as needed shortness of breath or wheezing  -Blood cultures collected in the ED  -Procalcitonin level  -Urine antigen for pneumococcus and Legionella negative  -O2 support as needed to maintain saturation of 94% or greater  -ID consulted: Appreciate evaluation and recommendations as below  -Continue vancomycin for now and monitor levels  -Continue meropenem  -Monitor fever curve     Concern with UTI  -Antibiotic as above  -Urine culture with mixed trisha indicative of contamination     Epilepsy  -Tegretol 200 mg daily given during tube feed bolus, together with baclofen and Tylenol  -Levetiracetam 2000 mg 3 times daily  -Phenytoin 100 mg 3 times  daily     Spasticity  -On baclofen     Neurogenic bladder  -Indwelling catheter in place     Hypertension  -Not on any medication, will monitor with vitals     Dysphagia  -N.p.o. with tube feed  -Diverting ostomy in place, connected to bag due to high output     Diet  -Tube feed     DVT prophylaxis  -EMR shows allergies to above heparin and Lovenox     Disposition: Presenting with increased somnolence, found with pneumonia and possible UTI, need further management, discharge pending clinical improvement             Anahi Hall MD

## 2025-01-15 NOTE — CARE PLAN
The patient's goals for the shift include pt remains safe and free of falls    The clinical goals for the shift include patient will maintain oxygenation at >92% this shift.    Over the shift, the patient did make progress toward the following goals.      Problem: Skin  Goal: Participates in plan/prevention/treatment measures  Outcome: Progressing  Goal: Prevent/manage excess moisture  Outcome: Progressing  Goal: Prevent/minimize sheer/friction injuries  Outcome: Progressing     Problem: Fall/Injury  Goal: Not fall by end of shift  Outcome: Progressing  Goal: Be free from injury by end of the shift  Outcome: Progressing  Goal: Verbalize understanding of personal risk factors for fall in the hospital  Outcome: Progressing  Goal: Verbalize understanding of risk factor reduction measures to prevent injury from fall in the home  Outcome: Progressing  Goal: Use assistive devices by end of the shift  Outcome: Progressing  Goal: Pace activities to prevent fatigue by end of the shift  Outcome: Progressing     Problem: Nutrition  Goal: Nutrition support goals are met within 48 hrs  Outcome: Progressing  Goal: Nutrition support is meeting 75% of nutrient needs  Outcome: Progressing  Goal: Tube feed tolerance  Outcome: Progressing  Goal: BG  mg/dL  Outcome: Progressing  Goal: Lab values WNL  Outcome: Progressing  Goal: Electrolytes WNL  Outcome: Progressing  Goal: Promote healing  Outcome: Progressing  Goal: Maintain stable weight  Outcome: Progressing  Goal: Improve ostomy output  Outcome: Progressing     Problem: Pain - Adult  Goal: Verbalizes/displays adequate comfort level or baseline comfort level  Outcome: Progressing     Problem: Safety - Adult  Goal: Free from fall injury  Outcome: Progressing     Problem: Discharge Planning  Goal: Discharge to home or other facility with appropriate resources  Outcome: Progressing     Problem: Chronic Conditions and Co-morbidities  Goal: Patient's chronic conditions and  co-morbidity symptoms are monitored and maintained or improved  Outcome: Progressing

## 2025-01-15 NOTE — PROGRESS NOTES
01/15/25 1509   Discharge Planning   Living Arrangements Parent   Support Systems Parent   Assistance Needed TCC met with patient's mother to follow up re; discharge plan; patient's mom is a nurse for 38 years,  who has provided total care (quadriplegia)   for her son for 11 years,patient's mother has communicated with [facility] to receive tube feed supplies post discharge she is aware she has to call when patient is discharged to receive packages per IM chart review;Continue vancomycin for now and monitor levels  -Continue meropenem  -Monitor fever curve   Type of Residence Private residence   Number of Stairs to Enter Residence 0  (elevators)   Number of Stairs Within Residence 0   Expected Discharge Disposition Home  (patient's mother declined passport services as patient has Medicaid -she prefers to do it herself for thoroughness)   Does the patient need discharge transport arranged? Yes  (patient will need transport home via stretcher)   RoundTrip coordination needed? Yes   Has discharge transport been arranged? No

## 2025-01-15 NOTE — PROGRESS NOTES
Vancomycin Dosing by Pharmacy- FOLLOW UP    Israel Edgar is a 44 y.o. year old male who Pharmacy has been consulted for vancomycin dosing for pneumonia. Based on the patient's indication and renal status this patient is being dosed based on a goal AUC of 400-600.     Renal function is currently stable.    Current vancomycin dose: 1750 mg given every 12 hours    Estimated vancomycin AUC on current dose: 515 mg/L.hr     Visit Vitals  /68 (BP Location: Left arm, Patient Position: Lying)   Pulse (!) 122   Temp 37.3 °C (99.2 °F) (Temporal)   Resp 16        Lab Results   Component Value Date    CREATININE 0.39 (L) 01/15/2025    CREATININE 0.42 (L) 2025    CREATININE 0.39 (L) 2025    CREATININE 0.35 (L) 2025        Patient weight is as follows:   Vitals:    25 1725   Weight: 71.5 kg (157 lb 10.1 oz)       Cultures:  No results found for the encounter in last 14 days.       I/O last 3 completed shifts:  In: 535 (7.5 mL/kg) [IV Piggyback:535]  Out: 1800 (25.2 mL/kg) [Urine:1200 (0.5 mL/kg/hr); Stool:600]  Weight: 71.5 kg   I/O during current shift:  No intake/output data recorded.    Temp (24hrs), Av.4 °C (97.6 °F), Min:35.8 °C (96.4 °F), Max:37.3 °C (99.2 °F)      Assessment/Plan    Within goal AUC range. Continue current vancomycin regimen.    This dosing regimen is predicted by [employer] to result in the following pharmacokinetic parameters:  Exposure target: AUC24 (range)400-600 mg/L.hr   [id number]: 512 mg/L.hr  AUC24,ss: 515 mg/L.hr  Probability of AUC24 > 400: 95 %  Ctrough,ss: 10.6 mg/L  Probability of Ctrough,ss > 20: 2 %  The next level will be obtained on  at 0500. May be obtained sooner if clinically indicated.   Will continue to monitor renal function daily while on vancomycin and order serum creatinine at least every 48 hours if not already ordered.  Follow for continued vancomycin needs, clinical response, and signs/symptoms of toxicity.       Ashly Nance, PharmD

## 2025-01-15 NOTE — PROGRESS NOTES
For follow-up of:  Aspiration pneumonia    Subjective   Interval History:  He is sleeping.  His mother is at the bedside and provides quite a bit of information regarding his medical problems.  He had a high fever today       Objective     Current Facility-Administered Medications   Medication Dose Route Frequency Provider Last Rate Last Admin    acetaminophen (Tylenol) suspension 650 mg  650 mg oral q6h Isrrael Goudiaby, DO   650 mg at 01/15/25 1012    albuterol 2.5 mg /3 mL (0.083 %) nebulizer solution 2.5 mg  2.5 mg nebulization q2h PRN Isrrael Goudiaby, DO        albuterol 2.5 mg /3 mL (0.083 %) nebulizer solution 2.5 mg  2.5 mg nebulization q6h Anahi Hall MD        aspirin chewable tablet 81 mg  81 mg oral Daily Isrrael Goudiaby, DO   81 mg at 01/15/25 1011    baclofen (Lioresal) tablet 30 mg  30 mg g-tube TID Anahi Hall MD   30 mg at 01/15/25 1426    carBAMazepine (TEGretol) tablet 200 mg  200 mg g-tube 4x daily Isrrael Goudiaby, DO   200 mg at 01/15/25 1429    cholecalciferol (Vitamin D-3) tablet 10 mcg  400 Units oral Daily Isrrael Goudiaby, DO   10 mcg at 01/15/25 1011    diphenhydramine-zinc acetate cream   Topical TID PRN Isrrael Goudiaby, DO        levETIRAcetam (Keppra) 100 mg/mL solution 2,000 mg  2,000 mg g-tube BID Isrrael Goudiaby, DO   2,000 mg at 01/15/25 1011    lubricating eye drops ophthalmic solution 1 drop  1 drop Both Eyes BID Isrrael Goudiaby, DO   1 drop at 01/15/25 1426    meropenem (Merrem) 1 g in sodium chloride 0.9%  mL  1 g intravenous q8h Roula Manuel MD   Stopped at 01/15/25 1225    neomycin-bacitracin-polymyxin (Neosporin) ointment foil packet   Topical TID Isrrael Goudiaby, DO   10 Application at 01/15/25 1013    neomycin-bacitracin-polymyxin (Polysporin) ophthalmic ointment 0.5 inch  0.5 inch Both Eyes q6h JAZ Anahi Hall MD        ondansetron (Zofran) tablet 4 mg  4 mg oral q8h PRN Isrrael Tejeda, DO        Or     ondansetron (Zofran) injection 4 mg  4 mg intravenous q8h PRN Isrrael Goudiaby, DO        oxygen (O2) therapy   inhalation Continuous PRN - O2/gases Isrrael Moralesaby, DO   45 L/min at 01/15/25 1300    phenytoin (Dilantin) chewable tablet 100 mg  100 mg g-tube TID Isrrael Moralesaby, DO   100 mg at 01/15/25 1426    sodium chloride 3 % nebulizer solution 3 mL  3 mL nebulization TID Anahi Hall MD   3 mL at 01/15/25 1305    vancomycin (Vancocin) 1,750 mg in sodium chloride 0.9% 500 mL IV  1,750 mg intravenous q12h Anahi Hall MD   Stopped at 01/15/25 1613    vancomycin (Vancocin) pharmacy to dose - pharmacy monitoring   miscellaneous Daily PRN Austen G Salomone, DO        white petrolatum-mineral oiL (Tears Naturale PM) ophthalmic ointment   ophthalmic (eye) Daily Isrrael Moralesaby, DO   Given at 01/14/25 0938    zinc oxide 20 % ointment 1 Application  1 Application Topical q1h PRN Isrrael Moralesaby, DO   1 Application at 01/15/25 1013        Last Recorded Vitals  /70 (BP Location: Left arm, Patient Position: Lying)   Pulse 102   Temp 36.9 °C (98.4 °F) (Axillary)   Resp 17   Wt 71.5 kg (157 lb 10.1 oz)   SpO2 96%   General: no acute distress, lying in bed, sleeping, mother at bedside  HEENT: pharynx not examined  CVS: Tachycardic  Resp: decreased breath sounds in bases, wearing high flow nasal cannula oxygen  ABD: soft, NT, ND, PEG, ostomy  : Boss  EXT: Heel guards  Skin: no rash     Relevant Results:    Labs:   Results from last 72 hours   Lab Units 01/15/25  0529 01/14/25  0609 01/14/25  0105 01/13/25  0715   SODIUM mmol/L 138 136 137 137   POTASSIUM mmol/L 4.8 4.1 4.5 3.7   CHLORIDE mmol/L 99 97* 99 103   BUN mg/dL 9 9 10 12   CREATININE mg/dL 0.39* 0.42* 0.39* 0.35*   PHOSPHORUS mg/dL 3.0 3.1  --  3.8     Results from last 72 hours   Lab Units 01/15/25  0529 01/14/25  0609 01/14/25  0105   WBC AUTO x10*3/uL 10.3 7.6 7.1   HEMOGLOBIN g/dL 14.2 14.4 14.0   HEMATOCRIT % 43.9 43.0 41.5    PLATELETS AUTO x10*3/uL 209 228 219       Microbiology data: I have personally and independently reviewed and intrepreted the lab results  1/11/2025 urine Legionella antigen negative; urine pneumococcal antigen negative  1/11/2025 blood culture show no growth  1/11/2025 urine culture shows mixed growth  1/12/2025 MRSA swab positive    1/15/2025 procalcitonin 1.02    Imaging data: I have personally and independently reviewed and interpreted the imaging studies  1/11/2025 chest x-ray shows left lower lobe infiltrate/atelectasis  1/11/2025 chest CT shows bilateral lower lobe consolidation suggestive of aspiration pneumonia    Assessment/Plan     MY IMPRESSION & RECOMMENDATIONS:  High fevers, of unclear etiology, despite receiving scheduled acetaminophen.  I have personally and independently reviewed and interpreted the laboratory tests, imaging studies, and the documentation from other healthcare providers.  I am monitoring for side effects from meropenem and vancomycin as outlined in a previous note.  We are treating aspiration pneumonia complicated by acute hypoxic respiratory failure, but it is a bit concerning that the fevers are back.  His cultures so far have been nondiagnostic.  His prognosis remains guarded due to his multiple comorbidities.     -Continue vancomycin and meropenem for now  -Continue scheduled acetaminophen  -Repeat blood cultures  -Monitor fever curve     Other issues:  #Hypertension  #Advanced multiple sclerosis  #Neurogenic bladder with chronic Boss  #Dysphagia status post PEG  #Seizure disorder  #History of DVT and PE  #History of tracheostomy  #History of end ileostomy and mucous fistula in 2024         Vic Soria MD

## 2025-01-15 NOTE — PROGRESS NOTES
Spiritual Care Visit  Spiritual Care Request    Reason for Visit:  Routine Visit: Introduction  Crisis Visit: Critical care (Rapid Response)   Request Received From:  Referral From: Verbal  Focus of Care:  Visited With: Patient   Care Provided for Crisis Visit: Family not present   Refer to : Yes  Care Provided:  Other health caregiver(s) were attending to the pt.  Sense of Community and or Yazidism Affiliation:  Restorationist   Outcome:  There will be another visit scheduled.  Advance Directives:   Received     Gabriele Pizano

## 2025-01-15 NOTE — CARE PLAN
The patient's goals for the shift include pt remains safe and free of falls    The clinical goals for the shift include Pt will remain HDS throughout shift    Over the shift, the patient did not make progress toward the following goals. Barriers to progression include

## 2025-01-15 NOTE — NURSING NOTE
Patient sinus taxhy in 120s highest 130s, notified MD.     EKG done on two different machines, informed MD couldn't get a good reading on both EKG.    Tylenol given for fever     Awaiting new orders

## 2025-01-16 LAB
ANION GAP BLDA CALCULATED.4IONS-SCNC: 9 MMO/L (ref 10–25)
ANION GAP SERPL CALC-SCNC: 7 MMOL/L (ref 10–20)
ATRIAL RATE: 129 BPM
BACTERIA BLD CULT: NORMAL
BACTERIA BLD CULT: NORMAL
BASE EXCESS BLDA CALC-SCNC: 5.6 MMOL/L (ref -2–3)
BODY TEMPERATURE: 37 DEGREES CELSIUS
BUN SERPL-MCNC: 10 MG/DL (ref 6–23)
CA-I BLDA-SCNC: 1.16 MMOL/L (ref 1.1–1.33)
CALCIUM SERPL-MCNC: 8.5 MG/DL (ref 8.6–10.3)
CHLORIDE BLDA-SCNC: 98 MMOL/L (ref 98–107)
CHLORIDE SERPL-SCNC: 101 MMOL/L (ref 98–107)
CO2 SERPL-SCNC: 32 MMOL/L (ref 21–32)
CREAT SERPL-MCNC: 0.29 MG/DL (ref 0.5–1.3)
EGFRCR SERPLBLD CKD-EPI 2021: >90 ML/MIN/1.73M*2
ERYTHROCYTE [DISTWIDTH] IN BLOOD BY AUTOMATED COUNT: 15.6 % (ref 11.5–14.5)
GLUCOSE BLDA-MCNC: 174 MG/DL (ref 74–99)
GLUCOSE SERPL-MCNC: 117 MG/DL (ref 74–99)
HCO3 BLDA-SCNC: 30.6 MMOL/L (ref 22–26)
HCT VFR BLD AUTO: 35.2 % (ref 41–52)
HCT VFR BLD EST: 37 % (ref 41–52)
HGB BLD-MCNC: 11.9 G/DL (ref 13.5–17.5)
HGB BLDA-MCNC: 12.4 G/DL (ref 13.5–17.5)
INHALED O2 CONCENTRATION: 92 %
LACTATE BLDA-SCNC: 1.2 MMOL/L (ref 0.4–2)
MCH RBC QN AUTO: 30.9 PG (ref 26–34)
MCHC RBC AUTO-ENTMCNC: 33.8 G/DL (ref 32–36)
MCV RBC AUTO: 91 FL (ref 80–100)
NRBC BLD-RTO: 0 /100 WBCS (ref 0–0)
OXYHGB MFR BLDA: 96.8 % (ref 94–98)
P OFFSET: 186 MS
P ONSET: 166 MS
PCO2 BLDA: 45 MM HG (ref 38–42)
PH BLDA: 7.44 PH (ref 7.38–7.42)
PLATELET # BLD AUTO: 201 X10*3/UL (ref 150–450)
PO2 BLDA: 97 MM HG (ref 85–95)
POTASSIUM BLDA-SCNC: 4.3 MMOL/L (ref 3.5–5.3)
POTASSIUM SERPL-SCNC: 4.2 MMOL/L (ref 3.5–5.3)
Q ONSET: 231 MS
QRS COUNT: 21 BEATS
QRS DURATION: 90 MS
QT INTERVAL: 294 MS
QTC CALCULATION(BAZETT): 435 MS
QTC FREDERICIA: 382 MS
R AXIS: 84 DEGREES
RBC # BLD AUTO: 3.85 X10*6/UL (ref 4.5–5.9)
SAO2 % BLDA: 99 % (ref 94–100)
SODIUM BLDA-SCNC: 133 MMOL/L (ref 136–145)
SODIUM SERPL-SCNC: 136 MMOL/L (ref 136–145)
T AXIS: 61 DEGREES
T OFFSET: 378 MS
VANCOMYCIN SERPL-MCNC: 45.2 UG/ML (ref 5–20)
VENTRICULAR RATE: 132 BPM
WBC # BLD AUTO: 6.8 X10*3/UL (ref 4.4–11.3)

## 2025-01-16 PROCEDURE — 36415 COLL VENOUS BLD VENIPUNCTURE: CPT | Performed by: PHARMACIST

## 2025-01-16 PROCEDURE — 2500000005 HC RX 250 GENERAL PHARMACY W/O HCPCS: Performed by: INTERNAL MEDICINE

## 2025-01-16 PROCEDURE — 94640 AIRWAY INHALATION TREATMENT: CPT

## 2025-01-16 PROCEDURE — 94664 DEMO&/EVAL PT USE INHALER: CPT

## 2025-01-16 PROCEDURE — 94669 MECHANICAL CHEST WALL OSCILL: CPT

## 2025-01-16 PROCEDURE — 99233 SBSQ HOSP IP/OBS HIGH 50: CPT | Performed by: INTERNAL MEDICINE

## 2025-01-16 PROCEDURE — 31720 CLEARANCE OF AIRWAYS: CPT

## 2025-01-16 PROCEDURE — 1200000002 HC GENERAL ROOM WITH TELEMETRY DAILY

## 2025-01-16 PROCEDURE — 84132 ASSAY OF SERUM POTASSIUM: CPT | Performed by: HOSPITALIST

## 2025-01-16 PROCEDURE — 2500000004 HC RX 250 GENERAL PHARMACY W/ HCPCS (ALT 636 FOR OP/ED): Performed by: INTERNAL MEDICINE

## 2025-01-16 PROCEDURE — 80202 ASSAY OF VANCOMYCIN: CPT | Performed by: PHARMACIST

## 2025-01-16 PROCEDURE — 36600 WITHDRAWAL OF ARTERIAL BLOOD: CPT

## 2025-01-16 PROCEDURE — 2500000001 HC RX 250 WO HCPCS SELF ADMINISTERED DRUGS (ALT 637 FOR MEDICARE OP): Performed by: INTERNAL MEDICINE

## 2025-01-16 PROCEDURE — 80048 BASIC METABOLIC PNL TOTAL CA: CPT | Performed by: PHARMACIST

## 2025-01-16 PROCEDURE — 2500000002 HC RX 250 W HCPCS SELF ADMINISTERED DRUGS (ALT 637 FOR MEDICARE OP, ALT 636 FOR OP/ED): Performed by: INTERNAL MEDICINE

## 2025-01-16 PROCEDURE — 85027 COMPLETE CBC AUTOMATED: CPT | Performed by: INTERNAL MEDICINE

## 2025-01-16 RX ADMIN — WHITE PETROLATUM 57.7 %-MINERAL OIL 31.9 % EYE OINTMENT: at 21:57

## 2025-01-16 RX ADMIN — CARBAMAZEPINE 200 MG: 200 TABLET ORAL at 18:03

## 2025-01-16 RX ADMIN — ALBUTEROL SULFATE 2.5 MG: 2.5 SOLUTION RESPIRATORY (INHALATION) at 07:48

## 2025-01-16 RX ADMIN — BACITRACIN ZINC, NEOMYCIN SULFATE , POLYMYXIN B SULFATE. 10 APPLICATION: 400; 3.5; 5 OINTMENT TOPICAL at 21:58

## 2025-01-16 RX ADMIN — ZINC OXIDE 1 APPLICATION: 200 OINTMENT TOPICAL at 08:35

## 2025-01-16 RX ADMIN — VANCOMYCIN HYDROCHLORIDE 1750 MG: 5 INJECTION, POWDER, LYOPHILIZED, FOR SOLUTION INTRAVENOUS at 14:05

## 2025-01-16 RX ADMIN — MEROPENEM 1 G: 1 INJECTION, POWDER, FOR SOLUTION INTRAVENOUS at 18:03

## 2025-01-16 RX ADMIN — ACETAMINOPHEN 650 MG: 160 SUSPENSION ORAL at 18:02

## 2025-01-16 RX ADMIN — ACETAMINOPHEN 650 MG: 160 SUSPENSION ORAL at 23:27

## 2025-01-16 RX ADMIN — MEROPENEM 1 G: 1 INJECTION, POWDER, FOR SOLUTION INTRAVENOUS at 01:41

## 2025-01-16 RX ADMIN — ASPIRIN 81 MG: 81 TABLET, CHEWABLE ORAL at 08:35

## 2025-01-16 RX ADMIN — LEVETIRACETAM 2000 MG: 100 SOLUTION ORAL at 21:55

## 2025-01-16 RX ADMIN — PHENYTOIN 100 MG: 50 TABLET, CHEWABLE ORAL at 08:34

## 2025-01-16 RX ADMIN — NEOMYCIN SULFATE, POLYMYXIN B SULFATE AND BACITRACIN ZINC 0.5 INCH: 3.5; 10000; 4 OINTMENT OPHTHALMIC at 06:38

## 2025-01-16 RX ADMIN — ZINC OXIDE 1 APPLICATION: 200 OINTMENT TOPICAL at 21:56

## 2025-01-16 RX ADMIN — BACITRACIN ZINC, NEOMYCIN SULFATE , POLYMYXIN B SULFATE. 10 APPLICATION: 400; 3.5; 5 OINTMENT TOPICAL at 08:34

## 2025-01-16 RX ADMIN — VANCOMYCIN HYDROCHLORIDE 1750 MG: 5 INJECTION, POWDER, LYOPHILIZED, FOR SOLUTION INTRAVENOUS at 04:18

## 2025-01-16 RX ADMIN — PHENYTOIN 100 MG: 50 TABLET, CHEWABLE ORAL at 14:05

## 2025-01-16 RX ADMIN — NEOMYCIN SULFATE, POLYMYXIN B SULFATE AND BACITRACIN ZINC 0.5 INCH: 3.5; 10000; 4 OINTMENT OPHTHALMIC at 18:04

## 2025-01-16 RX ADMIN — MEROPENEM 1 G: 1 INJECTION, POWDER, FOR SOLUTION INTRAVENOUS at 08:35

## 2025-01-16 RX ADMIN — CARBAMAZEPINE 200 MG: 200 TABLET ORAL at 06:37

## 2025-01-16 RX ADMIN — BACLOFEN 30 MG: 10 TABLET ORAL at 14:05

## 2025-01-16 RX ADMIN — ACETAMINOPHEN 650 MG: 160 SUSPENSION ORAL at 10:00

## 2025-01-16 RX ADMIN — CARBOXYMETHYLCELLULOSE SODIUM 1 DROP: 0.5 SOLUTION/ DROPS OPHTHALMIC at 14:05

## 2025-01-16 RX ADMIN — PHENYTOIN 100 MG: 50 TABLET, CHEWABLE ORAL at 21:55

## 2025-01-16 RX ADMIN — BACITRACIN ZINC, NEOMYCIN SULFATE , POLYMYXIN B SULFATE. 10 APPLICATION: 400; 3.5; 5 OINTMENT TOPICAL at 06:36

## 2025-01-16 RX ADMIN — CARBAMAZEPINE 200 MG: 200 TABLET ORAL at 23:26

## 2025-01-16 RX ADMIN — CHOLECALCIFEROL (VITAMIN D3) 10 MCG (400 UNIT) TABLET 10 MCG: at 08:35

## 2025-01-16 RX ADMIN — ACETAMINOPHEN 650 MG: 160 SUSPENSION ORAL at 06:45

## 2025-01-16 RX ADMIN — CARBOXYMETHYLCELLULOSE SODIUM 1 DROP: 0.5 SOLUTION/ DROPS OPHTHALMIC at 08:34

## 2025-01-16 RX ADMIN — ALBUTEROL SULFATE 2.5 MG: 2.5 SOLUTION RESPIRATORY (INHALATION) at 20:01

## 2025-01-16 RX ADMIN — BACLOFEN 30 MG: 10 TABLET ORAL at 21:55

## 2025-01-16 RX ADMIN — ALBUTEROL SULFATE 2.5 MG: 2.5 SOLUTION RESPIRATORY (INHALATION) at 11:36

## 2025-01-16 RX ADMIN — LEVETIRACETAM 2000 MG: 100 SOLUTION ORAL at 08:34

## 2025-01-16 RX ADMIN — ALBUTEROL SULFATE 2.5 MG: 2.5 SOLUTION RESPIRATORY (INHALATION) at 00:20

## 2025-01-16 RX ADMIN — SODIUM CHLORIDE SOLN NEBU 3% 3 ML: 3 NEBU SOLN at 20:09

## 2025-01-16 RX ADMIN — SODIUM CHLORIDE SOLN NEBU 3% 3 ML: 3 NEBU SOLN at 11:46

## 2025-01-16 RX ADMIN — CARBAMAZEPINE 200 MG: 200 TABLET ORAL at 14:06

## 2025-01-16 RX ADMIN — BACLOFEN 30 MG: 10 TABLET ORAL at 08:34

## 2025-01-16 RX ADMIN — SODIUM CHLORIDE SOLN NEBU 3% 3 ML: 3 NEBU SOLN at 08:04

## 2025-01-16 RX ADMIN — BACITRACIN ZINC, NEOMYCIN SULFATE , POLYMYXIN B SULFATE. 10 APPLICATION: 400; 3.5; 5 OINTMENT TOPICAL at 14:05

## 2025-01-16 ASSESSMENT — COGNITIVE AND FUNCTIONAL STATUS - GENERAL
MOBILITY SCORE: 6
DRESSING REGULAR LOWER BODY CLOTHING: TOTAL
MOVING FROM LYING ON BACK TO SITTING ON SIDE OF FLAT BED WITH BEDRAILS: TOTAL
CLIMB 3 TO 5 STEPS WITH RAILING: TOTAL
HELP NEEDED FOR BATHING: TOTAL
EATING MEALS: TOTAL
PERSONAL GROOMING: TOTAL
DAILY ACTIVITIY SCORE: 6
PERSONAL GROOMING: TOTAL
WALKING IN HOSPITAL ROOM: TOTAL
STANDING UP FROM CHAIR USING ARMS: TOTAL
DRESSING REGULAR UPPER BODY CLOTHING: TOTAL
TOILETING: TOTAL
MOVING TO AND FROM BED TO CHAIR: TOTAL
TOILETING: TOTAL
DAILY ACTIVITIY SCORE: 6
DRESSING REGULAR LOWER BODY CLOTHING: TOTAL
MOVING TO AND FROM BED TO CHAIR: TOTAL
MOBILITY SCORE: 6
CLIMB 3 TO 5 STEPS WITH RAILING: TOTAL
EATING MEALS: TOTAL
DRESSING REGULAR LOWER BODY CLOTHING: TOTAL
PERSONAL GROOMING: TOTAL
EATING MEALS: TOTAL
STANDING UP FROM CHAIR USING ARMS: TOTAL
MOVING FROM LYING ON BACK TO SITTING ON SIDE OF FLAT BED WITH BEDRAILS: TOTAL
HELP NEEDED FOR BATHING: TOTAL
DRESSING REGULAR UPPER BODY CLOTHING: TOTAL
MOVING FROM LYING ON BACK TO SITTING ON SIDE OF FLAT BED WITH BEDRAILS: TOTAL
STANDING UP FROM CHAIR USING ARMS: TOTAL
TOILETING: TOTAL
WALKING IN HOSPITAL ROOM: TOTAL
TURNING FROM BACK TO SIDE WHILE IN FLAT BAD: TOTAL
CLIMB 3 TO 5 STEPS WITH RAILING: TOTAL
DRESSING REGULAR UPPER BODY CLOTHING: TOTAL
WALKING IN HOSPITAL ROOM: TOTAL
MOBILITY SCORE: 6
TURNING FROM BACK TO SIDE WHILE IN FLAT BAD: TOTAL
MOVING TO AND FROM BED TO CHAIR: TOTAL
TURNING FROM BACK TO SIDE WHILE IN FLAT BAD: TOTAL
DAILY ACTIVITIY SCORE: 6
HELP NEEDED FOR BATHING: TOTAL

## 2025-01-16 ASSESSMENT — PAIN SCALES - GENERAL
PAINLEVEL_OUTOF10: 0 - NO PAIN
PAINLEVEL_OUTOF10: 0 - NO PAIN

## 2025-01-16 ASSESSMENT — PAIN SCALES - WONG BAKER
WONGBAKER_NUMERICALRESPONSE: NO HURT
WONGBAKER_NUMERICALRESPONSE: NO HURT

## 2025-01-16 ASSESSMENT — PAIN SCALES - PAIN ASSESSMENT IN ADVANCED DEMENTIA (PAINAD)
BREATHING: NORMAL
BODYLANGUAGE: RELAXED
FACIALEXPRESSION: SMILING OR INEXPRESSIVE
TOTALSCORE: 0
CONSOLABILITY: NO NEED TO CONSOLE

## 2025-01-16 ASSESSMENT — PAIN - FUNCTIONAL ASSESSMENT
PAIN_FUNCTIONAL_ASSESSMENT: PAINAD (PAIN ASSESSMENT IN ADVANCED DEMENTIA SCALE)
PAIN_FUNCTIONAL_ASSESSMENT: PAINAD (PAIN ASSESSMENT IN ADVANCED DEMENTIA SCALE)

## 2025-01-16 NOTE — PROGRESS NOTES
For follow-up of:  Aspiration pneumonia    Subjective   Interval History:  He is sleeping.  He has had no fevers today.        Objective     Current Facility-Administered Medications   Medication Dose Route Frequency Provider Last Rate Last Admin    acetaminophen (Tylenol) suspension 650 mg  650 mg oral q6h Isrrael Goudiaby, DO   650 mg at 01/16/25 1802    albuterol 2.5 mg /3 mL (0.083 %) nebulizer solution 2.5 mg  2.5 mg nebulization q2h PRN Isrrael Goudiaby, DO        albuterol 2.5 mg /3 mL (0.083 %) nebulizer solution 2.5 mg  2.5 mg nebulization q6h Anahi Hall MD   2.5 mg at 01/16/25 1136    aspirin chewable tablet 81 mg  81 mg oral Daily Isrrael Goudiaby, DO   81 mg at 01/16/25 0835    baclofen (Lioresal) tablet 30 mg  30 mg g-tube TID Anahi Hall MD   30 mg at 01/16/25 1405    carBAMazepine (TEGretol) tablet 200 mg  200 mg g-tube 4x daily Isrrael Goudiaby, DO   200 mg at 01/16/25 1803    cholecalciferol (Vitamin D-3) tablet 10 mcg  400 Units oral Daily Isrrael Goudiaby, DO   10 mcg at 01/16/25 0835    diphenhydramine-zinc acetate cream   Topical TID PRN Isrrael Goudiaby, DO        levETIRAcetam (Keppra) 100 mg/mL solution 2,000 mg  2,000 mg g-tube BID Isrrael Goudiaby, DO   2,000 mg at 01/16/25 0834    lubricating eye drops ophthalmic solution 1 drop  1 drop Both Eyes BID Isrrael Goudiaby, DO   1 drop at 01/16/25 1405    meropenem (Merrem) 1 g in sodium chloride 0.9%  mL  1 g intravenous q8h Roula Manuel  mL/hr at 01/16/25 1803 1 g at 01/16/25 1803    neomycin-bacitracin-polymyxin (Neosporin) ointment foil packet   Topical TID Isrrael Goudiaby, DO   10 Application at 01/16/25 1405    neomycin-bacitracin-polymyxin (Polysporin) ophthalmic ointment 0.5 inch  0.5 inch Both Eyes q6h [facility] Anahi Hall MD   0.5 inch at 01/16/25 1804    ondansetron (Zofran) tablet 4 mg  4 mg oral q8h PRN Isrrael Tejeda DO        Or    ondansetron (Zofran) injection 4 mg  4 mg  intravenous q8h PRN Isrrael Goudiaby, DO        oxygen (O2) therapy   inhalation Continuous PRN - O2/gases Anahi Hall MD        phenytoin (Dilantin) chewable tablet 100 mg  100 mg g-tube TID Isrrael Goudiaby, DO   100 mg at 01/16/25 1405    sodium chloride 3 % nebulizer solution 3 mL  3 mL nebulization TID Anahi Hall MD   3 mL at 01/16/25 1146    vancomycin (Vancocin) 1,750 mg in sodium chloride 0.9% 500 mL IV  1,750 mg intravenous q12h Anahi Hall MD   Stopped at 01/16/25 1652    vancomycin (Vancocin) pharmacy to dose - pharmacy monitoring   miscellaneous Daily PRN Austen G Salomone, DO        white petrolatum-mineral oiL (Tears Naturale PM) ophthalmic ointment   ophthalmic (eye) Daily Isrrael Gobelenaby, DO   Given at 01/15/25 2325    zinc oxide 20 % ointment 1 Application  1 Application Topical q1h PRN Isrrael Goudiaby, DO   1 Application at 01/16/25 0835        Last Recorded Vitals  /72   Pulse 64   Temp 36.7 °C (98.1 °F)   Resp 20   Wt 71.5 kg (157 lb 10.1 oz) Comment: took bedscale wt while at bedside = 179.6 lbs , however wt inaccurate d/t multiple items on bed ; using 71.5 kg wt from 1/12  SpO2 90%   General: no acute distress, lying in bed, sleeping  HEENT: pharynx not examined  CVS: Tachycardic  Resp: decreased breath sounds in bases, wearing high flow nasal cannula oxygen  ABD: soft, NT, ND, PEG, ostomy  : Boss  EXT: Heel guards  Skin: no rash     Relevant Results:    Labs:   Results from last 72 hours   Lab Units 01/16/25  0529 01/15/25  0529 01/14/25  0609   SODIUM mmol/L 136 138 136   POTASSIUM mmol/L 4.2 4.8 4.1   CHLORIDE mmol/L 101 99 97*   BUN mg/dL 10 9 9   CREATININE mg/dL 0.29* 0.39* 0.42*   PHOSPHORUS mg/dL  --  3.0 3.1     Results from last 72 hours   Lab Units 01/16/25  0529 01/15/25  0529 01/14/25  0609   WBC AUTO x10*3/uL 6.8 10.3 7.6   HEMOGLOBIN g/dL 11.9* 14.2 14.4   HEMATOCRIT % 35.2* 43.9 43.0   PLATELETS AUTO x10*3/uL 201 209 228        Microbiology data: I have personally and independently reviewed and intrepreted the lab results  1/11/2025 urine Legionella antigen negative; urine pneumococcal antigen negative  1/11/2025 blood culture show no growth  1/11/2025 urine culture shows mixed growth  1/12/2025 MRSA swab positive  1/15/2025 blood cultures are pending    1/15/2025 procalcitonin 1.02    Imaging data: I have personally and independently reviewed and interpreted the imaging studies  1/11/2025 chest x-ray shows left lower lobe infiltrate/atelectasis  1/11/2025 chest CT shows bilateral lower lobe consolidation suggestive of aspiration pneumonia    Assessment/Plan     MY IMPRESSION & RECOMMENDATIONS:  Fevers, which may have resolved.  I have personally and independently reviewed and interpreted the laboratory tests, imaging studies, and the documentation from other healthcare providers.  I am monitoring for side effects from meropenem and vancomycin as outlined in a previous note.  We continue to treat aspiration pneumonia complicated by acute hypoxic respiratory failure.  His prognosis is still guarded due to his multiple comorbidities.     -Continue vancomycin and meropenem for now  -Continue scheduled acetaminophen  -Await results of repeat blood cultures  -Monitor fever curve     Other issues:  #Hypertension  #Advanced multiple sclerosis  #Neurogenic bladder with chronic Boss  #Dysphagia status post PEG  #Seizure disorder  #History of DVT and PE  #History of tracheostomy  #History of end ileostomy and mucous fistula in 2024         Vic Soria MD

## 2025-01-16 NOTE — PROGRESS NOTES
Report received from nightshift RN. Patient resting in bed with no s/s of acute discomfort or distress. RT and respiratory students at bedside conducting care. Patient remains on AirVo. Vital signs reviewed. Labs and orders reviewed. Patient afebrile overnight, hemodynamically stable. Catheter and ostomy tube to drainage bag confirmed as free of kinks or dependent loops, draining appropriately. Bed alarm confirmed as on and active. Will assume care of patient.

## 2025-01-16 NOTE — CARE PLAN
The patient's goals for the shift include pt remains safe and free of falls    The clinical goals for the shift include patient will remain afebrile and hemodynamically stable this shift.    Over the shift, the patient did make progress toward the following goals.      Problem: Skin  Goal: Participates in plan/prevention/treatment measures  Outcome: Progressing  Goal: Prevent/manage excess moisture  Outcome: Progressing  Goal: Prevent/minimize sheer/friction injuries  Outcome: Progressing     Problem: Fall/Injury  Goal: Not fall by end of shift  Outcome: Progressing  Goal: Be free from injury by end of the shift  Outcome: Progressing  Goal: Verbalize understanding of personal risk factors for fall in the hospital  Outcome: Progressing  Goal: Verbalize understanding of risk factor reduction measures to prevent injury from fall in the home  Outcome: Progressing  Goal: Use assistive devices by end of the shift  Outcome: Progressing  Goal: Pace activities to prevent fatigue by end of the shift  Outcome: Progressing     Problem: Nutrition  Goal: Nutrition support goals are met within 48 hrs  Outcome: Progressing  Goal: Nutrition support is meeting 75% of nutrient needs  Outcome: Progressing  Goal: Tube feed tolerance  Outcome: Progressing  Goal: BG  mg/dL  Outcome: Progressing  Goal: Lab values WNL  Outcome: Progressing  Goal: Electrolytes WNL  Outcome: Progressing  Goal: Promote healing  Outcome: Progressing  Goal: Maintain stable weight  Outcome: Progressing  Goal: Improve ostomy output  Outcome: Progressing     Problem: Pain - Adult  Goal: Verbalizes/displays adequate comfort level or baseline comfort level  Outcome: Progressing     Problem: Safety - Adult  Goal: Free from fall injury  Outcome: Progressing     Problem: Discharge Planning  Goal: Discharge to home or other facility with appropriate resources  Outcome: Progressing     Problem: Chronic Conditions and Co-morbidities  Goal: Patient's chronic  conditions and co-morbidity symptoms are monitored and maintained or improved  Outcome: Progressing
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
## 2025-01-16 NOTE — NURSING NOTE
Rapid Response Nurse Note:     Israel Edgar is a 44 y.o. male on day 5 of admission presenting with Pneumonia of both lower lobes due to infectious organism.    Rounded on patient due to recent rapid response, reviewed patient's chart and checked with bedside nurse for any questions or concerns. There were none voiced at this time.    The patient's current RADAR score is 2    /69 (BP Location: Left arm, Patient Position: Lying)   Pulse (!) 113   Temp 37.3 °C (99.1 °F) (Axillary)   Resp 20   Wt 71.5 kg (157 lb 10.1 oz)   SpO2 95%     No signs/symptoms of distress at this time. Bedside nurse notified to escalate concerns if patient condition changes      Theresa Edgar RN

## 2025-01-16 NOTE — PROGRESS NOTES
"Nutrition Follow-up Note     Pt tolerating TF bolus as ordered per RN.   Pt resting in bed, in NAD.     Water flushes modified a few days ago to reflect what pt receives at home per pt's mom request.     History:  Food and Nutrient History  Food and Nutrient History: npo , per RN pt was receiving Boost via PEG at home, however RN also reported pt was supposed to be on Osomolite  1.5. Per previous RDN notes, pt was on Vital 1.5 @ 55 ml/hr at [facility] this past August, and note pt was receiving Osmolite 1.5 bolus 330 ml every six hours this past Sept at [facility].       Dietary Orders (From admission, onward)       Start     Ordered    01/15/25 1227  Enteral feeding with NPO PEG (percutaneous endoscopic gastric); 474; TID; No free water; 150 (Before and after bolus); Water; Tap water (Not too cold); Other (specify); 300ml flush 2 hr after bolus feeds, to provide an additional 900ml water daily...  Diet effective now        Question Answer Comment   Tube feeding formula: Vital 1.5    Feeding route: PEG (percutaneous endoscopic gastric)    Tube feeding bolus (mL): 474    Tube feeding bolus frequency: TID    Free water restriction: No free water    Tube feeding flush (mL): 150 Before and after bolus   Flush type: Water    Water type: Tap water Not too cold   Flush frequency: Other (specify)    Additional Details 300ml flush 2 hr after bolus feeds, to provide an additional 900ml water daily.        01/15/25 1228    01/13/25 0248  May Not Participate in Room Service  ( ROOM SERVICE MAY NOT PARTICIPATE)  Once        Question:  .  Answer:  Yes    01/13/25 0247                    Anthropometrics:  Height: 177.8 cm (5' 10\")  Weight: 71.5 kg (157 lb 10.1 oz) (took bedscale wt while at bedside = 179.6 lbs , however wt inaccurate d/t multiple items on bed ; using 71.5 kg wt from 1/12)  BMI (Calculated): 22.62      Weight Change  Weight History / % Weight Change: 69.4 kg 9/16/24, 64.6 kg 8/22/24, 77.1 kg 4/9/24  Significant Weight Loss: " "No     IBW/kg (Dietitian Calculated): 75.5 kg  Percent of IBW: 95 %     Scheduled medications  acetaminophen, 650 mg, oral, q6h  albuterol, 2.5 mg, nebulization, q6h  aspirin, 81 mg, oral, Daily  baclofen, 30 mg, g-tube, TID  carBAMazepine, 200 mg, g-tube, 4x daily  cholecalciferol, 400 Units, oral, Daily  levETIRAcetam, 2,000 mg, g-tube, BID  lubricating eye drops, 1 drop, Both Eyes, BID  meropenem, 1 g, intravenous, q8h  neomycin-bacitracnZn-polymyxnB, , Topical, TID  neomycin-bacitracin-polymyxin, 0.5 inch, Both Eyes, q6h JAZ  phenytoin, 100 mg, g-tube, TID  sodium chloride, 3 mL, nebulization, TID  vancomycin (Vancocin), 1,750 mg, intravenous, q12h  white petrolatum-mineral oiL, , ophthalmic (eye), Daily      Continuous medications     PRN medications  PRN medications: albuterol, diphenhydramine-zinc acetate, ondansetron **OR** ondansetron, oxygen, vancomycin, zinc oxide     Latest Reference Range & Units 01/16/25 05:29   GLUCOSE 74 - 99 mg/dL 117 (H)   SODIUM 136 - 145 mmol/L 136   POTASSIUM 3.5 - 5.3 mmol/L 4.2   CHLORIDE 98 - 107 mmol/L 101   Bicarbonate 21 - 32 mmol/L 32   Anion Gap 10 - 20 mmol/L 7 (L)   Blood Urea Nitrogen 6 - 23 mg/dL 10   Creatinine 0.50 - 1.30 mg/dL 0.29 (L)   EGFR >60 mL/min/1.73m*2 >90   Calcium 8.6 - 10.3 mg/dL 8.5 (L)   (H): Data is abnormally high  (L): Data is abnormally low    I/O last 3 completed shifts:  In: 2472 (34.6 mL/kg) [NG/GT:2472]  Out: 4100 (57.3 mL/kg) [Urine:3200 (1.2 mL/kg/hr); Stool:900]  Weight: 71.5 kg   I/O this shift:  In: 1224 [NG/GT:1224]  Out: 400 [Urine:200; Stool:200]    Ileostomy connected to drainage bag, noted yesterday's output 900 ml/24 hours per documentation      Energy Needs:  Calculated Energy Needs Using Equations  Height: 177.8 cm (5' 10\")  Temp: 36.8 °C (98.2 °F)    Estimated Energy Needs  Method for Estimating Needs: 8311-9986 kcal/day (25-30 kcal/kg)    Estimated Protein Needs  Method for Estimating Needs:  g/day (1.2-1.5 " g/kg)    Estimated Fluid Needs  Total Fluid Estimated Needs (mL): 1800 mL  Total Fluid Estimated Needs (mL/kg): 25 mL/kg  Method for Estimating Needs: or as per MD or ~ 1 ml/kcal/day         Nutrition Focused Physical Findings:  Subcutaneous Fat Loss  Orbital Fat Pads: Mild-Moderate (slight dark circles and slight hollowing)  Buccal Fat Pads: Mild-Moderate (flat cheeks, minimal bounce)  Triceps: Defer  Ribs: Defer    Muscle Wasting  Temporalis: Mild-Moderate (slight depression)  Pectoralis (Clavicular Region): Mild-Moderate (some protrusion of clavicle)  Deltoid/Trapezius: Mild-Moderate (slight protrusion of acromion process)  Quadriceps: Mild-Moderate (mild depression on inner and outer thigh)    Edema  Edema: none         Physical Findings (Nutrition Deficiency/Toxicity)  Skin: Positive (PEG, ileostomy, INTACT coccyx/buttocks per wound RN documentation)       Nutrition Diagnosis   Malnutrition Diagnosis  Patient has Malnutrition Diagnosis: No    Patient has Nutrition Diagnosis: Yes  Nutrition Diagnosis 1: Swallowing difficulity  Diagnosis Status (1): Ongoing  Related to (1): physiological cause  As Evidenced by (1): s/p PEG placement , pt requires long term enteral support  Additional Assessment Information (1): unknown amount of Boost pt was receiving PTA , however wt has increased [place] [facility] [place]           Nutrition Interventions/Recommendations   Nutrition Prescription  Individualized Nutrition Prescription Provided for : continue TF bolus and FWF as ordered, d/w MD  - FWF as ordered, MD to adjust if indicated, provides 1800 mL total FWF/day (FWF as per mom's request) ; TF free water and water flushes provides a total of 2886-mL free water/day   - strict I/O and daily wts   - wt check   - RFP, Mg daily; replete prn   - music therapy consult as appropriate   - TF as ordered provides 100% of estimated daily energy and protein requirements       Food and/or Nutrient Delivery  Interventions  Interventions: Enteral intake         Enteral Intake: Other (Comment)  Goal: Vital 1.5 bolus 474 mL three times daily provides 1422 mL total volume/day, 2133 kcal/day, 96 g/d protein, 1086 ml free water     Additional Interventions: NPO status recommended, noted NPO previous admissions per RDN documentation       Coordination of Nutrition Care by a Nutrition Professional  Collaboration and Referral of Nutrition Care: Collaboration by nutrition professional with other providers (d/w MD and RN ; bolus feeds using one Liter bottle d/w RN, as well. mom wants feeds via synringe, not pushed, just via gravity)    Education Documentation  No documentation found.           Nutrition Monitoring and Evaluation   Food and Nutrient Related History         Enteral and Parenteral Nutrition Intake: Enteral nutrition formula/solution, Enteral nutrition intake  Criteria: TF tolerance at goal rate, TF providing approximately 100% of estimated energy and protein requirements       Anthropometrics: Body Composition/Growth/Weight History  Weight: Weight change  Criteria: daily wts, monitor trend       Biochemical Data, Medical Tests and Procedures  Electrolyte and Renal Panel: BUN, Sodium, Calcium, serum, Chloride, Creatinine, Magnesium, Phosphorus, Potassium  Criteria: daily or as per MD    Gastrointestinal Profile: Alanine aminotransferase (ALT), Alkaline phosphatase, Bilirubin, total, Aspartate aminotransferase (AST)  Criteria: as per MD    Glucose/Endocrine Profile: Glucose, casual  Criteria: daily, or as per MD    Nutritional Anemia Profile: Hematocrit, Hemoglobin, Iron, serum  Criteria: as per MD    Vitamin Profile: Vitamin D, 25 hydroxy  Criteria: as indicated    Nutrition Focused Physical Findings  Adipose: Other (Comment)  Criteria: monitor NFPE    Bones: Other (Comment)  Criteria: monitor skeletal integrity    Digestive System: Abdominal distension, Increased appetite, Nausea, Vomiting, Anorexia, Ascites,  Constipation, Decrease in appetite, Diarrhea, Early satiety, Other (Comment)  Criteria: monitor GI function closely    Muscles: Muscle atrophy  Criteria: monitor NFPE    Skin: Other (Comment)  Criteria: monitor skin itegrity closely         Follow Up  Time Spent (min): 60 minutes  Last Date of Nutrition Visit: 01/16/25  Nutrition Follow-Up Needed?: Dietitian to reassess per policy  Follow up Comment: TF via PEG, SUMEET

## 2025-01-16 NOTE — CARE PLAN
The patient's goals for the shift include pt remains safe and free of falls    The clinical goals for the shift include patient will maintain oxygenation at >92% this shift.    Over the shift, the patient did not make progress toward the following goals. Barriers to progression include . Recommendations to address these barriers include .

## 2025-01-16 NOTE — PROGRESS NOTES
Vancomycin Dosing by Pharmacy- FOLLOW UP    Israel Edgar is a 44 y.o. year old male who Pharmacy has been consulted for vancomycin dosing for pneumonia. Based on the patient's indication and renal status this patient is being dosed based on a goal AUC of 400-600.     Renal function is currently stable.    Current vancomycin dose: 1750 mg given every 12 hours    Estimated vancomycin AUC on current dose: 511 mg/L.hr     Visit Vitals  /71 (BP Location: Left arm)   Pulse 105   Temp 37.1 °C (98.7 °F) (Axillary)   Resp 21        Lab Results   Component Value Date    CREATININE 0.29 (L) 2025    CREATININE 0.39 (L) 01/15/2025    CREATININE 0.42 (L) 2025    CREATININE 0.39 (L) 2025        Patient weight is as follows:   Vitals:    25 1725   Weight: 71.5 kg (157 lb 10.1 oz)       Cultures:  No results found for the encounter in last 14 days.       I/O last 3 completed shifts:  In: 2472 (34.6 mL/kg) [NG/GT:2472]  Out: 3400 (47.6 mL/kg) [Urine:2500 (1 mL/kg/hr); Stool:900]  Weight: 71.5 kg   I/O during current shift:  No intake/output data recorded.    Temp (24hrs), Av.7 °C (99.8 °F), Min:36.7 °C (98.1 °F), Max:38.7 °C (101.7 °F)      Assessment/Plan    Within goal AUC range. Continue current vancomycin regimen.    This dosing regimen is predicted by [employer] to result in the following pharmacokinetic parameters:  Exposure target: AUC24 (range)400-600 mg/L.hr   EVQ12-85: 510 mg/L.hr  AUC24,ss: 511 mg/L.hr  Probability of AUC24 > 400: 97 %  Ctrough,ss: 9.9 mg/L  Probability of Ctrough,ss > 20: 0 %  The next level will be obtained on  at 0500. May be obtained sooner if clinically indicated.   Will continue to monitor renal function daily while on vancomycin and order serum creatinine at least every 48 hours if not already ordered.  Follow for continued vancomycin needs, clinical response, and signs/symptoms of toxicity.       Ashly J Kolehmainen, PharmD

## 2025-01-16 NOTE — PROGRESS NOTES
Israel Edgar is a 44 y.o. male on day 5 of admission presenting with Pneumonia of both lower lobes due to infectious organism.      Subjective   Patient was seen and examined     Objective     Last Recorded Vitals  /71 (BP Location: Left arm)   Pulse 105   Temp 37.1 °C (98.7 °F) (Axillary)   Resp 21   Wt 71.5 kg (157 lb 10.1 oz)   SpO2 100%   Intake/Output last 3 Shifts:    Intake/Output Summary (Last 24 hours) at 1/16/2025 1136  Last data filed at 1/16/2025 1015  Gross per 24 hour   Intake 3072 ml   Output 2800 ml   Net 272 ml       Admission Weight  Weight: 71.5 kg (157 lb 9.6 oz) (01/12/25 0008)    Daily Weight  01/12/25 : 71.5 kg (157 lb 10.1 oz)    Image Results  ECG 12 lead  Supraventricular tachycardia  Otherwise normal ECG  When compared with ECG of 13-JAN-2025 08:10, (unconfirmed)  Incomplete right bundle branch block is no longer Present  Confirmed by Julio Davidson ([id number]) on 1/16/2025 11:29:08 AM      Physical Exam  Constitutional: Nonverbal, intellectually disabled  Eyes: PERRLA, clear sclera  ENMT: Not examined  Head / Neck: Atraumatic, normocephalic, supple neck, JVP not visualized  Lungs: Patent airways, CTABL  Heart: RRR, S1S2, no murmurs appreciated, palpable pulses in all extremities  GI: Soft, NT, ND, bowel sounds present in all quadrants, ostomy in place with liquid stool flowing in reservoir connected to ostomy  MSK: Unable to assess range of motion, due to spasticity and nonverbal state with inability to follow command  Extremities: Contractures in all extremities at different level, notable in hands bilaterally  : Indwelling Boss catheter in place  Breast: Deferred  Neurological: AAO x 0 unable to complete exam, nonverbal, with inability to follow command  Psychological: Appropriate mood and behavior     Relevant Results           Scheduled medications  acetaminophen, 650 mg, oral, q6h  albuterol, 2.5 mg, nebulization, q6h  aspirin, 81 mg, oral, Daily  baclofen, 30 mg, g-tube,  TID  carBAMazepine, 200 mg, g-tube, 4x daily  cholecalciferol, 400 Units, oral, Daily  levETIRAcetam, 2,000 mg, g-tube, BID  lubricating eye drops, 1 drop, Both Eyes, BID  meropenem, 1 g, intravenous, q8h  neomycin-bacitracnZn-polymyxnB, , Topical, TID  neomycin-bacitracin-polymyxin, 0.5 inch, Both Eyes, q6h JAZ  phenytoin, 100 mg, g-tube, TID  sodium chloride, 3 mL, nebulization, TID  vancomycin (Vancocin), 1,750 mg, intravenous, q12h  white petrolatum-mineral oiL, , ophthalmic (eye), Daily      Continuous medications     PRN medications  PRN medications: albuterol, diphenhydramine-zinc acetate, ondansetron **OR** ondansetron, oxygen, vancomycin, zinc oxide    Assessment/Plan          This patient has a urinary catheter   Reason for the urinary catheter remaining today? neurogenic bladder      44 y.o. male with PMH of advanced MS with spasticity at baseline, neurogenic bladder with chronic little, epilepsy with questionable breakthrough seizures, DVT, PE, MRSA colonization, MRDO pneumonia, HTN, quadriplegia, depression, CVA, dysphagia, previous tracheostomy who is presenting to [facility] ED with complaint of increased somnolence x 1 day by proxy and found with CAP     Community-acquired pneumonia: History of multi resistant drug organism pneumonia  -Meropenem 1 g IV piggyback every 8 hours  -Vancomycin 1250 mg IV piggyback twice daily  -Albuterol via nebulizer as needed shortness of breath or wheezing  -Blood cultures collected in the ED  -Procalcitonin level  -Urine antigen for pneumococcus and Legionella negative  -O2 support as needed to maintain saturation of 94% or greater  -ID consulted: Appreciate evaluation and recommendations as below  -Continue vancomycin for now and monitor levels  -Continue meropenem  -Monitor fever curve  -repeat blood cx     Concern with UTI  -Antibiotic as above  -Urine culture with mixed trisha indicative of contamination     Epilepsy  -Tegretol 200 mg daily given during  tube feed bolus, together with baclofen and Tylenol  -Levetiracetam 2000 mg 3 times daily  -Phenytoin 100 mg 3 times daily     Spasticity  -On baclofen     Neurogenic bladder  -Indwelling catheter in place     Hypertension  -Not on any medication, will monitor with vitals     Dysphagia  -N.p.o. with tube feed  -Diverting ostomy in place, connected to bag due to high output     Diet  -Tube feed     DVT prophylaxis  -EMR shows allergies to above heparin and Lovenox     Disposition: Presenting with increased somnolence, found with pneumonia and possible UTI, need further management, discharge pending clinical improvement             Anahi Hall MD

## 2025-01-17 ENCOUNTER — APPOINTMENT (OUTPATIENT)
Dept: CARDIOLOGY | Facility: HOSPITAL | Age: 45
End: 2025-01-17
Payer: MEDICAID

## 2025-01-17 ENCOUNTER — APPOINTMENT (OUTPATIENT)
Dept: RADIOLOGY | Facility: HOSPITAL | Age: 45
End: 2025-01-17
Payer: MEDICAID

## 2025-01-17 LAB
ANION GAP SERPL CALC-SCNC: 15 MMOL/L (ref 10–20)
BUN SERPL-MCNC: 9 MG/DL (ref 6–23)
CALCIUM SERPL-MCNC: 9.9 MG/DL (ref 8.6–10.3)
CHLORIDE SERPL-SCNC: 93 MMOL/L (ref 98–107)
CO2 SERPL-SCNC: 30 MMOL/L (ref 21–32)
CREAT SERPL-MCNC: 0.36 MG/DL (ref 0.5–1.3)
EGFRCR SERPLBLD CKD-EPI 2021: >90 ML/MIN/1.73M*2
ERYTHROCYTE [DISTWIDTH] IN BLOOD BY AUTOMATED COUNT: 15.4 % (ref 11.5–14.5)
GLUCOSE BLD MANUAL STRIP-MCNC: 101 MG/DL (ref 74–99)
GLUCOSE BLD MANUAL STRIP-MCNC: 120 MG/DL (ref 74–99)
GLUCOSE BLD MANUAL STRIP-MCNC: 121 MG/DL (ref 74–99)
GLUCOSE BLD MANUAL STRIP-MCNC: 128 MG/DL (ref 74–99)
GLUCOSE SERPL-MCNC: 113 MG/DL (ref 74–99)
HCT VFR BLD AUTO: 45.2 % (ref 41–52)
HGB BLD-MCNC: 15.1 G/DL (ref 13.5–17.5)
MCH RBC QN AUTO: 30.8 PG (ref 26–34)
MCHC RBC AUTO-ENTMCNC: 33.4 G/DL (ref 32–36)
MCV RBC AUTO: 92 FL (ref 80–100)
NRBC BLD-RTO: 0 /100 WBCS (ref 0–0)
PLATELET # BLD AUTO: 276 X10*3/UL (ref 150–450)
POTASSIUM SERPL-SCNC: 4.3 MMOL/L (ref 3.5–5.3)
RBC # BLD AUTO: 4.91 X10*6/UL (ref 4.5–5.9)
SODIUM SERPL-SCNC: 134 MMOL/L (ref 136–145)
VANCOMYCIN TROUGH SERPL-MCNC: 8.7 UG/ML (ref 5–20)
WBC # BLD AUTO: 8.4 X10*3/UL (ref 4.4–11.3)

## 2025-01-17 PROCEDURE — 85027 COMPLETE CBC AUTOMATED: CPT | Performed by: INTERNAL MEDICINE

## 2025-01-17 PROCEDURE — 2500000005 HC RX 250 GENERAL PHARMACY W/O HCPCS: Performed by: INTERNAL MEDICINE

## 2025-01-17 PROCEDURE — 2500000004 HC RX 250 GENERAL PHARMACY W/ HCPCS (ALT 636 FOR OP/ED): Performed by: INTERNAL MEDICINE

## 2025-01-17 PROCEDURE — 93005 ELECTROCARDIOGRAM TRACING: CPT

## 2025-01-17 PROCEDURE — 94664 DEMO&/EVAL PT USE INHALER: CPT

## 2025-01-17 PROCEDURE — 2020000001 HC ICU ROOM DAILY

## 2025-01-17 PROCEDURE — 80177 DRUG SCRN QUAN LEVETIRACETAM: CPT | Mod: AHULAB | Performed by: INTERNAL MEDICINE

## 2025-01-17 PROCEDURE — 87632 RESP VIRUS 6-11 TARGETS: CPT | Performed by: NURSE PRACTITIONER

## 2025-01-17 PROCEDURE — 2500000005 HC RX 250 GENERAL PHARMACY W/O HCPCS: Performed by: EMERGENCY MEDICINE

## 2025-01-17 PROCEDURE — 31720 CLEARANCE OF AIRWAYS: CPT

## 2025-01-17 PROCEDURE — 2500000001 HC RX 250 WO HCPCS SELF ADMINISTERED DRUGS (ALT 637 FOR MEDICARE OP): Performed by: EMERGENCY MEDICINE

## 2025-01-17 PROCEDURE — 2500000002 HC RX 250 W HCPCS SELF ADMINISTERED DRUGS (ALT 637 FOR MEDICARE OP, ALT 636 FOR OP/ED): Performed by: EMERGENCY MEDICINE

## 2025-01-17 PROCEDURE — 5A09357 ASSISTANCE WITH RESPIRATORY VENTILATION, LESS THAN 24 CONSECUTIVE HOURS, CONTINUOUS POSITIVE AIRWAY PRESSURE: ICD-10-PCS | Performed by: NURSE PRACTITIONER

## 2025-01-17 PROCEDURE — 2500000002 HC RX 250 W HCPCS SELF ADMINISTERED DRUGS (ALT 637 FOR MEDICARE OP, ALT 636 FOR OP/ED): Performed by: INTERNAL MEDICINE

## 2025-01-17 PROCEDURE — 36415 COLL VENOUS BLD VENIPUNCTURE: CPT | Performed by: INTERNAL MEDICINE

## 2025-01-17 PROCEDURE — 71045 X-RAY EXAM CHEST 1 VIEW: CPT

## 2025-01-17 PROCEDURE — 94640 AIRWAY INHALATION TREATMENT: CPT

## 2025-01-17 PROCEDURE — 99291 CRITICAL CARE FIRST HOUR: CPT | Performed by: NURSE PRACTITIONER

## 2025-01-17 PROCEDURE — 71045 X-RAY EXAM CHEST 1 VIEW: CPT | Performed by: STUDENT IN AN ORGANIZED HEALTH CARE EDUCATION/TRAINING PROGRAM

## 2025-01-17 PROCEDURE — 2500000004 HC RX 250 GENERAL PHARMACY W/ HCPCS (ALT 636 FOR OP/ED): Performed by: NURSE PRACTITIONER

## 2025-01-17 PROCEDURE — 82947 ASSAY GLUCOSE BLOOD QUANT: CPT

## 2025-01-17 PROCEDURE — 94660 CPAP INITIATION&MGMT: CPT

## 2025-01-17 PROCEDURE — 94669 MECHANICAL CHEST WALL OSCILL: CPT

## 2025-01-17 PROCEDURE — 80048 BASIC METABOLIC PNL TOTAL CA: CPT | Performed by: NURSE PRACTITIONER

## 2025-01-17 PROCEDURE — 99292 CRITICAL CARE ADDL 30 MIN: CPT | Performed by: INTERNAL MEDICINE

## 2025-01-17 PROCEDURE — 80202 ASSAY OF VANCOMYCIN: CPT

## 2025-01-17 PROCEDURE — 80186 ASSAY OF PHENYTOIN FREE: CPT | Performed by: INTERNAL MEDICINE

## 2025-01-17 RX ORDER — FUROSEMIDE 10 MG/ML
80 INJECTION INTRAMUSCULAR; INTRAVENOUS ONCE
Status: COMPLETED | OUTPATIENT
Start: 2025-01-17 | End: 2025-01-17

## 2025-01-17 RX ORDER — BACLOFEN 10 MG/1
30 TABLET ORAL DAILY
Status: DISCONTINUED | OUTPATIENT
Start: 2025-01-18 | End: 2025-01-24 | Stop reason: HOSPADM

## 2025-01-17 RX ORDER — BACLOFEN 10 MG/1
20 TABLET ORAL DAILY
Status: DISCONTINUED | OUTPATIENT
Start: 2025-01-17 | End: 2025-01-17

## 2025-01-17 RX ORDER — SODIUM CHLORIDE FOR INHALATION 3 %
3 VIAL, NEBULIZER (ML) INHALATION
Status: DISCONTINUED | OUTPATIENT
Start: 2025-01-17 | End: 2025-01-19

## 2025-01-17 RX ORDER — ALBUTEROL SULFATE 0.83 MG/ML
2.5 SOLUTION RESPIRATORY (INHALATION)
Status: DISCONTINUED | OUTPATIENT
Start: 2025-01-17 | End: 2025-01-19

## 2025-01-17 RX ORDER — BACLOFEN 10 MG/1
20 TABLET ORAL 2 TIMES DAILY
Status: DISCONTINUED | OUTPATIENT
Start: 2025-01-17 | End: 2025-01-24 | Stop reason: HOSPADM

## 2025-01-17 RX ORDER — BACLOFEN 10 MG/1
30 TABLET ORAL DAILY
Status: DISCONTINUED | OUTPATIENT
Start: 2025-01-18 | End: 2025-01-17

## 2025-01-17 RX ADMIN — SODIUM CHLORIDE SOLN NEBU 3% 3 ML: 3 NEBU SOLN at 11:31

## 2025-01-17 RX ADMIN — CARBOXYMETHYLCELLULOSE SODIUM 1 DROP: 0.5 SOLUTION/ DROPS OPHTHALMIC at 08:42

## 2025-01-17 RX ADMIN — WHITE PETROLATUM 57.7 %-MINERAL OIL 31.9 % EYE OINTMENT: at 21:26

## 2025-01-17 RX ADMIN — NEOMYCIN SULFATE, POLYMYXIN B SULFATE AND BACITRACIN ZINC 0.5 INCH: 3.5; 10000; 4 OINTMENT OPHTHALMIC at 12:31

## 2025-01-17 RX ADMIN — ALBUTEROL SULFATE 2.5 MG: 2.5 SOLUTION RESPIRATORY (INHALATION) at 20:13

## 2025-01-17 RX ADMIN — PHENYTOIN 100 MG: 50 TABLET, CHEWABLE ORAL at 17:29

## 2025-01-17 RX ADMIN — PHENYTOIN 100 MG: 50 TABLET, CHEWABLE ORAL at 08:42

## 2025-01-17 RX ADMIN — ASPIRIN 81 MG: 81 TABLET, CHEWABLE ORAL at 08:42

## 2025-01-17 RX ADMIN — ALBUTEROL SULFATE 2.5 MG: 2.5 SOLUTION RESPIRATORY (INHALATION) at 15:05

## 2025-01-17 RX ADMIN — CARBAMAZEPINE 200 MG: 200 TABLET ORAL at 17:29

## 2025-01-17 RX ADMIN — CARBAMAZEPINE 200 MG: 200 TABLET ORAL at 05:01

## 2025-01-17 RX ADMIN — ALBUTEROL SULFATE 2.5 MG: 2.5 SOLUTION RESPIRATORY (INHALATION) at 07:51

## 2025-01-17 RX ADMIN — Medication 80 PERCENT: at 07:51

## 2025-01-17 RX ADMIN — CARBAMAZEPINE 200 MG: 200 TABLET ORAL at 12:12

## 2025-01-17 RX ADMIN — VANCOMYCIN HYDROCHLORIDE 1750 MG: 5 INJECTION, POWDER, LYOPHILIZED, FOR SOLUTION INTRAVENOUS at 16:00

## 2025-01-17 RX ADMIN — VANCOMYCIN HYDROCHLORIDE 1750 MG: 5 INJECTION, POWDER, LYOPHILIZED, FOR SOLUTION INTRAVENOUS at 02:39

## 2025-01-17 RX ADMIN — MEROPENEM 1 G: 1 INJECTION, POWDER, FOR SOLUTION INTRAVENOUS at 16:00

## 2025-01-17 RX ADMIN — BACLOFEN 30 MG: 10 TABLET ORAL at 08:42

## 2025-01-17 RX ADMIN — ALBUTEROL SULFATE 2.5 MG: 2.5 SOLUTION RESPIRATORY (INHALATION) at 03:37

## 2025-01-17 RX ADMIN — ACETAMINOPHEN 650 MG: 160 SUSPENSION ORAL at 19:14

## 2025-01-17 RX ADMIN — NEOMYCIN SULFATE, POLYMYXIN B SULFATE AND BACITRACIN ZINC 0.5 INCH: 3.5; 10000; 4 OINTMENT OPHTHALMIC at 01:51

## 2025-01-17 RX ADMIN — ALBUTEROL SULFATE 2.5 MG: 2.5 SOLUTION RESPIRATORY (INHALATION) at 11:21

## 2025-01-17 RX ADMIN — MEROPENEM 1 G: 1 INJECTION, POWDER, FOR SOLUTION INTRAVENOUS at 01:53

## 2025-01-17 RX ADMIN — SODIUM CHLORIDE SOLN NEBU 3% 3 ML: 3 NEBU SOLN at 20:13

## 2025-01-17 RX ADMIN — FUROSEMIDE 80 MG: 10 INJECTION, SOLUTION INTRAMUSCULAR; INTRAVENOUS at 05:01

## 2025-01-17 RX ADMIN — CARBOXYMETHYLCELLULOSE SODIUM 1 DROP: 0.5 SOLUTION/ DROPS OPHTHALMIC at 14:57

## 2025-01-17 RX ADMIN — CHOLECALCIFEROL (VITAMIN D3) 10 MCG (400 UNIT) TABLET 10 MCG: at 08:42

## 2025-01-17 RX ADMIN — LEVETIRACETAM 2000 MG: 100 SOLUTION ORAL at 21:24

## 2025-01-17 RX ADMIN — PHENYTOIN 100 MG: 50 TABLET, CHEWABLE ORAL at 21:25

## 2025-01-17 RX ADMIN — CARBAMAZEPINE 200 MG: 200 TABLET ORAL at 22:18

## 2025-01-17 RX ADMIN — ACETAMINOPHEN 650 MG: 160 SUSPENSION ORAL at 12:18

## 2025-01-17 RX ADMIN — SODIUM CHLORIDE SOLN NEBU 3% 3 ML: 3 NEBU SOLN at 08:01

## 2025-01-17 RX ADMIN — SODIUM CHLORIDE SOLN NEBU 3% 3 ML: 3 NEBU SOLN at 15:15

## 2025-01-17 RX ADMIN — MEROPENEM 1 G: 1 INJECTION, POWDER, FOR SOLUTION INTRAVENOUS at 08:41

## 2025-01-17 RX ADMIN — Medication 60 L/MIN: at 15:05

## 2025-01-17 RX ADMIN — LEVETIRACETAM 2000 MG: 100 SOLUTION ORAL at 08:42

## 2025-01-17 RX ADMIN — NEOMYCIN SULFATE, POLYMYXIN B SULFATE AND BACITRACIN ZINC 0.5 INCH: 3.5; 10000; 4 OINTMENT OPHTHALMIC at 18:00

## 2025-01-17 ASSESSMENT — PAIN - FUNCTIONAL ASSESSMENT
PAIN_FUNCTIONAL_ASSESSMENT: CPOT (CRITICAL CARE PAIN OBSERVATION TOOL)

## 2025-01-17 ASSESSMENT — COGNITIVE AND FUNCTIONAL STATUS - GENERAL
TURNING FROM BACK TO SIDE WHILE IN FLAT BAD: TOTAL
PERSONAL GROOMING: TOTAL
MOVING TO AND FROM BED TO CHAIR: TOTAL
TOILETING: TOTAL
MOBILITY SCORE: 6
DRESSING REGULAR LOWER BODY CLOTHING: TOTAL
HELP NEEDED FOR BATHING: TOTAL
CLIMB 3 TO 5 STEPS WITH RAILING: TOTAL
STANDING UP FROM CHAIR USING ARMS: TOTAL
DAILY ACTIVITIY SCORE: 6
WALKING IN HOSPITAL ROOM: TOTAL
EATING MEALS: TOTAL
DRESSING REGULAR UPPER BODY CLOTHING: TOTAL
MOVING FROM LYING ON BACK TO SITTING ON SIDE OF FLAT BED WITH BEDRAILS: TOTAL

## 2025-01-17 NOTE — SIGNIFICANT EVENT
[facility]      Patient Name: Israel Edgar  MRN: [id number]  : 1980  AGE: 44 y.o.   GENDER: male  ==============================================================================    Patient with spastic MS, epilepsy, dysphagia, admitted  for pneumonia and possible UTI.  Has had progressive O2 requirements over the past few hours prompting a rapid response.  Chart and events of hospitalization reviewed.     On exam patient is at baseline (AAOx0) and on max Airvo and NRB, saturating mid 80s.     Plan:  Transfer to ICU  Initiate BiPap Therapy - risk of further aspiration understood however limitations of care preclude other measures  CXR with worsening mucus plugging of LLL - needs frequent suctioning.  Frequent turns.  May benefit from vest  Continue Meropenum/Vanco  Continue AED meds    Mother updated via telephone.  Notes he wears BiPap at home but has not been receiving here in the hospital.  Is concerned he continues to have fevers and does not appear to be improving.  Is adamant that he is not to be intubated under any circumstance, including cardiac arrest, but is to remain otherwise a full code.  I discussed with her that in the event of a cardiac arrest due to hypoxia/respiratory failure, success would be unlikely without advanced airway management.  She remains steadfast that she would want CPR performed but no advanced airway.

## 2025-01-17 NOTE — PROGRESS NOTES
For follow-up of:  Aspiration pneumonia    Subjective   Interval History:  He is sleeping.  He is currently on Airvo.  He has not had a fever today         Objective     Current Facility-Administered Medications   Medication Dose Route Frequency Provider Last Rate Last Admin    acetaminophen (Tylenol) suspension 650 mg  650 mg oral q6h Marcel Marrufo DO   650 mg at 01/17/25 1218    albuterol 2.5 mg /3 mL (0.083 %) nebulizer solution 2.5 mg  2.5 mg nebulization q2h PRN Marcel Marrufo DO        albuterol 2.5 mg /3 mL (0.083 %) nebulizer solution 2.5 mg  2.5 mg nebulization q4h Angus Crawford MD   2.5 mg at 01/17/25 1505    aspirin chewable tablet 81 mg  81 mg oral Daily Marcel Marrufo DO   81 mg at 01/17/25 0842    baclofen (Lioresal) tablet 20 mg  20 mg oral Daily Angsu Crawford MD        baclofen (Lioresal) tablet 20 mg  20 mg oral Daily Angus Crawford MD        [START ON 1/18/2025] baclofen (Lioresal) tablet 30 mg  30 mg g-tube Daily Angus Crawford MD        carBAMazepine (TEGretol) tablet 200 mg  200 mg g-tube 4x daily Marcel Marrufo DO   200 mg at 01/17/25 1212    cholecalciferol (Vitamin D-3) tablet 10 mcg  400 Units oral Daily Marcel Marrufo DO   10 mcg at 01/17/25 0842    diphenhydramine-zinc acetate cream   Topical TID PRN Marcel Marrufo DO        levETIRAcetam (Keppra) 100 mg/mL solution 2,000 mg  2,000 mg g-tube BID Marcel Marrufo DO   2,000 mg at 01/17/25 0842    lubricating eye drops ophthalmic solution 1 drop  1 drop Both Eyes BID Marcel Marrufo DO   1 drop at 01/17/25 1457    meropenem (Merrem) 1 g in sodium chloride 0.9%  mL  1 g intravenous q8h Roula Manuel MD   Stopped at 01/17/25 0953    neomycin-bacitracin-polymyxin (Neosporin) ointment foil packet   Topical TID Isrrael Tejeda, DO   10 Application at 01/16/25 1342    neomycin-bacitracin-polymyxin (Polysporin) ophthalmic ointment 0.5 inch  0.5 inch Both Eyes q6h [facility] Anahi Hall MD   0.5 inch  at 01/17/25 1231    ondansetron (Zofran) tablet 4 mg  4 mg oral q8h PRN Marcel Marrufo DO        Or    ondansetron (Zofran) injection 4 mg  4 mg intravenous q8h PRN Marcel Marrufo DO        oxygen (O2) therapy   inhalation Continuous PRN - O2/gases Anahi Hall MD   60 L/min at 01/17/25 1505    phenytoin (Dilantin) chewable tablet 100 mg  100 mg g-tube TID Marcel Marrufo DO   100 mg at 01/17/25 1457    sodium chloride 3 % nebulizer solution 3 mL  3 mL nebulization q4h Angus Crawford MD   3 mL at 01/17/25 1515    vancomycin (Vancocin) pharmacy to dose - pharmacy monitoring   miscellaneous Daily PRN Austen DIANA Salomone, DO        vancomycin 1,750 mg in dextrose 5% 500 mL IV  1,750 mg intravenous q12h Austen G Salomone, DO        white petrolatum-mineral oiL (Tears Naturale PM) ophthalmic ointment   ophthalmic (eye) Daily Marcel Marrufo DO   Given at 01/16/25 2157    zinc oxide 20 % ointment 1 Application  1 Application Topical q1h PRN Marcel Marrufo DO   1 Application at 01/16/25 2156        Last Recorded Vitals  /72   Pulse (!) 112   Temp 36.3 °C (97.3 °F) (Temporal)   Resp 22   Wt 75.8 kg (167 lb)   SpO2 99%   General: no acute distress, lying in bed, sleeping  HEENT: pharynx not examined  CVS: Tachycardic  Resp: decreased breath sounds in bases, wearing Airvo  ABD: soft, NT, ND, PEG, ostomy  : Boss  EXT: Heel guards  Skin: no rash     Relevant Results:    Labs:   Results from last 72 hours   Lab Units 01/17/25  0549 01/16/25  0529 01/15/25  0529   SODIUM mmol/L 134* 136 138   POTASSIUM mmol/L 4.3 4.2 4.8   CHLORIDE mmol/L 93* 101 99   BUN mg/dL 9 10 9   CREATININE mg/dL 0.36* 0.29* 0.39*   PHOSPHORUS mg/dL  --   --  3.0     Results from last 72 hours   Lab Units 01/17/25  0549 01/16/25  0529 01/15/25  0529   WBC AUTO x10*3/uL 8.4 6.8 10.3   HEMOGLOBIN g/dL 15.1 11.9* 14.2   HEMATOCRIT % 45.2 35.2* 43.9   PLATELETS AUTO x10*3/uL 276 201 209       Microbiology data: I have personally  and independently reviewed and intrepreted the lab results  1/11/2025 urine Legionella antigen negative; urine pneumococcal antigen negative  1/11/2025 blood culture show no growth  1/11/2025 urine culture shows mixed growth  1/12/2025 MRSA swab positive  1/15/2025 blood cultures show no growth    1/15/2025 procalcitonin 1.02    1/16/2025 vancomycin level 45.2    Imaging data: I have personally and independently reviewed and interpreted the imaging studies  1/11/2025 chest x-ray shows left lower lobe infiltrate/atelectasis  1/11/2025 chest CT shows bilateral lower lobe consolidation suggestive of aspiration pneumonia  1/17/2025 chest x-ray shows a larger left pleural effusion with infiltrate    Assessment/Plan     MY IMPRESSION & RECOMMENDATIONS:  Aspiration pneumonia, being treated with IV antibiotics.  I have personally and independently reviewed and interpreted the laboratory tests, imaging studies, and the documentation from other healthcare providers.  I am monitoring for side effects from meropenem and vancomycin as outlined in a previous note.  His vancomycin level was high yesterday.  He has had no more fevers.  His imaging shows a left pleural effusion.  A thoracentesis could be considered.  His prognosis remains guarded due to his multiple comorbidities.     -Continue meropenem for now  -Await vancomycin level today and redose as needed  -Continue scheduled acetaminophen  -Would consider thoracentesis     Other issues:  #Hypertension  #Advanced multiple sclerosis  #Neurogenic bladder with chronic Boss  #Dysphagia status post PEG  #Seizure disorder  #History of DVT and PE  #History of tracheostomy  #History of end ileostomy and mucous fistula in 2024       Vic Soria MD

## 2025-01-17 NOTE — H&P
Critical Care Medicine:  History & Physical    History of Present Illness     44 y.o. male  on Day # 5 hospitalization with a PMH of advanced MS with spasticity at baseline, neurogenic bladder with chronic little, epilepsy with questionable breakthrough seizures, DVT, PE, MRSA colonization, MRDO pneumonia, HTN, quadriplegia, depression, CVA, dysphagia, previous tracheostomy  originally presented d/t increase somnolence, Mother is the primary caregiver.   He is currently being treated for Aspiration PNA (+) MRSA Swab ( Atb: Meropenem and Vancomycin). Over the course of his stay, increase need for Oxygen. Tonight a RR was called d/t hypoxia. An ABG was obtained Maxed out on Airvo + a NRB, 7.44/45/97/30. Lactic 1.2   He is non verbal.   He is being admitted to the ICU for Acute Hypoxic Resp Failure     ROS:  Review of Systems   Reason unable to perform ROS: unable.       Objective   Previous History     Medical History  As above.    Surgical History  Past Surgical History:   Procedure Laterality Date    GASTROSTOMY      ILEOSTOMY  08/05/2015    Ileostomy    OTHER SURGICAL HISTORY  09/16/2016    Feeding Tube    TRACHEOSTOMY TUBE PLACEMENT       Allergies  Allergies   Allergen Reactions    Keflex [Cephalexin] Seizure     Diarrhea; seizure activity, rash, and redness    Levaquin [Levofloxacin] Seizure    Lorazepam Seizure     Increased agitation and complex seizures (tonic clonic)    Piperacillin-Tazobactam Other     erythema multiforme    Adhesive Tape-Silicones Hives    Lacosamide Other     Mother states lethargy, and non responsiveness to stimuli; will not allow this medication    Latex Other     Increased urinary infections    Pantoprazole Diarrhea    Suppository Adult [Glycerin (Adult)] Other     Mom doesnot want; says previous GI said no suppository     Budesonide Rash    Famotidine Diarrhea    Ipratropium Bromide Rash     Mother states facial rash and tachycardia    Lovenox [Enoxaparin] Rash    Nitrofurantoin Rash  "   Questran [Cholestyramine (With Sugar)] Rash    Ranitidine Rash    Sulfa (Sulfonamide Antibiotics) Hives and Rash    Sulfamethoxazole-Trimethoprim Hives and Rash       Home Medications  Current Outpatient Medications   Medication Instructions    acetaminophen (TYLENOL) 650 mg, oral, Every 6 hours, Pt takes med at : 9a/3p/10p/3a    albuterol 2.5 mg, nebulization, Every 6 hours PRN    alcohol swabs (Alcohol Pads) pads, medicated Use to give lovenox daily    aspirin 81 mg, oral, Daily    baclofen (Lioresal) 10 mg tablet TAKE 1 TABLET DAILY  With food at 10am    baclofen (LIORESAL) 20 mg, g-tube, 3 times daily, With food at 10am,- 4pm- 10pm    carBAMazepine (TEGRETOL) 200 mg, 4 times daily    carboxymethylcellulose (Refresh Plus) 0.5 % ophthalmic solution 2 drops, 2 times daily    cholecalciferol (Vitamin D-3) 10 mcg/mL (400 unit/mL) drops TAKE 10ML VIA G-TUBE ONCE DAILY    cholestyramine (Questran) 4 gram packet 4 g, oral, 2 times daily    gauze bandage (Band-Aid Gauze Pads) 2 X 2 \" bandage As needed    gauze bandage (Gauze Pad) 4 X 4 \" bandage Use as needed    gauze bandage 4 X 4 \" bandage Use split 4 X 4 guaze to cover the ileostomy/ PEG tube as needed    levETIRAcetam (KEPPRA) 2,000 mg, g-tube, 2 times daily, Pt takes this medication at 0900 and 2100.    lubricating eye drops ophthalmic solution 1 drop, Both Eyes, 2 times daily    medical supply, miscellaneous (MISCELLANEOUS MEDICAL SUPPLY MISC) topical (top), Water flushes-give 300 mL with each med Pass [150 mL before and 150 mL after]    miscellaneous medical supply kit 800ML SUCTION CANISTERS x3 VENT CONTROL yANKERS x4    miscellaneous medical supply misc Use split 4 X 4 guaze to cover the ileostomy/ PEG tube as needed    nasal spray midazolam (Versed) 5 mg/spray (0.1 mL) spray,non-aerosol Use one spray in one nostril for convulsive seizure lasting longer than 5 minutes. May repeat a in the other nostril after 5 minutes if convulsive seizures still ongoing CALL " "911    nebulizer accessories misc Use as instructed    nebulizer and compressor device Uses as instructed    neomycin-bacitracnZn-polymyxnB (Neosporin) 3.5-400-5,000 mg-unit-unit ointment in packet ointment Topical, 3 times daily    nutritional supplements (Osmolite 1.5 Oli) 0.06 gram-1.5 kcal/mL liquid Use 3 times daily    nutritional supplements (Osmolite 1.5 Oli) 0.06 gram-1.5 kcal/mL liquid 237 mL, per ostomy, 3 times daily    ostomy supplies [facility] Pouch: [place]: New image two 1/4 beige lock and roll, drainable pouch #[id number] and new image to 1/4 inches high output, [employer] pouch # 1/8/2001 330-day use-2 boxes; wafer: [place]: New image Cera plus 2 1/4 inches convex, with tape #[id number], new image Cera plus to 1/4 inch flat with tape #55629 30-day use-2 boxes, adhesive remover's: [place] adapt wipes #7760 30-day use-2 boxes, drainage bag/systems: [employer]Sultan urine drainage bag # [id number] 30-day use-2 units, moldable ring: Iterable Cera ring slim #8815 30 days use-2 boxes    phenytoin 25 mg/mL suspension GIVE \"BRIGHT\" 4 ML BY PEG TUBE THREE TIMES DAILY AT 9:00 AM, 3:00 PM AND AT 9:00 PM    selenium sulfide (Selsun) 2.5 % shampoo Topical, 2 times weekly    syringe, disposable, (Easy [place] Catheter Tip Syring) 60 mL syringe Use as needed .    white petrolatum-mineral oiL (Lubrifresh PM) 94-3 % ophthalmic ointment ophthalmic (eye), Daily RT     Social History  Social History     Tobacco Use   Smoking Status Never   Smokeless Tobacco Never       reports no history of alcohol use.    reports no history of drug use.     Family History  family history is not on file.    Objective     Vitals:    01/16/25 1656   Weight: 71.5 kg (157 lb 10.1 oz)   Body mass index is 22.62 kg/m².        1/16/2025    12:10 PM 1/16/2025     4:56 PM 1/16/2025     5:31 PM 1/16/2025    10:43 PM 1/17/2025    12:25 AM 1/17/2025     4:05 AM 1/17/2025     4:06 AM   Vitals   Systolic 107  113 114 105  108   Diastolic 72  72 76 71  77   BP " "Location    Left arm      Heart Rate 112  64 95 99  101   Temp 36.8 °C (98.2 °F)  36.7 °C (98.1 °F) 35.7 °C (96.3 °F) 35.7 °C (96.3 °F) 36.3 °C (97.3 °F)    Resp 20  18 17   22   Height  1.778 m (5' 10\")        Weight (lb)  157.63        BMI  22.62 kg/m2        BSA (m2)  1.88 m2             Vent settings:  FiO2 (%):  [30 %-90 %] 90 %    Intake/Output Summary (Last 24 hours) at 1/17/2025 0411  Last data filed at 1/17/2025 0239  Gross per 24 hour   Intake 4307 ml   Output 2350 ml   Net 1957 ml       Physical Exam  Constitutional:       Comments: Chronically ill appearing    Cardiovascular:      Rate and Rhythm: Tachycardia present.   Pulmonary:      Effort: Tachypnea present.      Breath sounds: Normal breath sounds.   Abdominal:      General: The ostomy site is clean.      Comments: PEG, RLQ ostomy    Genitourinary:     Comments: Chronic little   Musculoskeletal:      Comments: contracted   Skin:     General: Skin is moist.   Neurological:      Mental Status: He is lethargic.          Medications     Scheduled Medications:   [Transfer Hold] acetaminophen, 650 mg, oral, q6h  [Transfer Hold] albuterol, 2.5 mg, nebulization, q6h  [Transfer Hold] aspirin, 81 mg, oral, Daily  [Transfer Hold] baclofen, 30 mg, g-tube, TID  [Transfer Hold] carBAMazepine, 200 mg, g-tube, 4x daily  [Transfer Hold] cholecalciferol, 400 Units, oral, Daily  [Transfer Hold] levETIRAcetam, 2,000 mg, g-tube, BID  [Transfer Hold] lubricating eye drops, 1 drop, Both Eyes, BID  meropenem, 1 g, intravenous, q8h  neomycin-bacitracnZn-polymyxnB, , Topical, TID  neomycin-bacitracin-polymyxin, 0.5 inch, Both Eyes, q6h JAZ  [Transfer Hold] phenytoin, 100 mg, g-tube, TID  [Transfer Hold] sodium chloride, 3 mL, nebulization, TID  vancomycin (Vancocin), 1,750 mg, intravenous, q12h  [Transfer Hold] white petrolatum-mineral oiL, , ophthalmic (eye), Daily       Continuous Medications:       PRN Medications:     Labs     Results from last 72 hours   Lab Units " 01/16/25  0529 01/15/25  0529 01/14/25  0609   GLUCOSE mg/dL 117* 127* 109*   SODIUM mmol/L 136 138 136   POTASSIUM mmol/L 4.2 4.8 4.1   CHLORIDE mmol/L 101 99 97*   CO2 mmol/L 32 30 29   BUN mg/dL 10 9 9   CREATININE mg/dL 0.29* 0.39* 0.42*   EGFR mL/min/1.73m*2 >90 >90 >90   CALCIUM mg/dL 8.5* 9.0 9.5   ALBUMIN g/dL  --  3.6 4.0   PHOSPHORUS mg/dL  --  3.0 3.1                 Results from last 72 hours   Lab Units 01/16/25  0529 01/15/25  0529 01/14/25  0609   WBC AUTO x10*3/uL 6.8 10.3 7.6   NRBC AUTO /100 WBCs 0.0 0.0 0.0   RBC AUTO x10*6/uL 3.85* 4.67 4.74   HEMOGLOBIN g/dL 11.9* 14.2 14.4   HEMATOCRIT % 35.2* 43.9 43.0   MCV fL 91 94 91   MCH pg 30.9 30.4 30.4   MCHC g/dL 33.8 32.3 33.5   RDW % 15.6* 15.8* 16.0*   PLATELETS AUTO x10*3/uL 201 209 228     Results from last 72 hours   Lab Units 01/16/25  2141   POCT PH, ARTERIAL pH 7.44*   POCT PCO2, ARTERIAL mm Hg 45*   POCT PO2, ARTERIAL mm Hg 97*   POCT HCO3 CALCULATED, ARTERIAL mmol/L 30.6*   POCT LACTATE, ARTERIAL mmol/L 1.2   POCT BASE EXCESS, ARTERIAL mmol/L 5.6*   FIO2 % 92     Lab Results   Component Value Date    BLOODCULT No growth at 1 day 01/15/2025    BLOODCULT No growth at 1 day 01/15/2025    GRAMSTAIN Gram positive pleomorphic bacilli (AA) 08/11/2024     Lab Results   Component Value Date    URINECULTURE  01/11/2025     Growth indicates contamination with mixed bacterial trisha. Repeat culture if clinically indicated.       Imaging and Diagnostic Studies     ECG 12 lead    Result Date: 1/16/2025  Supraventricular tachycardia Otherwise normal ECG When compared with ECG of 13-JAN-2025 08:10, (unconfirmed) Incomplete right bundle branch block is no longer Present Confirmed by Julio Davidson ([id number]) on 1/16/2025 11:29:08 AM    XR chest 1 view    Result Date: 1/14/2025  Interpreted By:  Juwan Mcneill, STUDY: XR CHEST 1 VIEW;  1/13/2025 11:18 pm   INDICATION: Signs/Symptoms:Hypoxia, dec breath sounds left.   COMPARISON: 1/11/2025   ACCESSION NUMBER(S):  [id number]   ORDERING CLINICIAN: CONCHITA OLIVAS   FINDINGS: The cardiac silhouette is stable in size. Bibasilar airspace opacities. Small of pleural effusion also not excluded. No pneumothorax.       Bibasilar airspace opacities correspond to patient's known pneumonia.   MACRO: None   Signed by: Juwan Mcneill 1/14/2025 3:33 AM Dictation workstation:   [id number]    Electrocardiogram, 12-lead PRN ACS symptoms    Result Date: 1/13/2025  Normal sinus rhythm Incomplete right bundle branch block Borderline ECG When compared with ECG of 10-AUG-2024 12:35, Incomplete right bundle branch block is now Present    CT angio chest for pulmonary embolism    Result Date: 1/11/2025  Interpreted By:  Finkelstein, Evan, STUDY: CT ANGIO CHEST FOR PULMONARY EMBOLISM;  1/11/2025 9:47 pm   INDICATION: Signs/Symptoms:Fever, hypoxemia, concern for pneumonia versus PE.     COMPARISON: CT chest 08/14/2024. Chest radiograph 01/11/2025   ACCESSION NUMBER(S): [id number]   ORDERING CLINICIAN: DWAIN SABA   TECHNIQUE: Axial CTA images of the chest after intravenous administration of 80 mL Omnipaque 350 using CT angiographic technique. Coronal and sagittal images are reconstructed. MIP images were created and reviewed.   FINDINGS: CHEST WALL AND LOWER NECK: Within normal limits. ABDOMEN: A percutaneous gastrostomy tube is present.   VASCULAR: AORTA: No aortic aneurysm or dissection.  No significant atherosclerotic disease. PULMONARY ARTERY: Normal caliber. No pulmonary embolus to the segmental level.   CHEST:   HEART: Normal size. No pericardial effusion. MEDIASTINUM AND ALLEN: There are calcified mediastinal and hilar lymph nodes. LUNG, PLEURA, LARGE AIRWAYS: Filling defects in the right major bronchus. Mixed ground-glass and more consolidative opacities in the lower lobes bilaterally and to a lesser degree the bilateral upper lobes.   BONES: No acute osseous abnormality.       No acute pulmonary embolus to the segmental level.   Filling defects  in the right major bronchus, which may represent secretions with an endobronchial lesion not excluded. Nonemergent bronchoscopy can further characterize as clinically warranted.   Mixed ground-glass and more consolidative opacities in the lower lobes bilaterally and to a lesser degree the bilateral upper lobes. Findings are concerning for aspiration/pneumonia.     MACRO: None.   Signed by: Evan Finkelstein 1/11/2025 10:44 PM Dictation workstation:   [id number]    XR chest 1 view    Result Date: 1/11/2025  Interpreted By:  Franklin Lambert, STUDY: XR CHEST 1 VIEW;  1/11/2025 5:17 pm   INDICATION: Signs/Symptoms:Fever, cough, hx MDRO PNA.   COMPARISON: 08/24/2024   ACCESSION NUMBER(S): [id number]   ORDERING CLINICIAN: DWAIN SABA   FINDINGS: CARDIOMEDIASTINAL SILHOUETTE AND VASCULATURE:   Cardiac size:  Within normal limits. Aortic shadow:  Within normal limits.   Mediastinal contours: Within normal limits.   Pulmonary vasculature:  The central vasculature is unremarkable   LUNGS: There appears to be some persistent left retrocardiac opacity which may be due to residual atelectasis from previous infiltrate, or fibrosis. However, recurrent pneumonic infiltrate is possible, follow-up as clinically warranted. Otherwise there has been resolution of atelectasis at the right lower lung.   ABDOMEN AND OTHER FINDINGS: No remarkable upper abdominal findings.   BONES: No acute osseous changes.       1.  Residual atelectasis or recurrent atelectasis or infiltrate at the left lower lung.   Signed by: Franklin Lambert 1/11/2025 6:07 PM Dictation workstation:   [id number]               Assessment / Plan     # Hypoxic Resp Failure 2/2 Aspiration PNA  Max on Airvo, we will Try NIV  Viral Panel  CXR  Continue with ATB )(meropenem and Vancomycin   Lasix 80mg x1 now   Labs this am ordered   BC were NGT drawn 1/15, (-) U/A   Pulm Toilet   PRN breathing Tx    Epilepsy  -Tegretol 200 mg daily given during tube feed bolus, together with  baclofen and Tylenol  -Levetiracetam 2000 mg 3 times daily  -Phenytoin 100 mg 3 times daily     Spasticity  -On baclofen     Neurogenic bladder  -Indwelling catheter in place     Hypertension  -Not on any medication, will monitor with vitals     Dysphagia  -N.p.o. with tube feed  -Diverting ostomy in place, connected to bag due to high output     Diet  -Tube feed     DVT prophylaxis  -EMR shows allergies to above heparin and Lovenox      Pineda Huertas, APRN-CNP    This patient is critically ill/injured due to acute impairment in one or more vital organ systems, such that there is a probably of imminent or life-threatening deterioration of the patient's condition.  I spent  70 minutes in the direct delivery of medical care that involves high complexity decision making to treat single or multiple vital organ system failure and/or prevent further life-threatening deterioration of the patient's condition.  This time does not include separately billable procedures.

## 2025-01-17 NOTE — PROGRESS NOTES
"Pulmonary Daily Progress Note   Subjective    Israel Edgar is a 44 y.o. year old male patient known with advanced MS with spasticity at baseline, neurogenic bladder with chronic little, epilepsy with questionable breakthrough seizures, DVT, PE, MRSA colonization, MRDO pneumonia, HTN, quadriplegia, depression, CVA, dysphagia, previous tracheostomy  admitted on 1/11/2025 with hypoxic respiratory failure  Interval History:  On BiPAP overnight with improved oxygenation  On Hi Flow O2 as of this morning with adequate oxygen saturation  Tolerating vest    Meds    Scheduled medications  acetaminophen, 650 mg, oral, q6h  albuterol, 2.5 mg, nebulization, q6h  aspirin, 81 mg, oral, Daily  baclofen, 30 mg, g-tube, TID  carBAMazepine, 200 mg, g-tube, 4x daily  cholecalciferol, 400 Units, oral, Daily  levETIRAcetam, 2,000 mg, g-tube, BID  lubricating eye drops, 1 drop, Both Eyes, BID  meropenem, 1 g, intravenous, q8h  neomycin-bacitracnZn-polymyxnB, , Topical, TID  neomycin-bacitracin-polymyxin, 0.5 inch, Both Eyes, q6h JAZ  phenytoin, 100 mg, g-tube, TID  sodium chloride, 3 mL, nebulization, TID  vancomycin (Vancocin), 1,750 mg, intravenous, q12h  white petrolatum-mineral oiL, , ophthalmic (eye), Daily    PRN medications  PRN medications: albuterol, diphenhydramine-zinc acetate, ondansetron **OR** ondansetron, oxygen, vancomycin, zinc oxide     Objective    Blood pressure 113/72, pulse (!) 119, temperature 36.3 °C (97.3 °F), temperature source Temporal, resp. rate (!) 27, height 1.778 m (5' 10\"), weight 75.8 kg (167 lb), SpO2 99%.   Physical Exam   GENERAL: . Lying on his left side. Chronically ill  NECK: no JVD, midline trachea without stridor.    LUNGS: rhonchi with decreased BS on left  CARDIAC: Regular rate and rhythm. Sinus tachycardia  ABDOMEN: PEG and colostomy  EXTREMITIES: contracted  NEURO: much less responsive than baseline   SKIN: No rashes      Intake/Output Summary (Last 24 hours) at 1/17/2025 0635  Last data " filed at 1/17/2025 0622  Gross per 24 hour   Intake 4307 ml   Output 5100 ml   Net -793 ml     Labs:   Results from last 72 hours   Lab Units 01/16/25  0529 01/15/25  0529   SODIUM mmol/L 136 138   POTASSIUM mmol/L 4.2 4.8   CHLORIDE mmol/L 101 99   CO2 mmol/L 32 30   BUN mg/dL 10 9   CREATININE mg/dL 0.29* 0.39*   GLUCOSE mg/dL 117* 127*   CALCIUM mg/dL 8.5* 9.0   ANION GAP mmol/L 7* 14   EGFR mL/min/1.73m*2 >90 >90   PHOSPHORUS mg/dL  --  3.0      Results from last 72 hours   Lab Units 01/17/25  0549 01/16/25  0529 01/15/25  0529   WBC AUTO x10*3/uL 8.4 6.8 10.3   HEMOGLOBIN g/dL 15.1 11.9* 14.2   HEMATOCRIT % 45.2 35.2* 43.9   PLATELETS AUTO x10*3/uL 276 201 209      Results from last 72 hours   Lab Units 01/16/25  2141   POCT PH, ARTERIAL pH 7.44*   POCT PCO2, ARTERIAL mm Hg 45*   POCT PO2, ARTERIAL mm Hg 97*   POCT SO2, ARTERIAL % 99          Micro/ID:   Lab Results   Component Value Date    URINECULTURE  01/11/2025     Growth indicates contamination with mixed bacterial trisha. Repeat culture if clinically indicated.    BLOODCULT No growth at 1 day 01/15/2025    BLOODCULT No growth at 1 day 01/15/2025     Summary of key imaging results    Chest x-ray from today there is almost complete atelectasis of the left lung predominantly elevated lower lobe.  The right lung seems without any infiltrates  The chest CT scan done on January 11 is with bibasilar infiltrates and atelectatic changes    Impression   Israel Edgar is a 44 y.o. year old male patient is being seen by the pulmonary service for   Acute hypoxic respiratory failure in need for advanced NIV   Pneumonia on presentation involving both lower lobes.  Pathogen not yet identified  Atelectasis of the left lung related to retention of secretions in the setting of diffuse weakness  Encephalopathy waiting more than baseline.  Drug-induced is possible  History of seizure disorder on several medications.  Has not had any witnessed lately  Advanced longstanding  history of spastic multiple sclerosis that is irreversible.  Chronic debilitation, however cared for at home by his mother.  Dysphagia.  PEG tube 4 years with bolus feeding  Diverting colostomy  Chronic Boss    Recommendations   As follows:  Switch to high flow oxygen.  Initial setting 60 L 90% oxygen  May need BiPAP for few hours as the day progresses and will surely consider it while asleep  Awaiting sputum cultures and blood cultures keep on broad-spectrum antibiotics  Decrease oxygen as tolerated to maintain oxygen saturation above 92%  Increase the frequency of intensive airway clearance therapy  Vest therapy every 4 hours  IPV every 4 hours  3% saline as well as albuterol every 4 hours  NT suction on as-needed basis  Check Keppra and phenytoin levels  Hold next 2 doses of Haldol and reassess mental status  For now start enteral feeding at 10 cc an hour for the next 24 hours  This will be advanced as clinical status improves  Hold off bolus feedings to avoid any potential gastric distention from noninvasive ventilation  I discussed all of the above with mother who understands the severity of illness.  She is a bit upset about frequent discussions of CODE STATUS.  The ICU team will hold off any further discussions.  She will rethink things through and decide in the next few days.    Angus Crawford MD   01/17/25 at 6:35 AM     Disclaimer: Documentation completed with the information available at the time of input. Parts of this note may have been scribed or generated using voice dictation software, Dragon.  Homophonic errors may exist.  Please contact me directly if clarification is needed. The times in the chart may not be reflective of actual patient care times, interventions, or procedures. Documentation occurs after the physical care of the patient.   ---------------------------------------  this critically ill patient continues to be at-risk for deterioration / failure due to the above  mentioned  dysfunctional unstable organ systems.  I have reviewed, evaluated, identified and managed all critical medical problems.  Assessment, impressions and plans are reflected in the note above as well as the orders.  Critical care time is spent at bedside includes review of diagnostic tests, labs, and radiographs, serial assessments and management of hemodynamics, respiratory status, ventilation and coordination of care.  Teaching and any separately billable procedures are not included in the time calculation.    Billing Provider Critical Care Time:  45 minutes  ----------------------------------------

## 2025-01-17 NOTE — NURSING NOTE
0358: Rapid response called for spo2 saturation 79% during breathing treatment. RT placed patient on non-rebreather in addition to airvo. Patient transferred to ICU. Patient accompanied by RR nurse, RT, and myself (Bedside RN).

## 2025-01-17 NOTE — CARE PLAN
The patient's goals for the shift include pt remains safe and free of falls    The clinical goals for the shift include Pt will remain hemodynamically stable by end of shift    Over the shift, the patient did make progress toward the following goals.     Problem: Skin  Goal: Participates in plan/prevention/treatment measures  Outcome: Progressing  Flowsheets (Taken 1/17/2025 0342)  Participates in plan/prevention/treatment measures: Elevate heels     Problem: Fall/Injury  Goal: Not fall by end of shift  Outcome: Progressing     Problem: Skin  Goal: Prevent/minimize sheer/friction injuries  Outcome: Progressing  Flowsheets (Taken 1/17/2025 0342)  Prevent/minimize sheer/friction injuries:   HOB 30 degrees or less   Turn/reposition every 2 hours/use positioning/transfer devices   Use pull sheet

## 2025-01-18 ENCOUNTER — APPOINTMENT (OUTPATIENT)
Dept: RADIOLOGY | Facility: HOSPITAL | Age: 45
End: 2025-01-18
Payer: MEDICAID

## 2025-01-18 LAB
ADENOVIRUS RVP, VIRC: NOT DETECTED
ANION GAP BLDA CALCULATED.4IONS-SCNC: 3 MMO/L (ref 10–25)
ANION GAP SERPL CALC-SCNC: 13 MMOL/L (ref 10–20)
APPARATUS: ABNORMAL
BASE EXCESS BLDA CALC-SCNC: 10.8 MMOL/L (ref -2–3)
BODY TEMPERATURE: 37 DEGREES CELSIUS
BUN SERPL-MCNC: 10 MG/DL (ref 6–23)
CA-I BLDA-SCNC: 1.2 MMOL/L (ref 1.1–1.33)
CALCIUM SERPL-MCNC: 9 MG/DL (ref 8.6–10.3)
CHLORIDE BLDA-SCNC: 99 MMOL/L (ref 98–107)
CHLORIDE SERPL-SCNC: 95 MMOL/L (ref 98–107)
CO2 SERPL-SCNC: 28 MMOL/L (ref 21–32)
CREAT SERPL-MCNC: 0.35 MG/DL (ref 0.5–1.3)
EGFRCR SERPLBLD CKD-EPI 2021: >90 ML/MIN/1.73M*2
ENTEROVIRUS/RHINOVIRUS RVP, VIRC: NOT DETECTED
ERYTHROCYTE [DISTWIDTH] IN BLOOD BY AUTOMATED COUNT: 15.2 % (ref 11.5–14.5)
FLOW: 60 LPM
GLUCOSE BLD MANUAL STRIP-MCNC: 116 MG/DL (ref 74–99)
GLUCOSE BLD MANUAL STRIP-MCNC: 117 MG/DL (ref 74–99)
GLUCOSE BLD MANUAL STRIP-MCNC: 127 MG/DL (ref 74–99)
GLUCOSE BLDA-MCNC: 136 MG/DL (ref 74–99)
GLUCOSE SERPL-MCNC: 150 MG/DL (ref 74–99)
HCO3 BLDA-SCNC: 35.1 MMOL/L (ref 22–26)
HCT VFR BLD AUTO: 41.2 % (ref 41–52)
HCT VFR BLD EST: 41 % (ref 41–52)
HGB BLD-MCNC: 13.3 G/DL (ref 13.5–17.5)
HGB BLDA-MCNC: 13.5 G/DL (ref 13.5–17.5)
HUMAN BOCAVIRUS RVP, VIRC: NOT DETECTED
HUMAN CORONAVIRUS RVP, VIRC: NOT DETECTED
INFLUENZA A , VIRC: NOT DETECTED
INFLUENZA A H1N1-09 , VIRC: NOT DETECTED
INFLUENZA B PCR, VIRC: NOT DETECTED
INHALED O2 CONCENTRATION: 94 %
LACTATE BLDA-SCNC: 1.5 MMOL/L (ref 0.4–2)
LEVETIRACETAM SERPL-MCNC: 23 UG/ML (ref 10–40)
MCH RBC QN AUTO: 30.6 PG (ref 26–34)
MCHC RBC AUTO-ENTMCNC: 32.3 G/DL (ref 32–36)
MCV RBC AUTO: 95 FL (ref 80–100)
METAPNEUMOVIRUS , VIRC: NOT DETECTED
NRBC BLD-RTO: 0 /100 WBCS (ref 0–0)
OXYHGB MFR BLDA: 85 % (ref 94–98)
PARAINFLUENZA PCR, VIRC: NOT DETECTED
PCO2 BLDA: 44 MM HG (ref 38–42)
PH BLDA: 7.51 PH (ref 7.38–7.42)
PLATELET # BLD AUTO: 253 X10*3/UL (ref 150–450)
PO2 BLDA: 55 MM HG (ref 85–95)
POTASSIUM BLDA-SCNC: 3.9 MMOL/L (ref 3.5–5.3)
POTASSIUM SERPL-SCNC: 4.1 MMOL/L (ref 3.5–5.3)
RBC # BLD AUTO: 4.34 X10*6/UL (ref 4.5–5.9)
RSV PCR, RVP, VIRC: NOT DETECTED
SAO2 % BLDA: 87 % (ref 94–100)
SODIUM BLDA-SCNC: 133 MMOL/L (ref 136–145)
SODIUM SERPL-SCNC: 132 MMOL/L (ref 136–145)
WBC # BLD AUTO: 6.3 X10*3/UL (ref 4.4–11.3)

## 2025-01-18 PROCEDURE — 2500000005 HC RX 250 GENERAL PHARMACY W/O HCPCS: Performed by: INTERNAL MEDICINE

## 2025-01-18 PROCEDURE — 2500000004 HC RX 250 GENERAL PHARMACY W/ HCPCS (ALT 636 FOR OP/ED): Performed by: INTERNAL MEDICINE

## 2025-01-18 PROCEDURE — 36600 WITHDRAWAL OF ARTERIAL BLOOD: CPT

## 2025-01-18 PROCEDURE — 71045 X-RAY EXAM CHEST 1 VIEW: CPT | Performed by: RADIOLOGY

## 2025-01-18 PROCEDURE — 71045 X-RAY EXAM CHEST 1 VIEW: CPT

## 2025-01-18 PROCEDURE — 84132 ASSAY OF SERUM POTASSIUM: CPT | Performed by: SURGERY

## 2025-01-18 PROCEDURE — 36415 COLL VENOUS BLD VENIPUNCTURE: CPT | Performed by: NURSE PRACTITIONER

## 2025-01-18 PROCEDURE — 2500000001 HC RX 250 WO HCPCS SELF ADMINISTERED DRUGS (ALT 637 FOR MEDICARE OP): Performed by: EMERGENCY MEDICINE

## 2025-01-18 PROCEDURE — 80048 BASIC METABOLIC PNL TOTAL CA: CPT | Performed by: NURSE PRACTITIONER

## 2025-01-18 PROCEDURE — 94640 AIRWAY INHALATION TREATMENT: CPT

## 2025-01-18 PROCEDURE — 94660 CPAP INITIATION&MGMT: CPT

## 2025-01-18 PROCEDURE — 2020000001 HC ICU ROOM DAILY

## 2025-01-18 PROCEDURE — 85027 COMPLETE CBC AUTOMATED: CPT | Performed by: NURSE PRACTITIONER

## 2025-01-18 PROCEDURE — 2500000002 HC RX 250 W HCPCS SELF ADMINISTERED DRUGS (ALT 637 FOR MEDICARE OP, ALT 636 FOR OP/ED): Performed by: INTERNAL MEDICINE

## 2025-01-18 PROCEDURE — 94669 MECHANICAL CHEST WALL OSCILL: CPT

## 2025-01-18 PROCEDURE — 2500000001 HC RX 250 WO HCPCS SELF ADMINISTERED DRUGS (ALT 637 FOR MEDICARE OP): Performed by: INTERNAL MEDICINE

## 2025-01-18 PROCEDURE — 82947 ASSAY GLUCOSE BLOOD QUANT: CPT

## 2025-01-18 PROCEDURE — 2500000005 HC RX 250 GENERAL PHARMACY W/O HCPCS: Performed by: EMERGENCY MEDICINE

## 2025-01-18 PROCEDURE — 31720 CLEARANCE OF AIRWAYS: CPT

## 2025-01-18 PROCEDURE — 99291 CRITICAL CARE FIRST HOUR: CPT | Performed by: SURGERY

## 2025-01-18 PROCEDURE — 2500000001 HC RX 250 WO HCPCS SELF ADMINISTERED DRUGS (ALT 637 FOR MEDICARE OP): Performed by: PHARMACIST

## 2025-01-18 RX ORDER — ACETAMINOPHEN 160 MG/5ML
640 SOLUTION ORAL EVERY 6 HOURS SCHEDULED
Status: DISCONTINUED | OUTPATIENT
Start: 2025-01-18 | End: 2025-01-24 | Stop reason: HOSPADM

## 2025-01-18 RX ADMIN — PHENYTOIN 100 MG: 50 TABLET, CHEWABLE ORAL at 21:15

## 2025-01-18 RX ADMIN — CHOLECALCIFEROL (VITAMIN D3) 10 MCG (400 UNIT) TABLET 10 MCG: at 09:48

## 2025-01-18 RX ADMIN — ASPIRIN 81 MG: 81 TABLET, CHEWABLE ORAL at 09:48

## 2025-01-18 RX ADMIN — NEOMYCIN SULFATE, POLYMYXIN B SULFATE AND BACITRACIN ZINC 0.5 INCH: 3.5; 10000; 4 OINTMENT OPHTHALMIC at 13:30

## 2025-01-18 RX ADMIN — ALBUTEROL SULFATE 2.5 MG: 2.5 SOLUTION RESPIRATORY (INHALATION) at 15:18

## 2025-01-18 RX ADMIN — ALBUTEROL SULFATE 2.5 MG: 2.5 SOLUTION RESPIRATORY (INHALATION) at 21:50

## 2025-01-18 RX ADMIN — PHENYTOIN 100 MG: 50 TABLET, CHEWABLE ORAL at 10:48

## 2025-01-18 RX ADMIN — ALBUTEROL SULFATE 2.5 MG: 2.5 SOLUTION RESPIRATORY (INHALATION) at 07:03

## 2025-01-18 RX ADMIN — NEOMYCIN SULFATE, POLYMYXIN B SULFATE AND BACITRACIN ZINC 0.5 INCH: 3.5; 10000; 4 OINTMENT OPHTHALMIC at 17:25

## 2025-01-18 RX ADMIN — MEROPENEM 1 G: 1 INJECTION, POWDER, FOR SOLUTION INTRAVENOUS at 00:14

## 2025-01-18 RX ADMIN — WHITE PETROLATUM 57.7 %-MINERAL OIL 31.9 % EYE OINTMENT: at 21:13

## 2025-01-18 RX ADMIN — NEOMYCIN SULFATE, POLYMYXIN B SULFATE AND BACITRACIN ZINC 0.5 INCH: 3.5; 10000; 4 OINTMENT OPHTHALMIC at 00:14

## 2025-01-18 RX ADMIN — ACETAMINOPHEN 650 MG: 160 SUSPENSION ORAL at 00:12

## 2025-01-18 RX ADMIN — BACITRACIN ZINC, NEOMYCIN SULFATE , POLYMYXIN B SULFATE. 10 APPLICATION: 400; 3.5; 5 OINTMENT TOPICAL at 10:41

## 2025-01-18 RX ADMIN — CARBAMAZEPINE 200 MG: 200 TABLET ORAL at 06:01

## 2025-01-18 RX ADMIN — CARBAMAZEPINE 200 MG: 200 TABLET ORAL at 17:16

## 2025-01-18 RX ADMIN — LEVETIRACETAM 2000 MG: 100 SOLUTION ORAL at 21:13

## 2025-01-18 RX ADMIN — ACETAMINOPHEN 650 MG: 650 LIQUID ORAL at 12:39

## 2025-01-18 RX ADMIN — SODIUM CHLORIDE SOLN NEBU 3% 4 ML: 3 NEBU SOLN at 07:04

## 2025-01-18 RX ADMIN — MEROPENEM 1 G: 1 INJECTION, POWDER, FOR SOLUTION INTRAVENOUS at 09:49

## 2025-01-18 RX ADMIN — SODIUM CHLORIDE SOLN NEBU 3% 3 ML: 3 NEBU SOLN at 00:06

## 2025-01-18 RX ADMIN — SODIUM CHLORIDE SOLN NEBU 3% 4 ML: 3 NEBU SOLN at 15:18

## 2025-01-18 RX ADMIN — CARBOXYMETHYLCELLULOSE SODIUM 1 DROP: 0.5 SOLUTION/ DROPS OPHTHALMIC at 16:38

## 2025-01-18 RX ADMIN — BACLOFEN 20 MG: 10 TABLET ORAL at 16:33

## 2025-01-18 RX ADMIN — BACITRACIN ZINC, NEOMYCIN SULFATE , POLYMYXIN B SULFATE. 10 APPLICATION: 400; 3.5; 5 OINTMENT TOPICAL at 16:33

## 2025-01-18 RX ADMIN — ALBUTEROL SULFATE 2.5 MG: 2.5 SOLUTION RESPIRATORY (INHALATION) at 11:11

## 2025-01-18 RX ADMIN — SODIUM CHLORIDE SOLN NEBU 3% 3 ML: 3 NEBU SOLN at 03:46

## 2025-01-18 RX ADMIN — LEVETIRACETAM 2000 MG: 100 SOLUTION ORAL at 09:48

## 2025-01-18 RX ADMIN — ALBUTEROL SULFATE 2.5 MG: 2.5 SOLUTION RESPIRATORY (INHALATION) at 00:06

## 2025-01-18 RX ADMIN — CARBOXYMETHYLCELLULOSE SODIUM 1 DROP: 0.5 SOLUTION/ DROPS OPHTHALMIC at 09:48

## 2025-01-18 RX ADMIN — ALBUTEROL SULFATE 2.5 MG: 2.5 SOLUTION RESPIRATORY (INHALATION) at 03:41

## 2025-01-18 RX ADMIN — ACETAMINOPHEN 650 MG: 650 LIQUID ORAL at 06:03

## 2025-01-18 RX ADMIN — PHENYTOIN 100 MG: 50 TABLET, CHEWABLE ORAL at 16:33

## 2025-01-18 RX ADMIN — CARBAMAZEPINE 200 MG: 200 TABLET ORAL at 21:28

## 2025-01-18 RX ADMIN — SODIUM CHLORIDE SOLN NEBU 3% 3 ML: 3 NEBU SOLN at 21:50

## 2025-01-18 RX ADMIN — BACITRACIN ZINC, NEOMYCIN SULFATE , POLYMYXIN B SULFATE. 10 APPLICATION: 400; 3.5; 5 OINTMENT TOPICAL at 21:16

## 2025-01-18 RX ADMIN — ACETAMINOPHEN 650 MG: 650 LIQUID ORAL at 17:11

## 2025-01-18 RX ADMIN — NEOMYCIN SULFATE, POLYMYXIN B SULFATE AND BACITRACIN ZINC 0.5 INCH: 3.5; 10000; 4 OINTMENT OPHTHALMIC at 06:01

## 2025-01-18 RX ADMIN — BACLOFEN 30 MG: 10 TABLET ORAL at 09:48

## 2025-01-18 RX ADMIN — VANCOMYCIN HYDROCHLORIDE 1750 MG: 5 INJECTION, POWDER, LYOPHILIZED, FOR SOLUTION INTRAVENOUS at 19:52

## 2025-01-18 RX ADMIN — SODIUM CHLORIDE SOLN NEBU 3% 4 ML: 3 NEBU SOLN at 11:11

## 2025-01-18 RX ADMIN — CARBAMAZEPINE 200 MG: 200 TABLET ORAL at 13:27

## 2025-01-18 RX ADMIN — BACLOFEN 20 MG: 10 TABLET ORAL at 21:13

## 2025-01-18 RX ADMIN — Medication 80 PERCENT: at 07:00

## 2025-01-18 RX ADMIN — VANCOMYCIN HYDROCHLORIDE 1750 MG: 5 INJECTION, POWDER, LYOPHILIZED, FOR SOLUTION INTRAVENOUS at 03:09

## 2025-01-18 RX ADMIN — MEROPENEM 1 G: 1 INJECTION, POWDER, FOR SOLUTION INTRAVENOUS at 16:33

## 2025-01-18 ASSESSMENT — COGNITIVE AND FUNCTIONAL STATUS - GENERAL
TOILETING: TOTAL
DAILY ACTIVITIY SCORE: 6
DRESSING REGULAR UPPER BODY CLOTHING: TOTAL
EATING MEALS: TOTAL
PERSONAL GROOMING: TOTAL
MOBILITY SCORE: 6
STANDING UP FROM CHAIR USING ARMS: TOTAL
MOVING TO AND FROM BED TO CHAIR: TOTAL
TURNING FROM BACK TO SIDE WHILE IN FLAT BAD: TOTAL
WALKING IN HOSPITAL ROOM: TOTAL
DRESSING REGULAR LOWER BODY CLOTHING: TOTAL
MOVING FROM LYING ON BACK TO SITTING ON SIDE OF FLAT BED WITH BEDRAILS: TOTAL
CLIMB 3 TO 5 STEPS WITH RAILING: TOTAL
HELP NEEDED FOR BATHING: TOTAL

## 2025-01-18 ASSESSMENT — PAIN SCALES - PAIN ASSESSMENT IN ADVANCED DEMENTIA (PAINAD)
TOTALSCORE: 0
BODYLANGUAGE: RELAXED
TOTALSCORE: REPOSITIONED
FACIALEXPRESSION: SMILING OR INEXPRESSIVE
FACIALEXPRESSION: SMILING OR INEXPRESSIVE
TOTALSCORE: 0
BREATHING: NORMAL
BREATHING: NORMAL
TOTALSCORE: 0
BODYLANGUAGE: RELAXED
TOTALSCORE: REPOSITIONED
CONSOLABILITY: NO NEED TO CONSOLE
BREATHING: NORMAL
FACIALEXPRESSION: SMILING OR INEXPRESSIVE
CONSOLABILITY: NO NEED TO CONSOLE
BODYLANGUAGE: RELAXED
TOTALSCORE: REPOSITIONED
CONSOLABILITY: NO NEED TO CONSOLE

## 2025-01-18 ASSESSMENT — PAIN - FUNCTIONAL ASSESSMENT
PAIN_FUNCTIONAL_ASSESSMENT: CPOT (CRITICAL CARE PAIN OBSERVATION TOOL)

## 2025-01-18 NOTE — PROGRESS NOTES
"Intensive Care Unit Daily Progress Note   Subjective    Israel Edgar is a 44 y.o. year old male patient known with advanced MS with spasticity at baseline, neurogenic bladder with chronic little, epilepsy with questionable breakthrough seizures, DVT, PE, MRSA colonization, MRDO pneumonia, HTN, quadriplegia, depression, CVA, dysphagia, previous tracheostomy  admitted on 1/11/2025 with hypoxic respiratory failure  Interval History:  Continues to require CPAP 10 to maintain adequate oxygenation   CXR yesterday with what appears to be a large mucus plug, read from radiology suggests effusion    Meds    Scheduled medications  acetaminophen, 650 mg, oral, q6h JAZ  albuterol, 2.5 mg, nebulization, q4h  aspirin, 81 mg, oral, Daily  baclofen, 20 mg, oral, BID  baclofen, 30 mg, oral, Daily  carBAMazepine, 200 mg, g-tube, 4x daily  cholecalciferol, 400 Units, oral, Daily  levETIRAcetam, 2,000 mg, g-tube, BID  lubricating eye drops, 1 drop, Both Eyes, BID  meropenem, 1 g, intravenous, q8h  neomycin-bacitracnZn-polymyxnB, , Topical, TID  neomycin-bacitracin-polymyxin, 0.5 inch, Both Eyes, q6h JAZ  phenytoin, 100 mg, g-tube, TID  sodium chloride, 3 mL, nebulization, q4h  vancomycin, 1,750 mg, intravenous, q12h  white petrolatum-mineral oiL, , ophthalmic (eye), Daily    PRN medications  PRN medications: albuterol, diphenhydramine-zinc acetate, ondansetron **OR** ondansetron, oxygen, vancomycin, zinc oxide     Objective    Blood pressure 112/80, pulse (!) 114, temperature 37 °C (98.6 °F), temperature source Temporal, resp. rate 24, height 1.778 m (5' 10\"), weight 77.1 kg (170 lb), SpO2 90%.   Physical Exam   GENERAL: . Lying on his left side. Chronically ill  NECK: no JVD, midline trachea without stridor.    LUNGS: rhonchi with decreased BS on left  CARDIAC: Regular rate and rhythm. Sinus tachycardia  ABDOMEN: PEG and colostomy  EXTREMITIES: contracted  NEURO: much less responsive than baseline   SKIN: No rashes      Intake/Output " Summary (Last 24 hours) at 1/18/2025 1152  Last data filed at 1/18/2025 1100  Gross per 24 hour   Intake 2484 ml   Output 2625 ml   Net -141 ml     Labs:   Results from last 72 hours   Lab Units 01/18/25  0505 01/17/25  0549 01/16/25  0529   SODIUM mmol/L 132* 134* 136   POTASSIUM mmol/L 4.1 4.3 4.2   CHLORIDE mmol/L 95* 93* 101   CO2 mmol/L 28 30 32   BUN mg/dL 10 9 10   CREATININE mg/dL 0.35* 0.36* 0.29*   GLUCOSE mg/dL 150* 113* 117*   CALCIUM mg/dL 9.0 9.9 8.5*   ANION GAP mmol/L 13 15 7*   EGFR mL/min/1.73m*2 >90 >90 >90      Results from last 72 hours   Lab Units 01/18/25  0505 01/17/25  0549 01/16/25  0529   WBC AUTO x10*3/uL 6.3 8.4 6.8   HEMOGLOBIN g/dL 13.3* 15.1 11.9*   HEMATOCRIT % 41.2 45.2 35.2*   PLATELETS AUTO x10*3/uL 253 276 201      Results from last 72 hours   Lab Units 01/18/25  1036 01/16/25  2141   POCT PH, ARTERIAL pH 7.51* 7.44*   POCT PCO2, ARTERIAL mm Hg 44* 45*   POCT PO2, ARTERIAL mm Hg 55* 97*   POCT SO2, ARTERIAL % 87* 99          Micro/ID:   Lab Results   Component Value Date    URINECULTURE  01/11/2025     Growth indicates contamination with mixed bacterial trisha. Repeat culture if clinically indicated.    BLOODCULT No growth at 2 days 01/15/2025    BLOODCULT No growth at 2 days 01/15/2025     Summary of key imaging results    Chest x-ray from today there is almost complete atelectasis of the left lung predominantly elevated lower lobe.  The right lung seems without any infiltrates  The chest CT scan done on January 11 is with bibasilar infiltrates and atelectatic changes    Impression   Israel Edgar is a 44 y.o. year old male patient is being seen by the intensive care unit for  Acute hypoxic respiratory failure in need for advanced NIV   Pneumonia on presentation involving both lower lobes.  Pathogen not yet identified (MRSA + in nares)  Atelectasis of the left lung related to retention of secretions in the setting of diffuse weakness  Encephalopathy worse than baseline per report    History of seizure disorder on several medications.  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  Chronic debilitation, however cared for at home by his mother.  Dysphagia.  PEG tube 4 years with bolus feeding  Diverting colostomy  Chronic Boss    Recommendations   As follows:  Continue with CPAP, did not tolerate HiFlow this morning  Awaiting sputum cultures and blood cultures keep on broad-spectrum antibiotics  Decrease oxygen as tolerated to maintain oxygen saturation above 92%  Continue intensive airway clearance therapy  Vest therapy every 4 hours  IPV every 4 hours  3% saline as well as albuterol every 4 hours  NT suction on as-needed basis  Check Keppra and phenytoin levels  Advance enteral feeds to 20cc/hr - do not increase while on NIV      Chana Blevins MD   01/18/25 at 11:52 AM     Disclaimer: Documentation completed with the information available at the time of input. Parts of this note may have been scribed or generated using voice dictation software, Dragon.  Homophonic errors may exist.  Please contact me directly if clarification is needed. The times in the chart may not be reflective of actual patient care times, interventions, or procedures. Documentation occurs after the physical care of the patient.   ---------------------------------------  this critically ill patient continues to be at-risk for deterioration / failure due to the above  mentioned dysfunctional unstable organ systems.  I have reviewed, evaluated, identified and managed all critical medical problems.  Assessment, impressions and plans are reflected in the note above as well as the orders.  Critical care time is spent at bedside includes review of diagnostic tests, labs, and radiographs, serial assessments and management of hemodynamics, respiratory status, ventilation and coordination of care.  Teaching and any separately billable procedures are not included in the time  calculation.    [facility] Critical Care Time:  55 minutes  ----------------------------------------

## 2025-01-18 NOTE — CARE PLAN
The patient's goals for the shift include pt remains safe and free of falls    The clinical goals for the shift include Pt will remain hemodynamically stable by end of shift

## 2025-01-18 NOTE — CARE PLAN
The patient's goals for the shift include pt remains safe and free of falls    The clinical goals for the shift include Pt will remain hemodynamically stable by end of shift    Over the shift, the patient did not make progress toward the following goals. Barriers to progression include   Problem: Skin  Goal: Participates in plan/prevention/treatment measures  Outcome: Progressing  Goal: Prevent/manage excess moisture  Outcome: Progressing  Goal: Prevent/minimize sheer/friction injuries  Outcome: Progressing     Problem: Fall/Injury  Goal: Not fall by end of shift  Outcome: Progressing  Goal: Be free from injury by end of the shift  Outcome: Progressing  Goal: Verbalize understanding of personal risk factors for fall in the hospital  Outcome: Progressing  Goal: Verbalize understanding of risk factor reduction measures to prevent injury from fall in the home  Outcome: Progressing  Goal: Use assistive devices by end of the shift  Outcome: Progressing  Goal: Pace activities to prevent fatigue by end of the shift  Outcome: Progressing     Problem: Nutrition  Goal: BG  mg/dL  Outcome: Progressing  Goal: Lab values WNL  Outcome: Progressing  Goal: Electrolytes WNL  Outcome: Progressing  Goal: Maintain stable weight  Outcome: Progressing  Goal: Improve ostomy output  Outcome: Progressing     Problem: Pain - Adult  Goal: Verbalizes/displays adequate comfort level or baseline comfort level  Outcome: Progressing     Problem: Safety - Adult  Goal: Free from fall injury  Outcome: Progressing     Problem: Discharge Planning  Goal: Discharge to home or other facility with appropriate resources  Outcome: Progressing     Problem: Chronic Conditions and Co-morbidities  Goal: Patient's chronic conditions and co-morbidity symptoms are monitored and maintained or improved  Outcome: Progressing   .

## 2025-01-18 NOTE — CARE PLAN
The patient's goals for the shift include pt remains safe and free of falls    The clinical goals for the shift include Pt will remain hemodynamically stable by end of shift and have improved oxygenation.

## 2025-01-19 VITALS
TEMPERATURE: 98.1 F | WEIGHT: 177.03 LBS | HEART RATE: 112 BPM | RESPIRATION RATE: 16 BRPM | BODY MASS INDEX: 25.34 KG/M2 | DIASTOLIC BLOOD PRESSURE: 48 MMHG | OXYGEN SATURATION: 98 % | SYSTOLIC BLOOD PRESSURE: 146 MMHG | HEIGHT: 70 IN

## 2025-01-19 LAB
ANION GAP BLDV CALCULATED.4IONS-SCNC: -2 MMOL/L (ref 10–25)
ANION GAP SERPL CALC-SCNC: 12 MMOL/L (ref 10–20)
BACTERIA BLD CULT: NORMAL
BACTERIA BLD CULT: NORMAL
BASE EXCESS BLDV CALC-SCNC: 9.5 MMOL/L (ref -2–3)
BODY TEMPERATURE: 37 DEGREES CELSIUS
BUN SERPL-MCNC: 8 MG/DL (ref 6–23)
CA-I BLDV-SCNC: 1.17 MMOL/L (ref 1.1–1.33)
CALCIUM SERPL-MCNC: 9.1 MG/DL (ref 8.6–10.3)
CHLORIDE BLDV-SCNC: 105 MMOL/L (ref 98–107)
CHLORIDE SERPL-SCNC: 97 MMOL/L (ref 98–107)
CO2 SERPL-SCNC: 31 MMOL/L (ref 21–32)
CREAT SERPL-MCNC: 0.39 MG/DL (ref 0.5–1.3)
EGFRCR SERPLBLD CKD-EPI 2021: >90 ML/MIN/1.73M*2
ERYTHROCYTE [DISTWIDTH] IN BLOOD BY AUTOMATED COUNT: 14.8 % (ref 11.5–14.5)
GLUCOSE BLD MANUAL STRIP-MCNC: 109 MG/DL (ref 74–99)
GLUCOSE BLD MANUAL STRIP-MCNC: 129 MG/DL (ref 74–99)
GLUCOSE BLDV-MCNC: 101 MG/DL (ref 74–99)
GLUCOSE SERPL-MCNC: 180 MG/DL (ref 74–99)
HCO3 BLDV-SCNC: 34.8 MMOL/L (ref 22–26)
HCT VFR BLD AUTO: 39.9 % (ref 41–52)
HCT VFR BLD EST: 40 % (ref 41–52)
HGB BLD-MCNC: 13.4 G/DL (ref 13.5–17.5)
HGB BLDV-MCNC: 13.2 G/DL (ref 13.5–17.5)
INHALED O2 CONCENTRATION: 60 %
LACTATE BLDV-SCNC: 1.1 MMOL/L (ref 0.4–2)
MCH RBC QN AUTO: 30.7 PG (ref 26–34)
MCHC RBC AUTO-ENTMCNC: 33.6 G/DL (ref 32–36)
MCV RBC AUTO: 91 FL (ref 80–100)
NRBC BLD-RTO: 0 /100 WBCS (ref 0–0)
OXYHGB MFR BLDV: 33.9 % (ref 45–75)
PCO2 BLDV: 49 MM HG (ref 41–51)
PH BLDV: 7.46 PH (ref 7.33–7.43)
PLATELET # BLD AUTO: 363 X10*3/UL (ref 150–450)
PO2 BLDV: 25 MM HG (ref 35–45)
POTASSIUM BLDV-SCNC: 3.4 MMOL/L (ref 3.5–5.3)
POTASSIUM SERPL-SCNC: 4.4 MMOL/L (ref 3.5–5.3)
RBC # BLD AUTO: 4.37 X10*6/UL (ref 4.5–5.9)
SAO2 % BLDV: 34 % (ref 45–75)
SODIUM BLDV-SCNC: 134 MMOL/L (ref 136–145)
SODIUM SERPL-SCNC: 136 MMOL/L (ref 136–145)
VANCOMYCIN SERPL-MCNC: 49.7 UG/ML (ref 5–20)
WBC # BLD AUTO: 8.9 X10*3/UL (ref 4.4–11.3)

## 2025-01-19 PROCEDURE — 2500000002 HC RX 250 W HCPCS SELF ADMINISTERED DRUGS (ALT 637 FOR MEDICARE OP, ALT 636 FOR OP/ED): Performed by: INTERNAL MEDICINE

## 2025-01-19 PROCEDURE — 94640 AIRWAY INHALATION TREATMENT: CPT

## 2025-01-19 PROCEDURE — 85027 COMPLETE CBC AUTOMATED: CPT

## 2025-01-19 PROCEDURE — 99291 CRITICAL CARE FIRST HOUR: CPT | Performed by: SURGERY

## 2025-01-19 PROCEDURE — 80048 BASIC METABOLIC PNL TOTAL CA: CPT | Performed by: NURSE PRACTITIONER

## 2025-01-19 PROCEDURE — 2500000005 HC RX 250 GENERAL PHARMACY W/O HCPCS: Performed by: INTERNAL MEDICINE

## 2025-01-19 PROCEDURE — 2500000005 HC RX 250 GENERAL PHARMACY W/O HCPCS: Performed by: EMERGENCY MEDICINE

## 2025-01-19 PROCEDURE — 2060000001 HC INTERMEDIATE ICU ROOM DAILY

## 2025-01-19 PROCEDURE — 2500000001 HC RX 250 WO HCPCS SELF ADMINISTERED DRUGS (ALT 637 FOR MEDICARE OP): Performed by: PHARMACIST

## 2025-01-19 PROCEDURE — 2500000004 HC RX 250 GENERAL PHARMACY W/ HCPCS (ALT 636 FOR OP/ED): Performed by: INTERNAL MEDICINE

## 2025-01-19 PROCEDURE — 2500000001 HC RX 250 WO HCPCS SELF ADMINISTERED DRUGS (ALT 637 FOR MEDICARE OP): Performed by: INTERNAL MEDICINE

## 2025-01-19 PROCEDURE — 36415 COLL VENOUS BLD VENIPUNCTURE: CPT | Performed by: NURSE PRACTITIONER

## 2025-01-19 PROCEDURE — 2500000001 HC RX 250 WO HCPCS SELF ADMINISTERED DRUGS (ALT 637 FOR MEDICARE OP): Performed by: EMERGENCY MEDICINE

## 2025-01-19 PROCEDURE — 31720 CLEARANCE OF AIRWAYS: CPT

## 2025-01-19 PROCEDURE — 80202 ASSAY OF VANCOMYCIN: CPT

## 2025-01-19 PROCEDURE — 94669 MECHANICAL CHEST WALL OSCILL: CPT

## 2025-01-19 PROCEDURE — 36415 COLL VENOUS BLD VENIPUNCTURE: CPT | Performed by: SURGERY

## 2025-01-19 PROCEDURE — 84132 ASSAY OF SERUM POTASSIUM: CPT | Performed by: SURGERY

## 2025-01-19 PROCEDURE — 82947 ASSAY GLUCOSE BLOOD QUANT: CPT

## 2025-01-19 RX ORDER — SODIUM CHLORIDE FOR INHALATION 3 %
3 VIAL, NEBULIZER (ML) INHALATION
Status: DISCONTINUED | OUTPATIENT
Start: 2025-01-19 | End: 2025-01-24 | Stop reason: HOSPADM

## 2025-01-19 RX ORDER — ALBUTEROL SULFATE 0.83 MG/ML
2.5 SOLUTION RESPIRATORY (INHALATION)
Status: DISCONTINUED | OUTPATIENT
Start: 2025-01-19 | End: 2025-01-24 | Stop reason: HOSPADM

## 2025-01-19 RX ADMIN — ACETAMINOPHEN 650 MG: 650 LIQUID ORAL at 06:01

## 2025-01-19 RX ADMIN — CARBAMAZEPINE 200 MG: 200 TABLET ORAL at 06:01

## 2025-01-19 RX ADMIN — BACITRACIN ZINC, NEOMYCIN SULFATE , POLYMYXIN B SULFATE. 10 APPLICATION: 400; 3.5; 5 OINTMENT TOPICAL at 08:22

## 2025-01-19 RX ADMIN — BACITRACIN ZINC, NEOMYCIN SULFATE , POLYMYXIN B SULFATE. 10 APPLICATION: 400; 3.5; 5 OINTMENT TOPICAL at 14:53

## 2025-01-19 RX ADMIN — ACETAMINOPHEN 650 MG: 650 LIQUID ORAL at 11:28

## 2025-01-19 RX ADMIN — CARBAMAZEPINE 200 MG: 200 TABLET ORAL at 16:15

## 2025-01-19 RX ADMIN — ASPIRIN 81 MG: 81 TABLET, CHEWABLE ORAL at 08:01

## 2025-01-19 RX ADMIN — SODIUM CHLORIDE SOLN NEBU 3% 4 ML: 3 NEBU SOLN at 15:13

## 2025-01-19 RX ADMIN — NEOMYCIN SULFATE, POLYMYXIN B SULFATE AND BACITRACIN ZINC 0.5 INCH: 3.5; 10000; 4 OINTMENT OPHTHALMIC at 00:53

## 2025-01-19 RX ADMIN — BACLOFEN 30 MG: 10 TABLET ORAL at 08:19

## 2025-01-19 RX ADMIN — VANCOMYCIN HYDROCHLORIDE 1750 MG: 5 INJECTION, POWDER, LYOPHILIZED, FOR SOLUTION INTRAVENOUS at 04:17

## 2025-01-19 RX ADMIN — SODIUM CHLORIDE SOLN NEBU 3% 4 ML: 3 NEBU SOLN at 07:03

## 2025-01-19 RX ADMIN — NEOMYCIN SULFATE, POLYMYXIN B SULFATE AND BACITRACIN ZINC 0.5 INCH: 3.5; 10000; 4 OINTMENT OPHTHALMIC at 06:01

## 2025-01-19 RX ADMIN — MEROPENEM 1 G: 1 INJECTION, POWDER, FOR SOLUTION INTRAVENOUS at 00:53

## 2025-01-19 RX ADMIN — CARBOXYMETHYLCELLULOSE SODIUM 1 DROP: 0.5 SOLUTION/ DROPS OPHTHALMIC at 14:53

## 2025-01-19 RX ADMIN — SODIUM CHLORIDE SOLN NEBU 3% 3 ML: 3 NEBU SOLN at 03:36

## 2025-01-19 RX ADMIN — PHENYTOIN 100 MG: 50 TABLET, CHEWABLE ORAL at 14:53

## 2025-01-19 RX ADMIN — CARBOXYMETHYLCELLULOSE SODIUM 1 DROP: 0.5 SOLUTION/ DROPS OPHTHALMIC at 08:00

## 2025-01-19 RX ADMIN — Medication 4 L/MIN: at 20:37

## 2025-01-19 RX ADMIN — ALBUTEROL SULFATE 2.5 MG: 2.5 SOLUTION RESPIRATORY (INHALATION) at 07:02

## 2025-01-19 RX ADMIN — MEROPENEM 1 G: 1 INJECTION, POWDER, FOR SOLUTION INTRAVENOUS at 15:48

## 2025-01-19 RX ADMIN — ALBUTEROL SULFATE 2.5 MG: 2.5 SOLUTION RESPIRATORY (INHALATION) at 15:11

## 2025-01-19 RX ADMIN — CARBAMAZEPINE 200 MG: 200 TABLET ORAL at 12:40

## 2025-01-19 RX ADMIN — PHENYTOIN 100 MG: 50 TABLET, CHEWABLE ORAL at 08:01

## 2025-01-19 RX ADMIN — ACETAMINOPHEN 650 MG: 650 LIQUID ORAL at 17:46

## 2025-01-19 RX ADMIN — ALBUTEROL SULFATE 2.5 MG: 2.5 SOLUTION RESPIRATORY (INHALATION) at 03:36

## 2025-01-19 RX ADMIN — LEVETIRACETAM 2000 MG: 100 SOLUTION ORAL at 08:00

## 2025-01-19 RX ADMIN — NEOMYCIN SULFATE, POLYMYXIN B SULFATE AND BACITRACIN ZINC 0.5 INCH: 3.5; 10000; 4 OINTMENT OPHTHALMIC at 17:52

## 2025-01-19 RX ADMIN — Medication 6 L/MIN: at 15:46

## 2025-01-19 RX ADMIN — VANCOMYCIN HYDROCHLORIDE 1750 MG: 5 INJECTION, POWDER, LYOPHILIZED, FOR SOLUTION INTRAVENOUS at 21:12

## 2025-01-19 RX ADMIN — ACETAMINOPHEN 650 MG: 650 LIQUID ORAL at 00:11

## 2025-01-19 RX ADMIN — Medication 60 PERCENT: at 07:03

## 2025-01-19 RX ADMIN — ALBUTEROL SULFATE 2.5 MG: 2.5 SOLUTION RESPIRATORY (INHALATION) at 20:23

## 2025-01-19 RX ADMIN — BACLOFEN 20 MG: 10 TABLET ORAL at 16:15

## 2025-01-19 RX ADMIN — SODIUM CHLORIDE SOLN NEBU 3% 3 ML: 3 NEBU SOLN at 20:37

## 2025-01-19 RX ADMIN — SODIUM CHLORIDE SOLN NEBU 3% 4 ML: 3 NEBU SOLN at 11:34

## 2025-01-19 RX ADMIN — CHOLECALCIFEROL (VITAMIN D3) 10 MCG (400 UNIT) TABLET 10 MCG: at 08:01

## 2025-01-19 RX ADMIN — NEOMYCIN SULFATE, POLYMYXIN B SULFATE AND BACITRACIN ZINC 0.5 INCH: 3.5; 10000; 4 OINTMENT OPHTHALMIC at 12:40

## 2025-01-19 RX ADMIN — MEROPENEM 1 G: 1 INJECTION, POWDER, FOR SOLUTION INTRAVENOUS at 07:51

## 2025-01-19 RX ADMIN — ALBUTEROL SULFATE 2.5 MG: 2.5 SOLUTION RESPIRATORY (INHALATION) at 11:32

## 2025-01-19 ASSESSMENT — PAIN - FUNCTIONAL ASSESSMENT
PAIN_FUNCTIONAL_ASSESSMENT: CPOT (CRITICAL CARE PAIN OBSERVATION TOOL)

## 2025-01-19 ASSESSMENT — PAIN SCALES - GENERAL: PAINLEVEL_OUTOF10: 0 - NO PAIN

## 2025-01-19 NOTE — PROGRESS NOTES
Vancomycin Dosing by Pharmacy- FOLLOW UP    Israel Edgar is a 44 y.o. year old male who Pharmacy has been consulted for vancomycin dosing for pneumonia. Based on the patient's indication and renal status this patient is being dosed based on a goal AUC of 400-600.     Renal function is currently stable.    Current vancomycin dose: 1750 mg given every 12 hours    Estimated vancomycin AUC on current dose: 487 mg/L.hr     Visit Vitals  /73   Pulse (!) 118   Temp 36.9 °C (98.4 °F) (Temporal)   Resp 24        Lab Results   Component Value Date    CREATININE 0.39 (L) 2025    CREATININE 0.35 (L) 2025    CREATININE 0.36 (L) 2025    CREATININE 0.29 (L) 2025        Patient weight is as follows:   Vitals:    25 0600   Weight: 80.3 kg (177 lb 0.5 oz)       Cultures:  No results found for the encounter in last 14 days.       I/O last 3 completed shifts:  In: 5755 (71.7 mL/kg) [I.V.:1750 (21.8 mL/kg); NG/GT:1800; IV Piggyback:2205]  Out: 3300 (41.1 mL/kg) [Urine:2750 (1 mL/kg/hr); Stool:550]  Weight: 80.3 kg   I/O during current shift:  No intake/output data recorded.    Temp (24hrs), Av.9 °C (98.5 °F), Min:36.7 °C (98.1 °F), Max:37.1 °C (98.8 °F)      Assessment/Plan    Within goal AUC range. Continue current vancomycin regimen.    This dosing regimen is predicted by [employer] to result in the following pharmacokinetic parameters:  Exposure target: AUC24 (range)400-600 mg/L.hr   [id number]: 494 mg/L.hr  AUC24,ss: 487 mg/L.hr  Probability of AUC24 > 400: 99 %  Ctrough,ss: 9.2 mg/L  Probability of Ctrough,ss > 20: 0 %    The next level will be obtained on  at 0500. May be obtained sooner if clinically indicated.   Will continue to monitor renal function daily while on vancomycin and order serum creatinine at least every 48 hours if not already ordered.  Follow for continued vancomycin needs, clinical response, and signs/symptoms of toxicity.       Kimberly Travis, PharmD

## 2025-01-19 NOTE — PROGRESS NOTES
"Intensive Care Unit Daily Progress Note   Subjective    Israel Edgar is a 44 y.o. year old male patient known with advanced MS with spasticity at baseline, neurogenic bladder with chronic little, epilepsy with questionable breakthrough seizures, DVT, PE, MRSA colonization, MRDO pneumonia, HTN, quadriplegia, depression, CVA, dysphagia, previous tracheostomy  admitted on 1/11/2025 with hypoxic respiratory failure  Interval History:  Chest x-ray yesterday showed good improvement of left lung aeration   Weaned off of CPAP to high flow yesterday, currently at 60 L 60%    Meds    Scheduled medications  acetaminophen, 650 mg, oral, q6h JAZ  albuterol, 2.5 mg, nebulization, q4h  aspirin, 81 mg, oral, Daily  baclofen, 20 mg, oral, BID  baclofen, 30 mg, oral, Daily  carBAMazepine, 200 mg, g-tube, 4x daily  cholecalciferol, 400 Units, oral, Daily  levETIRAcetam, 2,000 mg, g-tube, BID  lubricating eye drops, 1 drop, Both Eyes, BID  meropenem, 1 g, intravenous, q8h  neomycin-bacitracnZn-polymyxnB, , Topical, TID  neomycin-bacitracin-polymyxin, 0.5 inch, Both Eyes, q6h JAZ  phenytoin, 100 mg, g-tube, TID  sodium chloride, 3 mL, nebulization, q4h  vancomycin, 1,750 mg, intravenous, q12h  white petrolatum-mineral oiL, , ophthalmic (eye), Daily    PRN medications  PRN medications: albuterol, diphenhydramine-zinc acetate, ondansetron **OR** ondansetron, oxygen, vancomycin, zinc oxide     Objective    Blood pressure 100/67, pulse (!) 114, temperature 37.1 °C (98.7 °F), temperature source Temporal, resp. rate 25, height 1.778 m (5' 10\"), weight 80.3 kg (177 lb 0.5 oz), SpO2 95%.   Physical Exam   GENERAL: . Lying on his right side. Chronically ill  NECK: no JVD, midline trachea without stridor.    LUNGS: Improved breath sounds bilaterally, no rhonchi  CARDIAC: Sinus tachycardia  ABDOMEN: PEG and colostomy  EXTREMITIES: contracted  NEURO: Eyes open, moves all extremities spontaneously  SKIN: No rashes      Intake/Output Summary (Last 24 " hours) at 1/19/2025 0957  Last data filed at 1/19/2025 0800  Gross per 24 hour   Intake 3671 ml   Output 1950 ml   Net 1721 ml     Labs:   Results from last 72 hours   Lab Units 01/19/25  0559 01/18/25  0505 01/17/25  0549   SODIUM mmol/L 136 132* 134*   POTASSIUM mmol/L 4.4 4.1 4.3   CHLORIDE mmol/L 97* 95* 93*   CO2 mmol/L 31 28 30   BUN mg/dL 8 10 9   CREATININE mg/dL 0.39* 0.35* 0.36*   GLUCOSE mg/dL 180* 150* 113*   CALCIUM mg/dL 9.1 9.0 9.9   ANION GAP mmol/L 12 13 15   EGFR mL/min/1.73m*2 >90 >90 >90      Results from last 72 hours   Lab Units 01/19/25  0559 01/18/25  0505 01/17/25  0549   WBC AUTO x10*3/uL 8.9 6.3 8.4   HEMOGLOBIN g/dL 13.4* 13.3* 15.1   HEMATOCRIT % 39.9* 41.2 45.2   PLATELETS AUTO x10*3/uL 363 253 276      Results from last 72 hours   Lab Units 01/18/25  1036 01/16/25  2141   POCT PH, ARTERIAL pH 7.51* 7.44*   POCT PCO2, ARTERIAL mm Hg 44* 45*   POCT PO2, ARTERIAL mm Hg 55* 97*   POCT SO2, ARTERIAL % 87* 99          Micro/ID:   Lab Results   Component Value Date    URINECULTURE  01/11/2025     Growth indicates contamination with mixed bacterial trisha. Repeat culture if clinically indicated.    BLOODCULT No growth at 3 days 01/15/2025    BLOODCULT No growth at 3 days 01/15/2025     Summary of key imaging results    Chest x-ray from 1/17 there is almost complete atelectasis of the left lung predominantly elevated lower lobe.  The right lung seems without any infiltrates  Chest x-ray from 1/18 there is improved aeration of the left lobe     Israel Edgar is a 44 y.o. year old male patient is being seen by the intensive care unit for  Acute hypoxic respiratory failure in need for advanced NIV   Pneumonia on presentation involving both lower lobes.  Pathogen not yet identified (MRSA + in nares)  Atelectasis of the left lung related to retention of secretions in the setting of diffuse weakness, improved  Encephalopathy worse than baseline per report   History of seizure disorder on several  medications.  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  Chronic debilitation, however cared for at home by his mother.  Dysphagia.  PEG tube 4 years with bolus feeding  Diverting colostomy  Chronic Boss    Recommendations   As follows:  Continue with HFNC,  wean as tolerated  Awaiting sputum cultures and blood cultures keep on broad-spectrum antibiotics  Decrease oxygen as tolerated to maintain oxygen saturation above 92%  Continue intensive airway clearance therapy  Vest therapy every 4 hours  IPV every 4 hours  3% saline as well as albuterol every 4 hours  NT suction on as-needed basis  Advance enteral feeds to 20cc/hr - will advance to goal tomorrow      Chana Blevins MD   01/19/25 at 9:57 AM     Disclaimer: Documentation completed with the information available at the time of input. Parts of this note may have been scribed or generated using voice dictation software, Dragon.  Homophonic errors may exist.  Please contact me directly if clarification is needed. The times in the chart may not be reflective of actual patient care times, interventions, or procedures. Documentation occurs after the physical care of the patient.   ---------------------------------------  this critically ill patient continues to be at-risk for deterioration / failure due to the above  mentioned dysfunctional unstable organ systems.  I have reviewed, evaluated, identified and managed all critical medical problems.  Assessment, impressions and plans are reflected in the note above as well as the orders.  Critical care time is spent at bedside includes review of diagnostic tests, labs, and radiographs, serial assessments and management of hemodynamics, respiratory status, ventilation and coordination of care.  Teaching and any separately billable procedures are not included in the time calculation.    Billing Provider Critical Care Time:  50 minutes  ----------------------------------------

## 2025-01-19 NOTE — CARE PLAN
Problem: Skin  Goal: Participates in plan/prevention/treatment measures  Outcome: Progressing  Goal: Prevent/manage excess moisture  Outcome: Progressing  Goal: Prevent/minimize sheer/friction injuries  Outcome: Progressing     Problem: Fall/Injury  Goal: Not fall by end of shift  Outcome: Progressing  Goal: Be free from injury by end of the shift  Outcome: Progressing  Goal: Verbalize understanding of personal risk factors for fall in the hospital  Outcome: Progressing  Goal: Verbalize understanding of risk factor reduction measures to prevent injury from fall in the home  Outcome: Progressing  Goal: Use assistive devices by end of the shift  Outcome: Progressing  Goal: Pace activities to prevent fatigue by end of the shift  Outcome: Progressing     Problem: Nutrition  Goal: BG  mg/dL  Outcome: Progressing  Goal: Lab values WNL  Outcome: Progressing  Goal: Electrolytes WNL  Outcome: Progressing  Goal: Maintain stable weight  Outcome: Progressing  Goal: Improve ostomy output  Outcome: Progressing     Problem: Pain - Adult  Goal: Verbalizes/displays adequate comfort level or baseline comfort level  Outcome: Progressing     Problem: Safety - Adult  Goal: Free from fall injury  Outcome: Progressing     Problem: Discharge Planning  Goal: Discharge to home or other facility with appropriate resources  Outcome: Progressing     Problem: Chronic Conditions and Co-morbidities  Goal: Patient's chronic conditions and co-morbidity symptoms are monitored and maintained or improved  Outcome: Progressing   The patient's goals for the shift include pt remains safe and free of falls    The clinical goals for the shift include PT will remain HDS & free from injury throoughout the shift    Over the shift, the patient did  make progress toward the following goals.

## 2025-01-19 NOTE — CARE PLAN
Problem: Skin  Goal: Participates in plan/prevention/treatment measures  Outcome: Progressing  Goal: Prevent/manage excess moisture  Outcome: Progressing  Flowsheets (Taken 1/19/2025 1606)  Prevent/manage excess moisture: Cleanse incontinence/protect with barrier cream  Goal: Prevent/minimize sheer/friction injuries  Outcome: Progressing     Problem: Fall/Injury  Goal: Not fall by end of shift  Outcome: Progressing  Goal: Be free from injury by end of the shift  Outcome: Progressing  Goal: Verbalize understanding of personal risk factors for fall in the hospital  Outcome: Progressing  Goal: Verbalize understanding of risk factor reduction measures to prevent injury from fall in the home  Outcome: Progressing  Goal: Use assistive devices by end of the shift  Outcome: Progressing  Goal: Pace activities to prevent fatigue by end of the shift  Outcome: Progressing     Problem: Nutrition  Goal: BG  mg/dL  Outcome: Progressing  Goal: Lab values WNL  Outcome: Progressing  Goal: Electrolytes WNL  Outcome: Progressing  Goal: Maintain stable weight  Outcome: Progressing  Goal: Improve ostomy output  Outcome: Progressing     Problem: Pain - Adult  Goal: Verbalizes/displays adequate comfort level or baseline comfort level  Outcome: Progressing     Problem: Safety - Adult  Goal: Free from fall injury  Outcome: Progressing     Problem: Discharge Planning  Goal: Discharge to home or other facility with appropriate resources  Outcome: Progressing     Problem: Chronic Conditions and Co-morbidities  Goal: Patient's chronic conditions and co-morbidity symptoms are monitored and maintained or improved  Outcome: Progressing

## 2025-01-20 ENCOUNTER — DOCUMENTATION (OUTPATIENT)
Dept: PRIMARY CARE | Facility: CLINIC | Age: 45
End: 2025-01-20
Payer: MEDICAID

## 2025-01-20 PROBLEM — G40.919 BREAKTHROUGH SEIZURE (MULTI): Status: ACTIVE | Noted: 2025-01-20

## 2025-01-20 LAB
ANION GAP SERPL CALC-SCNC: 12 MMOL/L (ref 10–20)
BUN SERPL-MCNC: 10 MG/DL (ref 6–23)
CALCIUM SERPL-MCNC: 8.6 MG/DL (ref 8.6–10.3)
CARBAMAZEPINE SERPL-MCNC: <2 UG/ML (ref 4–12)
CHLORIDE SERPL-SCNC: 99 MMOL/L (ref 98–107)
CO2 SERPL-SCNC: 26 MMOL/L (ref 21–32)
CREAT SERPL-MCNC: 0.39 MG/DL (ref 0.5–1.3)
EGFRCR SERPLBLD CKD-EPI 2021: >90 ML/MIN/1.73M*2
ERYTHROCYTE [DISTWIDTH] IN BLOOD BY AUTOMATED COUNT: 14.8 % (ref 11.5–14.5)
GLUCOSE BLD MANUAL STRIP-MCNC: 131 MG/DL (ref 74–99)
GLUCOSE SERPL-MCNC: 134 MG/DL (ref 74–99)
HCT VFR BLD AUTO: 36 % (ref 41–52)
HGB BLD-MCNC: 12.5 G/DL (ref 13.5–17.5)
LEVETIRACETAM SERPL-MCNC: 53 UG/ML (ref 10–40)
MCH RBC QN AUTO: 31.5 PG (ref 26–34)
MCHC RBC AUTO-ENTMCNC: 34.7 G/DL (ref 32–36)
MCV RBC AUTO: 91 FL (ref 80–100)
NRBC BLD-RTO: 0 /100 WBCS (ref 0–0)
PHENYTOIN FREE SERPL-MCNC: 0.7 UG/ML (ref 1–2)
PHENYTOIN SERPL-MCNC: 5.4 UG/ML (ref 10–20)
PLATELET # BLD AUTO: 398 X10*3/UL (ref 150–450)
POTASSIUM SERPL-SCNC: 3.9 MMOL/L (ref 3.5–5.3)
PROCALCITONIN SERPL-MCNC: 0.21 NG/ML
RBC # BLD AUTO: 3.97 X10*6/UL (ref 4.5–5.9)
SCAN RESULT: ABNORMAL
SODIUM SERPL-SCNC: 133 MMOL/L (ref 136–145)
VANCOMYCIN SERPL-MCNC: 15.2 UG/ML (ref 5–20)
WBC # BLD AUTO: 15.3 X10*3/UL (ref 4.4–11.3)

## 2025-01-20 PROCEDURE — 2500000005 HC RX 250 GENERAL PHARMACY W/O HCPCS: Performed by: INTERNAL MEDICINE

## 2025-01-20 PROCEDURE — 31720 CLEARANCE OF AIRWAYS: CPT

## 2025-01-20 PROCEDURE — 80048 BASIC METABOLIC PNL TOTAL CA: CPT | Performed by: NURSE PRACTITIONER

## 2025-01-20 PROCEDURE — 2500000002 HC RX 250 W HCPCS SELF ADMINISTERED DRUGS (ALT 637 FOR MEDICARE OP, ALT 636 FOR OP/ED): Performed by: INTERNAL MEDICINE

## 2025-01-20 PROCEDURE — 2500000001 HC RX 250 WO HCPCS SELF ADMINISTERED DRUGS (ALT 637 FOR MEDICARE OP): Performed by: HOSPITALIST

## 2025-01-20 PROCEDURE — 2060000001 HC INTERMEDIATE ICU ROOM DAILY

## 2025-01-20 PROCEDURE — 2500000004 HC RX 250 GENERAL PHARMACY W/ HCPCS (ALT 636 FOR OP/ED): Performed by: INTERNAL MEDICINE

## 2025-01-20 PROCEDURE — 94669 MECHANICAL CHEST WALL OSCILL: CPT

## 2025-01-20 PROCEDURE — 36415 COLL VENOUS BLD VENIPUNCTURE: CPT

## 2025-01-20 PROCEDURE — 2500000005 HC RX 250 GENERAL PHARMACY W/O HCPCS: Performed by: HOSPITALIST

## 2025-01-20 PROCEDURE — 84145 PROCALCITONIN (PCT): CPT | Mod: AHULAB | Performed by: HOSPITALIST

## 2025-01-20 PROCEDURE — 2500000004 HC RX 250 GENERAL PHARMACY W/ HCPCS (ALT 636 FOR OP/ED): Performed by: HOSPITALIST

## 2025-01-20 PROCEDURE — 80177 DRUG SCRN QUAN LEVETIRACETAM: CPT | Mod: AHULAB | Performed by: HOSPITALIST

## 2025-01-20 PROCEDURE — 85027 COMPLETE CBC AUTOMATED: CPT

## 2025-01-20 PROCEDURE — 82947 ASSAY GLUCOSE BLOOD QUANT: CPT

## 2025-01-20 PROCEDURE — 80185 ASSAY OF PHENYTOIN TOTAL: CPT | Performed by: HOSPITALIST

## 2025-01-20 PROCEDURE — 36415 COLL VENOUS BLD VENIPUNCTURE: CPT | Performed by: HOSPITALIST

## 2025-01-20 PROCEDURE — 80202 ASSAY OF VANCOMYCIN: CPT | Performed by: INTERNAL MEDICINE

## 2025-01-20 PROCEDURE — 94640 AIRWAY INHALATION TREATMENT: CPT

## 2025-01-20 PROCEDURE — 80156 ASSAY CARBAMAZEPINE TOTAL: CPT | Mod: AHULAB | Performed by: HOSPITALIST

## 2025-01-20 PROCEDURE — 99232 SBSQ HOSP IP/OBS MODERATE 35: CPT | Performed by: HOSPITALIST

## 2025-01-20 RX ORDER — PHENYTOIN 50 MG/1
100 TABLET, CHEWABLE ORAL ONCE
Status: DISCONTINUED | OUTPATIENT
Start: 2025-01-20 | End: 2025-01-20

## 2025-01-20 RX ORDER — CARBAMAZEPINE 200 MG/1
200 TABLET ORAL ONCE
Status: COMPLETED | OUTPATIENT
Start: 2025-01-20 | End: 2025-01-20

## 2025-01-20 RX ORDER — VANCOMYCIN HYDROCHLORIDE 1 G/20ML
INJECTION, POWDER, LYOPHILIZED, FOR SOLUTION INTRAVENOUS DAILY PRN
Status: DISCONTINUED | OUTPATIENT
Start: 2025-01-20 | End: 2025-01-22

## 2025-01-20 RX ORDER — BACLOFEN 10 MG/1
20 TABLET ORAL ONCE
Status: DISCONTINUED | OUTPATIENT
Start: 2025-01-20 | End: 2025-01-20

## 2025-01-20 RX ADMIN — CARBAMAZEPINE 200 MG: 200 TABLET ORAL at 04:38

## 2025-01-20 RX ADMIN — CARBOXYMETHYLCELLULOSE SODIUM 1 DROP: 0.5 SOLUTION/ DROPS OPHTHALMIC at 09:42

## 2025-01-20 RX ADMIN — LEVETIRACETAM 2000 MG: 100 SOLUTION ORAL at 09:22

## 2025-01-20 RX ADMIN — BACLOFEN 20 MG: 10 TABLET ORAL at 22:07

## 2025-01-20 RX ADMIN — VANCOMYCIN HYDROCHLORIDE 1750 MG: 5 INJECTION, POWDER, LYOPHILIZED, FOR SOLUTION INTRAVENOUS at 23:04

## 2025-01-20 RX ADMIN — Medication 4 L/MIN: at 23:59

## 2025-01-20 RX ADMIN — BACLOFEN 20 MG: 10 TABLET ORAL at 04:18

## 2025-01-20 RX ADMIN — MEROPENEM 1 G: 1 INJECTION, POWDER, FOR SOLUTION INTRAVENOUS at 09:09

## 2025-01-20 RX ADMIN — BACLOFEN 30 MG: 10 TABLET ORAL at 09:00

## 2025-01-20 RX ADMIN — BACITRACIN ZINC, NEOMYCIN SULFATE , POLYMYXIN B SULFATE.: 400; 3.5; 5 OINTMENT TOPICAL at 09:00

## 2025-01-20 RX ADMIN — BACITRACIN ZINC, NEOMYCIN SULFATE , POLYMYXIN B SULFATE.: 400; 3.5; 5 OINTMENT TOPICAL at 22:55

## 2025-01-20 RX ADMIN — ACETAMINOPHEN 650 MG: 650 LIQUID ORAL at 13:45

## 2025-01-20 RX ADMIN — DEXTROSE MONOHYDRATE 2000 MG: 50 INJECTION, SOLUTION INTRAVENOUS at 22:25

## 2025-01-20 RX ADMIN — CARBAMAZEPINE 200 MG: 200 TABLET ORAL at 13:45

## 2025-01-20 RX ADMIN — SODIUM CHLORIDE 500 ML: 9 INJECTION, SOLUTION INTRAVENOUS at 04:49

## 2025-01-20 RX ADMIN — SODIUM CHLORIDE SOLN NEBU 3% 3 ML: 3 NEBU SOLN at 13:00

## 2025-01-20 RX ADMIN — WHITE PETROLATUM 57.7 %-MINERAL OIL 31.9 % EYE OINTMENT: at 22:54

## 2025-01-20 RX ADMIN — CHOLECALCIFEROL (VITAMIN D3) 10 MCG (400 UNIT) TABLET 10 MCG: at 09:09

## 2025-01-20 RX ADMIN — MEROPENEM 1 G: 1 INJECTION, POWDER, FOR SOLUTION INTRAVENOUS at 00:06

## 2025-01-20 RX ADMIN — ALBUTEROL SULFATE 2.5 MG: 2.5 SOLUTION RESPIRATORY (INHALATION) at 07:42

## 2025-01-20 RX ADMIN — ALBUTEROL SULFATE 2.5 MG: 2.5 SOLUTION RESPIRATORY (INHALATION) at 20:07

## 2025-01-20 RX ADMIN — PHENYTOIN 100 MG: 50 TABLET, CHEWABLE ORAL at 09:09

## 2025-01-20 RX ADMIN — NEOMYCIN SULFATE, POLYMYXIN B SULFATE AND BACITRACIN ZINC 0.5 INCH: 3.5; 10000; 4 OINTMENT OPHTHALMIC at 00:06

## 2025-01-20 RX ADMIN — PHENYTOIN 100 MG: 50 TABLET, CHEWABLE ORAL at 04:18

## 2025-01-20 RX ADMIN — CARBAMAZEPINE 200 MG: 200 TABLET ORAL at 16:09

## 2025-01-20 RX ADMIN — CARBAMAZEPINE 200 MG: 200 TABLET ORAL at 22:09

## 2025-01-20 RX ADMIN — NEOMYCIN SULFATE, POLYMYXIN B SULFATE AND BACITRACIN ZINC 0.5 INCH: 3.5; 10000; 4 OINTMENT OPHTHALMIC at 04:54

## 2025-01-20 RX ADMIN — ACETAMINOPHEN 650 MG: 650 LIQUID ORAL at 04:17

## 2025-01-20 RX ADMIN — BACLOFEN 20 MG: 10 TABLET ORAL at 16:09

## 2025-01-20 RX ADMIN — SODIUM CHLORIDE SOLN NEBU 3% 3 ML: 3 NEBU SOLN at 08:05

## 2025-01-20 RX ADMIN — SODIUM CHLORIDE SOLN NEBU 3% 3 ML: 3 NEBU SOLN at 20:29

## 2025-01-20 RX ADMIN — PHENYTOIN SODIUM 100 MG: 50 INJECTION INTRAMUSCULAR; INTRAVENOUS at 17:52

## 2025-01-20 RX ADMIN — ALBUTEROL SULFATE 2.5 MG: 2.5 SOLUTION RESPIRATORY (INHALATION) at 12:36

## 2025-01-20 RX ADMIN — ASPIRIN 81 MG: 81 TABLET, CHEWABLE ORAL at 09:09

## 2025-01-20 RX ADMIN — VANCOMYCIN HYDROCHLORIDE 1750 MG: 5 INJECTION, POWDER, LYOPHILIZED, FOR SOLUTION INTRAVENOUS at 09:26

## 2025-01-20 RX ADMIN — Medication 4 L/MIN: at 20:29

## 2025-01-20 RX ADMIN — MEROPENEM 1 G: 1 INJECTION, POWDER, FOR SOLUTION INTRAVENOUS at 15:51

## 2025-01-20 RX ADMIN — PHENYTOIN SODIUM 100 MG: 50 INJECTION INTRAMUSCULAR; INTRAVENOUS at 22:43

## 2025-01-20 ASSESSMENT — COGNITIVE AND FUNCTIONAL STATUS - GENERAL
MOBILITY SCORE: 6
DAILY ACTIVITIY SCORE: 6
EATING MEALS: TOTAL
TURNING FROM BACK TO SIDE WHILE IN FLAT BAD: TOTAL
TOILETING: TOTAL
PERSONAL GROOMING: TOTAL
TURNING FROM BACK TO SIDE WHILE IN FLAT BAD: TOTAL
DRESSING REGULAR LOWER BODY CLOTHING: TOTAL
MOVING FROM LYING ON BACK TO SITTING ON SIDE OF FLAT BED WITH BEDRAILS: TOTAL
MOVING TO AND FROM BED TO CHAIR: TOTAL
MOVING TO AND FROM BED TO CHAIR: TOTAL
CLIMB 3 TO 5 STEPS WITH RAILING: TOTAL
WALKING IN HOSPITAL ROOM: TOTAL
MOBILITY SCORE: 6
MOVING FROM LYING ON BACK TO SITTING ON SIDE OF FLAT BED WITH BEDRAILS: TOTAL
MOVING TO AND FROM BED TO CHAIR: TOTAL
DRESSING REGULAR LOWER BODY CLOTHING: TOTAL
EATING MEALS: TOTAL
MOVING FROM LYING ON BACK TO SITTING ON SIDE OF FLAT BED WITH BEDRAILS: TOTAL
HELP NEEDED FOR BATHING: TOTAL
DRESSING REGULAR LOWER BODY CLOTHING: TOTAL
EATING MEALS: TOTAL
HELP NEEDED FOR BATHING: TOTAL
WALKING IN HOSPITAL ROOM: TOTAL
HELP NEEDED FOR BATHING: TOTAL
MOBILITY SCORE: 6
WALKING IN HOSPITAL ROOM: TOTAL
TURNING FROM BACK TO SIDE WHILE IN FLAT BAD: TOTAL
MOVING FROM LYING ON BACK TO SITTING ON SIDE OF FLAT BED WITH BEDRAILS: TOTAL
DRESSING REGULAR LOWER BODY CLOTHING: TOTAL
DAILY ACTIVITIY SCORE: 6
TOILETING: TOTAL
MOBILITY SCORE: 6
STANDING UP FROM CHAIR USING ARMS: TOTAL
WALKING IN HOSPITAL ROOM: TOTAL
PERSONAL GROOMING: TOTAL
TURNING FROM BACK TO SIDE WHILE IN FLAT BAD: TOTAL
PERSONAL GROOMING: TOTAL
HELP NEEDED FOR BATHING: TOTAL
DAILY ACTIVITIY SCORE: 6
TURNING FROM BACK TO SIDE WHILE IN FLAT BAD: TOTAL
STANDING UP FROM CHAIR USING ARMS: TOTAL
TOILETING: TOTAL
DAILY ACTIVITIY SCORE: 6
MOVING TO AND FROM BED TO CHAIR: TOTAL
TOILETING: TOTAL
PERSONAL GROOMING: TOTAL
DRESSING REGULAR UPPER BODY CLOTHING: TOTAL
STANDING UP FROM CHAIR USING ARMS: TOTAL
TOILETING: TOTAL
WALKING IN HOSPITAL ROOM: TOTAL
MOVING TO AND FROM BED TO CHAIR: TOTAL
STANDING UP FROM CHAIR USING ARMS: TOTAL
PERSONAL GROOMING: TOTAL
STANDING UP FROM CHAIR USING ARMS: TOTAL
PERSONAL GROOMING: TOTAL
CLIMB 3 TO 5 STEPS WITH RAILING: TOTAL
WALKING IN HOSPITAL ROOM: TOTAL
TURNING FROM BACK TO SIDE WHILE IN FLAT BAD: TOTAL
EATING MEALS: TOTAL
MOVING TO AND FROM BED TO CHAIR: TOTAL
STANDING UP FROM CHAIR USING ARMS: TOTAL
EATING MEALS: TOTAL
HELP NEEDED FOR BATHING: TOTAL
CLIMB 3 TO 5 STEPS WITH RAILING: TOTAL
DAILY ACTIVITIY SCORE: 6
CLIMB 3 TO 5 STEPS WITH RAILING: TOTAL
DRESSING REGULAR UPPER BODY CLOTHING: TOTAL
DAILY ACTIVITIY SCORE: 6
HELP NEEDED FOR BATHING: TOTAL
DRESSING REGULAR UPPER BODY CLOTHING: TOTAL
MOBILITY SCORE: 6
MOVING FROM LYING ON BACK TO SITTING ON SIDE OF FLAT BED WITH BEDRAILS: TOTAL
EATING MEALS: TOTAL
DRESSING REGULAR LOWER BODY CLOTHING: TOTAL
MOVING FROM LYING ON BACK TO SITTING ON SIDE OF FLAT BED WITH BEDRAILS: TOTAL
DRESSING REGULAR UPPER BODY CLOTHING: TOTAL
TOILETING: TOTAL
CLIMB 3 TO 5 STEPS WITH RAILING: TOTAL
DRESSING REGULAR UPPER BODY CLOTHING: TOTAL
CLIMB 3 TO 5 STEPS WITH RAILING: TOTAL
MOBILITY SCORE: 6
DRESSING REGULAR UPPER BODY CLOTHING: TOTAL
DRESSING REGULAR LOWER BODY CLOTHING: TOTAL

## 2025-01-20 ASSESSMENT — PAIN - FUNCTIONAL ASSESSMENT
PAIN_FUNCTIONAL_ASSESSMENT: 0-10

## 2025-01-20 ASSESSMENT — PAIN SCALES - GENERAL
PAINLEVEL_OUTOF10: 0 - NO PAIN

## 2025-01-20 ASSESSMENT — PAIN SCALES - WONG BAKER: WONGBAKER_NUMERICALRESPONSE: NO HURT

## 2025-01-20 NOTE — SIGNIFICANT EVENT
Pt. experienced seizure just as RT to begin therapy.  RN (day shift and night shift) called to bedside.  Night shift RN stated that pt. Had 2 seizures throughout the night.  Medication was given and pt. Is not due for additional medication at this time.  Seizure lasted approximately 2 minutes.  RT remained at bedside until patient recovered and also waited an additional 5 minutes.

## 2025-01-20 NOTE — SIGNIFICANT EVENT
Pt's mother at bedside sharply spoke to RT stating pt. Just got tube feed and he can't be shook up.  RT attempted to explain to mother that RT places pt tube feed on hold and remains at the bedside.  Pt.'s mother again stated that he should not have his treatment and vest/IPV at this time.  RT came out of pt's room to chart and pt's mother walked out to speak loudly to RT again.  Aerosol tx. Not given, IPV and vest not given. JACK Mckenna witnessed these conversations and states she's experienced the same as well.

## 2025-01-20 NOTE — CONSULTS
Vancomycin Dosing by Pharmacy- Cessation of Therapy    Consult to pharmacy for vancomycin dosing has been discontinued by the prescriber, pharmacy will sign off at this time.    Please call pharmacy if there are further questions or re-enter a consult if vancomycin is resumed.     Mayra Last, RadhaD

## 2025-01-20 NOTE — PROGRESS NOTES
01/20/25 0431   Discharge Planning   Living Arrangements Parent   Support Systems Parent   Assistance Needed per ID chart review;Continue meropenem for now  -Await vancomycin level today and redose as needed  -Continue scheduled acetaminophen  -Would consider thoracentesis   Expected Discharge Disposition Home   Does the patient need discharge transport arranged? Yes   RoundTrip coordination needed? Yes   Has discharge transport been arranged? No

## 2025-01-20 NOTE — CARE PLAN
The patient's goals for the shift include pt remains safe and free of falls    The clinical goals for the shift include patient will be able to maintain hemodynamic stability by the end of the shift    Problem: Skin  Goal: Prevent/minimize sheer/friction injuries  Flowsheets (Taken 1/19/2025 2154)  Prevent/minimize sheer/friction injuries:   Complete micro-shifts as needed if patient unable. Adjust patient position to relieve pressure points, not a full turn   Use pull sheet   HOB 30 degrees or less   Turn/reposition every 2 hours/use positioning/transfer devices

## 2025-01-20 NOTE — PROGRESS NOTES
For follow-up of:  Aspiration pneumonia    Subjective   Interval History:  Events noted.  I have spoken with his mother.  He apparently has developed 3 seizures since yesterday.  He has had no fevers today       Objective     Current Facility-Administered Medications   Medication Dose Route Frequency Provider Last Rate Last Admin    acetaminophen (Tylenol) oral liquid 650 mg  650 mg oral q6h [facility] Moni Gill MD   650 mg at 01/20/25 1345    albuterol 2.5 mg /3 mL (0.083 %) nebulizer solution 2.5 mg  2.5 mg nebulization q2h PRN Moni Gill MD        albuterol 2.5 mg /3 mL (0.083 %) nebulizer solution 2.5 mg  2.5 mg nebulization 4x daily Anahi Hall MD   2.5 mg at 01/20/25 1236    aspirin chewable tablet 81 mg  81 mg oral Daily Moni Gill MD   81 mg at 01/20/25 0909    baclofen (Lioresal) tablet 20 mg  20 mg oral BID Moni Gill MD   20 mg at 01/20/25 1609    baclofen (Lioresal) tablet 30 mg  30 mg oral Daily Moni Gill MD   30 mg at 01/20/25 0900    carBAMazepine (TEGretol) tablet 200 mg  200 mg g-tube 4x daily Moni Gill MD   200 mg at 01/20/25 1609    cholecalciferol (Vitamin D-3) tablet 10 mcg  400 Units oral Daily Moni Gill MD   10 mcg at 01/20/25 0909    diphenhydramine-zinc acetate cream   Topical TID PRN Moni Gill MD        levETIRAcetam (Keppra) 2,000 mg in dextrose 5% 250 mL IV  2,000 mg intravenous q12h Deep Fischer, DO        lubricating eye drops ophthalmic solution 1 drop  1 drop Both Eyes BID Moni Gill MD   1 drop at 01/20/25 0942    meropenem (Merrem) 1 g in sodium chloride 0.9%  mL  1 g intravenous q8h Roula Manuel  mL/hr at 01/20/25 1551 1 g at 01/20/25 1551    neomycin-bacitracin-polymyxin (Neosporin) ointment foil packet   Topical TID Isrrael Tejeda DO   Given at 01/20/25 0900    neomycin-bacitracin-polymyxin (Polysporin) ophthalmic ointment 0.5 inch  0.5 inch Both Eyes q6h [facility] Anahi Hall MD   0.5 inch at 01/20/25 1272     ondansetron (Zofran) tablet 4 mg  4 mg oral q8h PRN Moni Gill MD        Or    ondansetron (Zofran) injection 4 mg  4 mg intravenous q8h PRN Moni Gill MD        oxygen (O2) therapy   inhalation Continuous PRN - O2/gases Anahi Hall MD   4 L/min at 01/19/25 2037    phenytoin (Dilantin) 100 mg in sodium chloride 0.9% 50 mL IV  100 mg intravenous q8h JAZ Deep Fischer, DO        sodium chloride 3 % nebulizer solution 3 mL  3 mL nebulization 4x daily Anahi Hall MD   3 mL at 01/20/25 1300    vancomycin (Vancocin) pharmacy to dose - pharmacy monitoring   miscellaneous Daily PRN Austen G Salomone, DO        vancomycin 1,750 mg in dextrose 5% 500 mL IV  1,750 mg intravenous q12h Austen G Salomone, DO   Stopped at 01/20/25 1148    white petrolatum-mineral oiL (Tears Naturale PM) ophthalmic ointment   ophthalmic (eye) Daily Moni Gill MD   Given at 01/18/25 2113    zinc oxide 20 % ointment 1 Application  1 Application Topical q1h PRN Moni Gill MD   1 Application at 01/16/25 2156        Last Recorded Vitals  /61 (BP Location: Left arm, Patient Position: Lying)   Pulse (!) 118   Temp 36.9 °C (98.4 °F) (Temporal)   Resp 18   Wt 82.9 kg (182 lb 11.2 oz)   SpO2 98%   General: no acute distress, lying in bed, sleeping  HEENT: pharynx not examined  CVS: Tachycardic  Resp: decreased breath sounds in bases, wearing Airvo  ABD: soft, NT, ND, PEG, ostomy  : Boss  EXT: Heel guards  Skin: no rash     Relevant Results:    Labs:   Results from last 72 hours   Lab Units 01/20/25  0615 01/19/25  0559 01/18/25  0505   SODIUM mmol/L 133* 136 132*   POTASSIUM mmol/L 3.9 4.4 4.1   CHLORIDE mmol/L 99 97* 95*   BUN mg/dL 10 8 10   CREATININE mg/dL 0.39* 0.39* 0.35*     Results from last 72 hours   Lab Units 01/20/25  0615 01/19/25  0559 01/18/25  0505   WBC AUTO x10*3/uL 15.3* 8.9 6.3   HEMOGLOBIN g/dL 12.5* 13.4* 13.3*   HEMATOCRIT % 36.0* 39.9* 41.2   PLATELETS AUTO x10*3/uL 398 363 253       Microbiology  data: I have personally and independently reviewed and intrepreted the lab results  1/11/2025 urine Legionella antigen negative; urine pneumococcal antigen negative  1/11/2025 blood culture show no growth  1/11/2025 urine culture shows mixed growth  1/12/2025 MRSA swab positive  1/15/2025 blood cultures show no growth    1/15/2025 procalcitonin 1.02    1/19/2025 vancomycin trough 49.7    Imaging data: I have personally and independently reviewed and interpreted the imaging studies  1/11/2025 chest x-ray shows left lower lobe infiltrate/atelectasis  1/11/2025 chest CT shows bilateral lower lobe consolidation suggestive of aspiration pneumonia  1/17/2025 chest x-ray shows a larger left pleural effusion with infiltrate    Assessment/Plan     MY IMPRESSION & RECOMMENDATIONS:  Aspiration pneumonia, for which he has already received more than 1 week of IV antibiotics.  I have personally and independently reviewed and interpreted the laboratory tests, imaging studies, and the documentation from other healthcare providers.  I am monitoring for side effects from meropenem and vancomycin as outlined in a previous note.  He has developed seizures so I will stop the meropenem.  Since his MRSA nasal swab is positive, I will keep him on the vancomycin for a little bit longer.  There are plans for [place] [facility] [place] of his breakthrough seizures.     -Can continue vancomycin for 2 days to complete a 10-day total course of antibiotic treatment  -Continue scheduled acetaminophen     Other issues:  #Hypertension  #Advanced multiple sclerosis  #Neurogenic bladder with chronic Boss  #Dysphagia status post PEG  #Seizure disorder  #History of DVT and PE  #History of tracheostomy  #History of end ileostomy and mucous fistula in 2024       Vic Soria MD

## 2025-01-20 NOTE — PROGRESS NOTES
01/20/25 1535   Discharge Planning   Expected Discharge Disposition Home        Patient had couple seizures during night and heart rate went up to 140's. [place] [facility] [place] Patient has peg tube and tube feeding. Plan was for patient to go home with his mom.

## 2025-01-20 NOTE — NURSING NOTE
23:46 -informed dr devries via secure chat to unheld oral medications    03:12 -informed dr devries via secure chat about increasing of heart rate upto 120s    04:04 -informed dr. Devries via secure chat about first episode of generalized seizure for 30sec and HR of 130s; seizure precaution observed            -given baclofen, carbamazepine, phenytoin per dr. Devries    04:28 -informed dr. Devries via secure chat for 2nd episode of generalized seizure for 1min and 20sec; HR of 140s; seizure precaution observed            -hold feeding for now; dr devries informed; given bolus of NSS as ordered

## 2025-01-20 NOTE — SIGNIFICANT EVENT
Multiple breakthrough seizures this AM. 2 overnight and 1 around shift change.     Patient back to baseline, no seizure like activity seen at time of my evaluation this AM. Neurology was consulted but I was subsequently notified we do not have neurology coverage at [facility] this week. Spoke with Dr. Brenna Scott via transfer center.     Recommendations are:  Check serum drug levels for AED  Switch phenytoin and keppra to IV formulation. Continue Carbamezapine via G tube.  Transfer to neurology stepdown unit at [facility]. Currently no beds at [facility] but will be placed on transfer list.     Attempted to call his mother Vee([phone]) to update her. Call went to voicemail x2. Left voicemail requesting she call back the unit for update. Will attempt to call again later today.     Electronically signed by Ovidio Fischer DO on 01/20/25 at 11:09 AM

## 2025-01-21 ENCOUNTER — APPOINTMENT (OUTPATIENT)
Dept: PULMONOLOGY | Facility: HOSPITAL | Age: 45
End: 2025-01-21
Payer: MEDICAID

## 2025-01-21 ENCOUNTER — APPOINTMENT (OUTPATIENT)
Dept: NEUROLOGY | Facility: HOSPITAL | Age: 45
End: 2025-01-21
Payer: MEDICAID

## 2025-01-21 LAB
ANION GAP SERPL CALC-SCNC: 11 MMOL/L (ref 10–20)
BASOPHILS # BLD AUTO: 0.08 X10*3/UL (ref 0–0.1)
BASOPHILS NFR BLD AUTO: 0.7 %
BUN SERPL-MCNC: 8 MG/DL (ref 6–23)
CALCIUM SERPL-MCNC: 8.8 MG/DL (ref 8.6–10.3)
CHLORIDE SERPL-SCNC: 100 MMOL/L (ref 98–107)
CO2 SERPL-SCNC: 29 MMOL/L (ref 21–32)
CREAT SERPL-MCNC: 0.37 MG/DL (ref 0.5–1.3)
EGFRCR SERPLBLD CKD-EPI 2021: >90 ML/MIN/1.73M*2
EOSINOPHIL # BLD AUTO: 0.02 X10*3/UL (ref 0–0.7)
EOSINOPHIL NFR BLD AUTO: 0.2 %
ERYTHROCYTE [DISTWIDTH] IN BLOOD BY AUTOMATED COUNT: 15.1 % (ref 11.5–14.5)
GLUCOSE SERPL-MCNC: 114 MG/DL (ref 74–99)
HCT VFR BLD AUTO: 37.7 % (ref 41–52)
HGB BLD-MCNC: 12.8 G/DL (ref 13.5–17.5)
IMM GRANULOCYTES # BLD AUTO: 0.07 X10*3/UL (ref 0–0.7)
IMM GRANULOCYTES NFR BLD AUTO: 0.6 % (ref 0–0.9)
LYMPHOCYTES # BLD AUTO: 1.1 X10*3/UL (ref 1.2–4.8)
LYMPHOCYTES NFR BLD AUTO: 9 %
MCH RBC QN AUTO: 30.9 PG (ref 26–34)
MCHC RBC AUTO-ENTMCNC: 34 G/DL (ref 32–36)
MCV RBC AUTO: 91 FL (ref 80–100)
MONOCYTES # BLD AUTO: 1.23 X10*3/UL (ref 0.1–1)
MONOCYTES NFR BLD AUTO: 10 %
NEUTROPHILS # BLD AUTO: 9.75 X10*3/UL (ref 1.2–7.7)
NEUTROPHILS NFR BLD AUTO: 79.5 %
NRBC BLD-RTO: 0 /100 WBCS (ref 0–0)
PLATELET # BLD AUTO: 410 X10*3/UL (ref 150–450)
POTASSIUM SERPL-SCNC: 4.4 MMOL/L (ref 3.5–5.3)
RBC # BLD AUTO: 4.14 X10*6/UL (ref 4.5–5.9)
SODIUM SERPL-SCNC: 136 MMOL/L (ref 136–145)
WBC # BLD AUTO: 12.3 X10*3/UL (ref 4.4–11.3)

## 2025-01-21 PROCEDURE — 2500000004 HC RX 250 GENERAL PHARMACY W/ HCPCS (ALT 636 FOR OP/ED): Performed by: HOSPITALIST

## 2025-01-21 PROCEDURE — 2500000005 HC RX 250 GENERAL PHARMACY W/O HCPCS: Performed by: INTERNAL MEDICINE

## 2025-01-21 PROCEDURE — 1100000001 HC PRIVATE ROOM DAILY

## 2025-01-21 PROCEDURE — 2500000001 HC RX 250 WO HCPCS SELF ADMINISTERED DRUGS (ALT 637 FOR MEDICARE OP): Performed by: HOSPITALIST

## 2025-01-21 PROCEDURE — 80048 BASIC METABOLIC PNL TOTAL CA: CPT | Performed by: HOSPITALIST

## 2025-01-21 PROCEDURE — 31720 CLEARANCE OF AIRWAYS: CPT

## 2025-01-21 PROCEDURE — 2500000002 HC RX 250 W HCPCS SELF ADMINISTERED DRUGS (ALT 637 FOR MEDICARE OP, ALT 636 FOR OP/ED): Performed by: INTERNAL MEDICINE

## 2025-01-21 PROCEDURE — 2500000005 HC RX 250 GENERAL PHARMACY W/O HCPCS: Performed by: HOSPITALIST

## 2025-01-21 PROCEDURE — 94668 MNPJ CHEST WALL SBSQ: CPT

## 2025-01-21 PROCEDURE — 99232 SBSQ HOSP IP/OBS MODERATE 35: CPT | Performed by: HOSPITALIST

## 2025-01-21 PROCEDURE — 2500000004 HC RX 250 GENERAL PHARMACY W/ HCPCS (ALT 636 FOR OP/ED): Performed by: INTERNAL MEDICINE

## 2025-01-21 PROCEDURE — 85025 COMPLETE CBC W/AUTO DIFF WBC: CPT | Performed by: HOSPITALIST

## 2025-01-21 PROCEDURE — 95816 EEG AWAKE AND DROWSY: CPT | Performed by: STUDENT IN AN ORGANIZED HEALTH CARE EDUCATION/TRAINING PROGRAM

## 2025-01-21 PROCEDURE — 94640 AIRWAY INHALATION TREATMENT: CPT

## 2025-01-21 PROCEDURE — 95816 EEG AWAKE AND DROWSY: CPT

## 2025-01-21 PROCEDURE — 94669 MECHANICAL CHEST WALL OSCILL: CPT

## 2025-01-21 PROCEDURE — 36415 COLL VENOUS BLD VENIPUNCTURE: CPT | Performed by: HOSPITALIST

## 2025-01-21 RX ADMIN — WHITE PETROLATUM 57.7 %-MINERAL OIL 31.9 % EYE OINTMENT: at 21:11

## 2025-01-21 RX ADMIN — NEOMYCIN SULFATE, POLYMYXIN B SULFATE AND BACITRACIN ZINC 0.5 INCH: 3.5; 10000; 4 OINTMENT OPHTHALMIC at 00:55

## 2025-01-21 RX ADMIN — NEOMYCIN SULFATE, POLYMYXIN B SULFATE AND BACITRACIN ZINC 0.5 INCH: 3.5; 10000; 4 OINTMENT OPHTHALMIC at 13:26

## 2025-01-21 RX ADMIN — BACLOFEN 20 MG: 10 TABLET ORAL at 17:48

## 2025-01-21 RX ADMIN — CARBAMAZEPINE 200 MG: 200 TABLET ORAL at 17:48

## 2025-01-21 RX ADMIN — SODIUM CHLORIDE SOLN NEBU 3% 3 ML: 3 NEBU SOLN at 19:28

## 2025-01-21 RX ADMIN — CHOLECALCIFEROL (VITAMIN D3) 10 MCG (400 UNIT) TABLET 10 MCG: at 09:38

## 2025-01-21 RX ADMIN — ACETAMINOPHEN 650 MG: 650 LIQUID ORAL at 06:20

## 2025-01-21 RX ADMIN — ALBUTEROL SULFATE 2.5 MG: 2.5 SOLUTION RESPIRATORY (INHALATION) at 19:12

## 2025-01-21 RX ADMIN — CARBAMAZEPINE 200 MG: 200 TABLET ORAL at 13:25

## 2025-01-21 RX ADMIN — SODIUM CHLORIDE SOLN NEBU 3% 3 ML: 3 NEBU SOLN at 11:08

## 2025-01-21 RX ADMIN — Medication 1 L/MIN: at 19:12

## 2025-01-21 RX ADMIN — BACLOFEN 20 MG: 10 TABLET ORAL at 21:10

## 2025-01-21 RX ADMIN — VANCOMYCIN HYDROCHLORIDE 1750 MG: 5 INJECTION, POWDER, LYOPHILIZED, FOR SOLUTION INTRAVENOUS at 09:38

## 2025-01-21 RX ADMIN — ACETAMINOPHEN 650 MG: 650 LIQUID ORAL at 13:25

## 2025-01-21 RX ADMIN — PHENYTOIN SODIUM 100 MG: 50 INJECTION INTRAMUSCULAR; INTRAVENOUS at 13:28

## 2025-01-21 RX ADMIN — SODIUM CHLORIDE SOLN NEBU 3% 3 ML: 3 NEBU SOLN at 07:07

## 2025-01-21 RX ADMIN — PHENYTOIN SODIUM 125 MG: 50 INJECTION INTRAMUSCULAR; INTRAVENOUS at 21:09

## 2025-01-21 RX ADMIN — ALBUTEROL SULFATE 2.5 MG: 2.5 SOLUTION RESPIRATORY (INHALATION) at 11:07

## 2025-01-21 RX ADMIN — ACETAMINOPHEN 650 MG: 650 LIQUID ORAL at 00:42

## 2025-01-21 RX ADMIN — CARBAMAZEPINE 200 MG: 200 TABLET ORAL at 21:10

## 2025-01-21 RX ADMIN — SODIUM CHLORIDE SOLN NEBU 3% 3 ML: 3 NEBU SOLN at 15:11

## 2025-01-21 RX ADMIN — VANCOMYCIN HYDROCHLORIDE 1750 MG: 5 INJECTION, POWDER, LYOPHILIZED, FOR SOLUTION INTRAVENOUS at 21:46

## 2025-01-21 RX ADMIN — BACLOFEN 30 MG: 10 TABLET ORAL at 09:38

## 2025-01-21 RX ADMIN — ACETAMINOPHEN 650 MG: 650 LIQUID ORAL at 17:49

## 2025-01-21 RX ADMIN — DEXTROSE MONOHYDRATE 2000 MG: 50 INJECTION, SOLUTION INTRAVENOUS at 21:09

## 2025-01-21 RX ADMIN — ZINC OXIDE 1 APPLICATION: 200 OINTMENT TOPICAL at 21:47

## 2025-01-21 RX ADMIN — ALBUTEROL SULFATE 2.5 MG: 2.5 SOLUTION RESPIRATORY (INHALATION) at 15:10

## 2025-01-21 RX ADMIN — CARBOXYMETHYLCELLULOSE SODIUM 1 DROP: 0.5 SOLUTION/ DROPS OPHTHALMIC at 15:59

## 2025-01-21 RX ADMIN — PHENYTOIN SODIUM 100 MG: 50 INJECTION INTRAMUSCULAR; INTRAVENOUS at 06:31

## 2025-01-21 RX ADMIN — CARBOXYMETHYLCELLULOSE SODIUM 1 DROP: 0.5 SOLUTION/ DROPS OPHTHALMIC at 09:38

## 2025-01-21 RX ADMIN — Medication 4 L/MIN: at 03:53

## 2025-01-21 RX ADMIN — CARBAMAZEPINE 200 MG: 200 TABLET ORAL at 06:20

## 2025-01-21 RX ADMIN — ACETAMINOPHEN 650 MG: 650 LIQUID ORAL at 23:42

## 2025-01-21 RX ADMIN — NEOMYCIN SULFATE, POLYMYXIN B SULFATE AND BACITRACIN ZINC 0.5 INCH: 3.5; 10000; 4 OINTMENT OPHTHALMIC at 23:42

## 2025-01-21 RX ADMIN — NEOMYCIN SULFATE, POLYMYXIN B SULFATE AND BACITRACIN ZINC 0.5 INCH: 3.5; 10000; 4 OINTMENT OPHTHALMIC at 18:35

## 2025-01-21 RX ADMIN — NEOMYCIN SULFATE, POLYMYXIN B SULFATE AND BACITRACIN ZINC 0.5 INCH: 3.5; 10000; 4 OINTMENT OPHTHALMIC at 06:21

## 2025-01-21 RX ADMIN — DEXTROSE MONOHYDRATE 2000 MG: 50 INJECTION, SOLUTION INTRAVENOUS at 09:38

## 2025-01-21 RX ADMIN — ALBUTEROL SULFATE 2.5 MG: 2.5 SOLUTION RESPIRATORY (INHALATION) at 07:06

## 2025-01-21 RX ADMIN — ASPIRIN 81 MG: 81 TABLET, CHEWABLE ORAL at 09:38

## 2025-01-21 ASSESSMENT — COGNITIVE AND FUNCTIONAL STATUS - GENERAL
WALKING IN HOSPITAL ROOM: TOTAL
STANDING UP FROM CHAIR USING ARMS: TOTAL
PERSONAL GROOMING: TOTAL
DAILY ACTIVITIY SCORE: 6
MOVING FROM LYING ON BACK TO SITTING ON SIDE OF FLAT BED WITH BEDRAILS: TOTAL
DRESSING REGULAR UPPER BODY CLOTHING: TOTAL
DRESSING REGULAR UPPER BODY CLOTHING: TOTAL
DRESSING REGULAR LOWER BODY CLOTHING: TOTAL
HELP NEEDED FOR BATHING: TOTAL
CLIMB 3 TO 5 STEPS WITH RAILING: TOTAL
DRESSING REGULAR LOWER BODY CLOTHING: TOTAL
MOVING TO AND FROM BED TO CHAIR: TOTAL
TURNING FROM BACK TO SIDE WHILE IN FLAT BAD: TOTAL
CLIMB 3 TO 5 STEPS WITH RAILING: TOTAL
DAILY ACTIVITIY SCORE: 6
TURNING FROM BACK TO SIDE WHILE IN FLAT BAD: TOTAL
TOILETING: TOTAL
EATING MEALS: TOTAL
WALKING IN HOSPITAL ROOM: TOTAL
MOVING TO AND FROM BED TO CHAIR: TOTAL
TOILETING: TOTAL
MOBILITY SCORE: 6
PERSONAL GROOMING: TOTAL
MOBILITY SCORE: 6
HELP NEEDED FOR BATHING: TOTAL
STANDING UP FROM CHAIR USING ARMS: TOTAL
EATING MEALS: TOTAL
MOVING FROM LYING ON BACK TO SITTING ON SIDE OF FLAT BED WITH BEDRAILS: TOTAL

## 2025-01-21 ASSESSMENT — PAIN SCALES - PAIN ASSESSMENT IN ADVANCED DEMENTIA (PAINAD)
TOTALSCORE: 0
FACIALEXPRESSION: SMILING OR INEXPRESSIVE
BREATHING: NORMAL
CONSOLABILITY: NO NEED TO CONSOLE
BODYLANGUAGE: RELAXED

## 2025-01-21 NOTE — PROGRESS NOTES
01/21/25 5045   Discharge Planning   Expected Discharge Disposition   ([place] to [facility])        Patient is awaiting transfer to [facility] for breakthrough seizures, no beds yet. He is on tube feeding, IV abx. ID is following.

## 2025-01-21 NOTE — PROGRESS NOTES
"Nutrition Follow-up Note  Nutrition Assessment       Admitted for PNA. Chart review shows seizure activity and need for Bipap, EN held.  Is now on 4L O2 via NC and EN running at 25ml/hr.  Discussed increasing rate & water flushes with medical team.  EN order modified.  POC is tx to [facility].    History:  Food and Nutrient History  Energy Intake: Fair 50-75 %  Food and Nutrient History: Patient with seizure activity, EN held. Restarted at 25ml/hr and tolerated.    Food and Nutrient Administration History  Additional Food and Nutrient Administration History: npo , per RN pt was receiving Boost via PEG at home, however RN also reported pt was supposed to be on Osomolite 1.5. Per previous RDN notes, pt was on Vital 1.5 @ 55 ml/hr at [facility] this past August, and note pt was receiving Osmolite 1.5 bolus 330 ml every six hours this past Sept at [facility].    Anthropometrics:  Height: 177.8 cm (5' 10\")  Weight: 82.1 kg (181 lb)  BMI (Calculated): 25.97  Admit wt: 71.5 kg  Weight Change: -0.93  Review of weight hx shows significant weight gain, no edema present.  Suspect change in beds and added equipment on bed are reason for weight changes.  Weight Change  Weight History / % Weight Change: 69.4 kg 9/16/24, 64.6 kg 8/22/24, 77.1 kg 4/9/24  Significant Weight Loss: No  IBW/kg (Dietitian Calculated): 75.5 kg  Percent of IBW: 95 %     Energy Needs:  Estimated Energy Needs  Method for Estimating Needs: 7175-7787 kcal/day (25-30 kcal/kg)    Estimated Protein Needs  Method for Estimating Needs:  g/day (1.2-1.5 g/kg)    Estimated Fluid Needs  Total Fluid Estimated Needs (mL): 1800 mL  Total Fluid Estimated Needs (mL/kg): 25 mL/kg  Method for Estimating Needs: or as per MD or ~ 1 ml/kcal/day     Dietary Orders  Enteral feeding with NPO   Tube feeding formula: Vital 1.5   Tube feeding continuous rate (mL/hr): 25   Tube feeding flush (mL): 150   Flush type: Water   Water type: Tap water   Flush frequency: Every 4 hours    Comments: Please " increase vital 1.5 by 10ml q 8 hrs, as tolerates to 60ml/hr goal rate.    Labs: 01/21/25 05:13  GLUCOSE: 114 (H)  SODIUM: 136  POTASSIUM: 4.4  CHLORIDE: 100  Bicarbonate: 29  Anion Gap: 11  Blood Urea Nitrogen: 8  Creatinine: 0.37 (L)  EGFR: >90  Calcium: 8.8    Intake/Output Summary (Last 24 hours) at 1/21/2025 1552  Last data filed at 1/21/2025 1431  Gross per 24 hour  Intake 1013 ml  Output 2200 ml  Net -1187 ml     Nutrition Diagnosis   Malnutrition Diagnosis  Patient has Malnutrition Diagnosis: No    Patient has Nutrition Diagnosis: Yes  Nutrition Diagnosis 1: Swallowing difficulity  Diagnosis Status (1): Ongoing  Related to (1): physiological cause  As Evidenced by (1): s/p PEG placement , pt requires long term enteral support  Additional Assessment Information (1): unknown amount of Boost pt was receiving PTA , however wt has increased since previous admission this past August at [facility]    Nutrition Interventions/Recommendations   Nutrition Prescription  Individualized Nutrition Prescription Provided for : Vital 1.5 @ 25ml/hr, increase by 10ml q 8 hr as tolerates, to goal rate 60ml/hr. 150ml water q 4 hr.  d/w MD.    Food and/or Nutrient Delivery Interventions  Interventions: Enteral intake  Enteral Intake: Enteral nutrition site care, Feeding tube flush, Modify rate of enteral nutrition  Goal: Vital 1.5 @ 60ml/hr with 150ml water flush q 4 hr, provides: 2160 kca, 82 gr PRO, 2000ml water. Meets>75% estimated needs.     Nutrition Monitoring and Evaluation   Food and Nutrient Related History   Enteral and Parenteral Nutrition Intake: Enteral nutrition formula/solution, Enteral nutrition intake  Criteria: tolerate at goal rate in next 48 hrs.    Anthropometrics: Body Composition/Growth/Weight History  Weight: Measured weight  Criteria: daily  Weight Change: Weight change percentage  Criteria: weekly    Biochemical Data, Medical Tests and Procedures  Electrolyte and Renal Panel: BUN, Calcium, serum, Chloride,  Creatinine, Magnesium, Phosphorus, Potassium, Sodium  Criteria: as indicated  Gastrointestinal Profile: Gastric emptying time, Gastroesophageal reflux monitoring  Criteria: as indicated  Glucose/Endocrine Profile: Glucose, casual  Criteria: as indicated  Nutritional Anemia Profile: Folate, serum, Hematocrit, Hemoglobin, Iron, serum  Criteria: as indicated  Vitamin Profile: Vitamin D, 25 hydroxy, Other (Comment)  Criteria: as indicated    Nutrition Focused Physical Findings   Digestive System: Abdominal distension, Constipation, Diarrhea, Nausea, Vomiting, Other (Comment)  Criteria: daily  Skin: Other (Comment)  Criteria: monitor skin itegrity    Other: residuals    Follow Up  Last Date of Nutrition Visit: 01/21/25  Nutrition Follow-Up Needed?: Dietitian to reassess per policy  Follow up Comment: EN-TR

## 2025-01-21 NOTE — PROGRESS NOTES
For follow-up of:  Aspiration pneumonia    Subjective   Interval History:  He had a low-grade fever overnight.  He is sleeping.  He is breathing adequately on 3 L/min nasal cannula oxygen.       Objective     Current Facility-Administered Medications   Medication Dose Route Frequency Provider Last Rate Last Admin    acetaminophen (Tylenol) oral liquid 650 mg  650 mg oral q6h [facility] Moni Gill MD   650 mg at 01/21/25 0620    albuterol 2.5 mg /3 mL (0.083 %) nebulizer solution 2.5 mg  2.5 mg nebulization q2h PRN Moni Gill MD        albuterol 2.5 mg /3 mL (0.083 %) nebulizer solution 2.5 mg  2.5 mg nebulization 4x daily Anahi Hall MD   2.5 mg at 01/21/25 0706    aspirin chewable tablet 81 mg  81 mg oral Daily Moni Gill MD   81 mg at 01/20/25 0909    baclofen (Lioresal) tablet 20 mg  20 mg oral BID Moni Gill MD   20 mg at 01/20/25 2207    baclofen (Lioresal) tablet 30 mg  30 mg oral Daily Moni Gill MD   30 mg at 01/20/25 0900    carBAMazepine (TEGretol) tablet 200 mg  200 mg g-tube 4x daily Moni Gill MD   200 mg at 01/21/25 0620    cholecalciferol (Vitamin D-3) tablet 10 mcg  400 Units oral Daily Moni Gill MD   10 mcg at 01/20/25 0909    diphenhydramine-zinc acetate cream   Topical TID PRN Moni Gill MD        levETIRAcetam (Keppra) 2,000 mg in dextrose 5% 250 mL IV  2,000 mg intravenous q12h Deep Fischer, DO   Stopped at 01/20/25 2253    lubricating eye drops ophthalmic solution 1 drop  1 drop Both Eyes BID Moni Gill MD   1 drop at 01/20/25 0942    neomycin-bacitracin-polymyxin (Neosporin) ointment foil packet   Topical TID Isrrael Tejeda, DO   Given at 01/20/25 2255    neomycin-bacitracin-polymyxin (Polysporin) ophthalmic ointment 0.5 inch  0.5 inch Both Eyes q6h [facility] Anahi Hall MD   0.5 inch at 01/21/25 0621    ondansetron (Zofran) tablet 4 mg  4 mg oral q8h PRN Moni Gill MD        Or    ondansetron (Zofran) injection 4 mg  4 mg intravenous q8h PRN Moni  TIERRA Gill MD        oxygen (O2) therapy   inhalation Continuous PRN - O2/gases Anahi Hall MD   3 L/min at 01/21/25 0706    phenytoin (Dilantin) 100 mg in sodium chloride 0.9% 50 mL IV  100 mg intravenous q8h JAZ Deep Fischer, DO   Stopped at 01/21/25 0659    sodium chloride 3 % nebulizer solution 3 mL  3 mL nebulization 4x daily Anahi Hall MD   3 mL at 01/21/25 0707    vancomycin (Vancocin) pharmacy to dose - pharmacy monitoring   miscellaneous Daily PRN Vic Soria MD        vancomycin 1,750 mg in dextrose 5% 500 mL IV  1,750 mg intravenous q12h Vic Soria MD   Stopped at 01/21/25 0055    white petrolatum-mineral oiL (Tears Naturale PM) ophthalmic ointment   ophthalmic (eye) Daily Moni Gill MD   Given at 01/20/25 2254    zinc oxide 20 % ointment 1 Application  1 Application Topical q1h PRN Moni Gill MD   1 Application at 01/16/25 2156        Last Recorded Vitals  /67 (BP Location: Left arm, Patient Position: Lying)   Pulse (!) 112   Temp 37.1 °C (98.7 °F) (Oral)   Resp 18   Wt 82.1 kg (181 lb)   SpO2 98%   General: no acute distress, lying in bed, sleeping, a bit diaphoretic  HEENT: pharynx not examined  CVS: Tachycardic  Resp: decreased breath sounds in bases, wearing nasal cannula oxygen  ABD: soft, NT, ND, PEG, ostomy  : Boss  EXT: Heel guards  Skin: no rash     Relevant Results:    Labs:   Results from last 72 hours   Lab Units 01/21/25  0513 01/20/25  0615 01/19/25  0559   SODIUM mmol/L 136 133* 136   POTASSIUM mmol/L 4.4 3.9 4.4   CHLORIDE mmol/L 100 99 97*   BUN mg/dL 8 10 8   CREATININE mg/dL 0.37* 0.39* 0.39*     Results from last 72 hours   Lab Units 01/21/25  0513 01/20/25  0615 01/19/25  0559   WBC AUTO x10*3/uL 12.3* 15.3* 8.9   HEMOGLOBIN g/dL 12.8* 12.5* 13.4*   HEMATOCRIT % 37.7* 36.0* 39.9*   PLATELETS AUTO x10*3/uL 410 398 363       Microbiology data: I have personally and independently reviewed and intrepreted the lab results  1/11/2025 urine  Legionella antigen negative; urine pneumococcal antigen negative  1/11/2025 blood culture show no growth  1/11/2025 urine culture shows mixed growth  1/12/2025 MRSA swab positive  1/15/2025 blood cultures show no growth    1/21/2025 vancomycin trough 15.2    Imaging data: I have personally and independently reviewed and interpreted the imaging studies  1/11/2025 chest x-ray shows left lower lobe infiltrate/atelectasis  1/11/2025 chest CT shows bilateral lower lobe consolidation suggestive of aspiration pneumonia  1/17/2025 chest x-ray shows a larger left pleural effusion with infiltrate  1/18/2025 chest x-ray shows less opacity in the left lower lobe    Assessment/Plan     MY IMPRESSION & RECOMMENDATIONS:  Aspiration pneumonia, being treated with IV antibiotics.  I have personally and independently reviewed and interpreted the laboratory tests, imaging studies, and the documentation from other healthcare providers.  I am monitoring for side effects from vancomycin as outlined in a previous note.  He had a low-grade fever overnight, which is his first elevated temperature in approximately 6 days.  He is awaiting transfer to [facility] for breakthrough seizures.     -Can continue vancomycin to tomorrow  to complete a 10-day total course of antibiotic treatment  -Continue scheduled acetaminophen  -Monitor temperature     Other issues:  #Hypertension  #Advanced multiple sclerosis  #Neurogenic bladder with chronic Boss  #Dysphagia status post PEG  #Seizure disorder  #History of DVT and PE  #History of tracheostomy  #History of end ileostomy and mucous fistula in 2024       Vic Soria MD

## 2025-01-21 NOTE — PROGRESS NOTES
Between 7AM-7PM please message me via Epic Secure Chat.  After 7PM please page Nocturnist on call.    [facility] Hospitalist Progress Note      Israel Edgar    :  1980(44 y.o.)    MRN:  [id number]  Date: 25     Assessment and Plan:     Community-acquired pneumonia: History of multi resistant drug organism pneumonia  -ID consulted; no sputum cultures were initially sent, now on abx for 9 days thus limited utility. Blood cultures negative. MRSA PCR positive.  - Procal 1 -> 0.21; Can continue vancomycin to complete a 10-day total course of antibiotic treatment with end date 25. Meropenem was stopped on .    Leukocytosis  - bump in WBC to 15 on , suspect stress response due to breakthrough seizures as respiratory status and procal improving. WBC now improving.  - monitor wbc/fever curve    Acute respiratory failure with hypoxia  - due to PNA, was maxed out on Airvo requiring ICU care. Now weaned to 2L NC  - continue aggressive pulmonary hygiene; wean for goal o2 sat .     Epilepsy with breakthrough seizures  - 3 seizures in past 24 hours with likely trigger being infecction  - Phenytoin level 5.4, Carbamazepine level <2, Keppra level 53 (random levels drawn while compliant with meds in hospital past 9 days).   - Discussed with neurology at [facility]. Increase phenytoin to 125 mg tid. Continue carbamazepine 200 mg qid and keppra 2000 mg bid. Will obtain EEG. If no seizure activity seen on EEG may not need transfer.      Spasticity  -On baclofen     Neurogenic bladder- chronic indwelling catheter in place  Possible UTI  -Completed 1 week course of Meropenem and Vancomycin  -Urine culture with mixed trisha indicative of contamination     Hypertension  -Not on any medication, will monitor with vitals     Dysphagia s/p PEG  -N.p.o. with tube feed    History of end ileostomy and mucous fistula in    -Diverting ostomy in place, connected to bag due to high output    DVT Prophylaxis:  Detail Level: Zone SCDs    Disposition: continue to monitor inpatient, await consultant recommendations, await test results, and await clinical improvement    Electronically signed by Ovidio Fischer DO on 01/21/25 at 6:02 PM     Subjective:      Interval History:   Vitals and chart notes from overnight reviewed.   No acute issues overnight.   Patient seen and evaluated at bedside.   No further seizures overnight. Updated mother Vee via phone call.    Review of Systems:   Unable: nonverbal    Current medications:  Scheduled Meds:acetaminophen, 650 mg, oral, q6h JAZ  albuterol, 2.5 mg, nebulization, 4x daily  aspirin, 81 mg, oral, Daily  baclofen, 20 mg, oral, BID  baclofen, 30 mg, oral, Daily  carBAMazepine, 200 mg, g-tube, 4x daily  cholecalciferol, 400 Units, oral, Daily  levETIRAcetam, 2,000 mg, intravenous, q12h  lubricating eye drops, 1 drop, Both Eyes, BID  neomycin-bacitracnZn-polymyxnB, , Topical, TID  neomycin-bacitracin-polymyxin, 0.5 inch, Both Eyes, q6h JAZ  phenytoin, 125 mg, intravenous, q8h JAZ  sodium chloride, 3 mL, nebulization, 4x daily  vancomycin, 1,750 mg, intravenous, q12h  white petrolatum-mineral oiL, , ophthalmic (eye), Daily      Continuous Infusions:   PRN Meds:PRN medications: albuterol, diphenhydramine-zinc acetate, ondansetron **OR** ondansetron, oxygen, vancomycin, zinc oxide      Objective:     Heart Rate:  [112-122]   Temp:  [36.5 °C (97.7 °F)-38.3 °C (100.9 °F)]   Resp:  [17-18]   BP: ()/(58-70)   Weight:  [82.1 kg (181 lb)]   SpO2:  [94 %-100 %]     Oxygen Dose: *1 L/min    Physical Exam  Vitals and nursing note reviewed.   HENT:      Mouth/Throat:      Mouth: Mucous membranes are moist.      Pharynx: Oropharynx is clear.   Cardiovascular:      Rate and Rhythm: Regular rhythm. Tachycardia present.   Pulmonary:      Effort: Pulmonary effort is normal.   Abdominal:      Palpations: Abdomen is soft.   Musculoskeletal:      Right lower leg: No edema.      Left lower leg: No edema.   Neurological:       Mental Status: He is alert. Mental status is at baseline.      Comments: Does not respond to commands or speak.         Labs:   Lab Results   Component Value Date     01/21/2025    K 4.4 01/21/2025     01/21/2025    CO2 29 01/21/2025    BUN 8 01/21/2025    CREATININE 0.37 (L) 01/21/2025    GLUCOSE 114 (H) 01/21/2025    CALCIUM 8.8 01/21/2025    PROT 7.7 01/11/2025    BILITOT 0.5 01/11/2025    ALKPHOS 72 01/11/2025    AST 27 01/11/2025    ALT 16 01/11/2025       Lab Results   Component Value Date    WBC 12.3 (H) 01/21/2025    HGB 12.8 (L) 01/21/2025    HCT 37.7 (L) 01/21/2025    MCV 91 01/21/2025     01/21/2025

## 2025-01-21 NOTE — CARE PLAN
The patient's goals for the shift include pt remains safe and free of falls    The clinical goals for the shift include pt will remain hemodynamically stable

## 2025-01-21 NOTE — CARE PLAN
Problem: Skin  Goal: Participates in plan/prevention/treatment measures  Outcome: Progressing  Goal: Prevent/manage excess moisture  Outcome: Progressing  Goal: Prevent/minimize sheer/friction injuries  Outcome: Progressing     Problem: Fall/Injury  Goal: Not fall by end of shift  Outcome: Progressing  Goal: Be free from injury by end of the shift  Outcome: Progressing  Goal: Verbalize understanding of personal risk factors for fall in the hospital  Outcome: Progressing  Goal: Verbalize understanding of risk factor reduction measures to prevent injury from fall in the home  Outcome: Progressing  Goal: Use assistive devices by end of the shift  Outcome: Progressing  Goal: Pace activities to prevent fatigue by end of the shift  Outcome: Progressing     Problem: Nutrition  Goal: BG  mg/dL  Outcome: Progressing  Goal: Lab values WNL  Outcome: Progressing  Goal: Electrolytes WNL  Outcome: Progressing  Goal: Maintain stable weight  Outcome: Progressing  Goal: Improve ostomy output  Outcome: Progressing     Problem: Pain - Adult  Goal: Verbalizes/displays adequate comfort level or baseline comfort level  Outcome: Progressing     Problem: Safety - Adult  Goal: Free from fall injury  Outcome: Progressing     Problem: Discharge Planning  Goal: Discharge to home or other facility with appropriate resources  Outcome: Progressing     Problem: Chronic Conditions and Co-morbidities  Goal: Patient's chronic conditions and co-morbidity symptoms are monitored and maintained or improved  Outcome: Progressing   The patient's goals for the shift include pt remains safe and free of falls    The clinical goals for the shift include pt. will remain HDS    Over the shift, the patient did not make progress toward the following goals. Barriers to progression include . Recommendations to address these barriers include .

## 2025-01-21 NOTE — PROGRESS NOTES
Between 7AM-7PM please message me via Epic Secure Chat.  After 7PM please page Nocturnist on call.    [facility] Hospitalist Progress Note      Israel Edgar    :  1980(44 y.o.)    MRN:  [id number]  Date: 25     Assessment and Plan:     Community-acquired pneumonia: History of multi resistant drug organism pneumonia  -ID consulted; no sputum cultures were initially sent, now on abx for 9 days thus limited utility. Blood cultures negative. MRSA PCR positive.  - Procal 1 -> 0.21; Can continue vancomycin for 2 days to complete a 10-day total course of antibiotic treatment. Stop meropenem.     Leukocytosis  - bump in WBC to 15 on , suspect stress response due to breakthrough seizures as respiratory status and procal improving  - monitor wbc/fever curve    Acute respiratory failure with hypoxia  - due to PNA, was maxed out on Airvo requiring ICU care. Now weaned to 4L NC  - continue aggressive pulmonary hygiene; wean for goal o2 sat .     Epilepsy with breakthrough seizures  - 3 seizures in past 24 hours  - Phenytoin level 5.4, Carbamazepine level <2, Keppra level 53 (random levels drawn while compliant with meds in hospital past 9 days).   - Transitioned to IV phenytoin, keppra per neurology recs, awaiting tx to [facility] [place] for further evaluation by epilepsy team     Spasticity  -On baclofen     Neurogenic bladder- chronic indwelling catheter in place  Possible UTI  -Completed 1 week course of Meropenem and Vancomycin  -Urine culture with mixed trisha indicative of contamination     Hypertension  -Not on any medication, will monitor with vitals     Dysphagia s/p PEG  -N.p.o. with tube feed    History of end ileostomy and mucous fistula in    -Diverting ostomy in place, connected to bag due to high output    DVT Prophylaxis: SCDs    Disposition: continue to monitor inpatient, await consultant recommendations, await test results, and await clinical improvement    Electronically signed by  Ovidio Fischer, DO on 01/20/25 at 11:22 PM     Subjective:      Interval History:   Vitals and chart notes from overnight reviewed.   No acute issues overnight.   Patient seen and evaluated at bedside.   Breakthrough seizures this am (see my significant event note)    Review of Systems:   Unable: nonverbal    Current medications:  Scheduled Meds:acetaminophen, 650 mg, oral, q6h JAZ  albuterol, 2.5 mg, nebulization, 4x daily  aspirin, 81 mg, oral, Daily  baclofen, 20 mg, oral, BID  baclofen, 30 mg, oral, Daily  carBAMazepine, 200 mg, g-tube, 4x daily  cholecalciferol, 400 Units, oral, Daily  levETIRAcetam, 2,000 mg, intravenous, q12h  lubricating eye drops, 1 drop, Both Eyes, BID  neomycin-bacitracnZn-polymyxnB, , Topical, TID  neomycin-bacitracin-polymyxin, 0.5 inch, Both Eyes, q6h JAZ  phenytoin, 100 mg, intravenous, q8h JAZ  sodium chloride, 3 mL, nebulization, 4x daily  vancomycin, 1,750 mg, intravenous, q12h  white petrolatum-mineral oiL, , ophthalmic (eye), Daily      Continuous Infusions:   PRN Meds:PRN medications: albuterol, diphenhydramine-zinc acetate, ondansetron **OR** ondansetron, oxygen, vancomycin, zinc oxide      Objective:     Heart Rate:  [118]   Temp:  [36.7 °C (98.1 °F)-37.4 °C (99.4 °F)]   Resp:  [16-18]   BP: ()/(60-70)   Weight:  [82.9 kg (182 lb 11.2 oz)]   SpO2:  [97 %-99 %]     Oxygen Dose: *4 L/min    Physical Exam  Vitals and nursing note reviewed.   HENT:      Mouth/Throat:      Mouth: Mucous membranes are moist.      Pharynx: Oropharynx is clear.   Cardiovascular:      Rate and Rhythm: Regular rhythm. Tachycardia present.   Pulmonary:      Effort: Pulmonary effort is normal.   Abdominal:      Palpations: Abdomen is soft.   Musculoskeletal:      Right lower leg: No edema.      Left lower leg: No edema.   Neurological:      Mental Status: He is alert. Mental status is at baseline.      Comments: Does not respond to commands or speak.         Labs:   Lab Results   Component Value Date      (L) 01/20/2025    K 3.9 01/20/2025    CL 99 01/20/2025    CO2 26 01/20/2025    BUN 10 01/20/2025    CREATININE 0.39 (L) 01/20/2025    GLUCOSE 134 (H) 01/20/2025    CALCIUM 8.6 01/20/2025    PROT 7.7 01/11/2025    BILITOT 0.5 01/11/2025    ALKPHOS 72 01/11/2025    AST 27 01/11/2025    ALT 16 01/11/2025       Lab Results   Component Value Date    WBC 15.3 (H) 01/20/2025    HGB 12.5 (L) 01/20/2025    HCT 36.0 (L) 01/20/2025    MCV 91 01/20/2025     01/20/2025

## 2025-01-22 LAB
ANION GAP SERPL CALC-SCNC: 12 MMOL/L (ref 10–20)
BASOPHILS # BLD AUTO: 0.07 X10*3/UL (ref 0–0.1)
BASOPHILS NFR BLD AUTO: 1 %
BUN SERPL-MCNC: 8 MG/DL (ref 6–23)
CALCIUM SERPL-MCNC: 8.9 MG/DL (ref 8.6–10.3)
CHLORIDE SERPL-SCNC: 101 MMOL/L (ref 98–107)
CO2 SERPL-SCNC: 30 MMOL/L (ref 21–32)
CREAT SERPL-MCNC: 0.31 MG/DL (ref 0.5–1.3)
EGFRCR SERPLBLD CKD-EPI 2021: >90 ML/MIN/1.73M*2
EOSINOPHIL # BLD AUTO: 0.3 X10*3/UL (ref 0–0.7)
EOSINOPHIL NFR BLD AUTO: 4.5 %
ERYTHROCYTE [DISTWIDTH] IN BLOOD BY AUTOMATED COUNT: 15 % (ref 11.5–14.5)
GLUCOSE SERPL-MCNC: 97 MG/DL (ref 74–99)
HCT VFR BLD AUTO: 35.5 % (ref 41–52)
HGB BLD-MCNC: 12.1 G/DL (ref 13.5–17.5)
IMM GRANULOCYTES # BLD AUTO: 0.02 X10*3/UL (ref 0–0.7)
IMM GRANULOCYTES NFR BLD AUTO: 0.3 % (ref 0–0.9)
LYMPHOCYTES # BLD AUTO: 1.17 X10*3/UL (ref 1.2–4.8)
LYMPHOCYTES NFR BLD AUTO: 17.4 %
MCH RBC QN AUTO: 31.3 PG (ref 26–34)
MCHC RBC AUTO-ENTMCNC: 34.1 G/DL (ref 32–36)
MCV RBC AUTO: 92 FL (ref 80–100)
MONOCYTES # BLD AUTO: 0.77 X10*3/UL (ref 0.1–1)
MONOCYTES NFR BLD AUTO: 11.4 %
NEUTROPHILS # BLD AUTO: 4.4 X10*3/UL (ref 1.2–7.7)
NEUTROPHILS NFR BLD AUTO: 65.4 %
NRBC BLD-RTO: 0 /100 WBCS (ref 0–0)
PLATELET # BLD AUTO: 415 X10*3/UL (ref 150–450)
POTASSIUM SERPL-SCNC: 4.5 MMOL/L (ref 3.5–5.3)
RBC # BLD AUTO: 3.86 X10*6/UL (ref 4.5–5.9)
SODIUM SERPL-SCNC: 138 MMOL/L (ref 136–145)
WBC # BLD AUTO: 6.7 X10*3/UL (ref 4.4–11.3)

## 2025-01-22 PROCEDURE — 2500000001 HC RX 250 WO HCPCS SELF ADMINISTERED DRUGS (ALT 637 FOR MEDICARE OP): Performed by: HOSPITALIST

## 2025-01-22 PROCEDURE — 85025 COMPLETE CBC W/AUTO DIFF WBC: CPT | Performed by: HOSPITALIST

## 2025-01-22 PROCEDURE — 2500000005 HC RX 250 GENERAL PHARMACY W/O HCPCS: Performed by: HOSPITALIST

## 2025-01-22 PROCEDURE — 31720 CLEARANCE OF AIRWAYS: CPT

## 2025-01-22 PROCEDURE — 94640 AIRWAY INHALATION TREATMENT: CPT

## 2025-01-22 PROCEDURE — 94669 MECHANICAL CHEST WALL OSCILL: CPT

## 2025-01-22 PROCEDURE — 2500000005 HC RX 250 GENERAL PHARMACY W/O HCPCS: Performed by: INTERNAL MEDICINE

## 2025-01-22 PROCEDURE — 1100000001 HC PRIVATE ROOM DAILY

## 2025-01-22 PROCEDURE — 99232 SBSQ HOSP IP/OBS MODERATE 35: CPT | Performed by: HOSPITALIST

## 2025-01-22 PROCEDURE — 2500000004 HC RX 250 GENERAL PHARMACY W/ HCPCS (ALT 636 FOR OP/ED): Performed by: HOSPITALIST

## 2025-01-22 PROCEDURE — 2500000002 HC RX 250 W HCPCS SELF ADMINISTERED DRUGS (ALT 637 FOR MEDICARE OP, ALT 636 FOR OP/ED): Performed by: INTERNAL MEDICINE

## 2025-01-22 PROCEDURE — 2500000004 HC RX 250 GENERAL PHARMACY W/ HCPCS (ALT 636 FOR OP/ED): Performed by: INTERNAL MEDICINE

## 2025-01-22 PROCEDURE — 82374 ASSAY BLOOD CARBON DIOXIDE: CPT | Performed by: HOSPITALIST

## 2025-01-22 PROCEDURE — 36415 COLL VENOUS BLD VENIPUNCTURE: CPT | Performed by: HOSPITALIST

## 2025-01-22 PROCEDURE — 94668 MNPJ CHEST WALL SBSQ: CPT

## 2025-01-22 RX ADMIN — PHENYTOIN SODIUM 125 MG: 50 INJECTION INTRAMUSCULAR; INTRAVENOUS at 13:54

## 2025-01-22 RX ADMIN — ASPIRIN 81 MG: 81 TABLET, CHEWABLE ORAL at 08:04

## 2025-01-22 RX ADMIN — WHITE PETROLATUM 57.7 %-MINERAL OIL 31.9 % EYE OINTMENT: at 21:02

## 2025-01-22 RX ADMIN — CARBAMAZEPINE 200 MG: 200 TABLET ORAL at 21:01

## 2025-01-22 RX ADMIN — ACETAMINOPHEN 650 MG: 650 LIQUID ORAL at 05:40

## 2025-01-22 RX ADMIN — BACLOFEN 20 MG: 10 TABLET ORAL at 17:16

## 2025-01-22 RX ADMIN — CARBOXYMETHYLCELLULOSE SODIUM 1 DROP: 0.5 SOLUTION/ DROPS OPHTHALMIC at 08:01

## 2025-01-22 RX ADMIN — ACETAMINOPHEN 650 MG: 650 LIQUID ORAL at 17:16

## 2025-01-22 RX ADMIN — BACLOFEN 20 MG: 10 TABLET ORAL at 21:01

## 2025-01-22 RX ADMIN — ALBUTEROL SULFATE 2.5 MG: 2.5 SOLUTION RESPIRATORY (INHALATION) at 07:20

## 2025-01-22 RX ADMIN — ACETAMINOPHEN 650 MG: 650 LIQUID ORAL at 23:46

## 2025-01-22 RX ADMIN — SODIUM CHLORIDE SOLN NEBU 3% 3 ML: 3 NEBU SOLN at 21:20

## 2025-01-22 RX ADMIN — NEOMYCIN SULFATE, POLYMYXIN B SULFATE AND BACITRACIN ZINC 0.5 INCH: 3.5; 10000; 4 OINTMENT OPHTHALMIC at 05:40

## 2025-01-22 RX ADMIN — BACLOFEN 30 MG: 10 TABLET ORAL at 08:02

## 2025-01-22 RX ADMIN — NEOMYCIN SULFATE, POLYMYXIN B SULFATE AND BACITRACIN ZINC 0.5 INCH: 3.5; 10000; 4 OINTMENT OPHTHALMIC at 13:52

## 2025-01-22 RX ADMIN — VANCOMYCIN HYDROCHLORIDE 1750 MG: 5 INJECTION, POWDER, LYOPHILIZED, FOR SOLUTION INTRAVENOUS at 09:23

## 2025-01-22 RX ADMIN — SODIUM CHLORIDE SOLN NEBU 3% 3 ML: 3 NEBU SOLN at 15:30

## 2025-01-22 RX ADMIN — CARBOXYMETHYLCELLULOSE SODIUM 1 DROP: 0.5 SOLUTION/ DROPS OPHTHALMIC at 15:00

## 2025-01-22 RX ADMIN — ALBUTEROL SULFATE 2.5 MG: 2.5 SOLUTION RESPIRATORY (INHALATION) at 21:08

## 2025-01-22 RX ADMIN — ACETAMINOPHEN 650 MG: 650 LIQUID ORAL at 13:52

## 2025-01-22 RX ADMIN — CARBAMAZEPINE 200 MG: 200 TABLET ORAL at 05:40

## 2025-01-22 RX ADMIN — SODIUM CHLORIDE SOLN NEBU 3% 3 ML: 3 NEBU SOLN at 07:21

## 2025-01-22 RX ADMIN — ALBUTEROL SULFATE 2.5 MG: 2.5 SOLUTION RESPIRATORY (INHALATION) at 15:29

## 2025-01-22 RX ADMIN — PHENYTOIN SODIUM 125 MG: 50 INJECTION INTRAMUSCULAR; INTRAVENOUS at 05:42

## 2025-01-22 RX ADMIN — SODIUM CHLORIDE SOLN NEBU 3% 3 ML: 3 NEBU SOLN at 11:28

## 2025-01-22 RX ADMIN — CARBAMAZEPINE 200 MG: 200 TABLET ORAL at 13:51

## 2025-01-22 RX ADMIN — CHOLECALCIFEROL (VITAMIN D3) 10 MCG (400 UNIT) TABLET 10 MCG: at 08:11

## 2025-01-22 RX ADMIN — PHENYTOIN SODIUM 125 MG: 50 INJECTION INTRAMUSCULAR; INTRAVENOUS at 22:06

## 2025-01-22 RX ADMIN — CARBAMAZEPINE 200 MG: 200 TABLET ORAL at 17:16

## 2025-01-22 RX ADMIN — DEXTROSE MONOHYDRATE 2000 MG: 50 INJECTION, SOLUTION INTRAVENOUS at 08:08

## 2025-01-22 RX ADMIN — DEXTROSE MONOHYDRATE 2000 MG: 50 INJECTION, SOLUTION INTRAVENOUS at 21:01

## 2025-01-22 RX ADMIN — ALBUTEROL SULFATE 2.5 MG: 2.5 SOLUTION RESPIRATORY (INHALATION) at 11:27

## 2025-01-22 ASSESSMENT — COGNITIVE AND FUNCTIONAL STATUS - GENERAL
STANDING UP FROM CHAIR USING ARMS: TOTAL
STANDING UP FROM CHAIR USING ARMS: TOTAL
EATING MEALS: TOTAL
WALKING IN HOSPITAL ROOM: TOTAL
HELP NEEDED FOR BATHING: TOTAL
TOILETING: TOTAL
DAILY ACTIVITIY SCORE: 6
MOVING TO AND FROM BED TO CHAIR: TOTAL
DRESSING REGULAR UPPER BODY CLOTHING: TOTAL
MOBILITY SCORE: 6
TURNING FROM BACK TO SIDE WHILE IN FLAT BAD: TOTAL
PERSONAL GROOMING: TOTAL
DRESSING REGULAR LOWER BODY CLOTHING: TOTAL
MOVING FROM LYING ON BACK TO SITTING ON SIDE OF FLAT BED WITH BEDRAILS: TOTAL
TOILETING: TOTAL
EATING MEALS: TOTAL
PERSONAL GROOMING: TOTAL
CLIMB 3 TO 5 STEPS WITH RAILING: TOTAL
MOBILITY SCORE: 6
WALKING IN HOSPITAL ROOM: TOTAL
DAILY ACTIVITIY SCORE: 6
HELP NEEDED FOR BATHING: TOTAL
TURNING FROM BACK TO SIDE WHILE IN FLAT BAD: TOTAL
MOVING TO AND FROM BED TO CHAIR: TOTAL
CLIMB 3 TO 5 STEPS WITH RAILING: TOTAL
MOVING FROM LYING ON BACK TO SITTING ON SIDE OF FLAT BED WITH BEDRAILS: TOTAL
DRESSING REGULAR UPPER BODY CLOTHING: TOTAL
DRESSING REGULAR LOWER BODY CLOTHING: TOTAL

## 2025-01-22 ASSESSMENT — PAIN SCALES - PAIN ASSESSMENT IN ADVANCED DEMENTIA (PAINAD)
TOTALSCORE: REPOSITIONED
FACIALEXPRESSION: SMILING OR INEXPRESSIVE
CONSOLABILITY: NO NEED TO CONSOLE
BODYLANGUAGE: RELAXED
BREATHING: NORMAL
NEGVOCALIZATION: OCCASIONAL MOAN/GROAN, LOW SPEECH, NEGATIVE/DISAPPROVING QUALITY
TOTALSCORE: 1

## 2025-01-22 ASSESSMENT — PAIN SCALES - GENERAL
PAINLEVEL_OUTOF10: 0 - NO PAIN

## 2025-01-22 ASSESSMENT — PAIN - FUNCTIONAL ASSESSMENT
PAIN_FUNCTIONAL_ASSESSMENT: 0-10
PAIN_FUNCTIONAL_ASSESSMENT: 0-10
PAIN_FUNCTIONAL_ASSESSMENT: PAINAD (PAIN ASSESSMENT IN ADVANCED DEMENTIA SCALE)

## 2025-01-22 NOTE — PROGRESS NOTES
01/22/25 1524   Discharge Planning   Assistance Needed tube feed affordability   Expected Discharge Disposition Home     Notified by MD that patient's mom is interested in discussing cost effective tube feed options. SW called mom and left voicemail.    Addendum: Received return call. Mom stated that medicaid will not pay for tube feed because she is refusing home health aid supports at home. Mom states that he's had traditional medicaid, he has had it for over 10 years and tube feed was previously covered until recently. Mom stated that she has called insurance repeatedly without a straight answer. SW will investigate this and confirm tube feed is covered under his insurance.    Addendum: Met with mom at bedside. Appears that patient was at a facility and tube feed was discontinued, facility did not confirm tube feed or set up new delivery with orders. It seems that patient's mom was getting tube feed without proper documentation from MD, therefore it was not covered under insurance- confirmed with Rishi ESCOBEDO, that patient is eligible with medicaid for coverage. Patient's mom stated that she would like patient on a different tube feed than he is receiving here- mom would like patient discharged with Osmolite 1.5, bolus. Inquired with [employer] company if they carry. STEVEN will discuss with mom. ASHISH will follow.

## 2025-01-22 NOTE — PROGRESS NOTES
Nutrition Note re: enteral feeding for homegoing     RDN contacted by ASHISH re: home feeding needs. Mom is requesting a different formula, does not want Vital 1.5 for homegoing.     Spoke to mom at bedside. Mom is requesting the following for home going re: enteral support:   Osmolite 1.5 enteral formula, UNFLAVORED, 237 ml five times daily   - one carton = 237 ml , mom needs 5 cartons per day      2. Syringes, catheter tip: 5/month (mom uses a new syringe weekly)     3. 4x4 square split gauzes for PEG site     Per mom , pt's home feeding regimen:   - Osmolite 1.5 bolus feed via PEG, 395 ml bolus feed three times daily   - Water flushes 150 ml pre and 150 ml post TF bolus three times daily   (mom provides a total of 1800 ml/day FWF (900 ml with TF, 900 ml with meds)     RDN to follow   Deidre Chappell RD

## 2025-01-22 NOTE — CARE PLAN
The patient's goals for the shift include pt remains safe and free of falls    The clinical goals for the shift include patient will be able to maintain hemodynamic stability by the end of the shift      Problem: Skin  Goal: Participates in plan/prevention/treatment measures  Outcome: Progressing  Goal: Prevent/manage excess moisture  Outcome: Progressing  Goal: Prevent/minimize sheer/friction injuries  Outcome: Progressing     Problem: Fall/Injury  Goal: Not fall by end of shift  Outcome: Progressing  Goal: Be free from injury by end of the shift  Outcome: Progressing  Goal: Verbalize understanding of personal risk factors for fall in the hospital  Outcome: Progressing  Goal: Verbalize understanding of risk factor reduction measures to prevent injury from fall in the home  Outcome: Progressing  Goal: Use assistive devices by end of the shift  Outcome: Progressing  Goal: Pace activities to prevent fatigue by end of the shift  Outcome: Progressing     Problem: Nutrition  Goal: BG  mg/dL  Outcome: Progressing  Goal: Lab values WNL  Outcome: Progressing  Goal: Electrolytes WNL  Outcome: Progressing  Goal: Maintain stable weight  Outcome: Progressing  Goal: Improve ostomy output  Outcome: Progressing     Problem: Pain - Adult  Goal: Verbalizes/displays adequate comfort level or baseline comfort level  Outcome: Progressing     Problem: Safety - Adult  Goal: Free from fall injury  Outcome: Progressing     Problem: Discharge Planning  Goal: Discharge to home or other facility with appropriate resources  Outcome: Progressing     Problem: Chronic Conditions and Co-morbidities  Goal: Patient's chronic conditions and co-morbidity symptoms are monitored and maintained or improved  Outcome: Progressing

## 2025-01-22 NOTE — PROGRESS NOTES
For follow-up of:  Aspiration pneumonia    Subjective   Interval History:  He has no fevers today.  He is breathing adequately on room air.  I discussed his condition with his mother       Objective     Current Facility-Administered Medications   Medication Dose Route Frequency Provider Last Rate Last Admin    acetaminophen (Tylenol) oral liquid 650 mg  650 mg oral q6h JAZ Gill MD   650 mg at 01/22/25 1352    albuterol 2.5 mg /3 mL (0.083 %) nebulizer solution 2.5 mg  2.5 mg nebulization q2h PRN Moni Gill MD        albuterol 2.5 mg /3 mL (0.083 %) nebulizer solution 2.5 mg  2.5 mg nebulization 4x daily Anahi Hall MD   2.5 mg at 01/22/25 1529    aspirin chewable tablet 81 mg  81 mg oral Daily Moni Gill MD   81 mg at 01/22/25 0804    baclofen (Lioresal) tablet 20 mg  20 mg oral BID Moni Gill MD   20 mg at 01/21/25 2110    baclofen (Lioresal) tablet 30 mg  30 mg oral Daily Moni Gill MD   30 mg at 01/22/25 0802    carBAMazepine (TEGretol) tablet 200 mg  200 mg g-tube 4x daily Moni Gill MD   200 mg at 01/22/25 1351    cholecalciferol (Vitamin D-3) tablet 10 mcg  400 Units oral Daily Moni Gill MD   10 mcg at 01/22/25 0811    diphenhydramine-zinc acetate cream   Topical TID PRN Moni Gill MD        levETIRAcetam (Keppra) 2,000 mg in dextrose 5% 250 mL IV  2,000 mg intravenous q12h Deep Fischer, DO   Stopped at 01/22/25 0906    lubricating eye drops ophthalmic solution 1 drop  1 drop Both Eyes BID Moni Gill MD   1 drop at 01/22/25 0801    neomycin-bacitracin-polymyxin (Neosporin) ointment foil packet   Topical TID Isrrael Tejeda, DO   Given at 01/20/25 2255    neomycin-bacitracin-polymyxin (Polysporin) ophthalmic ointment 0.5 inch  0.5 inch Both Eyes q6h [facility] Anahi Hall MD   0.5 inch at 01/22/25 1352    ondansetron (Zofran) tablet 4 mg  4 mg oral q8h PRN Moni Gill MD        Or    ondansetron (Zofran) injection 4 mg  4 mg intravenous q8h PRN Moni MAYORGA  MD Jairo        oxygen (O2) therapy   inhalation Continuous PRN - O2/gases Deep Fischer, DO 60,000 mL/hr at 01/21/25 1912 1 L/min at 01/21/25 1912    phenytoin (Dilantin) 125 mg in sodium chloride 0.9% 50 mL IV  125 mg intravenous q8h [facility] Deep Fischer, DO   Stopped at 01/22/25 1547    sodium chloride 3 % nebulizer solution 3 mL  3 mL nebulization 4x daily Anahi Hall MD   3 mL at 01/22/25 1530    vancomycin (Vancocin) pharmacy to dose - pharmacy monitoring   miscellaneous Daily PRN Vic Soria MD        vancomycin 1,750 mg in dextrose 5% 500 mL IV  1,750 mg intravenous q12h Vic Soria MD   Stopped at 01/22/25 1206    white petrolatum-mineral oiL (Tears Naturale PM) ophthalmic ointment   ophthalmic (eye) Daily Moni Gill MD   Given at 01/21/25 2111    zinc oxide 20 % ointment 1 Application  1 Application Topical q1h PRN Moni Gill MD   1 Application at 01/21/25 2147        Last Recorded Vitals  /62 (BP Location: Left arm, Patient Position: Lying)   Pulse 102   Temp 36.8 °C (98.2 °F) (Axillary)   Resp 22   Wt 81.7 kg (180 lb 1.9 oz)   SpO2 98%   General: no acute distress, lying in bed, sleeping and arousable  HEENT: pharynx not examined  CVS: Tachycardic  Resp: decreased breath sounds in bases  ABD: soft, NT, ND, PEG, ostomy  : Boss  EXT: Heel guards  Skin: no rash     Relevant Results:    Labs:   Results from last 72 hours   Lab Units 01/22/25  0504 01/21/25  0513 01/20/25  0615   SODIUM mmol/L 138 136 133*   POTASSIUM mmol/L 4.5 4.4 3.9   CHLORIDE mmol/L 101 100 99   BUN mg/dL 8 8 10   CREATININE mg/dL 0.31* 0.37* 0.39*     Results from last 72 hours   Lab Units 01/22/25  0504 01/21/25  0513 01/20/25  0615   WBC AUTO x10*3/uL 6.7 12.3* 15.3*   HEMOGLOBIN g/dL 12.1* 12.8* 12.5*   HEMATOCRIT % 35.5* 37.7* 36.0*   PLATELETS AUTO x10*3/uL 415 410 398       Microbiology data: I have personally and independently reviewed and intrepreted the lab results  1/11/2025 urine Legionella  antigen negative; urine pneumococcal antigen negative  1/11/2025 blood culture show no growth  1/11/2025 urine culture shows mixed growth  1/12/2025 MRSA swab positive  1/15/2025 blood cultures show no growth    1/21/2025 vancomycin trough 15.2    Imaging data: I have personally and independently reviewed and interpreted the imaging studies  1/11/2025 chest x-ray shows left lower lobe infiltrate/atelectasis  1/11/2025 chest CT shows bilateral lower lobe consolidation suggestive of aspiration pneumonia  1/17/2025 chest x-ray shows a larger left pleural effusion with infiltrate  1/18/2025 chest x-ray shows less opacity in the left lower lobe    Assessment/Plan     MY IMPRESSION & RECOMMENDATIONS:  Aspiration pneumonia, for which he has reached the completion of a course of antibiotic treatment. I have personally and independently reviewed and interpreted the laboratory tests, imaging studies, and the documentation from other healthcare providers.  I am going to stop the vancomycin today.       -Can stop vancomycin   -Continue scheduled acetaminophen     Other issues:  #Hypertension  #Advanced multiple sclerosis  #Neurogenic bladder with chronic Boss  #Dysphagia status post PEG  #Seizure disorder  #History of DVT and PE  #History of tracheostomy  #History of end ileostomy and mucous fistula in 2024       Vic Soria MD

## 2025-01-22 NOTE — PROGRESS NOTES
01/22/25 1102   Discharge Planning   Expected Discharge Disposition   ([place] to [facility])         Patient continues treatment for PNA with IV abx. Peg with tube feedings. Patient is still waiting for transfer to [facility] for eval for epilepsy. No available beds yet.

## 2025-01-22 NOTE — CARE PLAN
The patient's goals for the shift include pt remains safe and free of falls    The clinical goals for the shift include patient will be able to maintain hemodynamic stability by the end of the shift    Problem: Skin  Goal: Prevent/minimize sheer/friction injuries  Outcome: Progressing  Flowsheets (Taken 1/21/2025 0193)  Prevent/minimize sheer/friction injuries:   Use pull sheet   Complete micro-shifts as needed if patient unable. Adjust patient position to relieve pressure points, not a full turn   HOB 30 degrees or less   Turn/reposition every 2 hours/use positioning/transfer devices     Problem: Skin  Goal: Prevent/manage excess moisture  Outcome: Progressing     Problem: Fall/Injury  Goal: Not fall by end of shift  Outcome: Progressing     Problem: Fall/Injury  Goal: Be free from injury by end of the shift  Outcome: Progressing     Problem: Nutrition  Goal: Lab values WNL  Outcome: Progressing     Problem: Pain - Adult  Goal: Verbalizes/displays adequate comfort level or baseline comfort level  Outcome: Progressing     Problem: Safety - Adult  Goal: Free from fall injury  Outcome: Progressing

## 2025-01-22 NOTE — CARE PLAN
The patient's goals for the shift include pt remains safe and free of falls    The clinical goals for the shift include remain free from injury and HDS    Problem: Skin  Goal: Participates in plan/prevention/treatment measures  1/22/2025 1105 by Sharri Newberry RN  Flowsheets (Taken 1/22/2025 1105)  Participates in plan/prevention/treatment measures: Elevate heels  1/22/2025 1104 by Sharri Newberry RN  Outcome: Progressing  Goal: Prevent/manage excess moisture  1/22/2025 1105 by Sharri Newberry RN  Flowsheets (Taken 1/22/2025 1105)  Prevent/manage excess moisture: Moisturize dry skin  1/22/2025 1104 by Sharri Newberry RN  Outcome: Progressing  Goal: Prevent/minimize sheer/friction injuries  1/22/2025 1105 by Sharri Newberry RN  Flowsheets (Taken 1/22/2025 1105)  Prevent/minimize sheer/friction injuries: Use pull sheet  1/22/2025 1104 by Sharri Newberry RN  Outcome: Progressing     Problem: Fall/Injury  Goal: Not fall by end of shift  Outcome: Progressing  Goal: Be free from injury by end of the shift  Outcome: Progressing  Goal: Verbalize understanding of personal risk factors for fall in the hospital  Outcome: Progressing  Goal: Verbalize understanding of risk factor reduction measures to prevent injury from fall in the home  Outcome: Progressing  Goal: Use assistive devices by end of the shift  Outcome: Progressing  Goal: Pace activities to prevent fatigue by end of the shift  Outcome: Progressing     Problem: Nutrition  Goal: BG  mg/dL  Outcome: Progressing  Goal: Lab values WNL  Outcome: Progressing  Goal: Electrolytes WNL  Outcome: Progressing  Goal: Maintain stable weight  Outcome: Progressing  Goal: Improve ostomy output  Outcome: Progressing     Problem: Pain - Adult  Goal: Verbalizes/displays adequate comfort level or baseline comfort level  Outcome: Progressing     Problem: Safety - Adult  Goal: Free from fall injury  Outcome: Progressing     Problem: Discharge Planning  Goal: Discharge to home or other  facility with appropriate resources  Outcome: Progressing     Problem: Chronic Conditions and Co-morbidities  Goal: Patient's chronic conditions and co-morbidity symptoms are monitored and maintained or improved  Outcome: Progressing

## 2025-01-23 LAB
ANION GAP SERPL CALC-SCNC: 12 MMOL/L (ref 10–20)
BASOPHILS # BLD AUTO: 0.09 X10*3/UL (ref 0–0.1)
BASOPHILS NFR BLD AUTO: 2.1 %
BUN SERPL-MCNC: 9 MG/DL (ref 6–23)
CALCIUM SERPL-MCNC: 8.9 MG/DL (ref 8.6–10.3)
CHLORIDE SERPL-SCNC: 101 MMOL/L (ref 98–107)
CO2 SERPL-SCNC: 29 MMOL/L (ref 21–32)
CREAT SERPL-MCNC: 0.34 MG/DL (ref 0.5–1.3)
EGFRCR SERPLBLD CKD-EPI 2021: >90 ML/MIN/1.73M*2
EOSINOPHIL # BLD AUTO: 0.43 X10*3/UL (ref 0–0.7)
EOSINOPHIL NFR BLD AUTO: 10.3 %
ERYTHROCYTE [DISTWIDTH] IN BLOOD BY AUTOMATED COUNT: 14.8 % (ref 11.5–14.5)
GLUCOSE SERPL-MCNC: 107 MG/DL (ref 74–99)
HCT VFR BLD AUTO: 35.6 % (ref 41–52)
HGB BLD-MCNC: 11.9 G/DL (ref 13.5–17.5)
IMM GRANULOCYTES # BLD AUTO: 0.01 X10*3/UL (ref 0–0.7)
IMM GRANULOCYTES NFR BLD AUTO: 0.2 % (ref 0–0.9)
LYMPHOCYTES # BLD AUTO: 0.93 X10*3/UL (ref 1.2–4.8)
LYMPHOCYTES NFR BLD AUTO: 22.2 %
MCH RBC QN AUTO: 30.8 PG (ref 26–34)
MCHC RBC AUTO-ENTMCNC: 33.4 G/DL (ref 32–36)
MCV RBC AUTO: 92 FL (ref 80–100)
MONOCYTES # BLD AUTO: 0.61 X10*3/UL (ref 0.1–1)
MONOCYTES NFR BLD AUTO: 14.6 %
NEUTROPHILS # BLD AUTO: 2.12 X10*3/UL (ref 1.2–7.7)
NEUTROPHILS NFR BLD AUTO: 50.6 %
NRBC BLD-RTO: 0 /100 WBCS (ref 0–0)
PLATELET # BLD AUTO: 456 X10*3/UL (ref 150–450)
POTASSIUM SERPL-SCNC: 4.7 MMOL/L (ref 3.5–5.3)
RBC # BLD AUTO: 3.86 X10*6/UL (ref 4.5–5.9)
SODIUM SERPL-SCNC: 137 MMOL/L (ref 136–145)
WBC # BLD AUTO: 4.2 X10*3/UL (ref 4.4–11.3)

## 2025-01-23 PROCEDURE — 2500000005 HC RX 250 GENERAL PHARMACY W/O HCPCS: Performed by: INTERNAL MEDICINE

## 2025-01-23 PROCEDURE — 90677 PCV20 VACCINE IM: CPT | Performed by: HOSPITALIST

## 2025-01-23 PROCEDURE — 99239 HOSP IP/OBS DSCHRG MGMT >30: CPT | Performed by: HOSPITALIST

## 2025-01-23 PROCEDURE — 2500000001 HC RX 250 WO HCPCS SELF ADMINISTERED DRUGS (ALT 637 FOR MEDICARE OP): Performed by: HOSPITALIST

## 2025-01-23 PROCEDURE — 90471 IMMUNIZATION ADMIN: CPT | Performed by: HOSPITALIST

## 2025-01-23 PROCEDURE — 94668 MNPJ CHEST WALL SBSQ: CPT

## 2025-01-23 PROCEDURE — 94640 AIRWAY INHALATION TREATMENT: CPT

## 2025-01-23 PROCEDURE — 2500000004 HC RX 250 GENERAL PHARMACY W/ HCPCS (ALT 636 FOR OP/ED): Performed by: HOSPITALIST

## 2025-01-23 PROCEDURE — 2500000002 HC RX 250 W HCPCS SELF ADMINISTERED DRUGS (ALT 637 FOR MEDICARE OP, ALT 636 FOR OP/ED): Performed by: INTERNAL MEDICINE

## 2025-01-23 PROCEDURE — 31720 CLEARANCE OF AIRWAYS: CPT

## 2025-01-23 PROCEDURE — 94669 MECHANICAL CHEST WALL OSCILL: CPT

## 2025-01-23 PROCEDURE — 2500000005 HC RX 250 GENERAL PHARMACY W/O HCPCS: Performed by: HOSPITALIST

## 2025-01-23 PROCEDURE — 36415 COLL VENOUS BLD VENIPUNCTURE: CPT | Performed by: HOSPITALIST

## 2025-01-23 PROCEDURE — 80048 BASIC METABOLIC PNL TOTAL CA: CPT | Performed by: HOSPITALIST

## 2025-01-23 PROCEDURE — 85025 COMPLETE CBC W/AUTO DIFF WBC: CPT | Performed by: HOSPITALIST

## 2025-01-23 RX ORDER — ELECTROLYTES/DEXTROSE
SOLUTION, ORAL ORAL
Qty: 33550 ML | Refills: 11 | Status: SHIPPED | OUTPATIENT
Start: 2025-01-23

## 2025-01-23 RX ORDER — SYRINGE, DISPOSABLE, 60 ML
SYRINGE, EMPTY DISPOSABLE MISCELLANEOUS
Qty: 5 EACH | Refills: 11 | Status: SHIPPED | OUTPATIENT
Start: 2025-01-23

## 2025-01-23 RX ORDER — PHENYTOIN 125 MG/5ML
125 SUSPENSION ORAL 3 TIMES DAILY
Qty: 450 ML | Refills: 0 | Status: SHIPPED | OUTPATIENT
Start: 2025-01-23 | End: 2025-02-22

## 2025-01-23 RX ADMIN — PHENYTOIN SODIUM 125 MG: 50 INJECTION INTRAMUSCULAR; INTRAVENOUS at 05:48

## 2025-01-23 RX ADMIN — ALBUTEROL SULFATE 2.5 MG: 2.5 SOLUTION RESPIRATORY (INHALATION) at 08:13

## 2025-01-23 RX ADMIN — CARBOXYMETHYLCELLULOSE SODIUM 1 DROP: 0.5 SOLUTION/ DROPS OPHTHALMIC at 08:26

## 2025-01-23 RX ADMIN — CARBAMAZEPINE 200 MG: 200 TABLET ORAL at 05:23

## 2025-01-23 RX ADMIN — BACLOFEN 30 MG: 10 TABLET ORAL at 08:26

## 2025-01-23 RX ADMIN — PNEUMOCOCCAL 20-VALENT CONJUGATE VACCINE 0.5 ML
2.2; 2.2; 2.2; 2.2; 2.2; 2.2; 2.2; 2.2; 2.2; 2.2; 2.2; 2.2; 2.2; 2.2; 2.2; 2.2; 4.4; 2.2; 2.2; 2.2 INJECTION, SUSPENSION INTRAMUSCULAR at 17:13

## 2025-01-23 RX ADMIN — BACITRACIN ZINC, NEOMYCIN SULFATE , POLYMYXIN B SULFATE.: 400; 3.5; 5 OINTMENT TOPICAL at 08:27

## 2025-01-23 RX ADMIN — NEOMYCIN SULFATE, POLYMYXIN B SULFATE AND BACITRACIN ZINC 0.5 INCH: 3.5; 10000; 4 OINTMENT OPHTHALMIC at 17:12

## 2025-01-23 RX ADMIN — CHOLECALCIFEROL (VITAMIN D3) 10 MCG (400 UNIT) TABLET 10 MCG: at 08:27

## 2025-01-23 RX ADMIN — ALBUTEROL SULFATE 2.5 MG: 2.5 SOLUTION RESPIRATORY (INHALATION) at 15:38

## 2025-01-23 RX ADMIN — CARBAMAZEPINE 200 MG: 200 TABLET ORAL at 15:00

## 2025-01-23 RX ADMIN — SODIUM CHLORIDE SOLN NEBU 3% 3 ML: 3 NEBU SOLN at 08:28

## 2025-01-23 RX ADMIN — ACETAMINOPHEN 650 MG: 650 LIQUID ORAL at 05:23

## 2025-01-23 RX ADMIN — SODIUM CHLORIDE SOLN NEBU 3% 3 ML: 3 NEBU SOLN at 12:10

## 2025-01-23 RX ADMIN — ALBUTEROL SULFATE 2.5 MG: 2.5 SOLUTION RESPIRATORY (INHALATION) at 19:48

## 2025-01-23 RX ADMIN — NEOMYCIN SULFATE, POLYMYXIN B SULFATE AND BACITRACIN ZINC 0.5 INCH: 3.5; 10000; 4 OINTMENT OPHTHALMIC at 11:00

## 2025-01-23 RX ADMIN — CARBAMAZEPINE 200 MG: 200 TABLET ORAL at 13:12

## 2025-01-23 RX ADMIN — SODIUM CHLORIDE SOLN NEBU 3% 3 ML: 3 NEBU SOLN at 15:58

## 2025-01-23 RX ADMIN — ASPIRIN 81 MG: 81 TABLET, CHEWABLE ORAL at 08:26

## 2025-01-23 RX ADMIN — ACETAMINOPHEN 650 MG: 650 LIQUID ORAL at 17:12

## 2025-01-23 RX ADMIN — ACETAMINOPHEN 650 MG: 650 LIQUID ORAL at 11:00

## 2025-01-23 RX ADMIN — PHENYTOIN SODIUM 125 MG: 50 INJECTION INTRAMUSCULAR; INTRAVENOUS at 17:11

## 2025-01-23 RX ADMIN — DEXTROSE MONOHYDRATE 2000 MG: 50 INJECTION, SOLUTION INTRAVENOUS at 08:26

## 2025-01-23 RX ADMIN — CARBOXYMETHYLCELLULOSE SODIUM 1 DROP: 0.5 SOLUTION/ DROPS OPHTHALMIC at 15:00

## 2025-01-23 RX ADMIN — SODIUM CHLORIDE SOLN NEBU 3% 3 ML: 3 NEBU SOLN at 19:48

## 2025-01-23 RX ADMIN — ALBUTEROL SULFATE 2.5 MG: 2.5 SOLUTION RESPIRATORY (INHALATION) at 11:55

## 2025-01-23 RX ADMIN — BACITRACIN ZINC, NEOMYCIN SULFATE , POLYMYXIN B SULFATE.: 400; 3.5; 5 OINTMENT TOPICAL at 15:00

## 2025-01-23 ASSESSMENT — PAIN SCALES - PAIN ASSESSMENT IN ADVANCED DEMENTIA (PAINAD)
FACIALEXPRESSION: SMILING OR INEXPRESSIVE
BREATHING: OCCASIONAL LABORED BREATHING, SHORT PERIOD OF HYPERVENTILATION
BREATHING: NORMAL
CONSOLABILITY: NO NEED TO CONSOLE
BODYLANGUAGE: RELAXED
BODYLANGUAGE: RELAXED
TOTALSCORE: 0
TOTALSCORE: 2
NEGVOCALIZATION: OCCASIONAL MOAN/GROAN, LOW SPEECH, NEGATIVE/DISAPPROVING QUALITY
CONSOLABILITY: NO NEED TO CONSOLE
TOTALSCORE: REPOSITIONED
FACIALEXPRESSION: SMILING OR INEXPRESSIVE

## 2025-01-23 NOTE — PROGRESS NOTES
Between 7AM-7PM please message me via Epic Secure Chat.  After 7PM please page Nocturnist on call.    [facility] Hospitalist Progress Note      Israel Edgar    :  1980(44 y.o.)    MRN:  [id number]  Date: 25     Assessment and Plan:     Community-acquired pneumonia: History of multi resistant drug organism pneumonia  -ID consulted; no sputum cultures were initially sent, now on abx for 9 days thus limited utility. Blood cultures negative. MRSA PCR positive.  - Procal 1 -> 0.21; complete a 10-day total course of Vancomycin on 25. Meropenem was stopped on .    Leukocytosis  - bump in WBC to 15 on , suspect stress response due to breakthrough seizures as respiratory status and procal improving. Now resolved.  - monitor wbc/fever curve    Acute respiratory failure with hypoxia  - due to PNA, was maxed out on Airvo requiring ICU care. Now weaned to RA  - continue aggressive pulmonary hygiene; wean for goal o2 sat .     Epilepsy with breakthrough seizures  - 3 seizures in past 24 hours with likely trigger being infecction  - Phenytoin level 5.4, Carbamazepine level <2, Keppra level 53 (random levels drawn while compliant with meds in hospital past 9 days).   - Discussed with neurology at [facility] [place]. Increase phenytoin to 125 mg tid. Continue carbamazepine 200 mg qid and keppra 2000 mg bid. EEG with no seizure activity. Does not need tx to [facility] [place] per epileptologist at [facility]. Can follow up with epilepsy clinic on  as OP.     Spasticity  -On baclofen     Neurogenic bladder- chronic indwelling catheter in place  Possible UTI  -Completed 1 week course of Meropenem and Vancomycin  -Urine culture with mixed trisha indicative of contamination     Hypertension  -Not on any medication, will monitor with vitals     Dysphagia s/p PEG  -N.p.o. with tube feed    History of end ileostomy and mucous fistula in    -Diverting ostomy in place, connected to bag due to high output    DVT Prophylaxis:  SCDs    Disposition: continue to monitor inpatient, await consultant recommendations, await test results, and await clinical improvement    Electronically signed by Ovidio Fischer DO on 01/22/25 at 7:09 PM     Subjective:      Interval History:   Vitals and chart notes from overnight reviewed.   No acute issues overnight.   Patient seen and evaluated at bedside.   No further seizures overnight. Updated mother Vee via phone call.    Review of Systems:   Unable: nonverbal    Current medications:  Scheduled Meds:acetaminophen, 650 mg, oral, q6h JAZ  albuterol, 2.5 mg, nebulization, 4x daily  aspirin, 81 mg, oral, Daily  baclofen, 20 mg, oral, BID  baclofen, 30 mg, oral, Daily  carBAMazepine, 200 mg, g-tube, 4x daily  cholecalciferol, 400 Units, oral, Daily  levETIRAcetam, 2,000 mg, intravenous, q12h  lubricating eye drops, 1 drop, Both Eyes, BID  neomycin-bacitracnZn-polymyxnB, , Topical, TID  neomycin-bacitracin-polymyxin, 0.5 inch, Both Eyes, q6h JAZ  phenytoin, 125 mg, intravenous, q8h JAZ  sodium chloride, 3 mL, nebulization, 4x daily  white petrolatum-mineral oiL, , ophthalmic (eye), Daily      Continuous Infusions:   PRN Meds:PRN medications: albuterol, diphenhydramine-zinc acetate, ondansetron **OR** ondansetron, oxygen, zinc oxide      Objective:     Heart Rate:  []   Temp:  [36.8 °C (98.2 °F)-37.1 °C (98.8 °F)]   Resp:  [18-22]   BP: ()/(59-98)   Weight:  [81.7 kg (180 lb 1.9 oz)]   SpO2:  [91 %-99 %]     Oxygen Dose: *1 L/min    Physical Exam  Vitals and nursing note reviewed.   HENT:      Mouth/Throat:      Mouth: Mucous membranes are moist.      Pharynx: Oropharynx is clear.   Cardiovascular:      Rate and Rhythm: Regular rhythm. Tachycardia present.   Pulmonary:      Effort: Pulmonary effort is normal.   Abdominal:      Palpations: Abdomen is soft.   Musculoskeletal:      Right lower leg: No edema.      Left lower leg: No edema.   Neurological:      Mental Status: He is alert. Mental status is  at baseline.      Comments: Does not respond to commands or speak.         Labs:   Lab Results   Component Value Date     01/22/2025    K 4.5 01/22/2025     01/22/2025    CO2 30 01/22/2025    BUN 8 01/22/2025    CREATININE 0.31 (L) 01/22/2025    GLUCOSE 97 01/22/2025    CALCIUM 8.9 01/22/2025    PROT 7.7 01/11/2025    BILITOT 0.5 01/11/2025    ALKPHOS 72 01/11/2025    AST 27 01/11/2025    ALT 16 01/11/2025       Lab Results   Component Value Date    WBC 6.7 01/22/2025    HGB 12.1 (L) 01/22/2025    HCT 35.5 (L) 01/22/2025    MCV 92 01/22/2025     01/22/2025

## 2025-01-23 NOTE — PROGRESS NOTES
Spiritual Care Visit  Spiritual Care Request    Reason for Visit:  Routine Visit: Follow-up (Follow up from Rapid response)  Continue Visiting: No (Pt preparing for discharge)   Request Received From:  Referral From: Verbal  Focus of Care:  Visited With: Patient and family together   Refer to :  Referral To:     Spiritual Care Assessment    Spiritual Assessment:  Family Spiritual Care Encounters  Family Coping: Open/discussion  Family Participation in Care: Consistently demonstrated  Care Provided:  Intended Effects: Demonstrate caring and concern, Preserve dignity and respect  Methods: Encourage self care, Encourage sharing of feelings, Encourage end of life review  Interventions: Ask questions to bring forth feelings, Prayer for healing, Discuss concerns, Assist someone with Advance Directives  Sense of Community and or Religion Affiliation:  Orthodoxy    Addressed Needs/Concerns and/or Wallace Through:  The mother of the pt willingly discussed her desire to have her son die first. She was transparent concerning her fear of dying first. Having the time to make plans for that possibility was openly discussed. Finding someone to take her place seems to present a realistic difficulty.  Outcome:  Outcome of Spiritual Care Visit: Comfort/healing presence, Resolution of identified issues, Trust in self/others/God   After prayer with the pt and mother the mother stated that she appreciated the consultation. The mother also stated that she was 98% pleased with care that her son was receiving.  Advance Directives:  Received for pt  Spiritual Care Annotation    Annotation:  The need for having HCPOA for herself was discussed and the document to be completed was given to the mother.     Chaplain Gabriele Pizano

## 2025-01-23 NOTE — PROGRESS NOTES
01/23/25 1419   Discharge Planning   Expected Discharge Disposition Home         Patient remains in SDU. Per doctor his seizures are under control and not necessary to transfer to [facility]. Tube feeding orders sent to DME company, [employer], and sent message to see if they have Osmolite tube feeding and if they are able to deliver today. Patient is medically ready for discharge home with his mom if tube feeding supplies arranged. Rishi responded they have Osmolite TF.    1500    Patient's mom at bedside and spoke with her. She stated even if delivery doesn't come today she has enough supplies and tube feeding for couple days. She said she is ready to take him home tonight.  Dr. Fischer made aware.    1515  Patient is medically ready for discharge  They are being discharged to:   Home  _Ambulance_________ will pick patient up    [facility]--mother declined             Name of agency on AVS               [facility]  [place] for [place] feeding and supplies             Company-- [employer]                \Patient denies any other needs

## 2025-01-23 NOTE — PROGRESS NOTES
"Nutrition Follow-up Note    Plan for discharge home later today per TCC.   Met with mom at bedside, mom stated she talked to DME and feels comfortable with everything, except questioning about syringe they mentioned they're sending her that \"screws in.\"     Sample of ENFit syringe provided to mom, along with sample ENFit connectors, to see if this is what she could use. D/w RN and TCC, RN to review with mom prior to discharge.     Plan for homegoing as d/w mom yesterday, per mom request:   Osmolite 1.5 enteral formula, UNFLAVORED, 237 ml five times daily   - one carton = 237 ml , mom needs 5 cartons per day       2. Syringes, catheter tip: 5/month (mom uses a new syringe weekly)      3. 4x4 square split gauzes for PEG site      Per mom , pt's home feeding regimen:   - Osmolite 1.5 bolus feed via PEG, 395 ml bolus feed three times daily   - Water flushes 150 ml pre and 150 ml post TF bolus three times daily   (mom provides a total of 1800 ml/day FWF (900 ml with TF, 900 ml with meds)       History:  Food and Nutrient History  Food and Nutrient History: NPO, Vital 1.5 @ 60 ml/hr via PEG       Food and Nutrient Administration History  Additional Food and Nutrient Administration History: npo , per RN pt was receiving Boost via PEG at home, however RN also reported pt was supposed to be on Osomolite 1.5. Per previous RDN notes, pt was on Vital 1.5 @ 55 ml/hr at [facility] this past August, and note pt was receiving Osmolite 1.5 bolus 330 ml every six hours this past Sept at [facility].       Dietary Orders (From admission, onward)       Start     Ordered    01/21/25 1310  Enteral feeding with NPO 25; 150; Water; Tap water; Every 4 hours  Diet effective now        Comments: Please increase vital 1.5 by 10ml q 8 hrs, as tolerates to 60ml/hr goal rate.   Question Answer Comment   Tube feeding formula: Vital 1.5    Tube feeding continuous rate (mL/hr): 25    Tube feeding flush (mL): 150    Flush type: Water    Water type: Tap water  " "  Flush frequency: Every 4 hours        01/21/25 1310    01/13/25 0248  May Not Participate in Room Service  ( ROOM SERVICE MAY NOT PARTICIPATE)  Once        Question:  .  Answer:  Yes    01/13/25 0247                      Anthropometrics:  Height: 177.8 cm (5' 10\")  Weight: 79.5 kg (175 lb 4.3 oz)  BMI (Calculated): 25.15      Weight Change  Weight History / % Weight Change: 69.4 kg 9/16/24, 64.6 kg 8/22/24, 77.1 kg 4/9/24  Significant Weight Loss: No     IBW/kg (Dietitian Calculated): 75.5 kg  Percent of IBW: 95 %     Scheduled medications  acetaminophen, 650 mg, oral, q6h JAZ  albuterol, 2.5 mg, nebulization, 4x daily  aspirin, 81 mg, oral, Daily  baclofen, 20 mg, oral, BID  baclofen, 30 mg, oral, Daily  carBAMazepine, 200 mg, g-tube, 4x daily  cholecalciferol, 400 Units, oral, Daily  levETIRAcetam, 2,000 mg, intravenous, q12h  lubricating eye drops, 1 drop, Both Eyes, BID  neomycin-bacitracnZn-polymyxnB, , Topical, TID  neomycin-bacitracin-polymyxin, 0.5 inch, Both Eyes, q6h JAZ  phenytoin, 125 mg, intravenous, q8h JAZ  pneumoc 20-paulina conj-dip cr(PF), 0.5 mL, intramuscular, During hospitalization  sodium chloride, 3 mL, nebulization, 4x daily  white petrolatum-mineral oiL, , ophthalmic (eye), Daily      Continuous medications     PRN medications  PRN medications: albuterol, diphenhydramine-zinc acetate, ondansetron **OR** ondansetron, oxygen, zinc oxide     Latest Reference Range & Units 01/23/25 04:53   GLUCOSE 74 - 99 mg/dL 107 (H)   SODIUM 136 - 145 mmol/L 137   POTASSIUM 3.5 - 5.3 mmol/L 4.7   CHLORIDE 98 - 107 mmol/L 101   Bicarbonate 21 - 32 mmol/L 29   Anion Gap 10 - 20 mmol/L 12   Blood Urea Nitrogen 6 - 23 mg/dL 9   Creatinine 0.50 - 1.30 mg/dL 0.34 (L)   EGFR >60 mL/min/1.73m*2 >90   Calcium 8.6 - 10.3 mg/dL 8.9   (H): Data is abnormally high  (L): Data is abnormally low    I/O last 3 completed shifts:  In: 4110 (51.7 mL/kg) [NG/GT:2045; IV Piggyback:2065]  Out: 4660 (58.6 mL/kg) [Urine:3360 (1.2 " "mL/kg/hr); Stool:1300]  Weight: 79.5 kg   I/O this shift:  In: -   Out: 1400 [Urine:1075; Stool:325]      Energy Needs:  Calculated Energy Needs Using Equations  Height: 177.8 cm (5' 10\")  Minute Ventilation (L/min): 10.5 L/min  Temp: 36.5 °C (97.7 °F)    Estimated Energy Needs  Method for Estimating Needs: 2248-5200 kcal/day (25-30 kcal/kg)    Estimated Protein Needs  Method for Estimating 24 Hour Protein Needs:  g/day (1.2-1.5 g/kg)    Estimated Fluid Needs  Total Fluid Estimated Needs in 24 Hours (mL): 1800 mL  Total Fluid Estimated Needs in 24 hours (mL/kg): 25 mL/kg  Method for Estimating 24 Hour Fluid Needs: or as per MD or ~ 1 ml/kcal/day  Patient on Order Fluid Restriction: No         Nutrition Focused Physical Findings:  Subcutaneous Fat Loss  Orbital Fat Pads: Mild-Moderate (slight dark circles and slight hollowing)  Buccal Fat Pads: Mild-Moderate (flat cheeks, minimal bounce)  Triceps: Defer  Ribs: Defer    Muscle Wasting  Temporalis: Mild-Moderate (slight depression)  Pectoralis (Clavicular Region): Mild-Moderate (some protrusion of clavicle)  Deltoid/Trapezius: Mild-Moderate (slight protrusion of acromion process)  Interosseous: Mild-Moderate (slightly depressed area between thumb and forefinger)  Trapezius/Infraspinatus/Supraspinatus (Scapular Region): Defer  Quadriceps: Mild-Moderate (mild depression on inner and outer thigh)  Gastrocnemius: Defer    Edema  Edema: +1 trace  Edema Location: generalized         Physical Findings  Skin: Positive       Nutrition Diagnosis   Malnutrition Diagnosis  Patient has Malnutrition Diagnosis: No    Patient has Nutrition Diagnosis: Yes  Nutrition Diagnosis 1: Swallowing difficulty  Diagnosis Status (1): Active  Related to (1): physiological cause  As Evidenced by (1): s/p PEG placement , pt requires long term enteral support  Additional Assessment Information (1): unknown amount of Boost pt was receiving PTA , however wt has increased since [place] [place] [facility]        Nutrition Interventions/Recommendations   Nutrition Prescription  Nutrition Prescription: Nutrition prescription for enteral nutrition  Individualized Nutrition Prescription Provided for : continue TF as ordered at this time, noted + ostomy output (mustard color)  - monitor wt trend, ostomy output   - RFP, Mg daily; replete prn   - TF orders for homegoing as above, d/w MD and TCC       Food and/or Nutrient Delivery Interventions  Interventions: Enteral intake         Enteral Intake: Other (Comment)  Goal: vital 1.5 @ 60 ml/hr provides 2160 kcla/day, 82 g/d proein, 1100 ml free water; water flushes as ordered            Coordination of Nutrition Care by a Nutrition Professional  Collaboration and Referral of Nutrition Care: Collaboration by nutrition professional with other providers  Coordination of Care with Providers: Nursing, Provider    Education Documentation  No documentation found.           Nutrition Monitoring and Evaluation   Food and Nutrient Related History         Enteral and Parenteral Nutrition Intake Determination: Enteral nutrition formula/solution, Enteral nutrition intake - Tolerate TF at goal rate, Enteral nutrition intake - Prevent refeeding, Enteral nutrition intake - To meet > 75% estimated energy needs  Criteria: TF tolerance at goal rate         Anthropometrics: Body Composition/Growth/Weight History  Body Weight: Body weight - Maintain stable weight  Criteria: daily wts, monitor trend          Biochemical Data, Medical Tests and Procedures  Electrolyte and Renal Panel: BUN, Sodium, Calcium, serum, Chloride, Creatinine, Magnesium, Phosphorus, Potassium, Electrolytes within normal limits, Calcium, ionized, GFR  Criteria: daily    Gastrointestinal Profile: Alanine aminotransferase (ALT), Alkaline phosphatase, Bilirubin, total, Aspartate aminotransferase (AST)  Criteria: as per MD    Criteria: daily, or as per MD    Nutritional Anemia Profile: Hematocrit,  Hemoglobin, Iron, serum  Criteria: as per MD    Vitamin Profile: Vitamin D, 25 hydroxy  Criteria: as indicated    Nutrition Focused Physical Findings  Adipose: Other (Comment)  Criteria: monitor NFPE    Bones: Other (Comment)  Criteria: monitor skeletal integrity    Criteria: monitor GI function closely    Muscles: Muscle atrophy  Criteria: monitor NFPE    Skin: Other (Comment)  Criteria: monitor skin itegrity closely       - provided mom with extra ENFit connectors for homegoing (in case she receives ENFit syringe thru DME). Mom appreciative, no questions at this time.     Follow Up  Time Spent (min): 60 minutes  Last Date of Nutrition Visit: 01/23/25  Nutrition Follow-Up Needed?: Dietitian to reassess per policy  Follow up Comment: SUMEET VARGAS

## 2025-01-24 ENCOUNTER — PATIENT OUTREACH (OUTPATIENT)
Dept: PRIMARY CARE | Facility: CLINIC | Age: 45
End: 2025-01-24
Payer: MEDICAID

## 2025-01-24 VITALS
RESPIRATION RATE: 16 BRPM | BODY MASS INDEX: 25.09 KG/M2 | TEMPERATURE: 97.2 F | HEIGHT: 70 IN | DIASTOLIC BLOOD PRESSURE: 78 MMHG | SYSTOLIC BLOOD PRESSURE: 122 MMHG | OXYGEN SATURATION: 93 % | WEIGHT: 175.27 LBS | HEART RATE: 100 BPM

## 2025-01-24 DIAGNOSIS — J18.9 PNEUMONIA OF BOTH LOWER LOBES DUE TO INFECTIOUS ORGANISM: ICD-10-CM

## 2025-01-24 DIAGNOSIS — Z09 HOSPITAL DISCHARGE FOLLOW-UP: ICD-10-CM

## 2025-01-24 NOTE — PROGRESS NOTES
TCM completed 01/24/25   Discharge Facility:  [facility]  Discharge Diagnosis: Aspiration Pneumonia, Acute respiratory failure with hypoxia , Epilepsy with breakthrough seizures   Admission Date: 1/11/25  Discharge Date: 1/23/25    PCP Appointment Date: 2/25/25  Specialist Appointment Date: Neurology- 2/5/25  Hospital Encounter and Summary Linked: Yes        Unable to reach patient; Two attempts were made to reach patient within two business days after discharge. Phone did not ring- goes straight to voicemail.  No return call as of this note.  Needs seen by: 2/7/25.

## 2025-01-24 NOTE — NURSING NOTE
Ambulance service at bedside to pickup patient to transport home. Mom at bedside and assisting with preparations to leave.  Patient's PIVs removed intact and without complications.  All belongings  taken by patient's mom.

## 2025-01-24 NOTE — DISCHARGE SUMMARY
"Discharge Diagnosis  Aspiration Pneumonia  Acute respiratory failure with hypoxia- resolved  Epilepsy with breakthrough seizures  Spasticity due to advanced MS  Neurogenic bladder with chronic indwelling catheter in place  Possible UTI  HTN  Dysphagia s/p PEG  History of end ileostomy and mucous fistula in 2024    Issues Requiring Follow-Up  - op follow up with pcp, pulm, epilepsy clinic  - repeat cbc, bmp in 1 week    Discharge Meds     Medication List      CHANGE how you take these medications     * miscellaneous medical supply kit; 800ML SUCTION CANISTERS x3 VENT   CONTROL yANKERS x4; What changed: Another medication with the same name   was removed. Continue taking this medication, and follow the directions   you see here.   * miscellaneous medical supply misc; Use split 4 X 4 guaze to cover the   ileostomy/ PEG tube as needed; What changed: Another medication with the   same name was removed. Continue taking this medication, and follow the   directions you see here.   Osmolite 1.5 Oli 0.06 gram-1.5 kcal/mL liquid; Generic drug: nutritional   supplements; Osmolite 1.5 enteral formula, UNFLAVORED, 237 ml five times   daily via PEG tube; What changed: how much to take, how to take this, when   to take this, additional instructions, Another medication with the same   name was removed. Continue taking this medication, and follow the   directions you see here.   phenytoin 25 mg/mL suspension; Commonly known as: Dilantin; GIVE \"BRIGTH\"   5 ML BY PEG TUBE THREE TIMES DAILY AT 9:00 AM, 3:00 PM AND AT 9:00 PM;   What changed: how much to take, how to take this, when to take this,   additional instructions   syringe (disposable) 60 mL syringe; Commonly known as: Easy Glide   Catheter Tip Syring; Use to provide tube feeds via PEG tube.; What   changed: additional instructions  * This list has 2 medication(s) that are the same as other medications   prescribed for you. Read the directions carefully, and ask your doctor or "   other care provider to review them with you.     CONTINUE taking these medications     acetaminophen 160 mg/5 mL (5 mL) suspension; Commonly known as: Tylenol;   Take 20.5 mL (650 mg) by mouth every 6 hours. Pt takes med at :   9a/3p/10p/3a   albuterol 2.5 mg /3 mL (0.083 %) nebulizer solution; Take 3 mL (2.5 mg)   by nebulization every 6 hours if needed for wheezing.   alcohol swabs pads, medicated; Commonly known as: Alcohol Pads; Use to   give lovenox daily   aspirin 81 mg chewable tablet; Chew 1 tablet (81 mg) once daily.   * baclofen 20 mg tablet; Commonly known as: Lioresal; 1 tablet (20 mg)   by g-tube route 3 times a day. With food at 10am,- 4pm- 10pm   * baclofen 10 mg tablet; Commonly known as: Lioresal; TAKE 1 TABLET   DAILY  With food at 10am   carBAMazepine 200 mg/10 mL suspension; Commonly known as: TEGretol   carboxymethylcellulose 0.5 % ophthalmic solution; Commonly known as:   Refresh Plus   cholecalciferol 10 mcg/mL (400 unit/mL) drops; Commonly known as:   Vitamin D-3; TAKE 10ML VIA G-TUBE ONCE DAILY   levETIRAcetam 100 mg/mL solution; Commonly known as: Keppra; 20 mL   (2,000 mg) by g-tube route 2 times a day. Pt takes this medication at 0900   and 2100.   lubricating eye drops ophthalmic solution; Administer 1 drop into both   eyes 2 times a day.   Lubrifresh PM 83-15 % ophthalmic ointment; Generic drug: white   petrolatum-mineral oiL; Apply to affected eye(s) once daily.   nasal spray midazolam 5 mg/spray (0.1 mL) spray,non-aerosol; Commonly   known as: Versed; Use one spray in one nostril for convulsive seizure   lasting longer than 5 minutes. May repeat a in the other nostril after 5   minutes if convulsive seizures still ongoing CALL 911   nebulizer accessories misc; Use as instructed   nebulizer and compressor device; Uses as instructed   neomycin-bacitracnZn-polymyxnB 3.5-400-5,000 mg-unit-unit ointment in   packet ointment; Commonly known as: Neosporin; Apply topically 3 times a  "  day.   ostomy supplies misc; Pouch: [place]: New image two 1/4 beige lock and   roll, drainable pouch #[id number] and new image to 1/4 inches high output,   Ultracare pouch # 1/8/2001 330-day use-2 boxes; wafer: [place]: New   image Cera plus 2 1/4 inches convex, with tape #[id number], new image Cera plus   to 1/4 inch flat with tape #[id number] 30-day use-2 boxes, adhesive remover's:   [place]aila adapt wipes #[id number] 30-day use-2 boxes, drainage bag/systems:   Covidien/Tres urine drainage bag # [id number] 30-day use-2 units, moldable   ring: Kaila Cera ring slim #8815 30 days use-2 boxes  * This list has 2 medication(s) that are the same as other medications   prescribed for you. Read the directions carefully, and ask your doctor or   other care provider to review them with you.     STOP taking these medications     gauze bandage 2 X 2 \" bandage; Commonly known as: Band-Aid Gauze Pads   gauze bandage 4 X 4 \" bandage       Test Results Pending At Discharge  Pending Labs       No current pending labs.            Hospital Course  44 y.o. male with a PMH of advanced MS with spasticity at baseline, neurogenic bladder with chronic little, epilepsy with questionable breakthrough seizures, DVT, PE, MRSA colonization, MRDO pneumonia, HTN, quadriplegia, depression, CVA, dysphagia, previous tracheostomy  originally presented d/t increase somnolence. Hospital course complicated by respiratory failure requiring max airvo setting in ICU and then subsequently breakthrough seizures. Now back on RA and EEG with no seizure activity after adjusting AEDs.     Aspiration pneumonia   -ID consulted; no sputum cultures were initially sent, now on abx for 9 days thus limited utility. Blood cultures negative. MRSA PCR positive.  - Procal 1 -> 0.21; complete a 10-day total course of Vancomycin on 1/22/25. Meropenem was stopped on 1/20.     Acute respiratory failure with hypoxia  - due to PNA, was maxed out on Airvo requiring ICU care. Now weaned to RA  - " continue aggressive pulmonary hygiene on dc  - patient's mother requested rx for Volara system, notified OP Pulm who will follow up and assist with obtaining this     Epilepsy with breakthrough seizures  - 3 seizures in past 24 hours with likely trigger being infecction  - Phenytoin level 5.4, Carbamazepine level <2, Keppra level 53 (random levels drawn while compliant with meds in hospital past 9 days).   - Discussed with neurology at [facility]. Increase phenytoin to 125 mg tid. Continue carbamazepine 200 mg qid and keppra 2000 mg bid. EEG with no seizure activity. Does not need tx to [facility] per epileptologist at [facility]. Can follow up with epilepsy clinic on 2/5 as OP.     Spasticity due to advanced MS  -On baclofen     Neurogenic bladder- chronic indwelling catheter in place  Possible UTI  -Completed 1 week course of Meropenem and Vancomycin  -Urine culture with mixed trisha indicative of contamination     Hypertension  -Not on any medication, will monitor with vitals     Dysphagia s/p PEG  -N.p.o. with tube feed; new script sent for TF. TCC reached out to [facility] regarding home TF. Awaiting them to fax letter of medical necessity which I have to sign. TCC spoke with patient's mother, even if delivery doesn't come today she has enough supplies and tube feeding for couple days and prefers to dc home today. [facility] to notify me when letter arrives and I will sign it.      History of end ileostomy and mucous fistula in 2024   -Diverting ostomy in place, connected to bag due to high output    Pertinent Physical Exam At Time of Discharge  Physical Exam  Vitals and nursing note reviewed.   HENT:      Mouth/Throat:      Mouth: Mucous membranes are moist.      Pharynx: Oropharynx is clear.   Cardiovascular:      Rate and Rhythm: Normal rate and regular rhythm.   Pulmonary:      Effort: Pulmonary effort is normal.   Abdominal:      Palpations: Abdomen is soft.   Musculoskeletal:      Right lower leg: No edema.      Left lower leg: No  edema.   Neurological:      Mental Status: He is alert. Mental status is at baseline.      Comments: Does not respond to commands or speak.         Outpatient Follow-Up  Future Appointments   Date Time Provider Department Center   2/5/2025  2:30 PM Brenna Scott MD [id number]     DISCHARGE TIME: > 30 minutes    Ovidio Fischer DO

## 2025-01-24 NOTE — PROGRESS NOTES
For follow-up of:  Aspiration pneumonia    Subjective   Interval History:  He is breathing comfortably on room air.  His mother is at the bedside and says that he is doing decently.         Objective     Current Facility-Administered Medications   Medication Dose Route Frequency Provider Last Rate Last Admin    acetaminophen (Tylenol) oral liquid 650 mg  650 mg oral q6h [facility] Moni Gill MD   650 mg at 01/23/25 1712    albuterol 2.5 mg /3 mL (0.083 %) nebulizer solution 2.5 mg  2.5 mg nebulization q2h PRN Moni Gill MD        albuterol 2.5 mg /3 mL (0.083 %) nebulizer solution 2.5 mg  2.5 mg nebulization 4x daily Anahi Hall MD   2.5 mg at 01/23/25 1948    aspirin chewable tablet 81 mg  81 mg oral Daily Moni Gill MD   81 mg at 01/23/25 0826    baclofen (Lioresal) tablet 20 mg  20 mg oral BID Moni Gill MD   20 mg at 01/22/25 2101    baclofen (Lioresal) tablet 30 mg  30 mg oral Daily Moni Gill MD   30 mg at 01/23/25 0826    carBAMazepine (TEGretol) tablet 200 mg  200 mg g-tube 4x daily Moni Gill MD   200 mg at 01/23/25 1500    cholecalciferol (Vitamin D-3) tablet 10 mcg  400 Units oral Daily Moni Gill MD   10 mcg at 01/23/25 0827    diphenhydramine-zinc acetate cream   Topical TID PRN Moni Gill MD        levETIRAcetam (Keppra) 2,000 mg in dextrose 5% 250 mL IV  2,000 mg intravenous q12h Deep Fischer, DO   Stopped at 01/23/25 1100    lubricating eye drops ophthalmic solution 1 drop  1 drop Both Eyes BID Moni Gill MD   1 drop at 01/23/25 1500    neomycin-bacitracin-polymyxin (Neosporin) ointment foil packet   Topical TID Isrrael Tejeda, DO   Given at 01/23/25 1500    neomycin-bacitracin-polymyxin (Polysporin) ophthalmic ointment 0.5 inch  0.5 inch Both Eyes q6h [facility] Anahi Hall MD   0.5 inch at 01/23/25 1712    ondansetron (Zofran) tablet 4 mg  4 mg oral q8h PRN Moni Gill MD        Or    ondansetron (Zofran) injection 4 mg  4 mg intravenous q8h PRN Moni MAYORGA  MD Jairo        oxygen (O2) therapy   inhalation Continuous PRN - O2/gases Deep Fischer, DO 60,000 mL/hr at 01/21/25 1912 1 L/min at 01/21/25 1912    phenytoin (Dilantin) 125 mg in sodium chloride 0.9% 50 mL IV  125 mg intravenous q8h [facility] Deep Fischer, DO   Stopped at 01/23/25 1726    sodium chloride 3 % nebulizer solution 3 mL  3 mL nebulization 4x daily Anahi Hall MD   3 mL at 01/23/25 1948    white petrolatum-mineral oiL (Tears Naturale PM) ophthalmic ointment   ophthalmic (eye) Daily Moni Gill MD   Given at 01/22/25 2102    zinc oxide 20 % ointment 1 Application  1 Application Topical q1h PRN Moni Gill MD   1 Application at 01/21/25 2147        Last Recorded Vitals  /71 (BP Location: Left arm, Patient Position: Lying)   Pulse 96   Temp 36.5 °C (97.7 °F) (Temporal)   Resp 16   Wt 79.5 kg (175 lb 4.3 oz)   SpO2 94%   General: no acute distress, lying in bed, awake, tracks with eyes   HEENT: pharynx not examined  CVS: Tachycardic  Resp: decreased breath sounds in bases  ABD: soft, NT, ND, PEG, ostomy  : Boss  EXT: Heel guards  Skin: no rash     Relevant Results:    Labs:   Results from last 72 hours   Lab Units 01/23/25  0453 01/22/25  0504 01/21/25  0513   SODIUM mmol/L 137 138 136   POTASSIUM mmol/L 4.7 4.5 4.4   CHLORIDE mmol/L 101 101 100   BUN mg/dL 9 8 8   CREATININE mg/dL 0.34* 0.31* 0.37*     Results from last 72 hours   Lab Units 01/23/25  0453 01/22/25  0504 01/21/25  0513   WBC AUTO x10*3/uL 4.2* 6.7 12.3*   HEMOGLOBIN g/dL 11.9* 12.1* 12.8*   HEMATOCRIT % 35.6* 35.5* 37.7*   PLATELETS AUTO x10*3/uL 456* 415 410       Microbiology data: I have personally and independently reviewed and intrepreted the lab results  1/11/2025 urine Legionella antigen negative; urine pneumococcal antigen negative  1/11/2025 blood culture show no growth  1/11/2025 urine culture shows mixed growth  1/12/2025 MRSA swab positive  1/15/2025 blood cultures show no growth    1/21/2025 vancomycin trough  15.2    Imaging data: I have personally and independently reviewed and interpreted the imaging studies  1/11/2025 chest x-ray shows left lower lobe infiltrate/atelectasis  1/11/2025 chest CT shows bilateral lower lobe consolidation suggestive of aspiration pneumonia  1/17/2025 chest x-ray shows a larger left pleural effusion with infiltrate  1/18/2025 chest x-ray shows less opacity in the left lower lobe    Assessment/Plan     MY IMPRESSION & RECOMMENDATIONS:  Aspiration pneumonia status post course of antibiotics. I have personally and independently reviewed and interpreted the laboratory tests, imaging studies, and the documentation from other healthcare providers.  His prognosis is uncertain.       -Can discharge when ready on no further antibiotics     Other issues:  #Hypertension  #Advanced multiple sclerosis  #Neurogenic bladder with chronic Boss  #Dysphagia status post PEG  #Seizure disorder  #History of DVT and PE  #History of tracheostomy  #History of end ileostomy and mucous fistula in 2024       Vic Soria MD

## 2025-02-05 ENCOUNTER — OFFICE VISIT (OUTPATIENT)
Dept: NEUROLOGY | Facility: CLINIC | Age: 45
End: 2025-02-05
Payer: MEDICAID

## 2025-02-05 VITALS
DIASTOLIC BLOOD PRESSURE: 61 MMHG | SYSTOLIC BLOOD PRESSURE: 97 MMHG | BODY MASS INDEX: 22.61 KG/M2 | WEIGHT: 157.6 LBS | HEART RATE: 81 BPM

## 2025-02-05 DIAGNOSIS — G40.909 NONINTRACTABLE EPILEPSY WITHOUT STATUS EPILEPTICUS, UNSPECIFIED EPILEPSY TYPE (MULTI): ICD-10-CM

## 2025-02-05 DIAGNOSIS — L85.3 DRY SKIN: ICD-10-CM

## 2025-02-05 DIAGNOSIS — R56.9 SEIZURE (MULTI): Primary | ICD-10-CM

## 2025-02-05 DIAGNOSIS — G40.919 REFRACTORY EPILEPSY (MULTI): ICD-10-CM

## 2025-02-05 PROCEDURE — 99215 OFFICE O/P EST HI 40 MIN: CPT | Performed by: PSYCHIATRY & NEUROLOGY

## 2025-02-05 ASSESSMENT — PAIN SCALES - GENERAL: PAINLEVEL_OUTOF10: 0-NO PAIN

## 2025-02-05 NOTE — PROGRESS NOTES
Presents for fu with his mother. 43 yo with secondary progressive MS, intractable epilepsy, neurogenic bladder, bowel cw recurrent infections and UTI. Underwent end ileostomy and mucous fistula in 2024.    Epilepsy Classification:  Epileptic Paroxysmal Episodes  Epileptogenic Zone: bihemispheric       Epileptic seizure semiology:   1. Type 1: Left versive seizureà left clonic         2. Type 2: right arm tonic à right arm clonic     Etiology: multiple sclerosis                                Significant Comorbidities: multiple sclerosis  Onset date: 2013  Diagnosis date: 2013  Disease therapies: LEV, PHE, CBZ    Recently admitted from [facility] in January with aspiration pneumonia, acute respiratory failure with hypoxia. Had breakthrough seizures in the hospital in the setting of acute illness and lower AED levels. EEG was completed that shoed a stable interictal EEG with no seizures. Phenytoin was increased from 100tid to 125tid. Since DC he has had no major seizures.     Currently on Carbamazepine 200/10mL 15ml-7.5ml-15ml  3 months quantity sent today  Phenytoin 25ml/ml now on 5mL tid 9am, 3pm, 9pm quantity   Lev 20ml bid, 2000mg, 100mg/ml     Sent Zinc oxide 20% ointment 500g    Addendum, as Phenytoin level came back as 25 we will adjust phenytoin down to 5ml-4ml-5ml and recheck levels in 2 weeks.

## 2025-02-07 ENCOUNTER — TELEPHONE (OUTPATIENT)
Dept: PRIMARY CARE | Facility: CLINIC | Age: 45
End: 2025-02-07
Payer: MEDICAID

## 2025-02-07 RX ORDER — LEVETIRACETAM 100 MG/ML
2000 SOLUTION ORAL 2 TIMES DAILY
Qty: 3600 ML | Refills: 3 | Status: SHIPPED | OUTPATIENT
Start: 2025-02-07 | End: 2026-02-07

## 2025-02-07 RX ORDER — PHENYTOIN 125 MG/5ML
SUSPENSION ORAL
Qty: 1350 ML | Refills: 3 | Status: SHIPPED | OUTPATIENT
Start: 2025-02-07

## 2025-02-07 RX ORDER — CARBAMAZEPINE 200 MG/10ML
SUSPENSION ORAL
Qty: 3400 ML | Refills: 3 | Status: SHIPPED | OUTPATIENT
Start: 2025-02-07

## 2025-02-10 ENCOUNTER — TELEPHONE (OUTPATIENT)
Dept: PRIMARY CARE | Facility: CLINIC | Age: 45
End: 2025-02-10

## 2025-02-10 LAB
CARBAMAZEPINE SERPL-MCNC: 6.2 MG/L (ref 4–12)
LEVETIRACETAM SERPL-MCNC: 54.8 MCG/ML
PHENYTOIN SERPL-MCNC: 25 MG/L (ref 10–20)

## 2025-02-10 RX ORDER — ZINC OXIDE 20 G/100G
1 OINTMENT TOPICAL AS NEEDED
Qty: 500 G | Refills: 3 | Status: SHIPPED | OUTPATIENT
Start: 2025-02-10

## 2025-02-11 DIAGNOSIS — N39.0 CHRONIC UTI: Primary | ICD-10-CM

## 2025-02-13 ENCOUNTER — DOCUMENTATION (OUTPATIENT)
Dept: PRIMARY CARE | Facility: CLINIC | Age: 45
End: 2025-02-13
Payer: MEDICAID

## 2025-02-13 DIAGNOSIS — N39.0 URINARY TRACT INFECTION ASSOCIATED WITH INDWELLING URETHRAL CATHETER, INITIAL ENCOUNTER (CMS-HCC): Primary | ICD-10-CM

## 2025-02-13 DIAGNOSIS — T83.511A URINARY TRACT INFECTION ASSOCIATED WITH INDWELLING URETHRAL CATHETER, INITIAL ENCOUNTER (CMS-HCC): Primary | ICD-10-CM

## 2025-02-13 RX ORDER — CIPROFLOXACIN 500 MG/1
500 TABLET ORAL 2 TIMES DAILY
Qty: 14 TABLET | Refills: 0 | Status: SHIPPED | OUTPATIENT
Start: 2025-02-13 | End: 2025-02-20

## 2025-02-13 NOTE — PROGRESS NOTES
TCM program closed due to inability to reach the patients caregiver and no PCP follow up appt within 14 days of discharge from the hospital. Program closed per policy.

## 2025-02-14 LAB
AMORPH SED URNS QL MICRO: ABNORMAL /HPF
APPEARANCE UR: ABNORMAL
BACTERIA #/AREA URNS HPF: ABNORMAL /HPF
BACTERIA UR CULT: ABNORMAL
BACTERIA UR CULT: ABNORMAL
BILIRUB UR QL STRIP: NEGATIVE
COLOR UR: YELLOW
GLUCOSE UR QL STRIP: NEGATIVE
HGB UR QL STRIP: NEGATIVE
HYALINE CASTS #/AREA URNS LPF: ABNORMAL /LPF
KETONES UR QL STRIP: NEGATIVE
LEUKOCYTE ESTERASE UR QL STRIP: ABNORMAL
NITRITE UR QL STRIP: POSITIVE
PH UR STRIP: ABNORMAL [PH] (ref 5–8)
PROT UR QL STRIP: ABNORMAL
RBC #/AREA URNS HPF: ABNORMAL /HPF
SERVICE CMNT-IMP: ABNORMAL
SP GR UR STRIP: 1.01 (ref 1–1.03)
SQUAMOUS #/AREA URNS HPF: ABNORMAL /HPF
TRI-PHOS CRY #/AREA URNS HPF: ABNORMAL /HPF
WBC #/AREA URNS HPF: ABNORMAL /HPF

## 2025-02-21 ENCOUNTER — TELEPHONE (OUTPATIENT)
Dept: NEUROLOGY | Facility: CLINIC | Age: 45
End: 2025-02-21
Payer: MEDICAID

## 2025-02-21 DIAGNOSIS — G40.919 REFRACTORY EPILEPSY (MULTI): Primary | ICD-10-CM

## 2025-02-22 LAB
CARBAMAZEPINE SERPL-MCNC: 6.2 MG/L (ref 4–12)
PHENYTOIN SERPL-MCNC: 27.3 MG/L (ref 10–20)

## 2025-02-25 ENCOUNTER — APPOINTMENT (OUTPATIENT)
Dept: PRIMARY CARE | Facility: CLINIC | Age: 45
End: 2025-02-25
Payer: MEDICAID

## 2025-02-25 DIAGNOSIS — G35 MULTIPLE SCLEROSIS (MULTI): ICD-10-CM

## 2025-02-25 DIAGNOSIS — J18.9 PNEUMONIA OF BOTH LOWER LOBES DUE TO INFECTIOUS ORGANISM: ICD-10-CM

## 2025-02-25 DIAGNOSIS — Z43.2 ILEOSTOMY CARE: ICD-10-CM

## 2025-02-25 DIAGNOSIS — Z93.1 GASTROSTOMY STATUS (MULTI): ICD-10-CM

## 2025-02-25 DIAGNOSIS — G82.50 QUADRIPLEGIA, UNSPECIFIED (MULTI): ICD-10-CM

## 2025-02-25 DIAGNOSIS — Z93.2 ILEOSTOMY IN PLACE (MULTI): ICD-10-CM

## 2025-02-25 PROCEDURE — 99213 OFFICE O/P EST LOW 20 MIN: CPT | Performed by: STUDENT IN AN ORGANIZED HEALTH CARE EDUCATION/TRAINING PROGRAM

## 2025-02-25 RX ORDER — SYRINGE, DISPOSABLE, 60 ML
SYRINGE, EMPTY DISPOSABLE MISCELLANEOUS
Qty: 5 EACH | Refills: 11 | Status: SHIPPED | OUTPATIENT
Start: 2025-02-25

## 2025-02-25 NOTE — PROGRESS NOTES
Subjective   Patient ID: Israel Edgar is a 44 y.o. male who presents for No chief complaint on file..  HPI  Virtual or Telephone Consent    An interactive audio and video telecommunication system which permits real time communications between the patient (at the originating site) and provider (at the distant site) was utilized to provide this telehealth service.   Verbal consent was requested and obtained for mother on this date, 02/25/25, for a telehealth visit.     Spoke to Ms Edgar ( mother)  She was upset that her appointment was delayed     My MA did call her earlier informing her about the delay.  She was rude to my MA today  I apologised for the delay in the appointment and I advised her to be respectful to my staff.  She had been previously sent a warning letter due to similar issues      She also brought up that prior authorization for Benji's feeds were not done.  However we have not received any information or paperwork regarding this.  For now she reports she got all the prior authorization done as well as has all the paperwork until next year.  She will call us back when she need more prior authorization of paperwork      Past Medical History:   Diagnosis Date    Acute deep vein thrombosis of arm (Multi)     Acute encephalopathy     Acute Hypoxic Encephalopathy    Acute upper respiratory infection, unspecified     Depression     Dysphagia     Epilepsy     History of MRSA infection     Hypertension     Ileus (Multi)     Impacted cerumen, bilateral     Nausea with vomiting, unspecified     Neurogenic bladder     Other conditions influencing health status     Lumbar Puncture Traumatic Tap    Personal history of urinary (tract) infections     Primary chronic progressive multiple sclerosis (Multi)     Pulmonary embolism 07/21/2024    Remote history of stroke     Respirator dependent (Multi)     Schizophrenia     Spastic quadriplegia (Multi)     Unspecified visual loss       Past Surgical History:    Procedure Laterality Date    GASTROSTOMY      ILEOSTOMY  08/05/2015    Ileostomy    OTHER SURGICAL HISTORY  09/16/2016    Feeding Tube    TRACHEOSTOMY TUBE PLACEMENT        No family history on file.   Allergies   Allergen Reactions    Keflex [Cephalexin] Seizure     Diarrhea; seizure activity, rash, and redness    Levaquin [Levofloxacin] Seizure    Lorazepam Seizure     Increased agitation and complex seizures (tonic clonic)    Piperacillin-Tazobactam Other     erythema multiforme    Adhesive Tape-Silicones Hives    Lacosamide Other     Mother states lethargy, and non responsiveness to stimuli; will not allow this medication    Latex Other     Increased urinary infections    Pantoprazole Diarrhea    Suppository Adult [Glycerin (Adult)] Other     Mom doesnot want; says previous GI said no suppository     Budesonide Rash    Famotidine Diarrhea    Ipratropium Bromide Rash     Mother states facial rash and tachycardia    Lovenox [Enoxaparin] Rash    Nitrofurantoin Rash    Questran [Cholestyramine (With Sugar)] Rash    Ranitidine Rash    Sulfa (Sulfonamide Antibiotics) Hives and Rash    Sulfamethoxazole-Trimethoprim Hives and Rash          Occupation:     Review of Systems   Unable to perform ROS: Patient nonverbal       Objective   Visit Vitals  Smoking Status Never      Physical Exam  Constitutional:       Comments: Physical examination virtual visit is limited.   HENT:      Nose: Nose normal.   Pulmonary:      Effort: Pulmonary effort is normal.         Assessment/Plan   Diagnoses and all orders for this visit:  Pneumonia of both lower lobes due to infectious organism  -     Referral to Primary Care - [facility]  Ileostomy in place (Multi)  -     syringe, disposable, (Easy [place] Catheter Tip Syring) 60 mL syringe; Use to provide tube feeds via PEG tube.  -     miscellaneous medical supply misc; Use split 4 X 4 guaze to cover the ileostomy/ PEG tube as needed  Multiple sclerosis (Multi)  -     syringe,  disposable, (Easy Glide Catheter Tip Syring) 60 mL syringe; Use to provide tube feeds via PEG tube.  Ileostomy care  -     syringe, disposable, (Easy [place] Catheter Tip Syring) 60 mL syringe; Use to provide tube feeds via PEG tube.  Quadriplegia, unspecified (Multi)  -     miscellaneous medical supply misc; Use split 4 X 4 guaze to cover the ileostomy/ PEG tube as needed  Gastrostomy status (Multi)  -     miscellaneous medical supply misc; Use split 4 X 4 guaze to cover the ileostomy/ PEG tube as needed

## 2025-02-26 ENCOUNTER — PATIENT OUTREACH (OUTPATIENT)
Dept: PRIMARY CARE | Facility: CLINIC | Age: 45
End: 2025-02-26
Payer: MEDICAID

## 2025-02-26 NOTE — PROGRESS NOTES
Attempted outreach and message left on the voicemail along with my contact number to return my call to discuss the Chronic care Management Program.

## 2025-03-02 LAB
ATRIAL RATE: 97 BPM
P AXIS: 63 DEGREES
P OFFSET: 194 MS
P ONSET: 121 MS
PR INTERVAL: 204 MS
Q ONSET: 223 MS
QRS COUNT: 16 BEATS
QRS DURATION: 110 MS
QT INTERVAL: 350 MS
QTC CALCULATION(BAZETT): 444 MS
QTC FREDERICIA: 410 MS
R AXIS: 4 DEGREES
T AXIS: 58 DEGREES
T OFFSET: 398 MS
VENTRICULAR RATE: 97 BPM

## 2025-03-05 ENCOUNTER — DOCUMENTATION (OUTPATIENT)
Dept: PRIMARY CARE | Facility: CLINIC | Age: 45
End: 2025-03-05
Payer: MEDICAID

## 2025-03-05 NOTE — PROGRESS NOTES
Attempted outreach to introduce the Chronic Care Management Program. Message left on the voicemail along with my contact number to return call.    March 05, 2025 2:21 PM  Patients mother Vee returned my call and states she is a bit busy and we keep missing each others call. States it's okay to mail information regarding the Chronic Care Management program and she will review and determine if interested in the program. Information regarding the CCM Program mailed to home Pt's address along with my contact information.

## 2025-03-18 ENCOUNTER — TELEPHONE (OUTPATIENT)
Dept: NEUROLOGY | Facility: HOSPITAL | Age: 45
End: 2025-03-18
Payer: MEDICAID

## 2025-03-18 DIAGNOSIS — G40.919 INTRACTABLE EPILEPSY WITHOUT STATUS EPILEPTICUS, UNSPECIFIED EPILEPSY TYPE (MULTI): Primary | ICD-10-CM

## 2025-03-18 NOTE — SIGNIFICANT EVENT
Spoke to patient’s mother regarding recent PHT level. Due to this level, she had decreased the PHT to 4 mg TID (from 5-4-5), given as suspension. He has only had one sz since this change. Additionally, she is down to the last dose of nayzilam, which I will refill.    Reagan Mills  Epilepsy Fellow

## 2025-03-29 DIAGNOSIS — L89.102 PRESSURE INJURY OF BACK, STAGE 2 (MULTI): Primary | ICD-10-CM

## 2025-03-31 DIAGNOSIS — G40.919 INTRACTABLE EPILEPSY WITHOUT STATUS EPILEPTICUS, UNSPECIFIED EPILEPSY TYPE (MULTI): ICD-10-CM

## 2025-03-31 RX ORDER — HYDROCORTISONE 25 MG/G
OINTMENT TOPICAL
Qty: 454 G | Refills: 11 | Status: SHIPPED | OUTPATIENT
Start: 2025-03-31

## 2025-04-16 ENCOUNTER — TELEPHONE (OUTPATIENT)
Dept: HOME HEALTH SERVICES | Facility: HOME HEALTH | Age: 45
End: 2025-04-16
Payer: MEDICAID

## 2025-04-16 ENCOUNTER — DOCUMENTATION (OUTPATIENT)
Dept: HOME HEALTH SERVICES | Facility: HOME HEALTH | Age: 45
End: 2025-04-16
Payer: MEDICAID

## 2025-04-16 DIAGNOSIS — S21.209D WOUND OF BACK, UNSPECIFIED LATERALITY, SUBSEQUENT ENCOUNTER: Primary | ICD-10-CM

## 2025-04-16 NOTE — TELEPHONE ENCOUNTER
Good Afternoon ,    Thank you for your referral to  Home Care. At this time, we are unable to accommodate your patient's needs in a safe and timely manner. In order to help your patient receive quality home care, we have included certified agencies that service this area:         [facility] - P: [phone]; F: [phone]            [facility] - P: [phone]; F:[phone]       [facility] - P:[phone]    You may contact one of these agencies, or an alternate of your choice, to see if they are able to accept your patient.    Thank you,  [facility] Intake

## 2025-04-16 NOTE — HH CARE COORDINATION
Home Care received a referral for Nursing. Unfortunately, we are unable to accept and process the referral at this time.    Reason:  Inability to accommodate the patient's needs in a safe and timely manner    Patients, please reach out to the referring provider or your PCP to assist in obtaining an alternative home care agency and/or guidance to meet your needs.    Providers, please reach out to [facility] at [phone] with any questions regarding the declined referral.

## 2025-04-22 ENCOUNTER — APPOINTMENT (OUTPATIENT)
Dept: RADIOLOGY | Facility: HOSPITAL | Age: 45
End: 2025-04-22
Payer: MEDICAID

## 2025-04-22 ENCOUNTER — TELEPHONE (OUTPATIENT)
Dept: PRIMARY CARE | Facility: CLINIC | Age: 45
End: 2025-04-22
Payer: MEDICAID

## 2025-04-22 ENCOUNTER — HOSPITAL ENCOUNTER (EMERGENCY)
Facility: HOSPITAL | Age: 45
Discharge: HOME | End: 2025-04-22
Attending: EMERGENCY MEDICINE
Payer: MEDICAID

## 2025-04-22 VITALS
RESPIRATION RATE: 16 BRPM | SYSTOLIC BLOOD PRESSURE: 108 MMHG | HEIGHT: 70 IN | BODY MASS INDEX: 22.9 KG/M2 | TEMPERATURE: 99.8 F | WEIGHT: 160 LBS | DIASTOLIC BLOOD PRESSURE: 75 MMHG | HEART RATE: 79 BPM | OXYGEN SATURATION: 99 %

## 2025-04-22 DIAGNOSIS — N30.00 ACUTE CYSTITIS WITHOUT HEMATURIA: ICD-10-CM

## 2025-04-22 DIAGNOSIS — R56.9 SEIZURE (MULTI): Primary | ICD-10-CM

## 2025-04-22 LAB
APPEARANCE UR: ABNORMAL
BILIRUB UR STRIP.AUTO-MCNC: NEGATIVE MG/DL
COLOR UR: COLORLESS
GLUCOSE UR STRIP.AUTO-MCNC: NORMAL MG/DL
KETONES UR STRIP.AUTO-MCNC: NEGATIVE MG/DL
LEUKOCYTE ESTERASE UR QL STRIP.AUTO: ABNORMAL
NITRITE UR QL STRIP.AUTO: NEGATIVE
PH UR STRIP.AUTO: 6 [PH]
PROT UR STRIP.AUTO-MCNC: NEGATIVE MG/DL
RBC # UR STRIP.AUTO: ABNORMAL MG/DL
RBC #/AREA URNS AUTO: NORMAL /HPF
SP GR UR STRIP.AUTO: 1
UROBILINOGEN UR STRIP.AUTO-MCNC: NORMAL MG/DL
WBC #/AREA URNS AUTO: NORMAL /HPF

## 2025-04-22 PROCEDURE — 71045 X-RAY EXAM CHEST 1 VIEW: CPT

## 2025-04-22 PROCEDURE — 81001 URINALYSIS AUTO W/SCOPE: CPT | Performed by: EMERGENCY MEDICINE

## 2025-04-22 PROCEDURE — 2500000001 HC RX 250 WO HCPCS SELF ADMINISTERED DRUGS (ALT 637 FOR MEDICARE OP): Performed by: EMERGENCY MEDICINE

## 2025-04-22 PROCEDURE — 71045 X-RAY EXAM CHEST 1 VIEW: CPT | Performed by: SURGERY

## 2025-04-22 PROCEDURE — 99284 EMERGENCY DEPT VISIT MOD MDM: CPT | Performed by: EMERGENCY MEDICINE

## 2025-04-22 RX ORDER — CIPROFLOXACIN 500 MG/1
500 TABLET ORAL ONCE
Status: COMPLETED | OUTPATIENT
Start: 2025-04-22 | End: 2025-04-22

## 2025-04-22 RX ORDER — CIPROFLOXACIN 500 MG/1
500 TABLET ORAL 2 TIMES DAILY
Qty: 14 TABLET | Refills: 0 | Status: SHIPPED | OUTPATIENT
Start: 2025-04-22 | End: 2025-04-29

## 2025-04-22 RX ADMIN — CIPROFLOXACIN 500 MG: 500 TABLET ORAL at 06:02

## 2025-04-22 ASSESSMENT — COLUMBIA-SUICIDE SEVERITY RATING SCALE - C-SSRS
6. HAVE YOU EVER DONE ANYTHING, STARTED TO DO ANYTHING, OR PREPARED TO DO ANYTHING TO END YOUR LIFE?: NO
2. HAVE YOU ACTUALLY HAD ANY THOUGHTS OF KILLING YOURSELF?: NO
1. IN THE PAST MONTH, HAVE YOU WISHED YOU WERE DEAD OR WISHED YOU COULD GO TO SLEEP AND NOT WAKE UP?: NO

## 2025-04-22 ASSESSMENT — PAIN DESCRIPTION - PROGRESSION: CLINICAL_PROGRESSION: NOT CHANGED

## 2025-04-22 ASSESSMENT — PAIN - FUNCTIONAL ASSESSMENT: PAIN_FUNCTIONAL_ASSESSMENT: 0-10

## 2025-04-22 ASSESSMENT — PAIN SCALES - GENERAL: PAINLEVEL_OUTOF10: 0 - NO PAIN

## 2025-04-22 NOTE — ED PROVIDER NOTES
HPI   Chief Complaint   Patient presents with    Seizures       This is a 44-year-old man with known aspiration pneumonia, epilepsy with breakthrough seizures, spasticity to advanced MS, neurogenic bladder chronic indwelling Boss, hypertension here with breakthrough seizures.  Did have multiple breakthrough seizures today was given his benzodiazepine by mother with improvement.  She is also concerned he has a UTI.  She did notice some sediment in the urine as well as some darkening in the color.  Did have a mild fever over the course of the day.  Does have chronic cough.  No abdominal distention was noted.            Patient History   Medical History[1]  Surgical History[2]  Family History[3]  Social History[4]    Physical Exam   ED Triage Vitals [04/22/25 0144]   Temperature Heart Rate Respirations BP   37.7 °C (99.8 °F) (!) 105 18 107/70      Pulse Ox Temp Source Heart Rate Source Patient Position   (!) 90 % Axillary Monitor --      BP Location FiO2 (%)     -- --       Physical Exam  Vitals and nursing note reviewed.   Constitutional:       General: He is not in acute distress.     Appearance: He is not ill-appearing.      Comments: Nonresponsive   HENT:      Head: Normocephalic and atraumatic.      Nose: Nose normal.      Mouth/Throat:      Mouth: Mucous membranes are moist.      Pharynx: Oropharynx is clear.   Eyes:      Conjunctiva/sclera: Conjunctivae normal.      Pupils: Pupils are equal, round, and reactive to light.   Cardiovascular:      Rate and Rhythm: Normal rate and regular rhythm.      Pulses: Normal pulses.      Heart sounds: Normal heart sounds. No murmur heard.     No gallop.   Pulmonary:      Effort: Pulmonary effort is normal.      Breath sounds: Normal breath sounds. No stridor. No wheezing.   Abdominal:      General: Abdomen is flat. There is no distension.      Palpations: Abdomen is soft.      Tenderness: There is no right CVA tenderness.   Musculoskeletal:      Cervical back: Normal range  of motion.      Right lower leg: No edema.      Left lower leg: No edema.      Comments: On the buttocks he has stage I to stage II decubitus ulcer   Skin:     General: Skin is warm.      Capillary Refill: Capillary refill takes less than 2 seconds.   Neurological:      Comments: Minimally responsive at baseline           ED Course & MDM   Diagnoses as of 04/22/25 0424   Seizure (Multi)   Acute cystitis without hematuria                 No data recorded     Carly Coma Scale Score: 13 (04/22/25 0140 : Nasra Rogers RN)                           Medical Decision Making  We did attempt to place IV but unable to place this.  Mother did refuse any further sticks.  A chest x-ray was obtained which does appear fairly stable to prior imaging.  UA was obtained at with patient's mother's permission and does show no clear elevation in white blood cell in the urine.  Mother was concerned again though however that he has UTI and requesting antibiotic treatment for this.  Given her concern he will be treated with Cipro and plan to follow-up with outpatient provider. Shared decision making around labs and admission. Return precautions are discussed    Amount and/or Complexity of Data Reviewed  Labs: ordered. Decision-making details documented in ED Course.  Radiology: ordered. Decision-making details documented in ED Course.  ECG/medicine tests: ordered. Decision-making details documented in ED Course.        Procedure  Procedures       [1]   Past Medical History:  Diagnosis Date    Acute deep vein thrombosis of arm (Multi)     Acute encephalopathy     Acute Hypoxic Encephalopathy    Acute upper respiratory infection, unspecified     Depression     Dysphagia     Epilepsy     History of MRSA infection     Hypertension     Ileus (Multi)     Impacted cerumen, bilateral     Nausea with vomiting, unspecified     Neurogenic bladder     Other conditions influencing health status     Lumbar Puncture Traumatic Tap    Personal history  of urinary (tract) infections     Primary chronic progressive multiple sclerosis (Multi)     Pulmonary embolism 07/21/2024    Remote history of stroke     Respirator dependent (Multi)     Schizophrenia     Spastic quadriplegia (Multi)     Unspecified visual loss    [2]   Past Surgical History:  Procedure Laterality Date    GASTROSTOMY      ILEOSTOMY  08/05/2015    Ileostomy    OTHER SURGICAL HISTORY  09/16/2016    Feeding Tube    TRACHEOSTOMY TUBE PLACEMENT     [3] No family history on file.  [4]   Social History  Tobacco Use    Smoking status: Never    Smokeless tobacco: Never   Vaping Use    Vaping status: Never Used   Substance Use Topics    Alcohol use: Never    Drug use: Never        Thor Gallegos MD  04/22/25 0427

## 2025-04-22 NOTE — ED TRIAGE NOTES
Pt biba from home with complaints of seizures. Pts mom states he has had 2 seizures within the last 8 hours. Pt is nonverbal and has a pmh of seizures, MS and is quadriplegic.

## 2025-04-22 NOTE — DISCHARGE INSTRUCTIONS
Please take the ciprofloxacin as prescribed treat for possible UTI.  Please continue your medications for seizures.  Please turn ED for worsening breakthrough seizures, fever, vomiting or any other concerning symptoms

## 2025-04-28 ENCOUNTER — HOSPITAL ENCOUNTER (OUTPATIENT)
Dept: CARDIOLOGY | Facility: HOSPITAL | Age: 45
Discharge: HOME | End: 2025-04-28
Payer: MEDICAID

## 2025-04-28 DIAGNOSIS — G35 MULTIPLE SCLEROSIS (MULTI): ICD-10-CM

## 2025-04-28 PROCEDURE — 93005 ELECTROCARDIOGRAM TRACING: CPT

## 2025-04-29 RX ORDER — CHOLECALCIFEROL (VITAMIN D3) 10(400)/ML
DROPS ORAL
Qty: 300 ML | Refills: 3 | Status: SHIPPED | OUTPATIENT
Start: 2025-04-29

## 2025-05-01 LAB
ATRIAL RATE: 100 BPM
P AXIS: 78 DEGREES
P OFFSET: 188 MS
P ONSET: 134 MS
PR INTERVAL: 174 MS
Q ONSET: 221 MS
QRS COUNT: 17 BEATS
QRS DURATION: 102 MS
QT INTERVAL: 340 MS
QTC CALCULATION(BAZETT): 438 MS
QTC FREDERICIA: 403 MS
R AXIS: 63 DEGREES
T AXIS: 79 DEGREES
T OFFSET: 391 MS
VENTRICULAR RATE: 100 BPM

## 2025-05-05 ENCOUNTER — TELEPHONE (OUTPATIENT)
Dept: PRIMARY CARE | Facility: CLINIC | Age: 45
End: 2025-05-05
Payer: MEDICAID

## 2025-05-09 ENCOUNTER — TELEPHONE (OUTPATIENT)
Dept: PRIMARY CARE | Facility: CLINIC | Age: 45
End: 2025-05-09
Payer: MEDICAID

## 2025-05-15 PROCEDURE — RXMED WILLOW AMBULATORY MEDICATION CHARGE

## 2025-05-16 ENCOUNTER — PHARMACY VISIT (OUTPATIENT)
Dept: PHARMACY | Facility: CLINIC | Age: 45
End: 2025-05-16
Payer: MEDICAID

## 2025-05-23 ENCOUNTER — DOCUMENTATION (OUTPATIENT)
Dept: PRIMARY CARE | Facility: CLINIC | Age: 45
End: 2025-05-23
Payer: MEDICAID

## 2025-05-23 NOTE — TELEPHONE ENCOUNTER
Ms. Vee Edgar left a message requesting refills and supplies for Israel. I call ms. Edgar back to clarify what supplies and meds were needed. She instanly got irritated and stated everything is in his chart. I Explained to her Dr. Ken was out of the office ,but per Cristal ( Supervisor) we could have Dr. Becerril refill his meds and supplies . But I need the names to send the Message. She begin to say no one wants to do their job and Harshly telling me to be Quiet and Listen. I said to .  I am a MA and not the Doctor trying to help her with this. She yelled and told me she was contacting Patient Advocacy. I stated that was fine. I will give the message to Dr. Ken Tuesday to refill Israel's meds and supply .

## 2025-06-08 PROCEDURE — RXMED WILLOW AMBULATORY MEDICATION CHARGE

## 2025-06-09 DIAGNOSIS — L21.9 CHRONIC SEBORRHEIC DERMATITIS: ICD-10-CM

## 2025-06-09 RX ORDER — BACITRACIN ZINC AND POLYMYXIN B SULFATE 500; 10000 [USP'U]/G; [USP'U]/G
OINTMENT OPHTHALMIC
Qty: 3.5 G | Refills: 11 | Status: SHIPPED | OUTPATIENT
Start: 2025-06-09

## 2025-06-11 ENCOUNTER — PHARMACY VISIT (OUTPATIENT)
Dept: PHARMACY | Facility: CLINIC | Age: 45
End: 2025-06-11
Payer: MEDICAID

## 2025-06-18 DIAGNOSIS — G40.919 REFRACTORY EPILEPSY (MULTI): ICD-10-CM

## 2025-07-02 DIAGNOSIS — G35 MULTIPLE SCLEROSIS (MULTI): ICD-10-CM

## 2025-07-02 DIAGNOSIS — G40.919 REFRACTORY EPILEPSY (MULTI): Primary | ICD-10-CM

## 2025-07-02 DIAGNOSIS — L21.9 SEBORRHEIC DERMATITIS: Primary | ICD-10-CM

## 2025-07-02 RX ORDER — PHENYTOIN 50 MG/1
TABLET, CHEWABLE ORAL
Qty: 210 TABLET | Refills: 5 | Status: SHIPPED | OUTPATIENT
Start: 2025-07-02 | End: 2025-12-29

## 2025-07-02 RX ORDER — SELENIUM SULFIDE 2.5 MG/100ML
LOTION TOPICAL
Qty: 120 ML | Refills: 11 | Status: SHIPPED | OUTPATIENT
Start: 2025-07-02

## 2025-07-08 DIAGNOSIS — G35 MULTIPLE SCLEROSIS (MULTI): ICD-10-CM

## 2025-07-08 DIAGNOSIS — G40.919 REFRACTORY EPILEPSY (MULTI): ICD-10-CM

## 2025-07-08 PROCEDURE — RXMED WILLOW AMBULATORY MEDICATION CHARGE

## 2025-07-08 RX ORDER — CARBAMAZEPINE 200 MG/10ML
SUSPENSION ORAL
Qty: 3400 ML | Refills: 3 | Status: SHIPPED | OUTPATIENT
Start: 2025-07-08

## 2025-07-08 RX ORDER — LEVETIRACETAM 100 MG/ML
2000 SOLUTION ORAL 2 TIMES DAILY
Qty: 3600 ML | Refills: 3 | Status: SHIPPED | OUTPATIENT
Start: 2025-07-08 | End: 2026-07-08

## 2025-07-08 RX ORDER — CHOLECALCIFEROL (VITAMIN D3) 10(400)/ML
DROPS ORAL
Qty: 300 ML | Refills: 3 | Status: SHIPPED | OUTPATIENT
Start: 2025-07-08

## 2025-07-08 RX ORDER — CHOLECALCIFEROL (VITAMIN D3) 10(400)/ML
DROPS ORAL
Qty: 300 ML | Refills: 11 | Status: SHIPPED | OUTPATIENT
Start: 2025-07-08

## 2025-07-09 ENCOUNTER — PHARMACY VISIT (OUTPATIENT)
Dept: PHARMACY | Facility: CLINIC | Age: 45
End: 2025-07-09
Payer: MEDICAID

## 2025-07-10 ENCOUNTER — DOCUMENTATION (OUTPATIENT)
Dept: PRIMARY CARE | Facility: CLINIC | Age: 45
End: 2025-07-10
Payer: MEDICAID

## 2025-07-10 NOTE — PROGRESS NOTES
Patient;s Mom  Vee Edgar  called on 7-8-25 requesting a refill of Patient Vitamin D solution. I sent the request over to Dr. Ken which danielle filled on 7-2-15. Patient Mom called back complaing that the medication had not been sent. I verified the pharmacy and she agreed it was correct. I asked Dr. Ken to send the prescription again. The ms Edgar asked if Dr. Ken no longer wanted to see Israel as a patient ,because we can;t seem to do what she asked. I advise ms Edgar this was not the case and the Medication was sent. She was very upset  and wanted to know why Dr. Ken could not see her son sooner. I Advise as she had been advise before Dr. Ken did not have any openings for a physical until next year, She was offered to see Dr. Hernandez last week ,but stated she had to research him. I Transferred her to [facility] and aske her to scheduled patient  with Dr. Hernandez on next available and that is the 23rd of July. She was offered this appoint around 3pm on the 8th, but stated she needs to finish her Breakfast and would call tash back.

## 2025-07-22 ENCOUNTER — APPOINTMENT (OUTPATIENT)
Dept: PULMONOLOGY | Facility: HOSPITAL | Age: 45
End: 2025-07-22
Payer: MEDICAID

## 2025-08-06 PROCEDURE — RXMED WILLOW AMBULATORY MEDICATION CHARGE

## 2025-08-08 ENCOUNTER — PHARMACY VISIT (OUTPATIENT)
Dept: PHARMACY | Facility: CLINIC | Age: 45
End: 2025-08-08
Payer: MEDICAID

## 2025-08-18 ENCOUNTER — HOSPITAL ENCOUNTER (EMERGENCY)
Facility: HOSPITAL | Age: 45
Discharge: HOME | End: 2025-08-19
Attending: GENERAL PRACTICE
Payer: MEDICAID

## 2025-08-18 DIAGNOSIS — T83.511A URINARY TRACT INFECTION ASSOCIATED WITH INDWELLING URETHRAL CATHETER, INITIAL ENCOUNTER: Primary | ICD-10-CM

## 2025-08-18 DIAGNOSIS — N39.0 URINARY TRACT INFECTION ASSOCIATED WITH INDWELLING URETHRAL CATHETER, INITIAL ENCOUNTER: Primary | ICD-10-CM

## 2025-08-18 LAB
ANION GAP SERPL CALC-SCNC: 11 MMOL/L (ref 10–20)
APPEARANCE UR: ABNORMAL
BACTERIA #/AREA URNS AUTO: ABNORMAL /HPF
BASOPHILS # BLD AUTO: 0.07 X10*3/UL (ref 0–0.1)
BASOPHILS NFR BLD AUTO: 1.6 %
BILIRUB UR STRIP.AUTO-MCNC: NEGATIVE MG/DL
BUN SERPL-MCNC: 12 MG/DL (ref 6–23)
CALCIUM SERPL-MCNC: 8.9 MG/DL (ref 8.6–10.3)
CHLORIDE SERPL-SCNC: 95 MMOL/L (ref 98–107)
CO2 SERPL-SCNC: 33 MMOL/L (ref 21–32)
COLOR UR: ABNORMAL
CREAT SERPL-MCNC: 0.39 MG/DL (ref 0.5–1.3)
EGFRCR SERPLBLD CKD-EPI 2021: >90 ML/MIN/1.73M*2
EOSINOPHIL # BLD AUTO: 0.08 X10*3/UL (ref 0–0.7)
EOSINOPHIL NFR BLD AUTO: 1.8 %
ERYTHROCYTE [DISTWIDTH] IN BLOOD BY AUTOMATED COUNT: 12.1 % (ref 11.5–14.5)
GLUCOSE SERPL-MCNC: 131 MG/DL (ref 74–99)
GLUCOSE UR STRIP.AUTO-MCNC: NORMAL MG/DL
HCT VFR BLD AUTO: 44.2 % (ref 41–52)
HGB BLD-MCNC: 15 G/DL (ref 13.5–17.5)
IMM GRANULOCYTES # BLD AUTO: 0.04 X10*3/UL (ref 0–0.7)
IMM GRANULOCYTES NFR BLD AUTO: 0.9 % (ref 0–0.9)
KETONES UR STRIP.AUTO-MCNC: NEGATIVE MG/DL
LEUKOCYTE ESTERASE UR QL STRIP.AUTO: ABNORMAL
LYMPHOCYTES # BLD AUTO: 1.23 X10*3/UL (ref 1.2–4.8)
LYMPHOCYTES NFR BLD AUTO: 27.4 %
MCH RBC QN AUTO: 32.1 PG (ref 26–34)
MCHC RBC AUTO-ENTMCNC: 33.9 G/DL (ref 32–36)
MCV RBC AUTO: 94 FL (ref 80–100)
MONOCYTES # BLD AUTO: 0.96 X10*3/UL (ref 0.1–1)
MONOCYTES NFR BLD AUTO: 21.4 %
MUCOUS THREADS #/AREA URNS AUTO: ABNORMAL /LPF
NEUTROPHILS # BLD AUTO: 2.11 X10*3/UL (ref 1.2–7.7)
NEUTROPHILS NFR BLD AUTO: 46.9 %
NITRITE UR QL STRIP.AUTO: NEGATIVE
NRBC BLD-RTO: 0 /100 WBCS (ref 0–0)
PH UR STRIP.AUTO: 7 [PH]
PLATELET # BLD AUTO: 274 X10*3/UL (ref 150–450)
POTASSIUM SERPL-SCNC: 3.8 MMOL/L (ref 3.5–5.3)
PROT UR STRIP.AUTO-MCNC: ABNORMAL MG/DL
RBC # BLD AUTO: 4.68 X10*6/UL (ref 4.5–5.9)
RBC # UR STRIP.AUTO: ABNORMAL MG/DL
RBC #/AREA URNS AUTO: >20 /HPF
SODIUM SERPL-SCNC: 135 MMOL/L (ref 136–145)
SP GR UR STRIP.AUTO: 1.01
UROBILINOGEN UR STRIP.AUTO-MCNC: NORMAL MG/DL
WBC # BLD AUTO: 4.5 X10*3/UL (ref 4.4–11.3)
WBC #/AREA URNS AUTO: >50 /HPF
WBC CLUMPS #/AREA URNS AUTO: ABNORMAL /HPF

## 2025-08-18 PROCEDURE — 85025 COMPLETE CBC W/AUTO DIFF WBC: CPT | Performed by: GENERAL PRACTICE

## 2025-08-18 PROCEDURE — 51702 INSERT TEMP BLADDER CATH: CPT

## 2025-08-18 PROCEDURE — 2500000001 HC RX 250 WO HCPCS SELF ADMINISTERED DRUGS (ALT 637 FOR MEDICARE OP): Performed by: GENERAL PRACTICE

## 2025-08-18 PROCEDURE — 99283 EMERGENCY DEPT VISIT LOW MDM: CPT | Performed by: GENERAL PRACTICE

## 2025-08-18 PROCEDURE — 87086 URINE CULTURE/COLONY COUNT: CPT | Mod: AHULAB | Performed by: GENERAL PRACTICE

## 2025-08-18 PROCEDURE — 80048 BASIC METABOLIC PNL TOTAL CA: CPT | Performed by: GENERAL PRACTICE

## 2025-08-18 PROCEDURE — 81001 URINALYSIS AUTO W/SCOPE: CPT | Performed by: GENERAL PRACTICE

## 2025-08-18 PROCEDURE — 36415 COLL VENOUS BLD VENIPUNCTURE: CPT | Performed by: GENERAL PRACTICE

## 2025-08-18 RX ORDER — CIPROFLOXACIN 500 MG/1
500 TABLET, FILM COATED ORAL ONCE
Status: COMPLETED | OUTPATIENT
Start: 2025-08-18 | End: 2025-08-18

## 2025-08-18 RX ORDER — CIPROFLOXACIN 500 MG/1
500 TABLET, FILM COATED ORAL 2 TIMES DAILY
Qty: 14 TABLET | Refills: 0 | Status: SHIPPED | OUTPATIENT
Start: 2025-08-18 | End: 2025-08-22

## 2025-08-18 RX ADMIN — CIPROFLOXACIN 500 MG: 500 TABLET ORAL at 23:30

## 2025-08-18 ASSESSMENT — PAIN - FUNCTIONAL ASSESSMENT: PAIN_FUNCTIONAL_ASSESSMENT: FLACC (FACE, LEGS, ACTIVITY, CRY, CONSOLABILITY)

## 2025-08-19 VITALS
DIASTOLIC BLOOD PRESSURE: 89 MMHG | HEART RATE: 85 BPM | TEMPERATURE: 97.2 F | OXYGEN SATURATION: 97 % | HEIGHT: 71 IN | BODY MASS INDEX: 22.32 KG/M2 | SYSTOLIC BLOOD PRESSURE: 126 MMHG | RESPIRATION RATE: 16 BRPM

## 2025-08-21 LAB
BACTERIA UR CULT: ABNORMAL
BACTERIA UR CULT: ABNORMAL

## 2025-08-22 ENCOUNTER — RESULTS FOLLOW-UP (OUTPATIENT)
Dept: PHARMACY | Facility: HOSPITAL | Age: 45
End: 2025-08-22
Payer: MEDICAID

## 2025-08-22 DIAGNOSIS — N30.00 ACUTE CYSTITIS WITHOUT HEMATURIA: Primary | ICD-10-CM

## 2025-08-22 RX ORDER — GRANULES FOR ORAL 3 G/1
POWDER ORAL
Qty: 3 G | Refills: 0 | Status: SHIPPED | OUTPATIENT
Start: 2025-08-22

## 2025-09-17 ENCOUNTER — APPOINTMENT (OUTPATIENT)
Dept: PRIMARY CARE | Facility: CLINIC | Age: 45
End: 2025-09-17
Payer: MEDICAID